# Patient Record
Sex: FEMALE | Race: WHITE | URBAN - METROPOLITAN AREA
[De-identification: names, ages, dates, MRNs, and addresses within clinical notes are randomized per-mention and may not be internally consistent; named-entity substitution may affect disease eponyms.]

---

## 2018-12-01 ENCOUNTER — EMERGENCY (EMERGENCY)
Facility: HOSPITAL | Age: 68
LOS: 0 days | Discharge: HOME | End: 2018-12-02
Attending: FAMILY MEDICINE | Admitting: FAMILY MEDICINE
Payer: MEDICARE

## 2018-12-01 VITALS
HEART RATE: 55 BPM | TEMPERATURE: 98 F | WEIGHT: 110.01 LBS | DIASTOLIC BLOOD PRESSURE: 61 MMHG | HEIGHT: 61 IN | SYSTOLIC BLOOD PRESSURE: 154 MMHG | RESPIRATION RATE: 20 BRPM

## 2018-12-01 DIAGNOSIS — N93.9 ABNORMAL UTERINE AND VAGINAL BLEEDING, UNSPECIFIED: ICD-10-CM

## 2018-12-01 DIAGNOSIS — F01.50 VASCULAR DEMENTIA WITHOUT BEHAVIORAL DISTURBANCE: ICD-10-CM

## 2018-12-01 DIAGNOSIS — Z79.899 OTHER LONG TERM (CURRENT) DRUG THERAPY: ICD-10-CM

## 2018-12-01 DIAGNOSIS — N93.8 OTHER SPECIFIED ABNORMAL UTERINE AND VAGINAL BLEEDING: ICD-10-CM

## 2018-12-01 DIAGNOSIS — N39.0 URINARY TRACT INFECTION, SITE NOT SPECIFIED: ICD-10-CM

## 2018-12-01 DIAGNOSIS — B96.1 KLEBSIELLA PNEUMONIAE [K. PNEUMONIAE] AS THE CAUSE OF DISEASES CLASSIFIED ELSEWHERE: ICD-10-CM

## 2018-12-01 DIAGNOSIS — M33.13 OTHER DERMATOMYOSITIS WITHOUT MYOPATHY: ICD-10-CM

## 2018-12-01 LAB
APPEARANCE UR: ABNORMAL
BACTERIA # UR AUTO: ABNORMAL
BASOPHILS # BLD AUTO: 0.04 K/UL — SIGNIFICANT CHANGE UP (ref 0–0.2)
BASOPHILS NFR BLD AUTO: 0.5 % — SIGNIFICANT CHANGE UP (ref 0–2)
BILIRUB UR-MCNC: NEGATIVE — SIGNIFICANT CHANGE UP
COLOR SPEC: ABNORMAL
DIFF PNL FLD: ABNORMAL
EOSINOPHIL # BLD AUTO: 0.46 K/UL — SIGNIFICANT CHANGE UP (ref 0–0.5)
EOSINOPHIL NFR BLD AUTO: 5.5 % — SIGNIFICANT CHANGE UP (ref 0–6)
EPI CELLS # UR: SIGNIFICANT CHANGE UP
GLUCOSE UR QL: NEGATIVE MG/DL — SIGNIFICANT CHANGE UP
HCT VFR BLD CALC: 41 % — SIGNIFICANT CHANGE UP (ref 34.5–45)
HGB BLD-MCNC: 13 G/DL — SIGNIFICANT CHANGE UP (ref 11.5–15.5)
IMM GRANULOCYTES NFR BLD AUTO: 0.4 % — SIGNIFICANT CHANGE UP (ref 0–1.5)
KETONES UR-MCNC: NEGATIVE — SIGNIFICANT CHANGE UP
LEUKOCYTE ESTERASE UR-ACNC: NEGATIVE — SIGNIFICANT CHANGE UP
LYMPHOCYTES # BLD AUTO: 2.37 K/UL — SIGNIFICANT CHANGE UP (ref 1–3.3)
LYMPHOCYTES # BLD AUTO: 28.6 % — SIGNIFICANT CHANGE UP (ref 13–44)
MCHC RBC-ENTMCNC: 31.3 PG — SIGNIFICANT CHANGE UP (ref 27–34)
MCHC RBC-ENTMCNC: 31.7 GM/DL — LOW (ref 32–36)
MCV RBC AUTO: 98.8 FL — SIGNIFICANT CHANGE UP (ref 80–100)
MONOCYTES # BLD AUTO: 0.62 K/UL — SIGNIFICANT CHANGE UP (ref 0–0.9)
MONOCYTES NFR BLD AUTO: 7.5 % — SIGNIFICANT CHANGE UP (ref 2–14)
NEUTROPHILS # BLD AUTO: 4.77 K/UL — SIGNIFICANT CHANGE UP (ref 1.8–7.4)
NEUTROPHILS NFR BLD AUTO: 57.5 % — SIGNIFICANT CHANGE UP (ref 43–77)
NITRITE UR-MCNC: POSITIVE
NRBC # BLD: 0 /100 WBCS — SIGNIFICANT CHANGE UP (ref 0–0)
PH UR: 5 — SIGNIFICANT CHANGE UP (ref 5–8)
PLATELET # BLD AUTO: 189 K/UL — SIGNIFICANT CHANGE UP (ref 150–400)
PROT UR-MCNC: 30 MG/DL
RBC # BLD: 4.15 M/UL — SIGNIFICANT CHANGE UP (ref 3.8–5.2)
RBC # FLD: 13.3 % — SIGNIFICANT CHANGE UP (ref 10.3–14.5)
RBC CASTS # UR COMP ASSIST: >50 /HPF (ref 0–4)
SP GR SPEC: 1.02 — SIGNIFICANT CHANGE UP (ref 1.01–1.02)
UROBILINOGEN FLD QL: NEGATIVE MG/DL — SIGNIFICANT CHANGE UP
WBC # BLD: 8.29 K/UL — SIGNIFICANT CHANGE UP (ref 3.8–10.5)
WBC # FLD AUTO: 8.29 K/UL — SIGNIFICANT CHANGE UP (ref 3.8–10.5)
WBC UR QL: SIGNIFICANT CHANGE UP

## 2018-12-01 PROCEDURE — 99285 EMERGENCY DEPT VISIT HI MDM: CPT | Mod: 25

## 2018-12-01 NOTE — ED PROVIDER NOTE - OBJECTIVE STATEMENT
69 y/o female with PMHx of HTN, dermatomyositis (gets IVIG infusions once a month), vascular dementia presents to the ED sent from [facility] [place] regarding vaginal bleeding. Per pt's son who is her healthcare proxy. Stacya called him yesterday and relayed that pt was experiencing vaginal bleeding. Denies pain anywhere, lightheadedness. NKDA. 67 y/o female with PMHx of HTN, dermatomyositis (gets IVIG infusions once a month), vascular dementia presents to the ED sent from [facility] at [place] regarding vaginal bleeding. Per pt's son who is her healthcare proxy, [facility] called him yesterday and relayed that pt was experiencing vaginal bleeding. Denies pain anywhere, lightheadedness. NKDA.

## 2018-12-01 NOTE — ED PROVIDER NOTE - PROGRESS NOTE DETAILS
Carol AMIN for ED attending, Dr. Saldivar: Stool guaiac performed by Dr. Saldivar. Lot #: [id number] ; Expiration: 3/31/19 ; Result: Positive Negative ; QC- reactive. Carol AMIN for ED attending, Dr. Saldivar: Stool guaiac performed by Dr. Saldivar. Lot #: [id number] ; Expiration: 6/30/19 ; Result: slight heme positive ; QC- reactive. Brown stool. Carol AMIN for ED attending, Dr. Saldivar: Vaginal exam performed by Dr. Saldivar. Slight discharge, no active bleeding noted.

## 2018-12-02 VITALS
HEART RATE: 57 BPM | OXYGEN SATURATION: 100 % | DIASTOLIC BLOOD PRESSURE: 74 MMHG | SYSTOLIC BLOOD PRESSURE: 153 MMHG | RESPIRATION RATE: 19 BRPM

## 2018-12-02 LAB
ABO RH CONFIRMATION: SIGNIFICANT CHANGE UP
ALBUMIN SERPL ELPH-MCNC: 3.4 G/DL — SIGNIFICANT CHANGE UP (ref 3.3–5)
ALP SERPL-CCNC: 91 U/L — SIGNIFICANT CHANGE UP (ref 40–120)
ALT FLD-CCNC: 24 U/L — SIGNIFICANT CHANGE UP (ref 12–78)
ANION GAP SERPL CALC-SCNC: 9 MMOL/L — SIGNIFICANT CHANGE UP (ref 5–17)
APTT BLD: 28 SEC — SIGNIFICANT CHANGE UP (ref 27.5–36.3)
AST SERPL-CCNC: 22 U/L — SIGNIFICANT CHANGE UP (ref 15–37)
BILIRUB SERPL-MCNC: 0.3 MG/DL — SIGNIFICANT CHANGE UP (ref 0.2–1.2)
BLD GP AB SCN SERPL QL: SIGNIFICANT CHANGE UP
BUN SERPL-MCNC: 13 MG/DL — SIGNIFICANT CHANGE UP (ref 7–23)
CALCIUM SERPL-MCNC: 8.8 MG/DL — SIGNIFICANT CHANGE UP (ref 8.5–10.1)
CHLORIDE SERPL-SCNC: 109 MMOL/L — HIGH (ref 96–108)
CO2 SERPL-SCNC: 27 MMOL/L — SIGNIFICANT CHANGE UP (ref 22–31)
CREAT SERPL-MCNC: 0.66 MG/DL — SIGNIFICANT CHANGE UP (ref 0.5–1.3)
GLUCOSE SERPL-MCNC: 81 MG/DL — SIGNIFICANT CHANGE UP (ref 70–99)
INR BLD: 1.03 RATIO — SIGNIFICANT CHANGE UP (ref 0.88–1.16)
POTASSIUM SERPL-MCNC: 3.3 MMOL/L — LOW (ref 3.5–5.3)
POTASSIUM SERPL-SCNC: 3.3 MMOL/L — LOW (ref 3.5–5.3)
PROT SERPL-MCNC: 7.9 GM/DL — SIGNIFICANT CHANGE UP (ref 6–8.3)
PROTHROM AB SERPL-ACNC: 11.5 SEC — SIGNIFICANT CHANGE UP (ref 10–12.9)
SODIUM SERPL-SCNC: 145 MMOL/L — SIGNIFICANT CHANGE UP (ref 135–145)
TYPE + AB SCN PNL BLD: SIGNIFICANT CHANGE UP

## 2018-12-02 RX ORDER — CEFTRIAXONE 500 MG/1
1000 INJECTION, POWDER, FOR SOLUTION INTRAMUSCULAR; INTRAVENOUS ONCE
Qty: 0 | Refills: 0 | Status: COMPLETED | OUTPATIENT
Start: 2018-12-02 | End: 2018-12-02

## 2018-12-02 RX ADMIN — CEFTRIAXONE 1000 MILLIGRAM(S): 500 INJECTION, POWDER, FOR SOLUTION INTRAMUSCULAR; INTRAVENOUS at 00:55

## 2018-12-02 NOTE — ED ADULT NURSE NOTE - OBJECTIVE STATEMENT
Patient presents to ED complaining of vaginal bleeding. Patient soming from [facility], A&0x1, son at bedside. Patient unable to answer questions regarding current medical status. Son states he was contacted by NH Patient presents to ED complaining of vaginal bleeding. Patient coming from [facility], A&0x1, son at bedside. Patient unable to answer questions regarding current medical status. Son states he was contacted by NH. Patient denies pain, dizziness, CP, SOB, NVD

## 2018-12-02 NOTE — ED ADULT NURSE NOTE - NSIMPLEMENTINTERV_GEN_ALL_ED
Implemented All Fall with Harm Risk Interventions:  [place] to call system. Call bell, personal items and telephone within reach. Instruct patient to call for assistance. Room bathroom lighting operational. Non-slip footwear when patient is off stretcher. Physically safe environment: no spills, clutter or unnecessary equipment. Stretcher in lowest position, wheels locked, appropriate side rails in place. Provide visual cue, wrist band, yellow gown, etc. Monitor gait and stability. Monitor for mental status changes and reorient to person, place, and time. Review medications for side effects contributing to fall risk. Reinforce activity limits and safety measures with patient and family. Provide visual clues: red socks.

## 2018-12-05 NOTE — ED POST DISCHARGE NOTE - DETAILS
left message for pt's son to call back ED  Rhoda Guillermo PA-C Spoke with nurse at [facility] regarding postive UC. Will treat with cirpo. Discussed close PMD FU, return precatutions. -Wes Oakley PA-C

## 2018-12-06 RX ORDER — MOXIFLOXACIN HYDROCHLORIDE TABLETS, 400 MG 400 MG/1
1 TABLET, FILM COATED ORAL
Qty: 14 | Refills: 0
Start: 2018-12-06 | End: 2018-12-12

## 2019-03-28 ENCOUNTER — EMERGENCY (EMERGENCY)
Facility: HOSPITAL | Age: 69
LOS: 1 days | Discharge: HOME | End: 2019-03-28
Attending: EMERGENCY MEDICINE | Admitting: STUDENT IN AN ORGANIZED HEALTH CARE EDUCATION/TRAINING PROGRAM
Payer: MEDICARE

## 2019-03-28 VITALS
RESPIRATION RATE: 16 BRPM | HEART RATE: 64 BPM | DIASTOLIC BLOOD PRESSURE: 78 MMHG | OXYGEN SATURATION: 99 % | TEMPERATURE: 98 F | SYSTOLIC BLOOD PRESSURE: 101 MMHG

## 2019-03-28 VITALS — WEIGHT: 139.99 LBS

## 2019-03-28 DIAGNOSIS — Z79.899 OTHER LONG TERM (CURRENT) DRUG THERAPY: ICD-10-CM

## 2019-03-28 DIAGNOSIS — I10 ESSENTIAL (PRIMARY) HYPERTENSION: ICD-10-CM

## 2019-03-28 DIAGNOSIS — Z00.00 ENCOUNTER FOR GENERAL ADULT MEDICAL EXAMINATION WITHOUT ABNORMAL FINDINGS: ICD-10-CM

## 2019-03-28 DIAGNOSIS — M33.10 OTHER DERMATOMYOSITIS, ORGAN INVOLVEMENT UNSPECIFIED: ICD-10-CM

## 2019-03-28 DIAGNOSIS — F01.50 VASCULAR DEMENTIA WITHOUT BEHAVIORAL DISTURBANCE: ICD-10-CM

## 2019-03-28 DIAGNOSIS — E87.6 HYPOKALEMIA: ICD-10-CM

## 2019-03-28 PROBLEM — M33.90 DERMATOPOLYMYOSITIS, UNSPECIFIED, ORGAN INVOLVEMENT UNSPECIFIED: Chronic | Status: ACTIVE | Noted: 2018-12-02

## 2019-03-28 LAB
ADD ON TEST-SPECIMEN IN LAB: SIGNIFICANT CHANGE UP
ALBUMIN SERPL ELPH-MCNC: 3.5 G/DL — SIGNIFICANT CHANGE UP (ref 3.3–5)
ALP SERPL-CCNC: 77 U/L — SIGNIFICANT CHANGE UP (ref 40–120)
ALT FLD-CCNC: 23 U/L — SIGNIFICANT CHANGE UP (ref 12–78)
ANION GAP SERPL CALC-SCNC: 8 MMOL/L — SIGNIFICANT CHANGE UP (ref 5–17)
AST SERPL-CCNC: 24 U/L — SIGNIFICANT CHANGE UP (ref 15–37)
BASOPHILS # BLD AUTO: 0.05 K/UL — SIGNIFICANT CHANGE UP (ref 0–0.2)
BASOPHILS NFR BLD AUTO: 0.7 % — SIGNIFICANT CHANGE UP (ref 0–2)
BILIRUB SERPL-MCNC: 0.2 MG/DL — SIGNIFICANT CHANGE UP (ref 0.2–1.2)
BUN SERPL-MCNC: 16 MG/DL — SIGNIFICANT CHANGE UP (ref 7–23)
CALCIUM SERPL-MCNC: 8.7 MG/DL — SIGNIFICANT CHANGE UP (ref 8.5–10.1)
CHLORIDE SERPL-SCNC: 104 MMOL/L — SIGNIFICANT CHANGE UP (ref 96–108)
CK SERPL-CCNC: 61 U/L — SIGNIFICANT CHANGE UP (ref 26–192)
CO2 SERPL-SCNC: 27 MMOL/L — SIGNIFICANT CHANGE UP (ref 22–31)
CREAT SERPL-MCNC: 0.89 MG/DL — SIGNIFICANT CHANGE UP (ref 0.5–1.3)
EOSINOPHIL # BLD AUTO: 0.24 K/UL — SIGNIFICANT CHANGE UP (ref 0–0.5)
EOSINOPHIL NFR BLD AUTO: 3.2 % — SIGNIFICANT CHANGE UP (ref 0–6)
ERYTHROCYTE [SEDIMENTATION RATE] IN BLOOD: 76 MM/HR — HIGH (ref 0–20)
GLUCOSE SERPL-MCNC: 142 MG/DL — HIGH (ref 70–99)
HCT VFR BLD CALC: 37.1 % — SIGNIFICANT CHANGE UP (ref 34.5–45)
HGB BLD-MCNC: 11.9 G/DL — SIGNIFICANT CHANGE UP (ref 11.5–15.5)
IMM GRANULOCYTES NFR BLD AUTO: 0.4 % — SIGNIFICANT CHANGE UP (ref 0–1.5)
LYMPHOCYTES # BLD AUTO: 1.67 K/UL — SIGNIFICANT CHANGE UP (ref 1–3.3)
LYMPHOCYTES # BLD AUTO: 22.1 % — SIGNIFICANT CHANGE UP (ref 13–44)
MCHC RBC-ENTMCNC: 32.1 GM/DL — SIGNIFICANT CHANGE UP (ref 32–36)
MCHC RBC-ENTMCNC: 32.2 PG — SIGNIFICANT CHANGE UP (ref 27–34)
MCV RBC AUTO: 100.5 FL — HIGH (ref 80–100)
MONOCYTES # BLD AUTO: 0.45 K/UL — SIGNIFICANT CHANGE UP (ref 0–0.9)
MONOCYTES NFR BLD AUTO: 6 % — SIGNIFICANT CHANGE UP (ref 2–14)
NEUTROPHILS # BLD AUTO: 5.12 K/UL — SIGNIFICANT CHANGE UP (ref 1.8–7.4)
NEUTROPHILS NFR BLD AUTO: 67.6 % — SIGNIFICANT CHANGE UP (ref 43–77)
NRBC # BLD: 0 /100 WBCS — SIGNIFICANT CHANGE UP (ref 0–0)
PLATELET # BLD AUTO: 218 K/UL — SIGNIFICANT CHANGE UP (ref 150–400)
POTASSIUM SERPL-MCNC: 3.3 MMOL/L — LOW (ref 3.5–5.3)
POTASSIUM SERPL-SCNC: 3.3 MMOL/L — LOW (ref 3.5–5.3)
PROT SERPL-MCNC: 8.4 GM/DL — HIGH (ref 6–8.3)
RBC # BLD: 3.69 M/UL — LOW (ref 3.8–5.2)
RBC # FLD: 14.6 % — HIGH (ref 10.3–14.5)
SODIUM SERPL-SCNC: 139 MMOL/L — SIGNIFICANT CHANGE UP (ref 135–145)
TSH SERPL-MCNC: 3.44 UU/ML — SIGNIFICANT CHANGE UP (ref 0.34–4.82)
WBC # BLD: 7.56 K/UL — SIGNIFICANT CHANGE UP (ref 3.8–10.5)
WBC # FLD AUTO: 7.56 K/UL — SIGNIFICANT CHANGE UP (ref 3.8–10.5)

## 2019-03-28 PROCEDURE — 99284 EMERGENCY DEPT VISIT MOD MDM: CPT

## 2019-03-28 RX ORDER — POTASSIUM CHLORIDE 20 MEQ
20 PACKET (EA) ORAL ONCE
Qty: 0 | Refills: 0 | Status: COMPLETED | OUTPATIENT
Start: 2019-03-28 | End: 2019-03-28

## 2019-03-28 RX ORDER — SODIUM CHLORIDE 9 MG/ML
500 INJECTION INTRAMUSCULAR; INTRAVENOUS; SUBCUTANEOUS ONCE
Qty: 0 | Refills: 0 | Status: COMPLETED | OUTPATIENT
Start: 2019-03-28 | End: 2019-03-28

## 2019-03-28 RX ADMIN — SODIUM CHLORIDE 500 MILLILITER(S): 9 INJECTION INTRAMUSCULAR; INTRAVENOUS; SUBCUTANEOUS at 16:53

## 2019-03-28 RX ADMIN — Medication 20 MILLIEQUIVALENT(S): at 18:30

## 2019-03-28 NOTE — ED PROVIDER NOTE - NSFOLLOWUPINSTRUCTIONS_ED_ALL_ED_FT
1) Follow up with your doctor and bring these results.   2) Return to the ER immediately for new or worsening symptoms including passing out, chest pain, or other issues.   3) continue all medications as prescribed.

## 2019-03-28 NOTE — ED PROVIDER NOTE - CARE PLAN
Principal Discharge DX:	Dermatomyositis  Secondary Diagnosis:	Hypokalemia  Secondary Diagnosis:	Vascular dementia

## 2019-03-28 NOTE — ED PROVIDER NOTE - CLINICAL SUMMARY MEDICAL DECISION MAKING FREE TEXT BOX
69 y/o F with hx of Dermatomyositis, Vascular Dementia, HTN presenting to ED for blood work. Will get lab, hydrate, and reassess.

## 2019-03-28 NOTE — ED ADULT TRIAGE NOTE - CHIEF COMPLAINT QUOTE
Son states that pt is to received IVIG infusionsmonthly and requries bloodwork prior to infusion. son states lab ahs been unable to obtain lab work 3 times, due to pt being difficult stick.  son advised to go to ED for pt to obtain lab work. pt feeling well at baseline, vomitted 1 time in car after eatting

## 2019-03-28 NOTE — ED PROVIDER NOTE - PROGRESS NOTE DETAILS
Warren STEWARD - patient finished fluids, and pulled own IV. will print copy of results to patient and dc home. Patient and sone bedside agree with plan.

## 2019-03-28 NOTE — ED PROVIDER NOTE - OBJECTIVE STATEMENT
69 y/o F with PMHx of Dermatomyositis, HTN, and Vascular Dementia presenting to the ED from Nursing Home for medical evaluation, blood work. Son reports that pt has been receiving monthly IVIG infusions for dermatomyositis. Pt was receiving these infusions from Rheumatologist Dr. Russo and [place] but is scheduled to receive her next infusion from Dr. Kern at [place]. Son states that pt requires blood work prior to receiving IVIG infusion at Dr. Kern's office but multiple attempts to obtain labs as outpatient have been unsuccessful due to pt being a difficult stick. Son brought pt into ED to obtain this blood work. Pt with no complaints at this time. NKDA. Unable to obtain full HPI secondary to pt's hx of Dementia.

## 2019-03-28 NOTE — ED PROVIDER NOTE - NEUROLOGICAL, MLM
Alert. No focal deficits, no motor or sensory deficits. Cranial Nerves II-XII intact. Normal speech.

## 2019-03-28 NOTE — ED PROVIDER NOTE - ATTENDING CONTRIBUTION TO CARE
Dr. Carter: I have personally performed a face to face bedside history and physical examination of this patient. I have discussed the history, examination, review of systems, assessment and plan of management with the resident. I have reviewed the electronic medical record and amended it to reflect my history, review of systems, physical exam, assessment and plan.

## 2019-03-28 NOTE — ED STATDOCS - PROGRESS NOTE DETAILS
Carol Gonzalez for attending Dr. Blackburn: Pt's son instructed specialized labs are not done in ED.

## 2019-03-28 NOTE — ED STATDOCS - OBJECTIVE STATEMENT
67 y/o female with a PMHx of dermatomyositis, HTN, vascular dementia presents to the ED for blood work. As per son, pt receives IVIG infusions monthly and requires blood work prior to infusion. Son states that they are unable to obtain labs due to pt being a difficult stick. Pt vomited once PTA. No other complaints at this time.

## 2019-03-29 LAB
CRP SERPL-MCNC: 0.45 MG/DL — HIGH (ref 0–0.4)
T3 SERPL-MCNC: 77 NG/DL — LOW (ref 80–200)
T4 AB SER-ACNC: 5.5 UG/DL — SIGNIFICANT CHANGE UP (ref 4.6–12)

## 2019-04-09 ENCOUNTER — EMERGENCY (EMERGENCY)
Facility: HOSPITAL | Age: 69
LOS: 0 days | Discharge: HOME | End: 2019-04-09
Attending: EMERGENCY MEDICINE | Admitting: EMERGENCY MEDICINE
Payer: MEDICARE

## 2019-04-09 VITALS
HEART RATE: 59 BPM | RESPIRATION RATE: 18 BRPM | TEMPERATURE: 98 F | SYSTOLIC BLOOD PRESSURE: 118 MMHG | OXYGEN SATURATION: 100 % | DIASTOLIC BLOOD PRESSURE: 62 MMHG

## 2019-04-09 VITALS — TEMPERATURE: 98 F

## 2019-04-09 DIAGNOSIS — Z87.2 PERSONAL HISTORY OF DISEASES OF THE SKIN AND SUBCUTANEOUS TISSUE: ICD-10-CM

## 2019-04-09 DIAGNOSIS — S09.90XA UNSPECIFIED INJURY OF HEAD, INITIAL ENCOUNTER: ICD-10-CM
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
## 2019-04-09 DIAGNOSIS — F32.9 MAJOR DEPRESSIVE DISORDER, SINGLE EPISODE, UNSPECIFIED: ICD-10-CM

## 2019-04-09 DIAGNOSIS — Z79.899 OTHER LONG TERM (CURRENT) DRUG THERAPY: ICD-10-CM
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
## 2019-04-09 DIAGNOSIS — F01.50 VASCULAR DEMENTIA WITHOUT BEHAVIORAL DISTURBANCE: ICD-10-CM

## 2019-04-09 LAB
APTT BLD: 28 SEC — SIGNIFICANT CHANGE UP (ref 27.5–36.3)
INR BLD: 0.98 RATIO — SIGNIFICANT CHANGE UP (ref 0.88–1.16)
PROTHROM AB SERPL-ACNC: 10.9 SEC — SIGNIFICANT CHANGE UP (ref 10–12.9)

## 2019-04-09 PROCEDURE — 73502 X-RAY EXAM HIP UNI 2-3 VIEWS: CPT | Mod: 26,LT

## 2019-04-09 PROCEDURE — 73590 X-RAY EXAM OF LOWER LEG: CPT | Mod: 26,LT

## 2019-04-09 PROCEDURE — 99284 EMERGENCY DEPT VISIT MOD MDM: CPT

## 2019-04-09 PROCEDURE — 72125 CT NECK SPINE W/O DYE: CPT | Mod: 26

## 2019-04-09 PROCEDURE — 72170 X-RAY EXAM OF PELVIS: CPT | Mod: 26,59

## 2019-04-09 PROCEDURE — 76376 3D RENDER W/INTRP POSTPROCES: CPT | Mod: 26

## 2019-04-09 PROCEDURE — 72192 CT PELVIS W/O DYE: CPT | Mod: 26

## 2019-04-09 PROCEDURE — 73552 X-RAY EXAM OF FEMUR 2/>: CPT | Mod: 26,LT

## 2019-04-09 PROCEDURE — 70450 CT HEAD/BRAIN W/O DYE: CPT | Mod: 26

## 2019-04-09 PROCEDURE — 71045 X-RAY EXAM CHEST 1 VIEW: CPT | Mod: 26

## 2019-04-09 NOTE — ED PROVIDER NOTE - PHYSICAL EXAMINATION
***GEN - NAD; well appearing; not alert and oriented   ***PULMONARY - CTA b/l, symmetric breath sounds.   ***CARDIAC -s1s2, RRR, no M,G,R  ***ABDOMEN - ND, NT, soft, no guarding, no rebound, no carina's   ***SKIN - no rash. +old bruise right forearm, old abrasion right shin.   ***NEUROLOGIC - not alert and oriented sensation nl, motor nl,   ***PSYCH - not alert and oriented  +left shin mild TTP

## 2019-04-09 NOTE — ED PROVIDER NOTE - OBJECTIVE STATEMENT
67 y/o female with a PMHx of vascular dementia, HTN, dermatomyositis presents to the ED from [facility] s/p fall this morning. Pt does not know where she is or what year it is, does not know why she came to the hospital. Denies any pain. History limited secondary to pt dementia. 67 y/o female with a PMHx of vascular dementia, HTN, dermatomyositis presents to the ED from [facility] s/p fall this morning. Pt does not know where she is or what year it is, does not know why she came to the hospital. Denies any pain. History limited secondary to pt dementia.  Per chart pt reached for walker and fell.

## 2019-04-09 NOTE — ED ADULT NURSE NOTE - OBJECTIVE STATEMENT
pt alert, disoriented, pt is 69 yo presents to er for witnessed fall, denies loc, not on blood thinners.  pt trying to stand up from sitting position, pivoted and tried to reach for the walker which was on the side of the chair, lost balance, and fell.  as per ems, confusion is her baseline mental status.  pt denies any pain, dizziness, NV, no s/sx of distress noted.  pt placed iso for contact - ESBL upon arrival.

## 2019-04-09 NOTE — ED PROVIDER NOTE - NS_ ATTENDINGSCRIBEDETAILS _ED_A_ED_FT
The scribe's documentation has been prepared under my direction and personally reviewed by me in its entirety. I confirm that the note above accurately reflects all work, treatment, procedures, and medical decision-making performed by me.  Praneeth Morfin MD

## 2019-04-09 NOTE — ED ADULT TRIAGE NOTE - CHIEF COMPLAINT QUOTE
fall with head injury. per EMS patient is on coumadin. hx from EMS patient is poor historian. trauma alert called in triage.

## 2019-04-09 NOTE — ED ADULT NURSE NOTE - NSIMPLEMENTINTERV_GEN_ALL_ED
Implemented All Fall Risk Interventions:  [place] to call system. Call bell, personal items and telephone within reach. Instruct patient to call for assistance. Room bathroom lighting operational. Non-slip footwear when patient is off stretcher. Physically safe environment: no spills, clutter or unnecessary equipment. Stretcher in lowest position, wheels locked, appropriate side rails in place. Provide visual cue, wrist band, yellow gown, etc. Monitor gait and stability. Monitor for mental status changes and reorient to person, place, and time. Review medications for side effects contributing to fall risk. Reinforce activity limits and safety measures with patient and family.

## 2019-04-09 NOTE — ED ADULT NURSE NOTE - CHPI ED NUR SYMPTOMS NEG
no abrasion/no tingling/no vomiting/no deformity/no loss of consciousness/no fever/no bleeding/no confusion/no numbness/no weakness

## 2019-04-09 NOTE — ED PROVIDER NOTE - CLINICAL SUMMARY MEDICAL DECISION MAKING FREE TEXT BOX
Pt per medical records s/p fall today after standing and tripping over object on ground. Pt with no complaints. Noted mild left shin TTP, no deformity or overlying bruising. Per initial RN note pt on coumadin, however on further discussion with EMS and nursing home notes, pt not on anticoagulation. Will evaluate for traumatic injury with CT and x-ray.

## 2019-04-09 NOTE — ED PROVIDER NOTE - PROGRESS NOTE DETAILS
No acute fx on CT, pt remains well appearing and to baseline.  stable for dc. No acute fx on CT, pt remains well appearing and to baseline.  d/w son regarding CT findings and possible NPH.  Is aware and has fu scheduled w/ neurology tomorrow.  stable for dc. No acute fx on CT, pt remains well appearing and to baseline.  d/w son regarding CT findings and possible NPH.  Is already aware and has fu scheduled w/ neurology tomorrow.  stable for dc.

## 2019-06-18 PROBLEM — F32.9 MAJOR DEPRESSIVE DISORDER, SINGLE EPISODE, UNSPECIFIED: Chronic | Status: ACTIVE | Noted: 2019-04-10

## 2019-06-24 ENCOUNTER — INPATIENT (INPATIENT)
Facility: HOSPITAL | Age: 69
LOS: 1 days | Discharge: HOME | DRG: 884 | End: 2019-06-26
Attending: HOSPITALIST | Admitting: HOSPITALIST
Payer: MEDICARE

## 2019-06-24 VITALS
SYSTOLIC BLOOD PRESSURE: 135 MMHG | TEMPERATURE: 98 F | DIASTOLIC BLOOD PRESSURE: 77 MMHG | OXYGEN SATURATION: 98 % | RESPIRATION RATE: 18 BRPM | HEART RATE: 73 BPM

## 2019-06-24 DIAGNOSIS — R56.9 UNSPECIFIED CONVULSIONS: ICD-10-CM

## 2019-06-24 LAB
ANION GAP SERPL CALC-SCNC: 13 MMOL/L — SIGNIFICANT CHANGE UP (ref 5–17)
BUN SERPL-MCNC: 19 MG/DL — SIGNIFICANT CHANGE UP (ref 8–20)
CALCIUM SERPL-MCNC: 9.3 MG/DL — SIGNIFICANT CHANGE UP (ref 8.6–10.2)
CHLORIDE SERPL-SCNC: 102 MMOL/L — SIGNIFICANT CHANGE UP (ref 98–107)
CO2 SERPL-SCNC: 26 MMOL/L — SIGNIFICANT CHANGE UP (ref 22–29)
CREAT SERPL-MCNC: 0.64 MG/DL — SIGNIFICANT CHANGE UP (ref 0.5–1.3)
GLUCOSE SERPL-MCNC: 74 MG/DL — SIGNIFICANT CHANGE UP (ref 70–115)
HBA1C BLD-MCNC: 5.1 % — SIGNIFICANT CHANGE UP (ref 4–5.6)
HCT VFR BLD CALC: 39.6 % — SIGNIFICANT CHANGE UP (ref 37–47)
HGB BLD-MCNC: 12.3 G/DL — SIGNIFICANT CHANGE UP (ref 12–16)
MCHC RBC-ENTMCNC: 29.4 PG — SIGNIFICANT CHANGE UP (ref 27–31)
MCHC RBC-ENTMCNC: 31.1 G/DL — LOW (ref 32–36)
MCV RBC AUTO: 94.5 FL — SIGNIFICANT CHANGE UP (ref 81–99)
PLATELET # BLD AUTO: 185 K/UL — SIGNIFICANT CHANGE UP (ref 150–400)
POTASSIUM SERPL-MCNC: 3.7 MMOL/L — SIGNIFICANT CHANGE UP (ref 3.5–5.3)
POTASSIUM SERPL-SCNC: 3.7 MMOL/L — SIGNIFICANT CHANGE UP (ref 3.5–5.3)
PROLACTIN SERPL-MCNC: 8.7 NG/ML — SIGNIFICANT CHANGE UP (ref 3.4–24.1)
RBC # BLD: 4.19 M/UL — LOW (ref 4.4–5.2)
RBC # FLD: 15.9 % — HIGH (ref 11–15.6)
SODIUM SERPL-SCNC: 141 MMOL/L — SIGNIFICANT CHANGE UP (ref 135–145)
WBC # BLD: 6.9 K/UL — SIGNIFICANT CHANGE UP (ref 4.8–10.8)
WBC # FLD AUTO: 6.9 K/UL — SIGNIFICANT CHANGE UP (ref 4.8–10.8)

## 2019-06-24 PROCEDURE — 99223 1ST HOSP IP/OBS HIGH 75: CPT | Mod: AI

## 2019-06-24 RX ORDER — ENOXAPARIN SODIUM 100 MG/ML
40 INJECTION SUBCUTANEOUS EVERY 24 HOURS
Refills: 0 | Status: DISCONTINUED | OUTPATIENT
Start: 2019-06-24 | End: 2019-06-26

## 2019-06-24 RX ORDER — HYDROCHLOROTHIAZIDE 25 MG
12.5 TABLET ORAL DAILY
Refills: 0 | Status: DISCONTINUED | OUTPATIENT
Start: 2019-06-24 | End: 2019-06-26

## 2019-06-24 RX ORDER — METOPROLOL TARTRATE 50 MG
25 TABLET ORAL DAILY
Refills: 0 | Status: DISCONTINUED | OUTPATIENT
Start: 2019-06-24 | End: 2019-06-26

## 2019-06-24 RX ORDER — LOSARTAN POTASSIUM 100 MG/1
50 TABLET, FILM COATED ORAL DAILY
Refills: 0 | Status: DISCONTINUED | OUTPATIENT
Start: 2019-06-24 | End: 2019-06-26

## 2019-06-24 RX ORDER — LORATADINE 10 MG/1
10 TABLET ORAL DAILY
Refills: 0 | Status: DISCONTINUED | OUTPATIENT
Start: 2019-06-24 | End: 2019-06-26

## 2019-06-24 RX ORDER — ASPIRIN/CALCIUM CARB/MAGNESIUM 324 MG
81 TABLET ORAL DAILY
Refills: 0 | Status: DISCONTINUED | OUTPATIENT
Start: 2019-06-24 | End: 2019-06-26

## 2019-06-24 RX ORDER — ESCITALOPRAM OXALATE 10 MG/1
20 TABLET, FILM COATED ORAL DAILY
Refills: 0 | Status: DISCONTINUED | OUTPATIENT
Start: 2019-06-24 | End: 2019-06-26

## 2019-06-24 RX ADMIN — Medication 81 MILLIGRAM(S): at 16:38

## 2019-06-24 RX ADMIN — LOSARTAN POTASSIUM 50 MILLIGRAM(S): 100 TABLET, FILM COATED ORAL at 12:49

## 2019-06-24 RX ADMIN — Medication 25 MILLIGRAM(S): at 16:39

## 2019-06-24 RX ADMIN — ENOXAPARIN SODIUM 40 MILLIGRAM(S): 100 INJECTION SUBCUTANEOUS at 16:39

## 2019-06-24 RX ADMIN — LORATADINE 10 MILLIGRAM(S): 10 TABLET ORAL at 12:49

## 2019-06-24 RX ADMIN — ESCITALOPRAM OXALATE 20 MILLIGRAM(S): 10 TABLET, FILM COATED ORAL at 12:49

## 2019-06-24 RX ADMIN — Medication 12.5 MILLIGRAM(S): at 12:49

## 2019-06-24 NOTE — CONSULT NOTE ADULT - PROBLEM SELECTOR RECOMMENDATION 9
Patient with abnormal EEG and progressive memory loss. She is admitted for video EEG to assess for seizure activity.  She is on no antiseizure medication. Continue with all other medications. Blood panel as per protocol. Ativan for seizure as per protocol. Call if any seizures.

## 2019-06-24 NOTE — PATIENT PROFILE ADULT - FUNCTIONAL SCREEN CURRENT LEVEL: COMMUNICATION, MLM
0 = understands/communicates without difficulty Asthma  no inhaler use for at least 2 years  Hypothyroidism  first trimester was on synthroid then d/c  Spontaneous   x2

## 2019-06-24 NOTE — H&P ADULT - HISTORY OF PRESENT ILLNESS
69yr old female with PMH of dermatomyositis, dementia, HTN, HPL with worsening confusion, forgetfulness, bladder incontinence, Gait disturbances presents for 48hr Video EEG. She was sen in office by Neurologist, had work up done which showed abnormal EEG findings. denies any complaints currently. Does not remember her PMD name, watches TV all day, is not able to carry out any ADLs on he own and son has been assisting, lives at assisted living.

## 2019-06-24 NOTE — H&P ADULT - NSHPREVIEWOFSYSTEMS_GEN_ALL_CORE
Limited ROS 2/2 memory issues    Denies any pain   Denies any urinary difficulties or burning  denies any fever, cough

## 2019-06-24 NOTE — CONSULT NOTE ADULT - REASON FOR ADMISSION
progressive memory loss, abnormal EEG, fo rvideo EEG, assess for seizure progressive memory loss, abnormal EEG, for video EEG, assess for seizure

## 2019-06-24 NOTE — H&P ADULT - ASSESSMENT
69yr old female with PMH of dermatomyositis, dementia, HTN, HPL with worsening confusion, forgetfulness, bladder incontinence, Gait disturbances presents for 48hr Video EEG. She was sen in office by Neurologist, had work up done which showed abnormal EEG findings.    # Dementia with behavioural issues   abnormal EEG findings in Office - 48hr Video EEG per Neurology  Not on Any AEDs  High risk of sundowning, discussed with RN - will evaluate and increase level of Observation as needed, currently pt calm and co-operative  On Aricept - will ct    # Dermatomyositis - follows at [facility], gets IViG   - unknown when was the last dose    # HTN - ct ANti-HTN meds   # HPL - not on statin  # dm - records from Assisted living suggest she is diabetic but not on meds - Carb controlled diet for now, monitor sugars    # DVT px - Lovenox

## 2019-06-24 NOTE — H&P ADULT - NSHPPHYSICALEXAM_GEN_ALL_CORE
PHYSICAL EXAM:    GENERAL: NAD, well-groomed, well-developed  HEAD:  Atraumatic, Normocephalic  EYES: EOMI, PERRLA, conjunctiva and sclera clear  ENMT: ; Moist mucous membranes  NECK: Supple, No JVD  NERVOUS SYSTEM:  Alert & Oriented X1-2 at best. (does not know why she is here, where she lives currently) Good concentration;   CHEST/LUNG: Clear to percussion bilaterally; No rales, rhonchi  HEART: Regular rate and rhythm;  3/6 systolic murmur+  ABDOMEN: Soft, Nontender, Nondistended; Bowel sounds present  EXTREMITIES:   No clubbing, cyanosis, or edema  SKIN: No rashes or lesions

## 2019-06-24 NOTE — CONSULT NOTE ADULT - SUBJECTIVE AND OBJECTIVE BOX
HPI: Patient is a 69 year old right handed female with progressive memory loss, and gait disturbance, and abnormal EEG  admitted for 48 hours of video EEG to assess for seizure activity.     PAST MEDICAL & SURGICAL HISTORY:  Depression  Vascular dementia  Dermatomyositis  HTN (hypertension)      MEDICATIONS  (STANDING):  as listed on last office note.  No antiseizure medications    MEDICATIONS  (PRN):      Intolerances        SOCIAL HISTORY:   , right handed, high school graduate  FAMILY HISTORY:  Non contributory        Neurological Exam:  Patient is alert and oriented x 3. There is no aphasia or dysarthria. Follows complex commands with some pause. Vision is intact to confrontation.   Pupils are equal and reactive. Extra occular  muscles are intact. There is no facial droop or asymmetry. Tongue is midline.   Sensation is intact in the face. Other CN II-XII are intact.   On motor examination upper muscles are 5/5 and there is no pronator drift. Bilateral iliopsoas are 4/5, otherwise 5/6 lower extremities.  Sensory is intact to pinprick and touch. DTR are 1/4 all 4 extremities. Babinski is negative bilateral.       LABS:                RADIOLOGY & ADDITIONAL STUDIES: HPI: Patient is a 69 year old right handed female with progressive memory loss, and gait disturbance, and abnormal EEG  admitted for 48 hours of video EEG to assess for seizure activity.     PAST MEDICAL & SURGICAL HISTORY:  Depression  Vascular dementia  Dermatomyositis  HTN (hypertension)      MEDICATIONS  (STANDING):  as listed on last office note.  No antiseizure medications    MEDICATIONS  (PRN):      Intolerances        SOCIAL HISTORY:   , right handed, high school graduate  FAMILY HISTORY:  Non contributory        Neurological Exam:  Patient is alert and oriented to place , not time.  There is no aphasia or dysarthria. Follows complex commands with some pause. Vision is intact to confrontation.   Pupils are equal and reactive. Extra occular  muscles are intact. There is no facial droop or asymmetry. Tongue is midline.   Sensation is intact in the face. Other CN II-XII are intact.   On motor examination upper muscles are 5/5 and there is no pronator drift. Bilateral iliopsoas are 4/5, otherwise 5/6 lower extremities.  Sensory is intact to pinprick and touch. DTR are 1/4 all 4 extremities. Babinski is negative bilateral.       LABS:                RADIOLOGY & ADDITIONAL STUDIES:

## 2019-06-25 LAB
HCV AB S/CO SERPL IA: 0.13 S/CO — SIGNIFICANT CHANGE UP (ref 0–0.99)
HCV AB SERPL-IMP: SIGNIFICANT CHANGE UP

## 2019-06-25 PROCEDURE — 99232 SBSQ HOSP IP/OBS MODERATE 35: CPT

## 2019-06-25 RX ADMIN — ESCITALOPRAM OXALATE 20 MILLIGRAM(S): 10 TABLET, FILM COATED ORAL at 10:58

## 2019-06-25 RX ADMIN — ENOXAPARIN SODIUM 40 MILLIGRAM(S): 100 INJECTION SUBCUTANEOUS at 15:47

## 2019-06-25 RX ADMIN — Medication 81 MILLIGRAM(S): at 10:58

## 2019-06-25 RX ADMIN — Medication 25 MILLIGRAM(S): at 05:27

## 2019-06-25 RX ADMIN — Medication 12.5 MILLIGRAM(S): at 05:27

## 2019-06-25 RX ADMIN — LOSARTAN POTASSIUM 50 MILLIGRAM(S): 100 TABLET, FILM COATED ORAL at 05:28

## 2019-06-25 RX ADMIN — LORATADINE 10 MILLIGRAM(S): 10 TABLET ORAL at 10:58

## 2019-06-25 NOTE — PROGRESS NOTE ADULT - PROBLEM SELECTOR PLAN 1
Patient with abnormal EEG and progressive memory loss, here to assess for seizure activity. No seizures are noted. Patient's EEG/Video EEG shows background slowing but no epileptiform activity. Continue one-on-one and video EEG for one more night.

## 2019-06-25 NOTE — PROGRESS NOTE ADULT - ASSESSMENT
69yr old female with PMH of dermatomyositis, dementia, HTN, HPL with worsening confusion, forgetfulness, bladder incontinence, Gait disturbances presents for 48hr Video EEG. She was seen in office by Neurologist, had work up done which showed abnormal EEG findings.    # Dementia with behavioural issues   delirium overnight 2/2 sundowning.   abnormal EEG findings in Office - 48hr Video EEG protocol per Neurology  Not on Any AEDs  High risk of sundowning again, discussed with RN - on increased level of Observation, 1:1  On Aricept - will ct    # Dermatomyositis - follows at [facility], gets IViG   - unknown when was the last dose    # HTN - ct ANti-HTN meds   # HPL - not on statin  # dm - records from Assisted living suggest she is diabetic but not on meds - Carb controlled diet - A1C - 5.1 - Will not keep on SSI.     # DVT px - Lovenox

## 2019-06-26 ENCOUNTER — TRANSCRIPTION ENCOUNTER (OUTPATIENT)
Age: 69
End: 2019-06-26

## 2019-06-26 VITALS
SYSTOLIC BLOOD PRESSURE: 103 MMHG | TEMPERATURE: 98 F | RESPIRATION RATE: 18 BRPM | OXYGEN SATURATION: 95 % | DIASTOLIC BLOOD PRESSURE: 69 MMHG | HEART RATE: 61 BPM

## 2019-06-26 PROCEDURE — 99238 HOSP IP/OBS DSCHRG MGMT 30/<: CPT

## 2019-06-26 RX ADMIN — LOSARTAN POTASSIUM 50 MILLIGRAM(S): 100 TABLET, FILM COATED ORAL at 06:36

## 2019-06-26 RX ADMIN — Medication 12.5 MILLIGRAM(S): at 06:36

## 2019-06-26 RX ADMIN — Medication 81 MILLIGRAM(S): at 11:15

## 2019-06-26 RX ADMIN — Medication 25 MILLIGRAM(S): at 06:36

## 2019-06-26 RX ADMIN — LORATADINE 10 MILLIGRAM(S): 10 TABLET ORAL at 11:15

## 2019-06-26 RX ADMIN — ESCITALOPRAM OXALATE 20 MILLIGRAM(S): 10 TABLET, FILM COATED ORAL at 11:15

## 2019-06-26 NOTE — DISCHARGE NOTE PROVIDER - HOSPITAL COURSE
69yr old female with PMH of dermatomyositis, dementia, HTN, HPL with worsening confusion, forgetfulness, bladder incontinence, Gait disturbances presents for 48hr Video EEG. She was seen in office by Neurologist, had work up done which showed abnormal EEG findings.        # Dementia with behavioural issues     delirium overnight 2/2 sundowning requiring 1:1    48hr Video EEG protocol per Neurology was done -     Not on Any AEDs    On Aricept - will ct        # Dermatomyositis - follows at [facility], gets IViG     - unknown when was the last dose        # HTN - ct ANti-HTN meds     # HPL - not on statin    # dm - records from Assisted living suggest she is diabetic but not on meds - A1C - 5.1             Vital Signs Last 24 Hrs    T(C): 36.9 (26 Jun 2019 07:39), Max: 37 (25 Jun 2019 16:06)    T(F): 98.4 (26 Jun 2019 07:39), Max: 98.6 (25 Jun 2019 16:06)    HR: 61 (26 Jun 2019 07:39) (61 - 78)    BP: 103/69 (26 Jun 2019 07:39) (103/69 - 116/67)    BP(mean): --    RR: 18 (26 Jun 2019 07:39) (18 - 18)    SpO2: 95% (26 Jun 2019 07:39) (95% - 98%)        PHYSICAL EXAM:        GENERAL: NAD, well-groomed, well-developed    HEAD:  Atraumatic, Normocephalic    EYES: EOMI, PERRLA, conjunctiva and sclera clear    NECK: Supple, No JVD    NERVOUS SYSTEM:  Alert & Oriented X1-2 at best, Good concentration     CHEST/LUNG: Clear to percussion bilaterally; No rales, rhonchi,     HEART: Regular rate and rhythm; No murmurs    ABDOMEN: Soft, Nontender, Nondistended; Bowel sounds present    EXTREMITIES:  2+ Peripheral Pulses, No clubbing, cyanosis, or edema    SKIN: No rashes or lesions 69yr old female with PMH of dermatomyositis, dementia, HTN, HPL with worsening confusion, forgetfulness, bladder incontinence, Gait disturbances presents for 48hr Video EEG. She was seen in office by Neurologist, had work up done which showed abnormal EEG findings.        # Dementia with behavioural issues     delirium overnight 2/2 sundowning requiring 1:1    48hr Video EEG protocol per Neurology was done - did not show any seizure activity. neurology cleared for discharge.     Not on Any AEDs    On Aricept - will ct        # Dermatomyositis - follows at [facility], gets IViG     - unknown when was the last dose        # HTN - ct ANti-HTN meds     # HPL - not on statin    # dm - records from Assisted living suggest she is diabetic but not on meds - A1C - 5.1             Vital Signs Last 24 Hrs    T(C): 36.9 (26 Jun 2019 07:39), Max: 37 (25 Jun 2019 16:06)    T(F): 98.4 (26 Jun 2019 07:39), Max: 98.6 (25 Jun 2019 16:06)    HR: 61 (26 Jun 2019 07:39) (61 - 78)    BP: 103/69 (26 Jun 2019 07:39) (103/69 - 116/67)    BP(mean): --    RR: 18 (26 Jun 2019 07:39) (18 - 18)    SpO2: 95% (26 Jun 2019 07:39) (95% - 98%)        PHYSICAL EXAM:        GENERAL: NAD, well-groomed, well-developed    HEAD:  Atraumatic, Normocephalic    EYES: EOMI, PERRLA, conjunctiva and sclera clear    NECK: Supple, No JVD    NERVOUS SYSTEM:  Alert & Oriented X1-2 at best, Good concentration     CHEST/LUNG: Clear to percussion bilaterally; No rales, rhonchi,     HEART: Regular rate and rhythm; No murmurs    ABDOMEN: Soft, Nontender, Nondistended; Bowel sounds present    EXTREMITIES:  2+ Peripheral Pulses, No clubbing, cyanosis, or edema    SKIN: No rashes or lesions

## 2019-06-26 NOTE — DISCHARGE NOTE PROVIDER - CARE PROVIDER_API CALL
Rodrigo Waldrop (DO)  Neurology  [address] [place] [address]  Phone: [phone]  Fax: [phone]  Follow Up Time:

## 2019-06-26 NOTE — PROGRESS NOTE ADULT - SUBJECTIVE AND OBJECTIVE BOX
CHIEF COMPLAINT:  Memory loss, abnormal EEG, assess for seizures    INTERVAL HISTORY:  Patient was monitored with Video EEG for the second night.  No events were reported or recorded. Patient denies any events.        VITAL SIGNS:  Vital Signs Last 24 Hrs  T(C): 36.9 (26 Jun 2019 07:39), Max: 37 (25 Jun 2019 16:06)  T(F): 98.4 (26 Jun 2019 07:39), Max: 98.6 (25 Jun 2019 16:06)  HR: 61 (26 Jun 2019 07:39) (61 - 78)  BP: 103/69 (26 Jun 2019 07:39) (103/69 - 116/67)  BP(mean): --  RR: 18 (26 Jun 2019 07:39) (18 - 18)  SpO2: 95% (26 Jun 2019 07:39) (95% - 98%)      Neurological Exam:  Patient is alert and not  oriented to time. There is no aphasia or dysarthria. Follows  commands with prompting. Vision is intact to confrontation.   Pupils are equal and reactive. Extra occular  muscles are intact. There is no facial droop or asymmetry. Tongue is midline.   Sensation is intact in the face. Other CN II-XII are intact.   On motor examination all muscles are 5/5 except for bilateral iliopsoas which is 4/5.  Sensory is intact to pinprick and touch. DTR are 1/4 all 4 extremities. Babinski is negative bilateral.     On video EEG both nights there was no spikes or epileptiform activity or seizures. Posterior dominant rhythm and background is 5-6 HZ and consistent with mild-moderate generalized slowing which has non-specific etiology.     MEDS:  MEDICATIONS  (STANDING):  aspirin enteric coated 81 milliGRAM(s) Oral daily  enoxaparin Injectable 40 milliGRAM(s) SubCutaneous every 24 hours  escitalopram 20 milliGRAM(s) Oral daily  hydrochlorothiazide 12.5 milliGRAM(s) Oral daily  loratadine 10 milliGRAM(s) Oral daily  losartan 50 milliGRAM(s) Oral daily  metoprolol succinate ER 25 milliGRAM(s) Oral daily    MEDICATIONS  (PRN):  LORazepam   Injectable 2 milliGRAM(s) IV Push once PRN Seizure activity      LABS:                          12.3   6.9   )-----------( 185      ( 24 Jun 2019 13:21 )             39.6     06-24    141  |  102  |  19.0  ----------------------------<  74  3.7   |  26.0  |  0.64    Ca    9.3      24 Jun 2019 13:21            RADIOLOGY & ADDITIONAL STUDIES:      IMPRESSION & PLAN:
CHIEF COMPLAINT:  memory loss, abnormal EEG, assess for seizure    INTERVAL HISTORY:    Patient was monitored overnight with sleep deprivation. Leads needed to be adjusted in the afternoon and patient pulled out the leads at approximately 9 PM. She is on one-on-one.   Patient denies any seizures or episodes. The nursing staff do not report any events.      VITAL SIGNS:  Vital Signs Last 24 Hrs  T(C): 37 (25 Jun 2019 07:41), Max: 37 (25 Jun 2019 07:41)  T(F): 98.6 (25 Jun 2019 07:41), Max: 98.6 (25 Jun 2019 07:41)  HR: 64 (25 Jun 2019 07:41) (64 - 64)  BP: 123/61 (25 Jun 2019 07:41) (102/62 - 123/61)  BP(mean): --  RR: 19 (25 Jun 2019 07:41) (18 - 19)  SpO2: 97% (25 Jun 2019 07:41) (97% - 97%)      Neurological Exam:  Patient is alert and oriented to place and self but not year or month.  Follows commands, at times with some pause. . There is no aphasia or dysarthria.  Vision is intact to confrontation.   Pupils are equal and reactive. Extra occular  muscles are intact. There is no facial droop or asymmetry. Tongue is midline.   Sensation is intact in the face. Other CN II-XII are intact.   On motor examination all muscles are 5/5 , except mild decrease in iliopsoas at 4+/5 and there is no pronator drift. Sensory is intact to pinprick and touch. DTR are 1/4 all 4 extremities. Babinski is negative bilateral.     MEDS:  MEDICATIONS  (STANDING):  aspirin enteric coated 81 milliGRAM(s) Oral daily  enoxaparin Injectable 40 milliGRAM(s) SubCutaneous every 24 hours  escitalopram 20 milliGRAM(s) Oral daily  hydrochlorothiazide 12.5 milliGRAM(s) Oral daily  loratadine 10 milliGRAM(s) Oral daily  losartan 50 milliGRAM(s) Oral daily  metoprolol succinate ER 25 milliGRAM(s) Oral daily    MEDICATIONS  (PRN):  LORazepam   Injectable 2 milliGRAM(s) IV Push once PRN Seizure activity      LABS:                          12.3   6.9   )-----------( 185      ( 24 Jun 2019 13:21 )             39.6     06-24    141  |  102  |  19.0  ----------------------------<  74  3.7   |  26.0  |  0.64    Ca    9.3      24 Jun 2019 13:21            RADIOLOGY & ADDITIONAL STUDIES:      IMPRESSION & PLAN:
RAFAEL FALL    [id number]    69y      Female    CC: Admitted for video EEG to r/o Seizures  Offers no complaints, currently in Bed    INTERVAL HPI/OVERNIGHT EVENTS: per RN, pt was very disoriented overnight, ripped off the EEG leads.       REVIEW OF SYSTEMS:  Limited ROS 2/2 mental state ?reliability   denies any pain  denies any urinary issues.       Vital Signs Last 24 Hrs  T(C): 37 (25 Jun 2019 07:41), Max: 37 (25 Jun 2019 07:41)  T(F): 98.6 (25 Jun 2019 07:41), Max: 98.6 (25 Jun 2019 07:41)  HR: 64 (25 Jun 2019 07:41) (64 - 64)  BP: 123/61 (25 Jun 2019 07:41) (102/62 - 123/61)  BP(mean): --  RR: 19 (25 Jun 2019 07:41) (18 - 19)  SpO2: 97% (25 Jun 2019 07:41) (97% - 97%)    PHYSICAL EXAM:    GENERAL: NAD, well-groomed  HEENT: PERRL, +EOMI  NECK: soft, Supple  CHEST/LUNG: Clear to percussion bilaterally; No wheezing  HEART: S1S2+, Regular rate and rhythm; No murmurs  EXTREMITIES:   No clubbing, cyanosis, or edema  SKIN: No rashes or lesions  Neuro: Calm, alert but disoriented       LABS:                        12.3   6.9   )-----------( 185      ( 24 Jun 2019 13:21 )             39.6     06-24    141  |  102  |  19.0  ----------------------------<  74  3.7   |  26.0  |  0.64    Ca    9.3      24 Jun 2019 13:21              MEDICATIONS  (STANDING):  aspirin enteric coated 81 milliGRAM(s) Oral daily  enoxaparin Injectable 40 milliGRAM(s) SubCutaneous every 24 hours  escitalopram 20 milliGRAM(s) Oral daily  hydrochlorothiazide 12.5 milliGRAM(s) Oral daily  loratadine 10 milliGRAM(s) Oral daily  losartan 50 milliGRAM(s) Oral daily  metoprolol succinate ER 25 milliGRAM(s) Oral daily    MEDICATIONS  (PRN):  LORazepam   Injectable 2 milliGRAM(s) IV Push once PRN Seizure activity      RADIOLOGY & ADDITIONAL TESTS:

## 2019-06-26 NOTE — PROGRESS NOTE ADULT - REASON FOR ADMISSION
abnormal EEG findings in office in the setting of dementia

## 2019-06-26 NOTE — DISCHARGE NOTE NURSING/CASE MANAGEMENT/SOCIAL WORK - NSDCDPATPORTLINK_GEN_ALL_CORE
You can access the [facility] Portal, offered by [facility], by registering with the following website: [email or web address]

## 2019-06-26 NOTE — PROGRESS NOTE ADULT - PROBLEM SELECTOR PLAN 1
Patient with memory loss and an abnormal EEG admitted for 48 hours of intensive video EEG to assess for seizures. No seizures were captured and there is no epileptiform activity. EEG is consistent with generalized background slowing which has non-specific etiology. patient in on no anti-seizure medications . She will be discharged on medications she was taking at home.

## 2019-06-26 NOTE — DISCHARGE NOTE PROVIDER - NSDCCPCAREPLAN_GEN_ALL_CORE_FT
PRINCIPAL DISCHARGE DIAGNOSIS  Diagnosis: Delirium with dementia  Assessment and Plan of Treatment:       SECONDARY DISCHARGE DIAGNOSES  Diagnosis: Dermatomyositis  Assessment and Plan of Treatment:     Diagnosis: HTN (hypertension), benign  Assessment and Plan of Treatment:

## 2019-06-27 PROCEDURE — 36415 COLL VENOUS BLD VENIPUNCTURE: CPT

## 2019-06-27 PROCEDURE — 85027 COMPLETE CBC AUTOMATED: CPT

## 2019-06-27 PROCEDURE — 83036 HEMOGLOBIN GLYCOSYLATED A1C: CPT

## 2019-06-27 PROCEDURE — 95951: CPT

## 2019-06-27 PROCEDURE — 80048 BASIC METABOLIC PNL TOTAL CA: CPT

## 2019-06-27 PROCEDURE — 86803 HEPATITIS C AB TEST: CPT

## 2019-06-27 PROCEDURE — 84146 ASSAY OF PROLACTIN: CPT

## 2020-04-01 NOTE — ED ADULT TRIAGE NOTE - MEANS OF ARRIVAL
stretcher Valtrex Counseling: I discussed with the patient the risks of valacyclovir including but not limited to kidney damage, nausea, vomiting and severe allergy.  The patient understands that if the infection seems to be worsening or is not improving, they are to call.

## 2021-05-04 ENCOUNTER — EMERGENCY (EMERGENCY)
Facility: HOSPITAL | Age: 71
LOS: 0 days | Discharge: HOME | End: 2021-05-04
Attending: EMERGENCY MEDICINE
Payer: MEDICARE

## 2021-05-04 VITALS
TEMPERATURE: 98 F | SYSTOLIC BLOOD PRESSURE: 125 MMHG | HEART RATE: 69 BPM | OXYGEN SATURATION: 100 % | RESPIRATION RATE: 19 BRPM | WEIGHT: 145.06 LBS | HEIGHT: 72 IN | DIASTOLIC BLOOD PRESSURE: 48 MMHG

## 2021-05-04 VITALS
RESPIRATION RATE: 19 BRPM | HEART RATE: 66 BPM | OXYGEN SATURATION: 100 % | SYSTOLIC BLOOD PRESSURE: 144 MMHG | DIASTOLIC BLOOD PRESSURE: 49 MMHG | TEMPERATURE: 98 F

## 2021-05-04 DIAGNOSIS — R93.89 ABNORMAL FINDINGS ON DIAGNOSTIC IMAGING OF OTHER SPECIFIED BODY STRUCTURES: ICD-10-CM

## 2021-05-04 DIAGNOSIS — F32.9 MAJOR DEPRESSIVE DISORDER, SINGLE EPISODE, UNSPECIFIED: ICD-10-CM

## 2021-05-04 DIAGNOSIS — I10 ESSENTIAL (PRIMARY) HYPERTENSION: ICD-10-CM

## 2021-05-04 DIAGNOSIS — M33.13 OTHER DERMATOMYOSITIS WITHOUT MYOPATHY: ICD-10-CM

## 2021-05-04 DIAGNOSIS — E78.5 HYPERLIPIDEMIA, UNSPECIFIED: ICD-10-CM

## 2021-05-04 DIAGNOSIS — G40.909 EPILEPSY, UNSPECIFIED, NOT INTRACTABLE, WITHOUT STATUS EPILEPTICUS: ICD-10-CM

## 2021-05-04 DIAGNOSIS — F01.50 VASCULAR DEMENTIA WITHOUT BEHAVIORAL DISTURBANCE: ICD-10-CM

## 2021-05-04 DIAGNOSIS — S09.90XA UNSPECIFIED INJURY OF HEAD, INITIAL ENCOUNTER: ICD-10-CM

## 2021-05-04 DIAGNOSIS — G91.9 HYDROCEPHALUS, UNSPECIFIED: ICD-10-CM

## 2021-05-04 DIAGNOSIS — Z20.822 CONTACT WITH AND (SUSPECTED) EXPOSURE TO COVID-19: ICD-10-CM
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
## 2021-05-04 DIAGNOSIS — Z79.82 LONG TERM (CURRENT) USE OF ASPIRIN: ICD-10-CM
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
## 2021-05-04 LAB — SARS-COV-2 RNA SPEC QL NAA+PROBE: SIGNIFICANT CHANGE UP

## 2021-05-04 PROCEDURE — 71045 X-RAY EXAM CHEST 1 VIEW: CPT | Mod: 26

## 2021-05-04 PROCEDURE — 72125 CT NECK SPINE W/O DYE: CPT

## 2021-05-04 PROCEDURE — 71045 X-RAY EXAM CHEST 1 VIEW: CPT

## 2021-05-04 PROCEDURE — 70450 CT HEAD/BRAIN W/O DYE: CPT | Mod: 26

## 2021-05-04 PROCEDURE — 99285 EMERGENCY DEPT VISIT HI MDM: CPT | Mod: 25

## 2021-05-04 PROCEDURE — U0003: CPT

## 2021-05-04 PROCEDURE — 72125 CT NECK SPINE W/O DYE: CPT | Mod: 26

## 2021-05-04 PROCEDURE — 72170 X-RAY EXAM OF PELVIS: CPT

## 2021-05-04 PROCEDURE — 70450 CT HEAD/BRAIN W/O DYE: CPT

## 2021-05-04 PROCEDURE — U0005: CPT

## 2021-05-04 PROCEDURE — 99285 EMERGENCY DEPT VISIT HI MDM: CPT | Mod: CS

## 2021-05-04 PROCEDURE — 72170 X-RAY EXAM OF PELVIS: CPT | Mod: 26

## 2021-05-04 NOTE — ED PROVIDER NOTE - CLINICAL SUMMARY MEDICAL DECISION MAKING FREE TEXT BOX
71 y/o female presenting s/p witnessed mechanical fall with head trauma. No LOC per aide. Pt walking at baseline with assistance. CXR shows possible COVID PNA. pt without cough or son. No fever. Spo2 99% on room air with pt maintaining upon exertion. 69 y/o female presenting s/p witnessed mechanical fall with head trauma. No LOC per aide. Pt walking at baseline with assistance. CXR shows possible COVID PNA. pt without cough nor sob. No fever. Spo2 99% on room air which pt maintaining upon exertion.

## 2021-05-04 NOTE — ED PROVIDER NOTE - PROGRESS NOTE DETAILS
Scribe IN for Dr. Grace: STEFF Hua will call [facility] to ask pt be isolated until COVID results, and arrange transport.

## 2021-05-04 NOTE — ED PROVIDER NOTE - NSFOLLOWUPINSTRUCTIONS_ED_ALL_ED_FT
Your CXR shows you may have PNA, you will need to be isolated until swab is resulted. You do not need to be admitted as you do not have cough or SOB, and spo2 normal at rest and exertion.     Fall Prevention    WHAT YOU NEED TO KNOW:    Fall prevention includes ways to make your home and other areas safer. It also includes ways you can move more carefully to prevent a fall. Health conditions that cause changes in your blood pressure, vision, or muscle strength and coordination may increase your risk for falls. Medicines may also increase your risk for falls if they make you dizzy, weak, or sleepy.     DISCHARGE INSTRUCTIONS:    Call 911 or have someone else call if:     You have fallen and are unconscious.      You have fallen and cannot move part of your body.    Contact your healthcare provider if:     You have fallen and have pain or a headache.      You have questions or concerns about your condition or care.    Fall prevention tips:     Stand or sit up slowly. This may help you keep your balance and prevent falls.      Use assistive devices as directed. Your healthcare provider may suggest that you use a cane or walker to help you keep your balance. You may need to have grab bars put in your bathroom near the toilet or in the shower.      Wear shoes that fit well and have soles that . Wear shoes both inside and outside. Use slippers with good . Do not wear shoes with high heels.      Wear a personal alarm. This is a device that allows you to call 911 if you fall and need help. Ask your healthcare provider for more information.      Stay active. Exercise can help strengthen your muscles and improve your balance. Your healthcare provider may recommend water aerobics or walking. He or she may also recommend physical therapy to improve your coordination. Never start an exercise program without talking to your healthcare provider first.       Manage your medical conditions. Keep all appointments with your healthcare providers. Visit your eye doctor as directed.           Home safety tips:     Add items to prevent falls in the bathroom. Put nonslip strips on your bath or shower floor to prevent you from slipping. Use a bath mat if you do not have carpet in the bathroom. This will prevent you from falling when you step out of the bath or shower. Use a shower seat so you do not need to stand while you shower. Sit on the toilet or a chair in your bathroom to dry yourself and put on clothing. This will prevent you from losing your balance from drying or dressing yourself while you are standing.       Keep paths clear. Remove books, shoes, and other objects from walkways and stairs. Place cords for telephones and lamps out of the way so that you do not need to walk over them. Tape them down if you cannot move them. Remove small rugs. If you cannot remove a rug, secure it with double-sided tape. This will prevent you from tripping.       Install bright lights in your home. Use night lights to help light paths to the bathroom or kitchen. Always turn on the light before you start walking.      Keep items you use often on shelves within reach. Do not use a step stool to help you reach an item.      Paint or place reflective tape on the edges of your stairs. This will help you see the stairs better.    Follow up with your healthcare provider as directed: Write down your questions so you remember to ask them during your visits. n

## 2021-05-04 NOTE — ED PROVIDER NOTE - PMH
Depression    Dermatomyositis    HLD (hyperlipidemia)    HTN (hypertension)    Seizures    Vascular dementia

## 2021-05-04 NOTE — ED ADULT TRIAGE NOTE - CHIEF COMPLAINT QUOTE
pt. brought in by EMS from [facility] s/p mechanical fall from standing height, pt. was walking with aid and tripped hitting head on carpeted floor. no visible bleeding, hx of dementia.  -LOC, -blood thinners. neuro alert called, pt. sent to CT. yellow gown applied.

## 2021-05-04 NOTE — ED PROVIDER NOTE - OBJECTIVE STATEMENT
Pertinent HPI/PMH/PSH/FHx/SHx and Review of Systems contained within  HPI:  Patient presenting BIBEMS from [facility] s/p fall today. Fall witnessed by aide, who states pt hit head on carpet. Pt with history of dementia. No blood thinner use. Pt AAOx2 at baseline to place and self secondary to dementia.  denies pain  PMH/PSH relevant for: Vascular dementia, Hydrocephalus s/p ventricular shunt, Depression, Dermatomyositis, HTN, Seizures on Keppra, on Lasix for BLE Edema.  ROS negative for: fever, Chest pain, SOB, Nausea, vomiting, diarrhea, abdominal pain, dysuria    FamilyHx and SocialHx not otherwise contributory Pertinent HPI/PMH/PSH/FHx/SHx and Review of Systems contained within  HPI:  Patient presenting BIBEMS from [facility] s/p fall today. Fall witnessed by aide, who states pt hit head on carpet. No blood thinner use. Pt AAOx2 at baseline to place and self secondary to dementia & has no complaints.  denies pain  PMH/PSH relevant for: Vascular dementia, Hydrocephalus s/p ventricular shunt, Depression, Dermatomyositis, HTN, Seizures on Keppra, on Lasix for BLE Edema.  ROS negative for: fever, Chest pain, SOB, Nausea, vomiting, diarrhea, abdominal pain, cough,   FamilyHx and SocialHx not otherwise contributory

## 2021-05-04 NOTE — ED PROVIDER NOTE - CARE PLAN
Principal Discharge DX:	Head trauma  Secondary Diagnosis:	Fall  Secondary Diagnosis:	Abnormal CXR (chest x-ray)

## 2021-05-04 NOTE — ED PROVIDER NOTE - PATIENT PORTAL LINK FT
You can access the FollowMyHealth Patient Portal offered by [facility] by registering at the following website: [email or web address]. By joining [employer]’s FollowMyHealth portal, you will also be able to view your health information using other applications (apps) compatible with our system.

## 2021-05-04 NOTE — ED PROVIDER NOTE - PHYSICAL EXAMINATION
*GEN: No acute distress, well appearing   *HEAD: + shunt intact  *EYES/NOSE: b/l Pupils symmetric & Reactive to ligth, EOMI b/l  *THROAT: airway patent, moist mucous membranes  *NECK: Neck supple  *PULMONARY: No Respiratory distress, symmetric b/l chest rise  *CARDIAC: s1s2, regular rhythm   *ABDOMEN:  Non Tender, Non Distended, soft, no guarding, no rebound, no masses   *BACK: no CVA tenderness, No midline vertebral tenderness to palpation   *EXTREMITIES: symmetric pulses, 2+ DP & radial pulses, no cyanosis, no edema   *SKIN: +chronic skin changes BLE  *NEUROLOGIC: alert,  full active & passive ROM in all 4 extremities, CN 2-12 intact, normal finger to nose & heel to shin; no dysdiadochokinesis; equal & normal strength & sensation in b/l UE/LE; full active & passive ROM in all extremities,  no pronator drift, normal patellar reflex, normal gait, romberg sign negative   *PSYCH: appropriate concern about symptoms, pleasant *GEN: No acute distress, well appearing   *HEAD: + shunt intact  *EYES/NOSE: b/l Pupils symmetric & Reactive to ligth, EOMI b/l  *THROAT: airway patent, moist mucous membranes  *NECK: Neck supple  *PULMONARY: No Respiratory distress, symmetric b/l chest rise  *CARDIAC: s1s2, regular rhythm   *ABDOMEN:  Non Tender, Non Distended, soft, no guarding, no rebound, no masses   *BACK: no CVA tenderness, No midline vertebral tenderness to palpation   *EXTREMITIES: symmetric pulses, 2+ DP & radial pulses, no cyanosis, no edema   *SKIN: +chronic skin changes BLE  *NEUROLOGIC: alert,  full active & passive ROM in all 4 extremities, CN 2-12 intact, normal finger to nose & heel to shin; no dysdiadochokinesis; equal & normal strength & sensation in b/l UE/LE; full active & passive ROM in all extremities,  no pronator drift, normal patellar reflex, normal gait, romberg sign negative   *PSYCH:  pleasantly demented

## 2021-05-04 NOTE — ED ADULT NURSE NOTE - OBJECTIVE STATEMENT
Pt brought in by EMS from [facility] s/p mechanical fall from standing height, pt. was walking with aid and tripped hitting head on carpeted floor. no visible bleeding, hx of dementia.  -LOC, -blood thinners. neuro alert called,

## 2021-05-04 NOTE — ED PROVIDER NOTE - NS ED ROS FT
Review of Systems:  	•	CONSTITUTIONAL: no fever  	•	SKIN: no rash  	•	RESPIRATORY: no shortness of breath  	•	CARDIAC: no chest pain, no palpitations  	•	GI:  no abd pain, no nausea, no vomiting, no diarrhea  	•	GENITO-URINARY:  no dysuria; no hematuria    	•	MUSCULOSKELETAL:  no back pain  	•	NEUROLOGIC: no weakness  	•	ALLERGY: no rhinitis  	•	PSYSCHIATRIC: no anxiety

## 2021-06-03 ENCOUNTER — EMERGENCY (EMERGENCY)
Facility: HOSPITAL | Age: 71
LOS: 0 days | Discharge: HOME | End: 2021-06-03
Attending: EMERGENCY MEDICINE
Payer: MEDICARE

## 2021-06-03 VITALS
SYSTOLIC BLOOD PRESSURE: 127 MMHG | HEART RATE: 62 BPM | HEIGHT: 72 IN | OXYGEN SATURATION: 100 % | DIASTOLIC BLOOD PRESSURE: 36 MMHG | WEIGHT: 139.99 LBS | RESPIRATION RATE: 20 BRPM | TEMPERATURE: 98 F

## 2021-06-03 VITALS
DIASTOLIC BLOOD PRESSURE: 55 MMHG | HEART RATE: 69 BPM | OXYGEN SATURATION: 100 % | TEMPERATURE: 98 F | RESPIRATION RATE: 18 BRPM | SYSTOLIC BLOOD PRESSURE: 136 MMHG

## 2021-06-03 DIAGNOSIS — M33.90 DERMATOPOLYMYOSITIS, UNSPECIFIED, ORGAN INVOLVEMENT UNSPECIFIED: ICD-10-CM

## 2021-06-03 DIAGNOSIS — G91.2 (IDIOPATHIC) NORMAL PRESSURE HYDROCEPHALUS: ICD-10-CM

## 2021-06-03 DIAGNOSIS — I10 ESSENTIAL (PRIMARY) HYPERTENSION: ICD-10-CM

## 2021-06-03 DIAGNOSIS — Z79.82 LONG TERM (CURRENT) USE OF ASPIRIN: ICD-10-CM

## 2021-06-03 DIAGNOSIS — Z79.899 OTHER LONG TERM (CURRENT) DRUG THERAPY: ICD-10-CM

## 2021-06-03 DIAGNOSIS — F32.9 MAJOR DEPRESSIVE DISORDER, SINGLE EPISODE, UNSPECIFIED: ICD-10-CM
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
## 2021-06-03 DIAGNOSIS — E78.5 HYPERLIPIDEMIA, UNSPECIFIED: ICD-10-CM

## 2021-06-03 DIAGNOSIS — S00.91XA ABRASION OF UNSPECIFIED PART OF HEAD, INITIAL ENCOUNTER: ICD-10-CM

## 2021-06-03 DIAGNOSIS — S06.5X0A TRAUMATIC SUBDURAL HEMORRHAGE WITHOUT LOSS OF CONSCIOUSNESS, INITIAL ENCOUNTER: ICD-10-CM

## 2021-06-03 DIAGNOSIS — Z98.2 PRESENCE OF CEREBROSPINAL FLUID DRAINAGE DEVICE: ICD-10-CM

## 2021-06-03 DIAGNOSIS — F01.50 VASCULAR DEMENTIA WITHOUT BEHAVIORAL DISTURBANCE: ICD-10-CM
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
## 2021-06-03 PROBLEM — R56.9 UNSPECIFIED CONVULSIONS: Chronic | Status: ACTIVE | Noted: 2021-05-05

## 2021-06-03 LAB
ALBUMIN SERPL ELPH-MCNC: 3.8 G/DL — SIGNIFICANT CHANGE UP (ref 3.3–5)
ALP SERPL-CCNC: 74 U/L — SIGNIFICANT CHANGE UP (ref 40–120)
ALT FLD-CCNC: 19 U/L — SIGNIFICANT CHANGE UP (ref 12–78)
ANION GAP SERPL CALC-SCNC: 3 MMOL/L — LOW (ref 5–17)
APTT BLD: 29.4 SEC — SIGNIFICANT CHANGE UP (ref 27.5–35.5)
AST SERPL-CCNC: 16 U/L — SIGNIFICANT CHANGE UP (ref 15–37)
BASOPHILS # BLD AUTO: 0.04 K/UL — SIGNIFICANT CHANGE UP (ref 0–0.2)
BASOPHILS NFR BLD AUTO: 0.6 % — SIGNIFICANT CHANGE UP (ref 0–2)
BILIRUB SERPL-MCNC: 0.3 MG/DL — SIGNIFICANT CHANGE UP (ref 0.2–1.2)
BUN SERPL-MCNC: 20 MG/DL — SIGNIFICANT CHANGE UP (ref 7–23)
CALCIUM SERPL-MCNC: 9.2 MG/DL — SIGNIFICANT CHANGE UP (ref 8.5–10.1)
CHLORIDE SERPL-SCNC: 112 MMOL/L — HIGH (ref 96–108)
CO2 SERPL-SCNC: 31 MMOL/L — SIGNIFICANT CHANGE UP (ref 22–31)
CREAT SERPL-MCNC: 0.72 MG/DL — SIGNIFICANT CHANGE UP (ref 0.5–1.3)
EOSINOPHIL # BLD AUTO: 0.19 K/UL — SIGNIFICANT CHANGE UP (ref 0–0.5)
EOSINOPHIL NFR BLD AUTO: 3 % — SIGNIFICANT CHANGE UP (ref 0–6)
GLUCOSE SERPL-MCNC: 98 MG/DL — SIGNIFICANT CHANGE UP (ref 70–99)
HCT VFR BLD CALC: 38 % — SIGNIFICANT CHANGE UP (ref 34.5–45)
HGB BLD-MCNC: 11.6 G/DL — SIGNIFICANT CHANGE UP (ref 11.5–15.5)
IMM GRANULOCYTES NFR BLD AUTO: 0.2 % — SIGNIFICANT CHANGE UP (ref 0–1.5)
INR BLD: 1.07 RATIO — SIGNIFICANT CHANGE UP (ref 0.88–1.16)
LYMPHOCYTES # BLD AUTO: 1.64 K/UL — SIGNIFICANT CHANGE UP (ref 1–3.3)
LYMPHOCYTES # BLD AUTO: 25.9 % — SIGNIFICANT CHANGE UP (ref 13–44)
MCHC RBC-ENTMCNC: 29.1 PG — SIGNIFICANT CHANGE UP (ref 27–34)
MCHC RBC-ENTMCNC: 30.5 GM/DL — LOW (ref 32–36)
MCV RBC AUTO: 95.5 FL — SIGNIFICANT CHANGE UP (ref 80–100)
MONOCYTES # BLD AUTO: 0.49 K/UL — SIGNIFICANT CHANGE UP (ref 0–0.9)
MONOCYTES NFR BLD AUTO: 7.7 % — SIGNIFICANT CHANGE UP (ref 2–14)
NEUTROPHILS # BLD AUTO: 3.96 K/UL — SIGNIFICANT CHANGE UP (ref 1.8–7.4)
NEUTROPHILS NFR BLD AUTO: 62.6 % — SIGNIFICANT CHANGE UP (ref 43–77)
PLATELET # BLD AUTO: 167 K/UL — SIGNIFICANT CHANGE UP (ref 150–400)
POTASSIUM SERPL-MCNC: 3.8 MMOL/L — SIGNIFICANT CHANGE UP (ref 3.5–5.3)
POTASSIUM SERPL-SCNC: 3.8 MMOL/L — SIGNIFICANT CHANGE UP (ref 3.5–5.3)
PROT SERPL-MCNC: 7.7 GM/DL — SIGNIFICANT CHANGE UP (ref 6–8.3)
PROTHROM AB SERPL-ACNC: 12.4 SEC — SIGNIFICANT CHANGE UP (ref 10.6–13.6)
RAPID RVP RESULT: SIGNIFICANT CHANGE UP
RBC # BLD: 3.98 M/UL — SIGNIFICANT CHANGE UP (ref 3.8–5.2)
RBC # FLD: 14.6 % — HIGH (ref 10.3–14.5)
SARS-COV-2 RNA SPEC QL NAA+PROBE: SIGNIFICANT CHANGE UP
SODIUM SERPL-SCNC: 146 MMOL/L — HIGH (ref 135–145)
WBC # BLD: 6.33 K/UL — SIGNIFICANT CHANGE UP (ref 3.8–10.5)
WBC # FLD AUTO: 6.33 K/UL — SIGNIFICANT CHANGE UP (ref 3.8–10.5)

## 2021-06-03 PROCEDURE — 99284 EMERGENCY DEPT VISIT MOD MDM: CPT

## 2021-06-03 PROCEDURE — 70450 CT HEAD/BRAIN W/O DYE: CPT | Mod: 26,MA,77

## 2021-06-03 PROCEDURE — 85610 PROTHROMBIN TIME: CPT

## 2021-06-03 PROCEDURE — 72125 CT NECK SPINE W/O DYE: CPT | Mod: 26,MH

## 2021-06-03 PROCEDURE — 99285 EMERGENCY DEPT VISIT HI MDM: CPT | Mod: 25

## 2021-06-03 PROCEDURE — 85730 THROMBOPLASTIN TIME PARTIAL: CPT

## 2021-06-03 PROCEDURE — 85025 COMPLETE CBC W/AUTO DIFF WBC: CPT

## 2021-06-03 PROCEDURE — 80053 COMPREHEN METABOLIC PANEL: CPT

## 2021-06-03 PROCEDURE — 99283 EMERGENCY DEPT VISIT LOW MDM: CPT

## 2021-06-03 PROCEDURE — 72125 CT NECK SPINE W/O DYE: CPT

## 2021-06-03 PROCEDURE — 36415 COLL VENOUS BLD VENIPUNCTURE: CPT

## 2021-06-03 PROCEDURE — 0225U NFCT DS DNA&RNA 21 SARSCOV2: CPT

## 2021-06-03 PROCEDURE — 70450 CT HEAD/BRAIN W/O DYE: CPT | Mod: 26,MH

## 2021-06-03 PROCEDURE — 70450 CT HEAD/BRAIN W/O DYE: CPT

## 2021-06-03 NOTE — ED ADULT NURSE NOTE - OBJECTIVE STATEMENT
Pt A&Ox1, BIBEMS from [facility] s/p unwitnessed fall.  Abrasion noted on left side of forehead. Pt denies any pain, LOC, numbness/tingling, and blood thinner use. Pt A&Ox1, poor historian. Pt BIBEMS from [facility] s/p unwitnessed fall. Abrasion noted to left forehead and left shoulder. Pt denies any pain, numbness/tingling, dizziness, blurred vision. Pt denies taking any blood thinners. +PROM/AROM in all four extremities.

## 2021-06-03 NOTE — ED PROVIDER NOTE - CONSTITUTIONAL, MLM
Well appearing, awake, alert, oriented to person, place, time/situation and in no apparent distress. normal... Well appearing, mildly confused and in no apparent distress.

## 2021-06-03 NOTE — PROGRESS NOTE ADULT - SUBJECTIVE AND OBJECTIVE BOX
Neurosurgery PA follow up note:    Follow up CT of the head reviewed  Stable CT of the head   Subacute SDH   Follow up with Dr. Edmonds or NP in the office in 4 weeks       < from: CT Head No Cont (06.03.21 @ 16:22) >  There is moderate diffuse parenchymal volume loss. There are areas of low attenuation in the periventricular white matter likely related to mild chronic microvascular ischemic changes.  Small left frontoparietal subdural hematoma without significant change in size. No significant midline shift  Right parietal ventricular catheter with tip near the left frontal horn unchanged. No hydrocephalus.  IMPRESSION:  Stable small left subdural hematoma.     Discussed with Dr. Edmonds

## 2021-06-03 NOTE — ED PROVIDER NOTE - CARE PROVIDER_API CALL
Brodie Edmonds; PhD)  Neurosurgery  [address], [address] [place] [address]  Phone: [phone]  Fax: [phone]  Follow Up Time:

## 2021-06-03 NOTE — ED ADULT TRIAGE NOTE - CHIEF COMPLAINT QUOTE
Pt arrives to ED s/p unwitnessed fall at [facility]. found on ground. abrasion to forehead. no other signs of trauma noted in triage. no daily blood thinners. Hx dementia. neuro alert called in triage.

## 2021-06-03 NOTE — ED PROVIDER NOTE - PATIENT PORTAL LINK FT
You can access the FollowMyHealth Patient Portal offered by [facility] by registering at the following website: [email or web address]. By joining [employer]’s FollowMyHealth portal, you will also be able to view your health information using other applications (apps) compatible with our system.

## 2021-06-03 NOTE — ED PROVIDER NOTE - OBJECTIVE STATEMENT
72 y/o F with PMHx of HLD, seizures, depression, vascular dementia, dermatomyositis and HTN presents to the ED BIBEMS for evaluation s/p unwitnessed fall at [facility]. She was found on the ground with abrasion to forehead. Pt reports to be feeling fine right now. Pt reports not taking daily blood thinners. No fever. Non smoker and social drinker. NKDA.

## 2021-06-03 NOTE — CONSULT NOTE ADULT - SUBJECTIVE AND OBJECTIVE BOX
Patient is a 71y old  Female who presents with a chief complaint of s/p fall     HPI: Pt is a 71 year old woman with a PMH of Dementia, HTN,  shunt for NPH, and seizures who presented to the ED after a fall at her assisted living facility.   A CT of the head was done and there was a left sided subacute subdural hematoma without brain compression. Neurosurgery was consulted.   On exam is awake and alert, oriented to self only, she denies headache, nausea vomiting, or pain. She is able to participate in the exam, follows commands but is confused. Pt is not on any anticoagulation or antiplatelet medication.     PAST MEDICAL & SURGICAL HISTORY:  HTN (hypertension)  Dermatomyositis  Vascular dementia  Depression  Seizures  HLD (hyperlipidemia)  No significant past surgical history    FAMILY HISTORY:  No pertinent family history in first degree relatives    Social Hx:  Nonsmoker, no drug or alcohol use    MEDICATIONS  (STANDING):  Home meds reviewed   Atorvastatin  Colace  Escitalopram  Lasix  Keppra  Loratadine  Losartan  Metoprolol  Potassium chloride  Michelle D2    Allergies  No Known Allergies    ROS: Pertinent positives in HPI, all other ROS were reviewed and are negative.      Vital Signs Last 24 Hrs  T(C): 36.5 (03 Jun 2021 12:03), Max: 36.5 (03 Jun 2021 12:03)  T(F): 97.7 (03 Jun 2021 12:03), Max: 97.7 (03 Jun 2021 12:03)  HR: 65 (03 Jun 2021 12:03) (62 - 65)  BP: 140/53 (03 Jun 2021 12:03) (127/36 - 140/53)  BP(mean): 71 (03 Jun 2021 12:03) (71 - 71)  RR: 14 (03 Jun 2021 12:03) (14 - 20)  SpO2: 100% (03 Jun 2021 09:57) (100% - 100%)    PHYSICAL EXAM:  Constitutional: awake and alert, pleasantly demented, follows most commands   HEENT: PERRLA, EOMI  Neck: Supple  Respiratory: Breath sounds are clear bilaterally  Cardiovascular: S1 and S2, regular rhythm  Gastrointestinal: soft, nontender  Extremities:  no edema  Vascular: Carotid Bruit - no  Musculoskeletal: no joint swelling/tenderness, no abnormal movements  Skin: abrasion to left forehead     Neurological exam:  HF: awake, alert, pleasant, oriented to self only, follows most commands, speech clear, no dysarthria, difficulty naming objects, repeats without difficulty  CN: ANDREE, EOMI, VFF, facial sensation normal, no NLFD, tongue midline, Palate moves equally, SCM equal bilaterally  Motor: No pronator drift, Strength 5/5 in all 4 ext, normal bulk and tone, no tremor, rigidity or bradykinesia.    Sens: Intact to light touch / PP/ VS/ JS    Reflexes: Symmetric and normal, downgoing toes b/l  Coord:  No FNFA, dysmetria, MICHEL intact   Gait/Balance: Not tested     Labs:                        11.6   6.33  )-----------( 167      ( 03 Jun 2021 11:27 )             38.0     06-03    146<H>  |  112<H>  |  20  ----------------------------<  98  3.8   |  31  |  0.72    Ca    9.2      03 Jun 2021 11:27    TPro  7.7  /  Alb  3.8  /  TBili  0.3  /  DBili  x   /  AST  16  /  ALT  19  /  AlkPhos  74  06-03    PT/INR - ( 03 Jun 2021 11:27 )   PT: 12.4 sec;   INR: 1.07 ratio      PTT - ( 03 Jun 2021 11:27 )  PTT:29.4 sec    RADIOLOGY:  < from: CT Head No Cont (06.03.21 @ 10:05) >  1)  new acute and/or recent subacute, low-density small subdural collection along the left hemisphere ranging between 8 and 9 mm in diameter..  2)  underlying involutional changes again noted with atrophy and a ventriculostomy in situ. No acute cerebral infarct or intraparenchymal bleed suggested.

## 2021-06-03 NOTE — ED PROVIDER NOTE - PROGRESS NOTE DETAILS
took signout from Dr tate pending repeat CT head and neurosx. neurosx has cleared pt for dc and outpt followup.  MD Gabe

## 2021-06-03 NOTE — ED PROVIDER NOTE - NSFOLLOWUPINSTRUCTIONS_ED_ALL_ED_FT
Follow-up with your regular physician  Return with any persistent/worsening symptoms.     Head Injury, Adult    There are many types of head injuries. They can be as minor as a bump. Some head injuries can be worse. Worse injuries include:    A strong hit to the head that hurts the brain (concussion).  A bruise of the brain (contusion). This means there is bleeding in the brain that can cause swelling.  A cracked skull (skull fracture).  Bleeding in the brain that gathers, gets thick (makes a clot), and forms a bump (hematoma).    Most problems from a head injury come in the first 24 hours. However, you may still have side effects up to 7–10 days after your injury. It is important to watch your condition for any changes.     Follow these instructions at home:  Activity    Rest as much as possible.  Avoid activities that are hard or tiring.  Make sure you get enough sleep.  Limit activities that need a lot of thought or attention, such as:  Watching TV.  Playing memory games and puzzles.  Job-related work or homework.  Working on the computer, social media, and texting.  Avoid activities that could cause another head injury until your doctor says it is okay. This includes playing sports.  Ask your doctor when it is safe for you to go back to your normal activities, such as work or school. Ask your doctor for a step-by-step plan for slowly going back to your normal activities.  Ask your doctor when you can drive, ride a bicycle, or use heavy machinery. Never do these activities if you are dizzy.    Lifestyle    Do not drink alcohol until your doctor says it is okay.  Avoid drug use.  If it is harder than usual to remember things, write them down.  If you are easily distracted, try to do one thing at a time.  Talk with family members or close friends when making important decisions.  Tell your friends, family, a trusted coworker, and  about your injury, symptoms, and limits (restrictions). Have them watch for any problems that are new or getting worse.    General instructions    Take over-the-counter and prescription medicines only as told by your doctor.  Have someone stay with you for 24 hours after your head injury. This person should watch you for any changes in your symptoms and be ready to get help.  Keep all follow-up visits as told by your doctor. This is important.    How is this prevented?  Work on your balance and strength. This can help you avoid falls.  Wear a seatbelt when you are in a moving vehicle.  Wear a helmet when:  Riding a bicycle.  Skiing.  Doing any other sport or activity that has a risk of injury.  Drink alcohol only in moderation.  Make your home safer by:  Getting rid of clutter from the floors and stairs, like things that can make you trip.  Using grab bars in bathrooms and handrails by stairs.  Placing non-slip mats on floors and in bathtubs.  Putting more light in dim areas.    Get help right away if:  You have:  A very bad (severe) headache that is not helped by medicine.  Trouble walking or weakness in your arms and legs.  Clear or bloody fluid coming from your nose or ears.  Changes in your seeing (vision).  Jerky movements that you cannot control (seizure).  You throw up (vomit).  Your symptoms get worse.  You lose balance.  Your speech is slurred.  You pass out.  You are sleepier and have trouble staying awake.  The black centers of your eyes (pupils) change in size.    These symptoms may be an emergency. Do not wait to see if the symptoms will go away. Get medical help right away. Call your local emergency services (911 in the [place] Do not drive yourself to the hospital.    ADDITIONAL NOTES AND INSTRUCTIONS    Please follow up with your Primary MD in 24-48 hr.  Seek immediate medical care for any new/worsening signs or symptoms. Follow-up with your regular physician within one week  Follow up with neurosurgery, Dr Edmonds in 4 weeks.  Return with any persistent/worsening symptoms.     Head Injury, Adult    There are many types of head injuries. They can be as minor as a bump. Some head injuries can be worse. Worse injuries include:    A strong hit to the head that hurts the brain (concussion).  A bruise of the brain (contusion). This means there is bleeding in the brain that can cause swelling.  A cracked skull (skull fracture).  Bleeding in the brain that gathers, gets thick (makes a clot), and forms a bump (hematoma).    Most problems from a head injury come in the first 24 hours. However, you may still have side effects up to 7–10 days after your injury. It is important to watch your condition for any changes.     Follow these instructions at home:  Activity    Rest as much as possible.  Avoid activities that are hard or tiring.  Make sure you get enough sleep.  Limit activities that need a lot of thought or attention, such as:  Watching TV.  Playing memory games and puzzles.  Job-related work or homework.  Working on the computer, social media, and texting.  Avoid activities that could cause another head injury until your doctor says it is okay. This includes playing sports.  Ask your doctor when it is safe for you to go back to your normal activities, such as work or school. Ask your doctor for a step-by-step plan for slowly going back to your normal activities.  Ask your doctor when you can drive, ride a bicycle, or use heavy machinery. Never do these activities if you are dizzy.    Lifestyle    Do not drink alcohol until your doctor says it is okay.  Avoid drug use.  If it is harder than usual to remember things, write them down.  If you are easily distracted, try to do one thing at a time.  Talk with family members or close friends when making important decisions.  Tell your friends, family, a trusted coworker, and  about your injury, symptoms, and limits (restrictions). Have them watch for any problems that are new or getting worse.    General instructions    Take over-the-counter and prescription medicines only as told by your doctor.  Have someone stay with you for 24 hours after your head injury. This person should watch you for any changes in your symptoms and be ready to get help.  Keep all follow-up visits as told by your doctor. This is important.    How is this prevented?  Work on your balance and strength. This can help you avoid falls.  Wear a seatbelt when you are in a moving vehicle.  Wear a helmet when:  Riding a bicycle.  Skiing.  Doing any other sport or activity that has a risk of injury.  Drink alcohol only in moderation.  Make your home safer by:  Getting rid of clutter from the floors and stairs, like things that can make you trip.  Using grab bars in bathrooms and handrails by stairs.  Placing non-slip mats on floors and in bathtubs.  Putting more light in dim areas.    Get help right away if:  You have:  A very bad (severe) headache that is not helped by medicine.  Trouble walking or weakness in your arms and legs.  Clear or bloody fluid coming from your nose or ears.  Changes in your seeing (vision).  Jerky movements that you cannot control (seizure).  You throw up (vomit).  Your symptoms get worse.  You lose balance.  Your speech is slurred.  You pass out.  You are sleepier and have trouble staying awake.  The black centers of your eyes (pupils) change in size.    These symptoms may be an emergency. Do not wait to see if the symptoms will go away. Get medical help right away. Call your local emergency services (911 in the [place] Do not drive yourself to the hospital.    ADDITIONAL NOTES AND INSTRUCTIONS    Please follow up with your Primary MD in 24-48 hr.  Seek immediate medical care for any new/worsening signs or symptoms.

## 2021-06-03 NOTE — ED PROVIDER NOTE - NEUROLOGICAL, MLM
Mildly confused, no focal deficits, no motor or sensory deficits. no focal deficits, no motor or sensory deficits.

## 2021-06-03 NOTE — CONSULT NOTE ADULT - ASSESSMENT
Pt is a 71 year old woman with a PMH of Dementia, HTN,  shunt for NPH, and seizures who presented to the ED after a fall at her assisted living facility.   A CT of the head was done and there was a left sided subacute subdural hematoma without brain compression.    #Subacute Subdural hematoma without brain compression   no acute neurosurgical intervention   Repeat CT head in 6 hours pt can be discharged back to her assisted living facility   maintain SBP < 140   Pt can follow up with María Elena the NP in Dr. Edmonds's office in 2-4 weeks     Discussed with Dr. Edmonds and Dr. Borrego from the ED

## 2021-06-05 ENCOUNTER — INPATIENT (INPATIENT)
Facility: HOSPITAL | Age: 71
LOS: 3 days | Discharge: NURSING, REHAB OR LONG-TERM CARE | DRG: 86 | End: 2021-06-09
Attending: SURGERY | Admitting: SURGERY
Payer: MEDICARE

## 2021-06-05 ENCOUNTER — EMERGENCY (EMERGENCY)
Facility: HOSPITAL | Age: 71
LOS: 0 days | Discharge: ANOTHER HOSPITAL | End: 2021-06-05
Attending: EMERGENCY MEDICINE
Payer: MEDICARE

## 2021-06-05 VITALS
WEIGHT: 100.09 LBS | HEART RATE: 58 BPM | HEIGHT: 72 IN | RESPIRATION RATE: 18 BRPM | TEMPERATURE: 98 F | SYSTOLIC BLOOD PRESSURE: 153 MMHG | DIASTOLIC BLOOD PRESSURE: 50 MMHG | OXYGEN SATURATION: 100 %

## 2021-06-05 VITALS
DIASTOLIC BLOOD PRESSURE: 53 MMHG | HEART RATE: 53 BPM | SYSTOLIC BLOOD PRESSURE: 139 MMHG | OXYGEN SATURATION: 100 % | RESPIRATION RATE: 18 BRPM | TEMPERATURE: 98 F

## 2021-06-05 VITALS
WEIGHT: 100.09 LBS | SYSTOLIC BLOOD PRESSURE: 148 MMHG | OXYGEN SATURATION: 97 % | DIASTOLIC BLOOD PRESSURE: 75 MMHG | HEIGHT: 62 IN | TEMPERATURE: 98 F | HEART RATE: 58 BPM | RESPIRATION RATE: 18 BRPM

## 2021-06-05 DIAGNOSIS — S01.111A LACERATION WITHOUT FOREIGN BODY OF RIGHT EYELID AND PERIOCULAR AREA, INITIAL ENCOUNTER: ICD-10-CM

## 2021-06-05 DIAGNOSIS — Z98.2 PRESENCE OF CEREBROSPINAL FLUID DRAINAGE DEVICE: ICD-10-CM

## 2021-06-05 DIAGNOSIS — S02.40CA MAXILLARY FRACTURE, RIGHT SIDE, INITIAL ENCOUNTER FOR CLOSED FRACTURE: ICD-10-CM

## 2021-06-05 DIAGNOSIS — S06.5X9A TRAUMATIC SUBDURAL HEMORRHAGE WITH LOSS OF CONSCIOUSNESS OF UNSPECIFIED DURATION, INITIAL ENCOUNTER: ICD-10-CM

## 2021-06-05 DIAGNOSIS — S02.641A FRACTURE OF RAMUS OF RIGHT MANDIBLE, INITIAL ENCOUNTER FOR CLOSED FRACTURE: ICD-10-CM

## 2021-06-05 DIAGNOSIS — Z79.899 OTHER LONG TERM (CURRENT) DRUG THERAPY: ICD-10-CM

## 2021-06-05 DIAGNOSIS — Z79.02 LONG TERM (CURRENT) USE OF ANTITHROMBOTICS/ANTIPLATELETS: ICD-10-CM

## 2021-06-05 DIAGNOSIS — E78.5 HYPERLIPIDEMIA, UNSPECIFIED: ICD-10-CM

## 2021-06-05 DIAGNOSIS — S02.92XA UNSPECIFIED FRACTURE OF FACIAL BONES, INITIAL ENCOUNTER FOR CLOSED FRACTURE: ICD-10-CM

## 2021-06-05 DIAGNOSIS — I10 ESSENTIAL (PRIMARY) HYPERTENSION: ICD-10-CM
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
## 2021-06-05 DIAGNOSIS — Z79.82 LONG TERM (CURRENT) USE OF ASPIRIN: ICD-10-CM
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
## 2021-06-05 DIAGNOSIS — Z87.2 PERSONAL HISTORY OF DISEASES OF THE SKIN AND SUBCUTANEOUS TISSUE: ICD-10-CM

## 2021-06-05 DIAGNOSIS — F03.90 UNSPECIFIED DEMENTIA WITHOUT BEHAVIORAL DISTURBANCE: ICD-10-CM

## 2021-06-05 DIAGNOSIS — S02.31XA FRACTURE OF ORBITAL FLOOR, RIGHT SIDE, INITIAL ENCOUNTER FOR CLOSED FRACTURE: ICD-10-CM

## 2021-06-05 DIAGNOSIS — F32.9 MAJOR DEPRESSIVE DISORDER, SINGLE EPISODE, UNSPECIFIED: ICD-10-CM

## 2021-06-05 DIAGNOSIS — G40.909 EPILEPSY, UNSPECIFIED, NOT INTRACTABLE, WITHOUT STATUS EPILEPTICUS: ICD-10-CM

## 2021-06-05 LAB
ALBUMIN SERPL ELPH-MCNC: 3.9 G/DL — SIGNIFICANT CHANGE UP (ref 3.3–5)
ALBUMIN SERPL ELPH-MCNC: 4 G/DL — SIGNIFICANT CHANGE UP (ref 3.3–5)
ALP SERPL-CCNC: 74 U/L — SIGNIFICANT CHANGE UP (ref 40–120)
ALP SERPL-CCNC: 77 U/L — SIGNIFICANT CHANGE UP (ref 40–120)
ALT FLD-CCNC: 11 U/L — SIGNIFICANT CHANGE UP (ref 10–45)
ALT FLD-CCNC: 17 U/L — SIGNIFICANT CHANGE UP (ref 12–78)
ANION GAP SERPL CALC-SCNC: 13 MMOL/L — SIGNIFICANT CHANGE UP (ref 5–17)
ANION GAP SERPL CALC-SCNC: 7 MMOL/L — SIGNIFICANT CHANGE UP (ref 5–17)
APPEARANCE UR: CLEAR — SIGNIFICANT CHANGE UP
APTT BLD: 29 SEC — SIGNIFICANT CHANGE UP (ref 27.5–35.5)
APTT BLD: 30.4 SEC — SIGNIFICANT CHANGE UP (ref 27.5–35.5)
AST SERPL-CCNC: 14 U/L — LOW (ref 15–37)
AST SERPL-CCNC: 16 U/L — SIGNIFICANT CHANGE UP (ref 10–40)
BASOPHILS # BLD AUTO: 0.02 K/UL — SIGNIFICANT CHANGE UP (ref 0–0.2)
BASOPHILS # BLD AUTO: 0.04 K/UL — SIGNIFICANT CHANGE UP (ref 0–0.2)
BASOPHILS NFR BLD AUTO: 0.3 % — SIGNIFICANT CHANGE UP (ref 0–2)
BASOPHILS NFR BLD AUTO: 0.6 % — SIGNIFICANT CHANGE UP (ref 0–2)
BILIRUB SERPL-MCNC: 0.3 MG/DL — SIGNIFICANT CHANGE UP (ref 0.2–1.2)
BILIRUB SERPL-MCNC: 0.4 MG/DL — SIGNIFICANT CHANGE UP (ref 0.2–1.2)
BILIRUB UR-MCNC: NEGATIVE — SIGNIFICANT CHANGE UP
BUN SERPL-MCNC: 13 MG/DL — SIGNIFICANT CHANGE UP (ref 7–23)
BUN SERPL-MCNC: 17 MG/DL — SIGNIFICANT CHANGE UP (ref 7–23)
CALCIUM SERPL-MCNC: 9.1 MG/DL — SIGNIFICANT CHANGE UP (ref 8.4–10.5)
CALCIUM SERPL-MCNC: 9.6 MG/DL — SIGNIFICANT CHANGE UP (ref 8.5–10.1)
CHLORIDE SERPL-SCNC: 109 MMOL/L — HIGH (ref 96–108)
CHLORIDE SERPL-SCNC: 110 MMOL/L — HIGH (ref 96–108)
CO2 SERPL-SCNC: 23 MMOL/L — SIGNIFICANT CHANGE UP (ref 22–31)
CO2 SERPL-SCNC: 27 MMOL/L — SIGNIFICANT CHANGE UP (ref 22–31)
COLOR SPEC: YELLOW — SIGNIFICANT CHANGE UP
CREAT SERPL-MCNC: 0.55 MG/DL — SIGNIFICANT CHANGE UP (ref 0.5–1.3)
CREAT SERPL-MCNC: 0.56 MG/DL — SIGNIFICANT CHANGE UP (ref 0.5–1.3)
DIFF PNL FLD: NEGATIVE — SIGNIFICANT CHANGE UP
EOSINOPHIL # BLD AUTO: 0.15 K/UL — SIGNIFICANT CHANGE UP (ref 0–0.5)
EOSINOPHIL # BLD AUTO: 0.16 K/UL — SIGNIFICANT CHANGE UP (ref 0–0.5)
EOSINOPHIL NFR BLD AUTO: 2 % — SIGNIFICANT CHANGE UP (ref 0–6)
EOSINOPHIL NFR BLD AUTO: 2.2 % — SIGNIFICANT CHANGE UP (ref 0–6)
ETHANOL SERPL-MCNC: SIGNIFICANT CHANGE UP MG/DL (ref 0–10)
GLUCOSE SERPL-MCNC: 84 MG/DL — SIGNIFICANT CHANGE UP (ref 70–99)
GLUCOSE SERPL-MCNC: 91 MG/DL — SIGNIFICANT CHANGE UP (ref 70–99)
GLUCOSE UR QL: NEGATIVE MG/DL — SIGNIFICANT CHANGE UP
HCT VFR BLD CALC: 38.2 % — SIGNIFICANT CHANGE UP (ref 34.5–45)
HCT VFR BLD CALC: 39.9 % — SIGNIFICANT CHANGE UP (ref 34.5–45)
HGB BLD-MCNC: 11.6 G/DL — SIGNIFICANT CHANGE UP (ref 11.5–15.5)
HGB BLD-MCNC: 12.2 G/DL — SIGNIFICANT CHANGE UP (ref 11.5–15.5)
IMM GRANULOCYTES NFR BLD AUTO: 0.3 % — SIGNIFICANT CHANGE UP (ref 0–1.5)
IMM GRANULOCYTES NFR BLD AUTO: 0.3 % — SIGNIFICANT CHANGE UP (ref 0–1.5)
INR BLD: 1.04 RATIO — SIGNIFICANT CHANGE UP (ref 0.88–1.16)
INR BLD: 1.09 RATIO — SIGNIFICANT CHANGE UP (ref 0.88–1.16)
KETONES UR-MCNC: NEGATIVE — SIGNIFICANT CHANGE UP
LEUKOCYTE ESTERASE UR-ACNC: ABNORMAL
LIDOCAIN IGE QN: 29 U/L — SIGNIFICANT CHANGE UP (ref 7–60)
LYMPHOCYTES # BLD AUTO: 1.44 K/UL — SIGNIFICANT CHANGE UP (ref 1–3.3)
LYMPHOCYTES # BLD AUTO: 1.52 K/UL — SIGNIFICANT CHANGE UP (ref 1–3.3)
LYMPHOCYTES # BLD AUTO: 19.9 % — SIGNIFICANT CHANGE UP (ref 13–44)
LYMPHOCYTES # BLD AUTO: 20.3 % — SIGNIFICANT CHANGE UP (ref 13–44)
MCHC RBC-ENTMCNC: 28.5 PG — SIGNIFICANT CHANGE UP (ref 27–34)
MCHC RBC-ENTMCNC: 28.9 PG — SIGNIFICANT CHANGE UP (ref 27–34)
MCHC RBC-ENTMCNC: 30.4 GM/DL — LOW (ref 32–36)
MCHC RBC-ENTMCNC: 30.6 GM/DL — LOW (ref 32–36)
MCV RBC AUTO: 93.9 FL — SIGNIFICANT CHANGE UP (ref 80–100)
MCV RBC AUTO: 94.5 FL — SIGNIFICANT CHANGE UP (ref 80–100)
MONOCYTES # BLD AUTO: 0.47 K/UL — SIGNIFICANT CHANGE UP (ref 0–0.9)
MONOCYTES # BLD AUTO: 0.51 K/UL — SIGNIFICANT CHANGE UP (ref 0–0.9)
MONOCYTES NFR BLD AUTO: 6.5 % — SIGNIFICANT CHANGE UP (ref 2–14)
MONOCYTES NFR BLD AUTO: 6.8 % — SIGNIFICANT CHANGE UP (ref 2–14)
NEUTROPHILS # BLD AUTO: 5.1 K/UL — SIGNIFICANT CHANGE UP (ref 1.8–7.4)
NEUTROPHILS # BLD AUTO: 5.28 K/UL — SIGNIFICANT CHANGE UP (ref 1.8–7.4)
NEUTROPHILS NFR BLD AUTO: 70.3 % — SIGNIFICANT CHANGE UP (ref 43–77)
NEUTROPHILS NFR BLD AUTO: 70.5 % — SIGNIFICANT CHANGE UP (ref 43–77)
NITRITE UR-MCNC: POSITIVE
NRBC # BLD: 0 /100 WBCS — SIGNIFICANT CHANGE UP (ref 0–0)
PH UR: 7 — SIGNIFICANT CHANGE UP (ref 5–8)
PLATELET # BLD AUTO: 162 K/UL — SIGNIFICANT CHANGE UP (ref 150–400)
PLATELET # BLD AUTO: 163 K/UL — SIGNIFICANT CHANGE UP (ref 150–400)
PLATELET RESPONSE ASPIRIN RESULT: 632 ARU — SIGNIFICANT CHANGE UP (ref 350–700)
POTASSIUM SERPL-MCNC: 3.7 MMOL/L — SIGNIFICANT CHANGE UP (ref 3.5–5.3)
POTASSIUM SERPL-MCNC: 3.9 MMOL/L — SIGNIFICANT CHANGE UP (ref 3.5–5.3)
POTASSIUM SERPL-SCNC: 3.7 MMOL/L — SIGNIFICANT CHANGE UP (ref 3.5–5.3)
POTASSIUM SERPL-SCNC: 3.9 MMOL/L — SIGNIFICANT CHANGE UP (ref 3.5–5.3)
PROT SERPL-MCNC: 7.3 G/DL — SIGNIFICANT CHANGE UP (ref 6–8.3)
PROT SERPL-MCNC: 8 GM/DL — SIGNIFICANT CHANGE UP (ref 6–8.3)
PROT UR-MCNC: NEGATIVE MG/DL — SIGNIFICANT CHANGE UP
PROTHROM AB SERPL-ACNC: 12.4 SEC — SIGNIFICANT CHANGE UP (ref 10.6–13.6)
PROTHROM AB SERPL-ACNC: 12.6 SEC — SIGNIFICANT CHANGE UP (ref 10.6–13.6)
RBC # BLD: 4.07 M/UL — SIGNIFICANT CHANGE UP (ref 3.8–5.2)
RBC # BLD: 4.22 M/UL — SIGNIFICANT CHANGE UP (ref 3.8–5.2)
RBC # FLD: 14.5 % — SIGNIFICANT CHANGE UP (ref 10.3–14.5)
RBC # FLD: 14.7 % — HIGH (ref 10.3–14.5)
SARS-COV-2 RNA SPEC QL NAA+PROBE: SIGNIFICANT CHANGE UP
SODIUM SERPL-SCNC: 144 MMOL/L — SIGNIFICANT CHANGE UP (ref 135–145)
SODIUM SERPL-SCNC: 145 MMOL/L — SIGNIFICANT CHANGE UP (ref 135–145)
SP GR SPEC: 1.01 — SIGNIFICANT CHANGE UP (ref 1.01–1.02)
TROPONIN I SERPL-MCNC: <0.015 NG/ML — SIGNIFICANT CHANGE UP (ref 0.01–0.04)
UROBILINOGEN FLD QL: NEGATIVE MG/DL — SIGNIFICANT CHANGE UP
WBC # BLD: 7.23 K/UL — SIGNIFICANT CHANGE UP (ref 3.8–10.5)
WBC # BLD: 7.5 K/UL — SIGNIFICANT CHANGE UP (ref 3.8–10.5)
WBC # FLD AUTO: 7.23 K/UL — SIGNIFICANT CHANGE UP (ref 3.8–10.5)
WBC # FLD AUTO: 7.5 K/UL — SIGNIFICANT CHANGE UP (ref 3.8–10.5)

## 2021-06-05 PROCEDURE — 85610 PROTHROMBIN TIME: CPT

## 2021-06-05 PROCEDURE — 70486 CT MAXILLOFACIAL W/O DYE: CPT

## 2021-06-05 PROCEDURE — U0003: CPT

## 2021-06-05 PROCEDURE — 71045 X-RAY EXAM CHEST 1 VIEW: CPT | Mod: 26

## 2021-06-05 PROCEDURE — 72170 X-RAY EXAM OF PELVIS: CPT

## 2021-06-05 PROCEDURE — 99291 CRITICAL CARE FIRST HOUR: CPT | Mod: 25

## 2021-06-05 PROCEDURE — 81001 URINALYSIS AUTO W/SCOPE: CPT

## 2021-06-05 PROCEDURE — 85025 COMPLETE CBC W/AUTO DIFF WBC: CPT

## 2021-06-05 PROCEDURE — 70486 CT MAXILLOFACIAL W/O DYE: CPT | Mod: 26,MA

## 2021-06-05 PROCEDURE — 70450 CT HEAD/BRAIN W/O DYE: CPT | Mod: 26

## 2021-06-05 PROCEDURE — 70450 CT HEAD/BRAIN W/O DYE: CPT | Mod: 26,MH

## 2021-06-05 PROCEDURE — 93010 ELECTROCARDIOGRAM REPORT: CPT

## 2021-06-05 PROCEDURE — 87086 URINE CULTURE/COLONY COUNT: CPT

## 2021-06-05 PROCEDURE — 84484 ASSAY OF TROPONIN QUANT: CPT

## 2021-06-05 PROCEDURE — 99283 EMERGENCY DEPT VISIT LOW MDM: CPT

## 2021-06-05 PROCEDURE — 12011 RPR F/E/E/N/L/M 2.5 CM/<: CPT

## 2021-06-05 PROCEDURE — 82962 GLUCOSE BLOOD TEST: CPT

## 2021-06-05 PROCEDURE — 72125 CT NECK SPINE W/O DYE: CPT

## 2021-06-05 PROCEDURE — 87186 SC STD MICRODIL/AGAR DIL: CPT

## 2021-06-05 PROCEDURE — 76376 3D RENDER W/INTRP POSTPROCES: CPT | Mod: 26

## 2021-06-05 PROCEDURE — 85730 THROMBOPLASTIN TIME PARTIAL: CPT

## 2021-06-05 PROCEDURE — 71045 X-RAY EXAM CHEST 1 VIEW: CPT

## 2021-06-05 PROCEDURE — 86900 BLOOD TYPING SEROLOGIC ABO: CPT

## 2021-06-05 PROCEDURE — U0005: CPT

## 2021-06-05 PROCEDURE — 72125 CT NECK SPINE W/O DYE: CPT | Mod: 26,MH

## 2021-06-05 PROCEDURE — 76376 3D RENDER W/INTRP POSTPROCES: CPT

## 2021-06-05 PROCEDURE — 36415 COLL VENOUS BLD VENIPUNCTURE: CPT

## 2021-06-05 PROCEDURE — 80053 COMPREHEN METABOLIC PANEL: CPT

## 2021-06-05 PROCEDURE — 86901 BLOOD TYPING SEROLOGIC RH(D): CPT

## 2021-06-05 PROCEDURE — 70450 CT HEAD/BRAIN W/O DYE: CPT

## 2021-06-05 PROCEDURE — 93005 ELECTROCARDIOGRAM TRACING: CPT | Mod: XU

## 2021-06-05 PROCEDURE — 99285 EMERGENCY DEPT VISIT HI MDM: CPT | Mod: GC

## 2021-06-05 PROCEDURE — 96374 THER/PROPH/DIAG INJ IV PUSH: CPT | Mod: XU

## 2021-06-05 PROCEDURE — 86850 RBC ANTIBODY SCREEN: CPT

## 2021-06-05 PROCEDURE — 72170 X-RAY EXAM OF PELVIS: CPT | Mod: 26

## 2021-06-05 RX ORDER — LOSARTAN POTASSIUM 100 MG/1
50 TABLET, FILM COATED ORAL DAILY
Refills: 0 | Status: DISCONTINUED | OUTPATIENT
Start: 2021-06-05 | End: 2021-06-09

## 2021-06-05 RX ORDER — CEFTRIAXONE 500 MG/1
1000 INJECTION, POWDER, FOR SOLUTION INTRAMUSCULAR; INTRAVENOUS ONCE
Refills: 0 | Status: COMPLETED | OUTPATIENT
Start: 2021-06-05 | End: 2021-06-05

## 2021-06-05 RX ORDER — POTASSIUM CHLORIDE 20 MEQ
20 PACKET (EA) ORAL DAILY
Refills: 0 | Status: DISCONTINUED | OUTPATIENT
Start: 2021-06-05 | End: 2021-06-09

## 2021-06-05 RX ORDER — METOPROLOL TARTRATE 50 MG
25 TABLET ORAL DAILY
Refills: 0 | Status: DISCONTINUED | OUTPATIENT
Start: 2021-06-05 | End: 2021-06-09

## 2021-06-05 RX ORDER — LEVETIRACETAM 250 MG/1
500 TABLET, FILM COATED ORAL EVERY 12 HOURS
Refills: 0 | Status: DISCONTINUED | OUTPATIENT
Start: 2021-06-06 | End: 2021-06-09

## 2021-06-05 RX ORDER — LEVETIRACETAM 250 MG/1
500 TABLET, FILM COATED ORAL
Refills: 0 | Status: DISCONTINUED | OUTPATIENT
Start: 2021-06-05 | End: 2021-06-05

## 2021-06-05 RX ORDER — ATORVASTATIN CALCIUM 80 MG/1
10 TABLET, FILM COATED ORAL AT BEDTIME
Refills: 0 | Status: DISCONTINUED | OUTPATIENT
Start: 2021-06-05 | End: 2021-06-09

## 2021-06-05 RX ORDER — CEFTRIAXONE 500 MG/1
1000 INJECTION, POWDER, FOR SOLUTION INTRAMUSCULAR; INTRAVENOUS ONCE
Refills: 0 | Status: DISCONTINUED | OUTPATIENT
Start: 2021-06-05 | End: 2021-06-05

## 2021-06-05 RX ORDER — FUROSEMIDE 40 MG
20 TABLET ORAL DAILY
Refills: 0 | Status: DISCONTINUED | OUTPATIENT
Start: 2021-06-05 | End: 2021-06-09

## 2021-06-05 RX ORDER — ESCITALOPRAM OXALATE 10 MG/1
20 TABLET, FILM COATED ORAL DAILY
Refills: 0 | Status: DISCONTINUED | OUTPATIENT
Start: 2021-06-05 | End: 2021-06-09

## 2021-06-05 RX ORDER — LEVETIRACETAM 250 MG/1
500 TABLET, FILM COATED ORAL EVERY 12 HOURS
Refills: 0 | Status: DISCONTINUED | OUTPATIENT
Start: 2021-06-05 | End: 2021-06-05

## 2021-06-05 RX ADMIN — Medication 20 MILLIEQUIVALENT(S): at 23:02

## 2021-06-05 RX ADMIN — LEVETIRACETAM 400 MILLIGRAM(S): 250 TABLET, FILM COATED ORAL at 15:23

## 2021-06-05 RX ADMIN — CEFTRIAXONE 1000 MILLIGRAM(S): 500 INJECTION, POWDER, FOR SOLUTION INTRAMUSCULAR; INTRAVENOUS at 09:40

## 2021-06-05 RX ADMIN — ESCITALOPRAM OXALATE 20 MILLIGRAM(S): 10 TABLET, FILM COATED ORAL at 23:02

## 2021-06-05 RX ADMIN — ATORVASTATIN CALCIUM 10 MILLIGRAM(S): 80 TABLET, FILM COATED ORAL at 23:02

## 2021-06-05 NOTE — ED PROVIDER NOTE - PROGRESS NOTE DETAILS
called by radiology - pt with new subdural - spoke with neurosurgery will see patient shortly. Manjit Lowery M.D., Attending Physician ct max face appreciated. trauma consulted. Manjit Lowery M.D., Attending Physician spoke with patients son and updated on patients issues. Manjit Lowery M.D., Attending Physician spoke with Dr. Jimenez on call for trauma - states trauma will see the patient but pt will need transfer. transfer center called. Manjit Lowery M.D., Attending Physician called transfer center again as its been 1 hour and ENT/OMFS hasn't called them back. they are trying to call the 2nd on call. Manjit Lowery M.D., Attending Physician ct max face appreciated. trauma consulted full rom of eye no entrapment no proptosis able to open and close eye without issue. Manjit Lowery M.D., Attending Physician 3rd call to transfer center. they are trying to get in touch with omfs/ent/optho. Manjit Lowery M.D., Attending Physician pt to go to [facility] to trauma service . accepted under Dr. Fritz. Manjit Lowery M.D., Attending Physician

## 2021-06-05 NOTE — ED PROVIDER NOTE - CLINICAL SUMMARY MEDICAL DECISION MAKING FREE TEXT BOX
Attending MD Nolan: 71F with dementia,  shunt presenting from OSH for traumatic SDH, mandibular and maxillary sinus fx. Pt wide awake here, pleasant, oriented x 1 (presumably baseline). C spine was cleared radiographically at OSH. No other trauma externally on exam. Plan for serial head CT now, nsg, plastics and trauma consultations.        *The above represents an initial assessment/impression. Please refer to progress notes for potential changes in patient clinical course*

## 2021-06-05 NOTE — ED ADULT NURSE NOTE - OBJECTIVE STATEMENT
71 year old transfer from [place] from [facility], Pt had mechanical fall this morning with facial injuries as per EMS and was found to have a new acute bleeding in the subdural space as well as a stable subdural. Pt is A&O x 1 at baseline. repaired laceration noted to right forehead. Pt is A&O x 1, VSS, afebrile. Pt denies fever, chills, NVD, SOB, or chest pain. IV placed, labs drawn, bed in low position, safety measures in place.

## 2021-06-05 NOTE — CONSULT NOTE ADULT - ATTENDING COMMENTS
I was present in ER with PA.  I examined patient. I have reviewed all imaging and lab results     Patient had  shunt placed for treatment of presumed NPH at an outside hospital. Patient and her son unclear as to which hospital or the treating surgeon. It was placed in spring of 2019 and apparently no followup. shunt placed from and occipital trajectory on right. shunt valve/reservoir is in the occipital region - the patient's head in the neutral resting position is compressing the reservoir. It is not clear if mechanical repeated compression is causing overshunting of the ventricles or if it is because of the valve setting is too low. However, clearly because of the collapsed ventricles the patient is vulnerable to subdural hygroma and hematoma.     Given the patient's overall condition and minimal mass effect I recommend conservative approach to the left hemisphere SDH collection. The shunt valve has been set at a higher resistance - 7 - Certas programmable valve.     After discussion with ER and trauma team it has been determined that the patient has significant right mid face fractures and retro orbital hematoma (without evidence of elevated pressure on exam). As a result the patient will be transferred to [facility] for specialist ENT and opthalmology support.     repeat CT of head stable - patient is safe for transport

## 2021-06-05 NOTE — ED PROVIDER NOTE - CLINICAL SUMMARY MEDICAL DECISION MAKING FREE TEXT BOX
71F presents to the ED s/p unwitnessed fall. on arrival pt with lac to right eyebrow - trauma alert called. will obtain ct head workup for unwitnessed fall likely admit. Manjit Lowery M.D., Attending Physician

## 2021-06-05 NOTE — H&P ADULT - HISTORY OF PRESENT ILLNESS
TRAUMA SERVICE (Acute Care Surgery / [facility] - #[id number]) - CONSULT NOTE  --------------------------------------------------------------------------------------------    TRAUMA ACTIVATION LEVEL: 3    MECHANISM OF INJURY:      [] Blunt  	[] MVC	[X] Fall	[] Pedestrian Struck	[] Motorcycle accident      [] Penetrating  	[] Gun Shot Wound 		[] Stab Wound    GCS: 	E: 4	V: 4	M: 6    Patient is a 71y old  Female who presents with a chief complaint of fall    HPI:   71F with history of dementia (AAOx1 to self at baseline, lives at [facility]), HTN,  shunt for NPH, seizures (worked up in April 2019, currently not on AEDs), who initially arrived to [facility] ED s/p mechanical fall. After initial workup including neurosurgery and trauma evaluation, she was transferred to [facility] for further trauma evaluation  Pt has been having recurrent falls and was recently seen in [facility] ED 6/3/2021 for left subacute SDH s/p fall, repeat imaging stable and discharged same day    Primary Survey:    A - airway intact  B - bilateral breath sounds and good chest rise  C - initial BP  BP: 148/75 (06-05-21 @ 13:49), HR HR: 58 (06-05-21 @ 13:49), palpable pulses in all extremities  D - GCS 14 on arrival  Exposure obtained      Secondary Survey:   General: Elderly female, NAD  HEENT: R periorbital ecchymosis, small amount of dry blood at periorbital area and on upper lip, PEERL. R   Neck: Soft, midline trachea.  Chest: No chest wall tenderness or deformities.   Cardiac: S1, S2, RRR  Respiratory: Bilateral breath sounds, clear and equal bilaterally  Abdomen: Soft, non-distended, non-tender, no rebound, no guarding, no masses palpated  Groin: Normal appearing  Ext: palp radial b/l UE, b/l DP palp in Lower Extrem, motor and sensory grossly intact in all 4 extremities  Back: no TTP, no palpable stepoff/deformity        PAST MEDICAL & SURGICAL HISTORY:  Dementia      SOCIAL HISTORY:  Unable to obtain    ALLERGIES: No Known Allergies      --------------------------------------------------------------------------------------------    Vitals:   T(C): 36.8 (06-05-21 @ 13:49), Max: 36.8 (06-05-21 @ 13:49)  HR: 58 (06-05-21 @ 13:49) (58 - 58)  BP: 148/75 (06-05-21 @ 13:49) (148/75 - 148/75)  RR: 18 (06-05-21 @ 13:49) (18 - 18)  SpO2: 97% (06-05-21 @ 13:49) (97% - 97%)      --------------------------------------------------------------------------------------------

## 2021-06-05 NOTE — ED PROVIDER NOTE - OBJECTIVE STATEMENT
71F with PMHx of dementia (AAO*1 at baseline) who initially arrived to [facility] from the [facility] from a mechanical fall. Pt has been having recurrent falls and was recently seen on the 3rd. She had a stable SDH on the 3rd and after this more recent fall she developed a small amount of acute bleeding in the subdural space. She was also found to have a right inferior orbital wall, madibular ramus and maxillary sinus fractures. Pt denies any pain currently or any symptoms right now. Is pleasantly demented.

## 2021-06-05 NOTE — H&P ADULT - NSICDXPASTMEDICALHX_GEN_ALL_CORE_FT
PAST MEDICAL HISTORY:  Benign essential HTN     Dementia     Dermatomyositis     Hyperlipidemia     Normal pressure hydrocephalus s/p VPS

## 2021-06-05 NOTE — CONSULT NOTE ADULT - ASSESSMENT
70 y/o F PMH of Dementia, HTN, NPH s/p VPS, seizures? on ASA 81 presents with LT acute on chronic SDH s/p fall this morning at nursing home with right facial fractures and laceration s/p repair    Patient seen and evaluated by Neurosurgery  F/U repeat Head CT   Patient plan for transfer to [facility] for tertiary evaluation of right orbital floor fractures, displaced fracture of the right anterior mandibular ramus  No acute trauma intervention at this time   Please reconsult as needed    Plan discussed with Dr. Jimenez, trauma surgery attending

## 2021-06-05 NOTE — ED PROVIDER NOTE - OBJECTIVE STATEMENT
71F hx htn hld dementia seizure disorder presents to the ED from nursing facility for unwitnessed fall. sent in for laceration to right eyebrow. Pt was seen in the Ed on June 3rd for subdural hematoma and was d/c home. history limited 2/2 dementia.

## 2021-06-05 NOTE — ED ADULT TRIAGE NOTE - CHIEF COMPLAINT QUOTE
Unwitnessed fall at NH. Hx Dementia, patient cannot recall falling, denies any complaints. No anticoagulants. Hematoma to back of head. Small lac to right eyebrow, bleeding controlled. MD Lowery made aware. VS stable, no distress noted. Recently seen in ED s/p fall with bleed. Trauma alert called.          Trauma Alert Called.

## 2021-06-05 NOTE — ED PROVIDER NOTE - PHYSICAL EXAMINATION
Constitutional: NAD   Eyes: PERRLA EOMI  Head: see below  ENT: No tom sign, no raccoon eyes no csf rhinorrhea, no nasal septal hematoma  Mouth: MMM  Cardiac: regular rate   Resp: Lungs CTAB  GI: Abd s/nt/nd  Neuro: CN2-12 intact GCS 15  Skin: no bruising to chest, back, abdomen or extremities abrasion to left shoulder, (no ttp) 1 cm laceration to right eyebrow  Msk: No midline spinal ttp, mild ttp right eyebrow, no ttp to chest wall, pelvis stable, full ROM of all extremities without any ttp of extremities

## 2021-06-05 NOTE — CONSULT NOTE ADULT - SUBJECTIVE AND OBJECTIVE BOX
Trauma Surgery   Trauma Consultation    Patient Name: RAFAEL FALL  Identification: [id number] , 71y (1950) , Female  Location: [facility] ([facility])    Mechanism/CC:  71 year old woman with a PMH of Dementia, HTN, NPH s/p VPS, seizures? on ASA 81 presents with LT acute on chronic SDH s/p fall this morning at nursing home .  Patient has fall 2 days ago and was evaluated for LT chronic SDH at  ED and discharged after stable scan. Patient denies LOC.  Patient had history of VPS in 2019.   Location of Pain: Right face , Intensity: *** /10    Physical Examination:  GCS: E: 4 V:5 M:6 = 15 /15  Vital Signs: T(C): 36.8 (21 @ 07:01), Max: 36.8 (21 @ 07:01)  HR: 58 (21 @ 07:01)  BP: 153/50 (21 @ 07:01)  RR: 18 (21 @ 07:01)  SpO2: 100% (21 @ 07:01)  Height (cm): 315 (21 @ 07:01)  Weight (kg): 45.4 (21 @ 07:01)  BMI (kg/m2): 4.6 (21 @ :01)  BSA (m2): 2.35 (21 @ 07:01)  General Appearance: A&O x2, no acute distress confused   Head:  - Midface: stable, Eyes: right orbital contusion , Nose: dried blood in nares , Mouth: atraumatic , Pharynx: atraumatic  Neck:  - Tracheal Deviation:  no , Mid-line tenderness C-spine: no , JVD:  no   Chest:  - External evidence of trauma: no; BL chest rise , Flail segment: no , Able to take deep inspiation without pain:  no KENDRICK , Breathe sounds: BL CTA  , Heart Sounds: normal RRR , Chest tenderness: none KENDRICK , Subcutaneous Emphysema: no  Abdomen:   - Soft Firm non-distended , Tender:  no   Back:  - Tender: no,  Deformity:  no, Hematoma: no   Pelvis:   - AP compression of Iliac Crest: stable, non-tender KENDRICK , AP compression of Symphysis Pubis: stable , Lacerations:  no  Genitals:  - Normal Female   Rectum:  - Not assessed   Extremities:  - Open Joint:no , Deformities:no , Lacerations: no , Knee deformities: no , Hip dislocation: no , Open fractures: no , Neurological Impairment: no , Bruits: no , KENDRICK  Neuro:  - Pupils: R eye:, Reactivity: Brisk , L eye:Reactivity: Brisk , Motor Function: (Grade 1-5), KENDRICK RUE *** /5 RLE *** /5 LUE *** /5 LLE *** /5    HPI:  This is a 71y Female who presents to ED s/p fall at nursing home. Patient has PMH of Dementia, HTN, NPH s/p VPS, seizures? on ASA 81 presents with LT acute on chronic SDH s/p fall this morning at nursing home .  Patient has fall 2 days ago and was evaluated for LT chronic SDH at  ED and discharged after stable scan. Patient denies LOC.  Patient had history of VPS in 2019.   (+): HT , LOC , BT/AC , Seatbelt , Airbags , EtOH , Substance use, Obvious signs of trauma   (-): HT , LOC , BT/AC , Seatbelt , Airbags , EtOH , Substance use , Obvious signs of trauma     PMH:  HTN (hypertension)    Dermatomyositis    Vascular dementia    Depression    Seizures    HLD (hyperlipidemia)      PSH:  No significant past surgical history        Medications:  Aspirin Enteric Coated 81 mg oral delayed release tablet: 1 tab(s) orally once a day  escitalopram 20 mg oral tablet: 1 tab(s) orally once a day  hydroCHLOROthiazide 12.5 mg oral capsule: 1 tab(s) orally once a day  loratadine 10 mg oral tablet: 1 tab(s) orally once a day  losartan 50 mg oral tablet: 1 tab(s) orally once a day  Metoprolol Succinate ER 25 mg oral tablet, extended release: 1 tab(s) orally once a day  triamcinolone 0.1% topical ointment: Apply topically to affected area once a day  Vitamin D3 50,000 intl units oral capsule: 1 cap(s) orally every 2 weeks    Allergies:  No Known Allergies      Social Hx:  Denies all  EtOH: ***drinks/day  Tobacco: ***pack years  Substance Use:  no   Tetanus: UNK  Last Meal: This morning    Radiology/Imaging:  CXR , Xray Pelvis , Head CT , CT C-sine , CT Chest , CT Abdomen/Pelvis Results:   CT Maxillofacial No Cont:   EXAM:  CT MAXILLOFACIAL                          EXAM:  CT 3D RECONSTRUCT LORNA SAGE                            PROCEDURE DATE:  2021          INTERPRETATION:  Study: Maxillofacial CT without contrast  Indication:  trauma alert  Comparison:None available.    Technique: Multidetector axial imaging of the facial bones was obtained without contrast. 3-D reformats were obtained. Sagittal and coronal reformatted images were produced.    Findings:  There is an acute fracture of the right inferior orbital wall with minimally depressed bony fragments. There are displaced comminuted fractures of the lateral wall of the right maxillary sinus. There is extensive blood in the right maxillary sinus.    There is a displaced fracture of the rightanterior mandibular ramus. The right TMJ remains well located.    There is a small right periorbital hematoma and hematoma of the right anterior cheek.    Rest of the osseous structures are remain intact.    Please see report of CT head for intracranial findings including new acute rebleeding of the left subdural hematoma.        IMPRESSION:    Acute fracture of the right inferior orbital wall with minimally depressed bony fragments. There are displaced comminuted fractures of the lateral wall ofthe right maxillary sinus. There is extensive blood in the right maxillary sinus.    Displaced fracture of the right anterior mandibular ramus. The right TMJ remains well located.    Small right periorbital hematoma and hematoma of the right anteriorcheek.      Please see report of CT head for intracranial findings including new acute rebleeding of the left subdural hematoma.                  ALEXANDER BEAL MD; Attending Radiologist  This document has been electronically signed. 2021  7:50AM (21 @ 07:21)  CT Cervical Spine No Cont:   EXAM:  CT CERVICAL SPINE                            PROCEDURE DATE:  2021          INTERPRETATION:  CT cervical spine without IV contrast    CLINICAL INFORMATION: trauma alert    TECHNIQUE:  Contiguous axial sections were obtained through thecervical spine using a single helical acquisition.  Additional sagittal and coronal reconstructions of the spine were obtained.  These additional reformatted images were used to evaluate the spine for alignment, vertebral fractures and the integrity of the the posterior elements.   This scan was performed using automatic exposure control (radiation dose reduction software) to obtain a diagnostic image quality scan with patient dose as low as reasonably achievable.    FINDINGS:   No prior similar studies are available for review    Cervical vertebral body heights are maintained. No vertebral fracture is seen. No destructive bone lesion is found.  Alignment is preserved.  Facet joints appear intact and aligned.    Cervical intervertebral disc spaces show multilevel degenerative disease and spondylosis with loss of intervertebral disc height and associated degenerative endplate changes.  There is multilevel narrowing of the neural foramina on a degenerative basis.  There is no evidence of high-grade central canal stenosis.   MR would be required to evaluate the intervertebral discs at higher sensitivity for disc pathology.    The skull base appears intact.  No neck mass is recognized.  Paraspinal soft tissues appear intact. Visualized lymph nodes appear to be within physiologic size limits.      IMPRESSION:    No acute fracture.  Multilevel degenerative changes of the cervical spine as above.            ALEXANDER BEAL MD; Attending Radiologist  This document has been electronically signed. 2021  7:44AM (21 @ 07:21)  CT Head No Cont:   EXAM:  CT BRAIN                            PROCEDURE DATE:  2021          INTERPRETATION:  EXAM:  CT BRAIN      PROCEDURE DATE: 2021    INTERPRETATION:  CLINICAL STATEMENT: Follow-up subdural hematoma    TECHNIQUE: CT of the head was performed without IV contrast.  RAPID artificial intelligence was utilized for the preliminary evaluation of intracranial hemorrhage.    COMPARISON: CT head 6/3/2021    FINDINGS:    There is a small amount of new acute rebleeding into the left subdural hematoma. Overall size of the left holohemispheric subdural hematoma is increased measuring up to 1.4 cm. No significant mass effect on the underlying parenchyma. No midline shift.    Right parietal ventricular catheter with tip near the left frontal horn unchanged. Ventricles unchanged in size.    There is moderate diffuse parenchymal volume loss. There are areas of low attenuation in the periventricular white matter likely related to mild chronic microvascular ischemic changes.        IMPRESSION:  Small amount of new acute rebleeding into the left subdural hematoma. Overall size of the left holohemispheric subdural hematoma is increased measuring up to 1.4 cm. No significant mass effect on the underlying parenchyma. No midline shift.      Critical value:  I discussed the finding of this report with Dr. Lowery at 7:43 AM on 2021.  Critical value policy of the hospital was followed.  Read back and confirmation of receipt of this communication was performed.  This verbal communication supplements the text report of this document.                    ALEXANDER BEAL MD; Attending Radiologist  This document has been electronically signed. 2021  7:43AM (21 @ 07:21)      Labs:  CBC: 21 @ 08:02  WBC:7.23 N-- M-- L-- E--  HEMO:12.2 RBC:4.22  HCT:39.9  PLT:162    BMP: 21 @ 08:02   <H> BUN 17 GFR 94  K 3.7 CO2 27 CR 0.55 GLC 84    CA: 9.6  MG: --  PO4: --    LFT: 21 @ 08:02  TPROT 8.0 TBILI 0.4 AST 14<L> ALP 77  ALB 3.9 DBILI -- ALT 17    COA21 @ 08:02  PT: 12.6  PTT: 30.4  INR: 1.09    ETOH:   PREG STATUS:   CE: Troponin I, Serum: <0.015 ng/mL (21 @ 08:02)    OTHER:     Injury Diagnosis/Problem List:  Right eyebrow laceration s/p primary repair       Chief/Senior Surgical Resident Note:   ***

## 2021-06-05 NOTE — CHART NOTE - NSCHARTNOTEFT_GEN_A_CORE
Patient with pmhx NPH s/p VPS placement with decreased ventricle size and left thin SDH on CT scan.  CURRENT HealariumMAN CERTAS VPS @ 5, confirmed via adjustment device and  series on CT.  Adjusted to 7 at 11am.   D/w Dr. Williamson

## 2021-06-05 NOTE — ED PROVIDER NOTE - ATTENDING CONTRIBUTION TO CARE
Attending MD Nolan:  I personally have seen and examined this patient.  Resident note reviewed and agree on plan of care and except where noted.  See HPI, PE, and MDM for details.

## 2021-06-05 NOTE — CONSULT NOTE ADULT - ASSESSMENT
71 year old woman with a PMH of Dementia, HTN, NPH s/p VPS, seizures? on ASA 81 presents with LT acute on chronic SDH s/p fall this morning.  Patient has fall 2 days ago and was evaluated for LT chronic SDH and discharged after stable scan.    - Repeat CT head in 6 hours (2PM), please obtain earlier if any acute neuro changes occur.   - Patient records indicate patient possibly on ASA 81mg, no need to reverse, discussed with Dr. Williamson.  HOLD ASPIRIN / AC  - Maintain SBP < 150, continue home antihypertensives, losartan and HCTZ.  Cont Lopressor.  Frequent falls:  - Will need to obtain lateral skull X-ray to evaluate VPS valve if we can't find information on VPS, decreased ventricle size can be contributing to SDH and falls. May need to adjust valve setting.   - consider EEG to evaluate for seizures causing falls, patient with HX of EEG evaluation in 2019 for seizure like activity.  No need for seizure prophylaxis at this time.  +AED if EEG positive or witnessed seizure.   - Appreciate Hospitalist reccs/ follow if admitted.     Attempts to contact [facility] unsuccessful, will attempt again to obtain more medical history.      D/W Dr. Williamson.  71 year old woman with a PMH of Dementia, HTN, NPH s/p VPS, seizures? on ASA 81 presents with LT acute on chronic SDH s/p fall this morning.  Patient has fall 2 days ago and was evaluated for LT chronic SDH and discharged after stable scan.    - Repeat CT head in 6 hours (2PM), please obtain earlier if any acute neuro changes occur.   - Patient records indicate patient possibly on ASA 81mg, no need to reverse, discussed with Dr. Williamson.  HOLD ASPIRIN / AC  - Maintain SBP < 150, continue home antihypertensives, losartan and HCTZ.  Cont Lopressor.  Frequent falls:  - Decreased ventricle size can be contributing to SDH and falls.  Via  series on CT, VPS valve looks to be a CODMAN CERTAS @5, confirmed with adjustment device.  Will ADJUST TO 7   - consider EEG to evaluate for seizures causing falls, patient with HX of EEG evaluation in 2019 for seizure like activity.  No need for seizure prophylaxis at this time.  +AED if EEG positive or witnessed seizure.   - Appreciate Hospitalist reccs/ follow if admitted.     Attempts to contact [facility] unsuccessful, will attempt again to obtain more medical history.    Spoke with Son Josh at phone number listed.  Updated on plans.     D/W Dr. Williamson.

## 2021-06-05 NOTE — ED PROCEDURE NOTE - CPROC ED WOUND CLOSURE1
Sulma Rand   8/11/2017   Anticoagulation Therapy Visit    Description:  69 year old female   Provider:  Kirby Boucher, RN   Department:  [facility]           INR as of 8/11/2017     Today's INR 2.12      Anticoagulation Summary as of 8/11/2017     INR goal 2.0-3.0   Today's INR 2.12   Full instructions 7.5 mg on Tue, Thu, Sat; 10 mg all other days   Next INR check 8/15/2017    Indications   Personal history of DVT (deep vein thrombosis) [Z86.718]  Long term (current) use of anticoagulants [Z79.01]         August 2017 Details    Sun Mon Tue Wed Thu Fri Sat       1               2               3               4               5                 6               7               8               9               10               11      10 mg   See details      12      7.5 mg           13      10 mg         14      10 mg         15            16               17               18               19                 20               21               22               23               24               25               26                 27               28               29               30               31                  Date Details   08/11 This INR check       Date of next INR:  8/15/2017         How to take your warfarin dose     To take:  7.5 mg Take 1.5 of the 5 mg tablets.    To take:  10 mg Take 2 of the 5 mg tablets.           
skin suture

## 2021-06-05 NOTE — ED ADULT NURSE NOTE - SUICIDE SCREENING QUESTION 1
----- Message from Natalie Kay sent at 10/23/2018  8:43 AM CDT -----  Contact: Dr.Wojcik Heard calling asking to speak with  regarding pt          call back number [phone]   Patient unable to complete

## 2021-06-05 NOTE — ED PROVIDER NOTE - CARE PLAN
Principal Discharge DX:	Facial fracture due to fall   Principal Discharge DX:	Facial fracture due to fall  Secondary Diagnosis:	Subdural hematoma  Secondary Diagnosis:	Facial laceration, initial encounter  Secondary Diagnosis:	Closed head injury, initial encounter

## 2021-06-05 NOTE — ED PROVIDER NOTE - PHYSICAL EXAMINATION
Head: No steps offs, bruising or hematoma noted throughout the skull. No otorrhea, rhinorrhea. Bruising noted over right eye and maxillary areas. Repaired laceration over right eyebrow.  Neck: Pt able to rotate neck 45 degrees to the left and right w/o difficulty or pain. No pain on palpation of the mid cervical spine. No step offs present.  Chest: No pain on palpation of the ribs. No pain on deep inspiration. No obvious deformities or bruising  Extremities: No obvious fractures or deformities. Sensation intact throughout. Full RoM.

## 2021-06-05 NOTE — CONSULT NOTE ADULT - SUBJECTIVE AND OBJECTIVE BOX
71 year old woman with a PMH of Dementia (Lives at Assisted Facility [facility] in [facility]), HTN,  shunt for NPH (unknown type, placed between 4/2019 and 5/2021), and seizures (worked up in April 2019, currently not on AEDs), recently seen in ED 6/3/2021 for LT subacute SDH s/p fall.  Repeat imaging stable and discharged same day with follow-up instructions.  Patient now in ED s/p fall this morning, head CT shows prior LT SDH with new acute components, no MLS/compression but decreased ventricle size. Neurosurgery was consulted. Patient is awake and alert, oriented to self only, she denies headache, nausea vomiting, or pain. She is able to participate in the exam, follows commands but is confused. Pt is not on any anticoagulation or antiplatelet medication.       PAST MEDICAL & SURGICAL HISTORY:  HTN (hypertension)  Dermatomyositis  Vascular dementia  Depression  Seizures  HLD (hyperlipidemia)  No significant past surgical history        FAMILY HISTORY:  No pertinent family history in first degree relatives       Noncontributory     Social Hx:  Nonsmoker, no drug or alcohol use    Allergies    No Known Allergies    Intolerances        MEDICATIONS  (STANDING):  cefTRIAXone   IVPB 1000 milliGRAM(s) IV Intermittent once       ROS: Pertinent positives in HPI, all other ROS were reviewed and are negative.      Vital Signs Last 24 Hrs  T(C): 36.8 (05 Jun 2021 07:01), Max: 36.8 (05 Jun 2021 07:01)  T(F): 98.2 (05 Jun 2021 07:01), Max: 98.2 (05 Jun 2021 07:01)  HR: 58 (05 Jun 2021 07:01) (58 - 58)  BP: 153/50 (05 Jun 2021 07:01) (153/50 - 153/50)  BP(mean): --  RR: 18 (05 Jun 2021 07:01) (18 - 18)  SpO2: 100% (05 Jun 2021 07:01) (100% - 100%)    Labs:                        12.2   7.23  )-----------( 162      ( 05 Jun 2021 08:02 )             39.9     06-05    144  |  110<H>  |  17  ----------------------------<  84  3.7   |  27  |  0.55    Ca    9.6      05 Jun 2021 08:02    TPro  8.0  /  Alb  3.9  /  TBili  0.4  /  DBili  x   /  AST  14<L>  /  ALT  17  /  AlkPhos  77  06-05        PT/INR - ( 05 Jun 2021 08:02 )   PT: 12.6 sec;   INR: 1.09 ratio         PTT - ( 05 Jun 2021 08:02 )  PTT:30.4 sec    Radiology report:    CT Head No Cont (06.05.21 @ 07:21) >  IMPRESSION:  Small amount of new acute rebleeding into the left subdural hematoma. Overall size of the left holohemispheric subdural hematoma is increased measuring up to 1.4 cm. No significant mass effect on the underlying parenchyma. No midline shift.    CT Cervical Spine No Cont (06.05.21 @ 07:21) >  IMPRESSION:    No acute fracture.  Multilevel degenerative changes of the cervical spine as above.    CT Maxillofacial No Cont (06.05.21 @ 07:21) >  IMPRESSION:  Acute fracture of the right inferior orbital wall with minimally depressed bony fragments. There are displaced comminuted fractures of the lateral wall ofthe right maxillary sinus. There is extensive blood in the right maxillary sinus.  Displaced fracture of the right anterior mandibular ramus. The right TMJ remains well located.  Small right periorbital hematoma and hematoma of the right anteriorcheek.    < end of copied text >      Physical Exam:  Constitutional: Awake / alert oriented to self only  HEENT: PERRLA, EOMI, +laceration/ecchymosis above RT orbit. Sutured in ED.  Neck: Supple  Respiratory: Breath sounds are clear bilaterally  Cardiovascular: S1 and S2, regular rhythm  Gastrointestinal: Soft, NT/ND  Extremities:  no edema  Vascular: No carotid Bruit  Musculoskeletal: no joint swelling/tenderness, no abnormal movements  Skin: No rashes    Neurological Exam:  Oriented to self only, pleasantly interactive, normal affect, speech fluent, face symmetric. PERRL EOM-I.  Moves all extremities with good strength at least 4/5 (difficult to keep focus). No drifts appreciated.   Gait/Balance: Cannot test     71 year old woman with a PMH of Dementia (Lives at Assisted [facility] in [facility]), HTN,  shunt for NPH (unknown type, placed between 4/2019 and 5/2021), and seizures (worked up in April 2019, currently not on AEDs), recently seen in ED 6/3/2021 for LT subacute SDH s/p fall.  Repeat imaging stable and discharged same day with follow-up instructions.  Patient now in ED s/p fall this morning, head CT shows prior LT chronic SDH with new acute components, no MLS/compression but decreased ventricle size. Neurosurgery was consulted. Patient is awake and alert, oriented to self only, she denies headache, nausea vomiting, or pain. She is able to participate in the exam, follows commands but is confused. Pt is not on any anticoagulation or antiplatelet medication.       PAST MEDICAL & SURGICAL HISTORY:  HTN (hypertension)  Dermatomyositis  Vascular dementia  Depression  Seizures  HLD (hyperlipidemia)  No significant past surgical history        FAMILY HISTORY:  No pertinent family history in first degree relatives       Noncontributory     Social Hx:  Nonsmoker, no drug or alcohol use    Allergies    No Known Allergies    Intolerances        MEDICATIONS  (STANDING):  cefTRIAXone   IVPB 1000 milliGRAM(s) IV Intermittent once       ROS: Pertinent positives in HPI, all other ROS were reviewed and are negative.      Vital Signs Last 24 Hrs  T(C): 36.8 (05 Jun 2021 07:01), Max: 36.8 (05 Jun 2021 07:01)  T(F): 98.2 (05 Jun 2021 07:01), Max: 98.2 (05 Jun 2021 07:01)  HR: 58 (05 Jun 2021 07:01) (58 - 58)  BP: 153/50 (05 Jun 2021 07:01) (153/50 - 153/50)  BP(mean): --  RR: 18 (05 Jun 2021 07:01) (18 - 18)  SpO2: 100% (05 Jun 2021 07:01) (100% - 100%)    Labs:                        12.2   7.23  )-----------( 162      ( 05 Jun 2021 08:02 )             39.9     06-05    144  |  110<H>  |  17  ----------------------------<  84  3.7   |  27  |  0.55    Ca    9.6      05 Jun 2021 08:02    TPro  8.0  /  Alb  3.9  /  TBili  0.4  /  DBili  x   /  AST  14<L>  /  ALT  17  /  AlkPhos  77  06-05        PT/INR - ( 05 Jun 2021 08:02 )   PT: 12.6 sec;   INR: 1.09 ratio         PTT - ( 05 Jun 2021 08:02 )  PTT:30.4 sec    Radiology report:    CT Head No Cont (06.05.21 @ 07:21) >  IMPRESSION:  Small amount of new acute rebleeding into the left subdural hematoma. Overall size of the left holohemispheric subdural hematoma is increased measuring up to 1.4 cm. No significant mass effect on the underlying parenchyma. No midline shift.    CT Cervical Spine No Cont (06.05.21 @ 07:21) >  IMPRESSION:    No acute fracture.  Multilevel degenerative changes of the cervical spine as above.    CT Maxillofacial No Cont (06.05.21 @ 07:21) >  IMPRESSION:  Acute fracture of the right inferior orbital wall with minimally depressed bony fragments. There are displaced comminuted fractures of the lateral wall ofthe right maxillary sinus. There is extensive blood in the right maxillary sinus.  Displaced fracture of the right anterior mandibular ramus. The right TMJ remains well located.  Small right periorbital hematoma and hematoma of the right anteriorcheek.    < end of copied text >      Physical Exam:  Constitutional: Awake / alert oriented to self only  HEENT: PERRLA, EOMI, +laceration/ecchymosis above RT orbit. Sutured in ED.  Neck: Supple  Respiratory: Breath sounds are clear bilaterally  Cardiovascular: S1 and S2, regular rhythm  Gastrointestinal: Soft, NT/ND  Extremities:  no edema  Vascular: No carotid Bruit  Musculoskeletal: no joint swelling/tenderness, no abnormal movements  Skin: No rashes    Neurological Exam:  Oriented to self only, pleasantly interactive, normal affect, speech fluent, face symmetric. PERRL EOM-I.  Moves all extremities with good strength at least 4/5 (difficult to keep focus). No drifts appreciated.   Gait/Balance: Cannot test

## 2021-06-05 NOTE — H&P ADULT - NSHPLABSRESULTS_GEN_ALL_CORE
EXAM:  CT BRAIN                            PROCEDURE DATE:  06/05/2021          INTERPRETATION:  EXAM:  CT BRAIN      PROCEDURE DATE: 6/5/2021    INTERPRETATION:  CLINICAL STATEMENT: Follow-up subdural hematoma    TECHNIQUE: CT of the head was performed without IV contrast.  RAPID artificial intelligence was utilized for the preliminary evaluation of intracranial hemorrhage.    COMPARISON: CT head 6/3/2021    FINDINGS:    There is a small amount of new acute rebleeding into the left subdural hematoma. Overall size of the left holohemispheric subdural hematoma is increased measuring up to 1.4 cm. No significant mass effect on the underlying parenchyma. No midline shift.    Right parietal ventricular catheter with tip near the left frontal horn unchanged. Ventricles unchanged in size.    There is moderate diffuse parenchymal volume loss. There are areas of low attenuation in the periventricular white matter likely related to mild chronic microvascular ischemic changes.        IMPRESSION:  Small amount of new acute rebleeding into the left subdural hematoma. Overall size of the left holohemispheric subdural hematoma is increased measuring up to 1.4 cm. No significant mass effect on the underlying parenchyma. No midline shift.      Critical value:  I discussed the finding of this report with Dr. Lowery at 7:43 AM on 6/5/2021.  Critical value policy of the hospital was followed.  Read back and confirmation of receipt of this communication was performed.  This verbal communication supplements the text report of this document.                    ALEXANDER BEAL MD; Attending Radiologist  This document has been electronically signed. Jun 5 2021  7:43AM      EXAM:  CT CERVICAL SPINE                            PROCEDURE DATE:  06/05/2021          INTERPRETATION:  CT cervical spine without IV contrast    CLINICAL INFORMATION: trauma alert    TECHNIQUE:  Contiguous axial sections were obtained through the cervical spine using a single helical acquisition.  Additional sagittal and coronal reconstructions of the spine were obtained.  These additional reformatted images were used to evaluate the spine for alignment, vertebral fractures and the integrity of the the posterior elements.   This scan was performed using automatic exposure control (radiation dose reduction software) to obtain a diagnostic image quality scan with patient dose as low as reasonably achievable.    FINDINGS:   No prior similar studies are available for review    Cervical vertebral body heights are maintained. No vertebral fracture is seen. No destructive bone lesion is found.  Alignment is preserved.  Facet joints appear intact and aligned.    Cervical intervertebral disc spaces show multilevel degenerative disease and spondylosis with loss of intervertebral disc height and associated degenerative endplate changes.  There is multilevel narrowing of the neural foramina on a degenerative basis.  There is no evidence of high-grade central canal stenosis.   MR would be required to evaluate the intervertebral discs at higher sensitivity for disc pathology.    The skull base appears intact.  No neck mass is recognized.  Paraspinal soft tissues appear intact. Visualized lymph nodes appear to be within physiologic size limits.      IMPRESSION:    No acute fracture.  Multilevel degenerative changes of the cervical spine as above.            ALEXANDER BEAL MD; Attending Radiologist  This document has been electronically signed. Jun 5 2021  7:44AM        EXAM:  CT MAXILLOFACIAL          IMPRESSION:  Acute fracture of the right inferior orbital wall with minimally depressed bony fragments. There are displaced comminuted fractures of the lateral wall of the right maxillary sinus. There is extensive blood in the right maxillary sinus.    Displaced fracture of the right anterior mandibular ramus. The right TMJ remains well located.    Small right periorbital hematoma and hematoma of the right anterior cheek.

## 2021-06-06 DIAGNOSIS — Z98.2 PRESENCE OF CEREBROSPINAL FLUID DRAINAGE DEVICE: Chronic | ICD-10-CM

## 2021-06-06 LAB
ANION GAP SERPL CALC-SCNC: 13 MMOL/L — SIGNIFICANT CHANGE UP (ref 5–17)
BUN SERPL-MCNC: 10 MG/DL — SIGNIFICANT CHANGE UP (ref 7–23)
CALCIUM SERPL-MCNC: 9.5 MG/DL — SIGNIFICANT CHANGE UP (ref 8.4–10.5)
CHLORIDE SERPL-SCNC: 106 MMOL/L — SIGNIFICANT CHANGE UP (ref 96–108)
CO2 SERPL-SCNC: 25 MMOL/L — SIGNIFICANT CHANGE UP (ref 22–31)
COVID-19 SPIKE DOMAIN AB INTERP: POSITIVE
COVID-19 SPIKE DOMAIN ANTIBODY RESULT: >250 U/ML — HIGH
CREAT SERPL-MCNC: 0.63 MG/DL — SIGNIFICANT CHANGE UP (ref 0.5–1.3)
GLUCOSE SERPL-MCNC: 86 MG/DL — SIGNIFICANT CHANGE UP (ref 70–99)
HCT VFR BLD CALC: 40.1 % — SIGNIFICANT CHANGE UP (ref 34.5–45)
HCV AB S/CO SERPL IA: 0.15 S/CO — SIGNIFICANT CHANGE UP (ref 0–0.99)
HCV AB SERPL-IMP: SIGNIFICANT CHANGE UP
HGB BLD-MCNC: 12.1 G/DL — SIGNIFICANT CHANGE UP (ref 11.5–15.5)
MAGNESIUM SERPL-MCNC: 2 MG/DL — SIGNIFICANT CHANGE UP (ref 1.6–2.6)
MCHC RBC-ENTMCNC: 28.3 PG — SIGNIFICANT CHANGE UP (ref 27–34)
MCHC RBC-ENTMCNC: 30.2 GM/DL — LOW (ref 32–36)
MCV RBC AUTO: 93.9 FL — SIGNIFICANT CHANGE UP (ref 80–100)
NRBC # BLD: 0 /100 WBCS — SIGNIFICANT CHANGE UP (ref 0–0)
PHOSPHATE SERPL-MCNC: 3.1 MG/DL — SIGNIFICANT CHANGE UP (ref 2.5–4.5)
PLATELET # BLD AUTO: 158 K/UL — SIGNIFICANT CHANGE UP (ref 150–400)
POTASSIUM SERPL-MCNC: 3.9 MMOL/L — SIGNIFICANT CHANGE UP (ref 3.5–5.3)
POTASSIUM SERPL-SCNC: 3.9 MMOL/L — SIGNIFICANT CHANGE UP (ref 3.5–5.3)
RBC # BLD: 4.27 M/UL — SIGNIFICANT CHANGE UP (ref 3.8–5.2)
RBC # FLD: 14.6 % — HIGH (ref 10.3–14.5)
SARS-COV-2 IGG+IGM SERPL QL IA: >250 U/ML — HIGH
SARS-COV-2 IGG+IGM SERPL QL IA: POSITIVE
SODIUM SERPL-SCNC: 144 MMOL/L — SIGNIFICANT CHANGE UP (ref 135–145)
WBC # BLD: 6.02 K/UL — SIGNIFICANT CHANGE UP (ref 3.8–10.5)
WBC # FLD AUTO: 6.02 K/UL — SIGNIFICANT CHANGE UP (ref 3.8–10.5)

## 2021-06-06 PROCEDURE — 99233 SBSQ HOSP IP/OBS HIGH 50: CPT

## 2021-06-06 PROCEDURE — 70450 CT HEAD/BRAIN W/O DYE: CPT | Mod: 26

## 2021-06-06 PROCEDURE — 70250 X-RAY EXAM OF SKULL: CPT | Mod: 26

## 2021-06-06 RX ADMIN — Medication 20 MILLIGRAM(S): at 06:07

## 2021-06-06 RX ADMIN — Medication 25 MILLIGRAM(S): at 06:07

## 2021-06-06 RX ADMIN — ATORVASTATIN CALCIUM 10 MILLIGRAM(S): 80 TABLET, FILM COATED ORAL at 21:48

## 2021-06-06 RX ADMIN — Medication 20 MILLIEQUIVALENT(S): at 11:35

## 2021-06-06 RX ADMIN — ESCITALOPRAM OXALATE 20 MILLIGRAM(S): 10 TABLET, FILM COATED ORAL at 11:36

## 2021-06-06 RX ADMIN — LOSARTAN POTASSIUM 50 MILLIGRAM(S): 100 TABLET, FILM COATED ORAL at 06:07

## 2021-06-06 RX ADMIN — LEVETIRACETAM 500 MILLIGRAM(S): 250 TABLET, FILM COATED ORAL at 03:09

## 2021-06-06 RX ADMIN — LEVETIRACETAM 500 MILLIGRAM(S): 250 TABLET, FILM COATED ORAL at 14:11

## 2021-06-06 NOTE — PROGRESS NOTE ADULT - ASSESSMENT
71F with history of dementia HTN,  shunt for NPH, seizures, recurrent falls and was recently seen in [place] ED 6/3/2021 for left subacute SDH s/p fall, now transferred from [place] ED s/p fall with worsened L SDH without midline shift and facial fractures  VPS adjusted at [place] from 5 to 7    PLAN:  - F/u neurosurgery recommendations  - F/u Plastics for facial fracture management    - Q4h neurological checks  - Hold AC/Antiplatelet agents  - PT eval  - SW/    ACS [id number] 71F with history of dementia HTN,  shunt for NPH, seizures, recurrent falls and was recently seen in [place] ED 6/3/2021 for left subacute SDH s/p fall, now transferred from [place] ED s/p fall with worsened L SDH without midline shift and facial fractures. VPS adjusted at [place] from 5 to 7    PLAN:  - rpt CTH today. If stable, can resume DVT ppx tmrw.   - F/u neurosurgery recommendations  - F/u Plastics for facial fracture management    - Hold AC/Antiplatelet agents  - PT eval  - c/w keppra  - c/w home meds  - SW/CM    ACS [id number]

## 2021-06-06 NOTE — PHYSICAL THERAPY INITIAL EVALUATION ADULT - PRECAUTIONS/LIMITATIONS, REHAB EVAL
Pt has been having recurrent falls and was recently seen in [place] ED 6/3/2021 for left subacute SDH s/p fall, repeat imaging stable and discharged same day. PT found to have increase in size of SDH and R orbital floor fracture, R coronoid process fracture and R anterior maxillary wall fracture. As per NSx, no surgical intervention at this time./fall precautions

## 2021-06-06 NOTE — CHART NOTE - NSCHARTNOTEFT_GEN_A_CORE
Repeat CTH slight increase in hyperdense component of SDH. Rec repeat in the AM for stability. No DVT ppx at this time
shunt changed to 8, certas. Rec XR to confirm
Repeat 6h CTH stable. No further imaging indicated except for change in exam. No neurosurgical intervention.    If needs to start DVT ppx, must repeat CTH in AM for 24h stability imaging. Cannot start dvt ppx until 24h AFTER a final read 24h stable CTH.       [id number] with any changes. Otherwise, plan as previously documented.

## 2021-06-06 NOTE — PHYSICAL THERAPY INITIAL EVALUATION ADULT - LEVEL OF INDEPENDENCE, REHAB EVAL
PRINCIPAL DISCHARGE DIAGNOSIS  Diagnosis: Perforated viscus  Assessment and Plan of Treatment:       SECONDARY DISCHARGE DIAGNOSES  Diagnosis: Delirium due to known physiological condition  Assessment and Plan of Treatment: contact guard

## 2021-06-06 NOTE — PROGRESS NOTE ADULT - SUBJECTIVE AND OBJECTIVE BOX
SUBJECTIVE:   Pt seen and examined at bedside. No acute events overnight. Pt laying in bed        Vital Signs Last 24 Hrs  T(C): 36.7 (05 Jun 2021 23:48), Max: 36.8 (05 Jun 2021 13:49)  T(F): 98.1 (05 Jun 2021 23:48), Max: 98.2 (05 Jun 2021 13:49)  HR: 74 (05 Jun 2021 23:48) (58 - 74)  BP: 135/78 (05 Jun 2021 23:48) (135/78 - 148/75)  BP(mean): --  RR: 18 (05 Jun 2021 23:48) (17 - 18)  SpO2: 96% (05 Jun 2021 23:48) (95% - 99%)      PHYSICAL EXAM:  Constitutional: NAD, laying in bed  Respiratory: breathing comfortably  Gastrointestinal: Abdomen soft, non distended, nontender      I&O's Summary    05 Jun 2021 07:01  -  06 Jun 2021 03:29  --------------------------------------------------------  IN: 100 mL / OUT: 0 mL / NET: 100 mL      I&O's Detail    05 Jun 2021 07:01  -  06 Jun 2021 03:29  --------------------------------------------------------  IN:    Oral Fluid: 100 mL  Total IN: 100 mL    OUT:  Total OUT: 0 mL    Total NET: 100 mL          MEDICATIONS  (STANDING):  atorvastatin 10 milliGRAM(s) Oral at bedtime  escitalopram 20 milliGRAM(s) Oral daily  furosemide    Tablet 20 milliGRAM(s) Oral daily  levETIRAcetam 500 milliGRAM(s) Oral every 12 hours  losartan 50 milliGRAM(s) Oral daily  metoprolol succinate ER 25 milliGRAM(s) Oral daily  potassium chloride    Tablet ER 20 milliEquivalent(s) Oral daily    MEDICATIONS  (PRN):      LABS:                        11.6   7.50  )-----------( 163      ( 05 Jun 2021 14:12 )             38.2     06-05    145  |  109<H>  |  13  ----------------------------<  91  3.9   |  23  |  0.56    Ca    9.1      05 Jun 2021 14:12    TPro  7.3  /  Alb  4.0  /  TBili  0.3  /  DBili  x   /  AST  16  /  ALT  11  /  AlkPhos  74  06-05    PT/INR - ( 05 Jun 2021 14:12 )   PT: 12.4 sec;   INR: 1.04 ratio         PTT - ( 05 Jun 2021 14:12 )  PTT:29.0 sec      RADIOLOGY & ADDITIONAL STUDIES:

## 2021-06-06 NOTE — ED POST DISCHARGE NOTE - OTHER COMMUNICATION
Pt was transferred to [facility]. Accepting attending/team to follow. Pt was already given rocephin while in ER. -Minor Singh PA-C

## 2021-06-06 NOTE — PHYSICAL THERAPY INITIAL EVALUATION ADULT - PERTINENT HX OF CURRENT PROBLEM, REHAB EVAL
71F with history of dementia (AAOx1 to self at baseline, lives at [facility]), HTN,  shunt for NPH, seizures (worked up in April 2019, currently not on AEDs), who initially arrived to [place] ED s/p mechanical fall. After initial workup including neurosurgery and trauma evaluation, she was transferred to [facility] for further trauma evaluation.

## 2021-06-07 DIAGNOSIS — F03.90 UNSPECIFIED DEMENTIA WITHOUT BEHAVIORAL DISTURBANCE: ICD-10-CM

## 2021-06-07 DIAGNOSIS — I10 ESSENTIAL (PRIMARY) HYPERTENSION: ICD-10-CM

## 2021-06-07 DIAGNOSIS — S02.92XA UNSPECIFIED FRACTURE OF FACIAL BONES, INITIAL ENCOUNTER FOR CLOSED FRACTURE: ICD-10-CM

## 2021-06-07 DIAGNOSIS — G91.2 (IDIOPATHIC) NORMAL PRESSURE HYDROCEPHALUS: ICD-10-CM

## 2021-06-07 DIAGNOSIS — S06.5X9A TRAUMATIC SUBDURAL HEMORRHAGE WITH LOSS OF CONSCIOUSNESS OF UNSPECIFIED DURATION, INITIAL ENCOUNTER: ICD-10-CM

## 2021-06-07 DIAGNOSIS — Z29.9 ENCOUNTER FOR PROPHYLACTIC MEASURES, UNSPECIFIED: ICD-10-CM

## 2021-06-07 LAB
ANION GAP SERPL CALC-SCNC: 11 MMOL/L — SIGNIFICANT CHANGE UP (ref 5–17)
BUN SERPL-MCNC: 18 MG/DL — SIGNIFICANT CHANGE UP (ref 7–23)
CALCIUM SERPL-MCNC: 9.7 MG/DL — SIGNIFICANT CHANGE UP (ref 8.4–10.5)
CHLORIDE SERPL-SCNC: 106 MMOL/L — SIGNIFICANT CHANGE UP (ref 96–108)
CO2 SERPL-SCNC: 25 MMOL/L — SIGNIFICANT CHANGE UP (ref 22–31)
CREAT SERPL-MCNC: 0.88 MG/DL — SIGNIFICANT CHANGE UP (ref 0.5–1.3)
GLUCOSE SERPL-MCNC: 87 MG/DL — SIGNIFICANT CHANGE UP (ref 70–99)
HCT VFR BLD CALC: 41.2 % — SIGNIFICANT CHANGE UP (ref 34.5–45)
HGB BLD-MCNC: 12.7 G/DL — SIGNIFICANT CHANGE UP (ref 11.5–15.5)
MAGNESIUM SERPL-MCNC: 2.2 MG/DL — SIGNIFICANT CHANGE UP (ref 1.6–2.6)
MCHC RBC-ENTMCNC: 28.7 PG — SIGNIFICANT CHANGE UP (ref 27–34)
MCHC RBC-ENTMCNC: 30.8 GM/DL — LOW (ref 32–36)
MCV RBC AUTO: 93.2 FL — SIGNIFICANT CHANGE UP (ref 80–100)
NRBC # BLD: 0 /100 WBCS — SIGNIFICANT CHANGE UP (ref 0–0)
PHOSPHATE SERPL-MCNC: 4.2 MG/DL — SIGNIFICANT CHANGE UP (ref 2.5–4.5)
PLATELET # BLD AUTO: 170 K/UL — SIGNIFICANT CHANGE UP (ref 150–400)
POTASSIUM SERPL-MCNC: 4.4 MMOL/L — SIGNIFICANT CHANGE UP (ref 3.5–5.3)
POTASSIUM SERPL-SCNC: 4.4 MMOL/L — SIGNIFICANT CHANGE UP (ref 3.5–5.3)
RBC # BLD: 4.42 M/UL — SIGNIFICANT CHANGE UP (ref 3.8–5.2)
RBC # FLD: 14.8 % — HIGH (ref 10.3–14.5)
SODIUM SERPL-SCNC: 142 MMOL/L — SIGNIFICANT CHANGE UP (ref 135–145)
WBC # BLD: 7.12 K/UL — SIGNIFICANT CHANGE UP (ref 3.8–10.5)
WBC # FLD AUTO: 7.12 K/UL — SIGNIFICANT CHANGE UP (ref 3.8–10.5)

## 2021-06-07 PROCEDURE — 70450 CT HEAD/BRAIN W/O DYE: CPT | Mod: 26

## 2021-06-07 PROCEDURE — 99223 1ST HOSP IP/OBS HIGH 75: CPT

## 2021-06-07 RX ORDER — CHOLECALCIFEROL (VITAMIN D3) 125 MCG
2000 CAPSULE ORAL DAILY
Refills: 0 | Status: DISCONTINUED | OUTPATIENT
Start: 2021-06-07 | End: 2021-06-09

## 2021-06-07 RX ORDER — ERGOCALCIFEROL 1.25 MG/1
50000 CAPSULE ORAL
Refills: 0 | Status: DISCONTINUED | OUTPATIENT
Start: 2021-06-07 | End: 2021-06-07

## 2021-06-07 RX ADMIN — Medication 20 MILLIEQUIVALENT(S): at 11:34

## 2021-06-07 RX ADMIN — ESCITALOPRAM OXALATE 20 MILLIGRAM(S): 10 TABLET, FILM COATED ORAL at 11:34

## 2021-06-07 RX ADMIN — Medication 2000 UNIT(S): at 11:34

## 2021-06-07 RX ADMIN — Medication 20 MILLIGRAM(S): at 05:53

## 2021-06-07 RX ADMIN — ATORVASTATIN CALCIUM 10 MILLIGRAM(S): 80 TABLET, FILM COATED ORAL at 21:01

## 2021-06-07 RX ADMIN — LEVETIRACETAM 500 MILLIGRAM(S): 250 TABLET, FILM COATED ORAL at 14:57

## 2021-06-07 RX ADMIN — LOSARTAN POTASSIUM 50 MILLIGRAM(S): 100 TABLET, FILM COATED ORAL at 05:53

## 2021-06-07 RX ADMIN — Medication 25 MILLIGRAM(S): at 05:52

## 2021-06-07 RX ADMIN — LEVETIRACETAM 500 MILLIGRAM(S): 250 TABLET, FILM COATED ORAL at 05:52

## 2021-06-07 NOTE — CONSULT NOTE ADULT - SUBJECTIVE AND OBJECTIVE BOX
p (1480)     HPI:  71F with PMHx of dementia (AAO*1 at baseline) who initially arrived to [facility] from the [facility] from a mechanical fall. Pt has been having recurrent falls and was recently seen on the 3rd. She had a stable SDH on the 3rd and after this more recent fall she developed a small amount of acute bleeding in the subdural space. She was also found to have a right inferior orbital wall, madibular ramus and maxillary sinus fractures. Pt denies any pain currently or any symptoms right now. Is pleasantly demented.    Imaging:  Small amount of new acute rebleeding into the left subdural hematoma. Overall size of the left holohemispheric subdural hematoma is increased measuring up to 1.4 cm. No significant mass effect on the underlying parenchyma. No midline shift.    Acute fracture of the right inferior orbital wall with minimally depressed bony fragments. There are displaced comminuted fractures of the lateral wall of the right maxillary sinus. There is extensive blood in the right maxillary sinus.    Displaced fracture of the right anterior mandibular ramus. The right TMJ remains well located.    Exam: AOx1, otherwise non-sensical, intermittently fc x4, CALLAHAN w/ LOL strength, pupils 3R, +facial bruising R>L    --Anticoagulation:    =====================  PAST MEDICAL HISTORY   Dementia      PAST SURGICAL HISTORY         MEDICATIONS:  Antibiotics:    Neuro:    Other:      SOCIAL HISTORY:   Occupation:   Marital Status:     FAMILY HISTORY:      ROS: Negative except per HPI    LABS:  PT/INR - ( 05 Jun 2021 14:12 )   PT: 12.4 sec;   INR: 1.04 ratio         PTT - ( 05 Jun 2021 14:12 )  PTT:29.0 sec                        11.6   7.50  )-----------( 163      ( 05 Jun 2021 14:12 )             38.2     06-05    145  |  109<H>  |  13  ----------------------------<  91  3.9   |  23  |  0.56    Ca    9.1      05 Jun 2021 14:12    TPro  7.3  /  Alb  4.0  /  TBili  0.3  /  DBili  x   /  AST  16  /  ALT  11  /  AlkPhos  74  06-05      
TRAUMA/ACUTE CARE SURGERY - HOSPITAL MEDICINE CO-MANAGEMENT INITIAL VISIT NOTE    CHIEF COMPLAINT: Patient is a 71y old  Female who presents with a chief complaint of         HPI:   71F with history of dementia (AAOx1 to self at baseline, lives at [facility]), HTN,  shunt for NPH, seizures (worked up in April 2019, currently not on AEDs), who initially arrived to [place] ED s/p mechanical fall. After initial workup including neurosurgery and trauma evaluation, she was transferred to [facility] for further trauma evaluation  Pt has been having recurrent falls and was recently seen in [place] ED 6/3/2021 for left subacute SDH s/p fall, repeat imaging stable and discharged same day      PAST MEDICAL & SURGICAL HISTORY:  Dementia      SOCIAL HISTORY:  Unable to obtain    ALLERGIES: No Known Allergies      --------------------------------------------------------------------------------------------    Vitals:   T(C): 36.8 (06-05-21 @ 13:49), Max: 36.8 (06-05-21 @ 13:49)  HR: 58 (06-05-21 @ 13:49) (58 - 58)  BP: 148/75 (06-05-21 @ 13:49) (148/75 - 148/75)  RR: 18 (06-05-21 @ 13:49) (18 - 18)  SpO2: 97% (06-05-21 @ 13:49) (97% - 97%)      --------------------------------------------------------------------------------------------     (05 Jun 2021 15:22)      History limited due to: [x ] Dementia  [ ] Delirium  [ x] Condition    Pain Symptoms if applicable:             	                         none	   mild         moderate         severe  	                            0	    1-3	     4-6	         7-10  Pain:  Location:	  Modifying factors:	  Associated symptoms:	    Allergies    No Known Allergies    Intolerances        HOME MEDICATIONS: [ ] Reviewed    MEDICATIONS  (STANDING):  atorvastatin 10 milliGRAM(s) Oral at bedtime  cholecalciferol 2000 Unit(s) Oral daily  escitalopram 20 milliGRAM(s) Oral daily  furosemide    Tablet 20 milliGRAM(s) Oral daily  levETIRAcetam 500 milliGRAM(s) Oral every 12 hours  losartan 50 milliGRAM(s) Oral daily  metoprolol succinate ER 25 milliGRAM(s) Oral daily  potassium chloride    Tablet ER 20 milliEquivalent(s) Oral daily    MEDICATIONS  (PRN):      PAST MEDICAL & SURGICAL HISTORY:  Dementia    Normal pressure hydrocephalus  s/p VPS    Benign essential HTN    Hyperlipidemia    Dermatomyositis    S/P ventriculoperitoneal shunt    [ ] Reviewed     SOCIAL HISTORY:  Residence: [ ] [facility]  [ ] [facility]  [ ] Community  [ ] Substance abuse:   [ ] Tobacco:   [ ] Alcohol use:     FAMILY HISTORY:  [ ] No pertinent family history in first degree relatives     REVIEW OF SYSTEMS:    [  ] All other ROS negative  [ x ] Unable to obtain due to poor mental status    PHYSICAL EXAM:    Vital Signs Last 24 Hrs  T(C): 37.7 (07 Jun 2021 13:42), Max: 37.7 (07 Jun 2021 13:42)  T(F): 99.8 (07 Jun 2021 13:42), Max: 99.8 (07 Jun 2021 13:42)  HR: 63 (07 Jun 2021 13:42) (62 - 78)  BP: 117/73 (07 Jun 2021 13:42) (105/57 - 143/63)  BP(mean): --  RR: 18 (07 Jun 2021 13:42) (18 - 18)  SpO2: 98% (07 Jun 2021 13:42) (97% - 99%)    GENERAL: NAD, thin  HEAD:  Atraumatic, Normocephalic  EYES: conjunctiva and sclera clear  NECK:  No JVD  RESPIRATORY: Clear to auscultation bilaterally; No rales, rhonchi, wheezing, or rubs  CARDIOVASCULAR: Regular rate and rhythm; S1S2  GASTROINTESTINAL: Soft, Nontender, Nondistended; Bowel sounds present  EXTREMITIES:  2+ Peripheral Pulses,  NERVOUS SYSTEM:  Alert & Oriented X1                        12.7   7.12  )-----------( 170      ( 07 Jun 2021 05:58 )             41.2     Hemoglobin: 12.7 g/dL (06-07 @ 05:58)  Hemoglobin: 12.1 g/dL (06-06 @ 06:18)  Hemoglobin: 11.6 g/dL (06-05 @ 14:12)    06-07    142  |  106  |  18  ----------------------------<  87  4.4   |  25  |  0.88    Ca    9.7      07 Jun 2021 05:58  Phos  4.2     06-07  Mg     2.2     06-07          CAPILLARY BLOOD GLUCOSE        Microbiology     RADIOLOGY & ADDITIONAL STUDIES:    EKG:   Personally Reviewed:  [ ] YES     Imaging:   Personally Reviewed:  [ ] YES               [ ] Consultant(s) Notes Reviewed  [x] Care Discussed with Consultants/Other Providers: Trauma Team    Functional Assessment: [ ] Independent  [ ] Assistance  [ ] Total care  [ ] Non-ambulatory    [ ] Fall risks identified:    [ ] High risk medications identified:    [ ] Probable osteoporosis    [ ] Possible osteomalacia    [ ] Increased delirium risk    [x ] Delirium and other risks can be reduced by:          -early ambulation          -minimizing "tethers" - IV, oxygen, catheters, etc          -avoiding hypnotics and sedatives          -maintaining hydration/nutrition          -avoid anticholinergics - diphenhydramine, etc          -pain control          -supportive environment    Advanced Directives: [ ] DNR  [ ] No feeding tube  [ ] MOLST in chart  [ ] MOLST completed today  [ ] Unknown    
Plastic surgery consult note     HPI:   71F with history of dementia (AAOx1 to self at baseline, lives at [facility]), HTN,  shunt for NPH, seizures (worked up in April 2019, currently not on AEDs), who initially arrived to [place] ED s/p mechanical fall. After initial workup including neurosurgery and trauma evaluation, she was transferred to [facility] for further trauma evaluation  Pt has been having recurrent falls and was recently seen in [place] ED 6/3/2021 for left subacute SDH s/p fall, repeat imaging stable and discharged same day    Plastic surgery consulted in ED for evaluation of multiple facial fractures noted on CT max/face.       PAST MEDICAL & SURGICAL HISTORY:  Dementia      SOCIAL HISTORY:  Unable to obtain    ALLERGIES: No Known Allergies      --------------------------------------------------------------------------------------------    Vitals:   T(C): 36.8 (06-05-21 @ 13:49), Max: 36.8 (06-05-21 @ 13:49)  HR: 58 (06-05-21 @ 13:49) (58 - 58)  BP: 148/75 (06-05-21 @ 13:49) (148/75 - 148/75)  RR: 18 (06-05-21 @ 13:49) (18 - 18)  SpO2: 97% (06-05-21 @ 13:49) (97% - 97%)      --------------------------------------------------------------------------------------------     (05 Jun 2021 15:22)    PAST MEDICAL & SURGICAL HISTORY:  Dementia      Allergies    No Known Allergies    Intolerances      Home Medications:    MEDICATIONS  (STANDING):  levETIRAcetam  IVPB 500 milliGRAM(s) IV Intermittent every 12 hours      SOCIAL HISTORY:  FAMILY HISTORY:      ___________________________________________  OBJECTIVE:  Vital Signs Last 24 Hrs  T(C): 36.8 (05 Jun 2021 13:49), Max: 36.8 (05 Jun 2021 13:49)  T(F): 98.2 (05 Jun 2021 13:49), Max: 98.2 (05 Jun 2021 13:49)  HR: 58 (05 Jun 2021 13:49) (58 - 58)  BP: 148/75 (05 Jun 2021 13:49) (148/75 - 148/75)  BP(mean): --  RR: 18 (05 Jun 2021 13:49) (18 - 18)  SpO2: 97% (05 Jun 2021 13:49) (97% - 97%)CAPILLARY BLOOD GLUCOSE        I&O's Detail    General: NAD  Neuro: A/O X 1, moves all extremities spontaneously  HEENT: R eye with periorbital swelling and ecchymosis, laceration repair to R eyebrow. Small palpable stepoff to R inferior orbital rim. EOMI, PERRL. No tenderness overlying R TMJ, normal appearing occlusion, no dental injuries or intraoral lacerations. Does not appear to have difficulties with opening/closing mouth. Exam limited secondary to mental status.   Respiratory: Airway patent, respirations unlabored  CVS: Regular rate and rhythm  Abdomen: Soft, nontender, nondistended  Extremities: No edema, sensation and movement grossly intact  Skin: Warm, dry, appropriate color  ____________________________________________  LABS:  CBC Full  -  ( 05 Jun 2021 14:12 )  WBC Count : 7.50 K/uL  RBC Count : 4.07 M/uL  Hemoglobin : 11.6 g/dL  Hematocrit : 38.2 %  Platelet Count - Automated : 163 K/uL  Mean Cell Volume : 93.9 fl  Mean Cell Hemoglobin : 28.5 pg  Mean Cell Hemoglobin Concentration : 30.4 gm/dL  Auto Neutrophil # : 5.28 K/uL  Auto Lymphocyte # : 1.52 K/uL  Auto Monocyte # : 0.51 K/uL  Auto Eosinophil # : 0.15 K/uL  Auto Basophil # : 0.02 K/uL  Auto Neutrophil % : 70.3 %  Auto Lymphocyte % : 20.3 %  Auto Monocyte % : 6.8 %  Auto Eosinophil % : 2.0 %  Auto Basophil % : 0.3 %    06-05    145  |  109<H>  |  13  ----------------------------<  91  3.9   |  23  |  0.56    Ca    9.1      05 Jun 2021 14:12    TPro  7.3  /  Alb  4.0  /  TBili  0.3  /  DBili  x   /  AST  16  /  ALT  11  /  AlkPhos  74  06-05    LIVER FUNCTIONS - ( 05 Jun 2021 14:12 )  Alb: 4.0 g/dL / Pro: 7.3 g/dL / ALK PHOS: 74 U/L / ALT: 11 U/L / AST: 16 U/L / GGT: x           PT/INR - ( 05 Jun 2021 14:12 )   PT: 12.4 sec;   INR: 1.04 ratio         PTT - ( 05 Jun 2021 14:12 )  PTT:29.0 sec          ____________________________________________  MICRO:  RECENT CULTURES:    ____________________________________________  RADIOLOGY:

## 2021-06-07 NOTE — CONSULT NOTE ADULT - PROBLEM SELECTOR RECOMMENDATION 9
Repeat CT head: The holohemispheric left subdural hematoma remains stable in thickness without midline shift or significant mass effect. The ventricular caliber has slightly increased  keppra for total 7 days  management per sx

## 2021-06-07 NOTE — CONSULT NOTE ADULT - ASSESSMENT
71F with history of dementia HTN,  shunt for NPH, seizures, recurrent falls and was recently seen in [place] ED 6/3/2021 for left subacute SDH s/p fall, now transferred from [place] ED s/p fall with worsened L SDH without midline shift and facial fractures. VPS adjusted at [place] from 5 to 7 and to 8 by neurosurgery (6/7).Medicine consulted for further manaement   
RAFAEL FALL    72YO F w/ severe dementia (AOx1), hx VPS for NPH unknown neurosurgeon, on ASA, xfer from [facility] after fall at nursing home for OMFS w/u of mandibular fx, Dr. Williamson adjusted Certas from 5-->7 this AM c/f overdrainage given smaller vents and new small acute component to otherwise known L chronic SDH. No HA/N/V, coags/plts wnl. Exam: Wide awake, Ox1, otherwise nonsensical, intermittently fc x4, CALLAHAN w/ LOL strength, pupils 3R, +facial bruising R>L.   - No acute nsgy intervention, shunt confirmed to be at 7  - CTH now for 6-8 stability scan, if stable cont q4h neuro checks  - Hold all systemic AC/AP at this time  - Fu outpatient with primary neurosurgeon and/or Dr. Williamson in 1-2 weeks for stability imaging   - Keppra 500 BID x7d  - fu ARU    - Will discuss with neurosurgery attending  - Plan discussed with ER  - GCS 15  
A/P:     Patient is a 72 y/o F with Hx of dementia, recent falls, NPH with  shunt, chronic SDH transferred from [facility] for mechanical fall with increase in size of SDH and R orbital floor fracture, R coronoid process fracture and R anterior maxillary wall fracture:       - No surgical intervention at this time.   - Patient may consume soft diet given mandible fractures   - Keep head elevated to help reduce swelling   - Sinus precautions   - Patient may follow up as outpatient with Dr. Ryan when discharged from the hospital. You may call [phone] to schedule an appointment.   - Please contact plastic surgery for any questions or concerns.       Plastic Surgery   [id number]

## 2021-06-07 NOTE — PROGRESS NOTE ADULT - ASSESSMENT
71F with history of dementia HTN,  shunt for NPH, seizures, recurrent falls and was recently seen in [place] ED 6/3/2021 for left subacute SDH s/p fall, now transferred from [place] ED s/p fall with worsened L SDH without midline shift and facial fractures. VPS adjusted at [place] from 5 to 7 and to 8 by neurosurgery (6/7)    PLAN:  - Diet: Soft  - CT Head in AM  - Follow up head xray s/p shunt repositioned   - Appreciate Neurosurgery following; shunt adjusted to 8  - R orbital, coronoid process, and anterior maxillary sinus fractures; Appreciate plastic surgery recommendations; no acute sgx, HOB elevation  - Hold AC/Antiplatelet agents until 24 hours with stable head CT  - Sceening LE venous duplex on HD3 per trauma clinical management guideline  - PT eval  - c/w Keppra (7 day course post traumatic seizure ppx)   - c/w home meds  - SW/CM  - Will clarify with patient this AM hx of Loratadine and dermatomyositis treatement (hx of IVIG)  - Hospitalist co-management consult  - Follow up with Dr. Ryan when discharged ([phone])    ACS [id number]

## 2021-06-07 NOTE — PROGRESS NOTE ADULT - ASSESSMENT
RAFAEL FALL    72YO F w/ severe dementia (AOx1), hx VPS for NPH unknown neurosurgeon, on ASA, xfer from [facility] after fall at nursing home for OMFS w/u of mandibular fx, Dr. Williamson adjusted Certas from 5-->7 this AM c/f overdrainage given smaller vents and new small acute component to otherwise known L chronic SDH. No HA/N/V, coags/plts wnl. Exam: Wide awake, Ox1, otherwise nonsensical, intermittently fc x4, CALLAHAN w/ LOL strength, pupils 3R, +facial bruising R>L.   - No acute nsgy intervention  -Xray to assess shunt setting shows now at 8  - Repeat CTH AM for slight inc hyperdense component  - Hold all systemic AC/AP   - Fu outpt w/ primary neurosurgeon and/or Dr. Williamson in 1-2 weeks for stability imaging and shunt management  - Keppra 500 BID x7d total

## 2021-06-07 NOTE — PROGRESS NOTE ADULT - SUBJECTIVE AND OBJECTIVE BOX
TRAUMA SURGERY PROGRESS NOTE   ___________________________________________________________________    RAFAEL FALL | [id number] | 71y Female | [facility] 7TOW 706 D1 | LOS 2d    Attending: Raulito Fritz    ___________________________________________________________________    CC: Patient is a 71y old  Female who presents with a chief complaint of     SUBJECTIVE:   Patient seen today during morning rounds at bedside and found to be without acute distress.     Overnight: Unremarkable    Allergies:  NKDA    OBJECTIVE:  Vitals:    T(C): 36.7 (21 @ 20:06), Max: 36.8 (21 @ 13:33)  HR: 78 (21 @ 20:06) (57 - 87)  BP: 105/57 (21 @ 20:06) (105/57 - 158/73)  RR: 18 (21 @ 20:06) (18 - 18)  SpO2: 99% (21 @ 20:06) (99% - 100%)      OUT:    Voided (mL): 500 mL  Total OUT: 500 mL      OUT:    Voided (mL): 900 mL  Total OUT: 900 mL        Physical Exam:   Constitutional: NAD, laying in bed  Respiratory: breathing comfortably  Gastrointestinal: Abdomen soft, non distended, nontender      Medications:    escitalopram 20 milliGRAM(s) Oral daily  furosemide    Tablet 20 milliGRAM(s) Oral daily  levETIRAcetam 500 milliGRAM(s) Oral every 12 hours  losartan 50 milliGRAM(s) Oral daily  metoprolol succinate ER 25 milliGRAM(s) Oral daily        Laboratory:  WBC: 6.02 H&H: 12.1/40.1 Plt: 158  WBC: 7.50 H&H: 11.6/38.2 Plt: 163    Chemistry:    144  |  106  |  10  ----------------------------<  86  3.9   |  25  |  0.63    Phos: 3.1  M.0         TPro 7.3 / Alb 4.0 / TBili 0.3 / DBili x  / AST/AST 16/11 / AlkPhos 74  PTT 29.0 PT/INR 12.4/1.04          Reviewed laboratory and imaging

## 2021-06-08 ENCOUNTER — TRANSCRIPTION ENCOUNTER (OUTPATIENT)
Age: 71
End: 2021-06-08

## 2021-06-08 LAB
-  AMIKACIN: SIGNIFICANT CHANGE UP
-  AMOXICILLIN/CLAVULANIC ACID: SIGNIFICANT CHANGE UP
-  AMPICILLIN/SULBACTAM: SIGNIFICANT CHANGE UP
-  AMPICILLIN: SIGNIFICANT CHANGE UP
-  AZTREONAM: SIGNIFICANT CHANGE UP
-  CEFAZOLIN: SIGNIFICANT CHANGE UP
-  CEFEPIME: SIGNIFICANT CHANGE UP
-  CEFOXITIN: SIGNIFICANT CHANGE UP
-  CEFTRIAXONE: SIGNIFICANT CHANGE UP
-  CIPROFLOXACIN: SIGNIFICANT CHANGE UP
-  ERTAPENEM: SIGNIFICANT CHANGE UP
-  GENTAMICIN: SIGNIFICANT CHANGE UP
-  IMIPENEM: SIGNIFICANT CHANGE UP
-  LEVOFLOXACIN: SIGNIFICANT CHANGE UP
-  MEROPENEM: SIGNIFICANT CHANGE UP
-  NITROFURANTOIN: SIGNIFICANT CHANGE UP
-  PIPERACILLIN/TAZOBACTAM: SIGNIFICANT CHANGE UP
-  TIGECYCLINE: SIGNIFICANT CHANGE UP
-  TOBRAMYCIN: SIGNIFICANT CHANGE UP
-  TRIMETHOPRIM/SULFAMETHOXAZOLE: SIGNIFICANT CHANGE UP
CULTURE RESULTS: SIGNIFICANT CHANGE UP
METHOD TYPE: SIGNIFICANT CHANGE UP
ORGANISM # SPEC MICROSCOPIC CNT: SIGNIFICANT CHANGE UP
ORGANISM # SPEC MICROSCOPIC CNT: SIGNIFICANT CHANGE UP
SARS-COV-2 RNA SPEC QL NAA+PROBE: SIGNIFICANT CHANGE UP
SPECIMEN SOURCE: SIGNIFICANT CHANGE UP

## 2021-06-08 PROCEDURE — 99231 SBSQ HOSP IP/OBS SF/LOW 25: CPT

## 2021-06-08 PROCEDURE — 70450 CT HEAD/BRAIN W/O DYE: CPT | Mod: 26

## 2021-06-08 PROCEDURE — 99233 SBSQ HOSP IP/OBS HIGH 50: CPT

## 2021-06-08 PROCEDURE — 93970 EXTREMITY STUDY: CPT | Mod: 26

## 2021-06-08 RX ORDER — ACETAMINOPHEN 500 MG
1 TABLET ORAL
Qty: 0 | Refills: 0 | DISCHARGE
Start: 2021-06-08

## 2021-06-08 RX ORDER — LEVETIRACETAM 250 MG/1
1 TABLET, FILM COATED ORAL
Qty: 0 | Refills: 0 | DISCHARGE
Start: 2021-06-08 | End: 2021-06-11

## 2021-06-08 RX ORDER — ENOXAPARIN SODIUM 100 MG/ML
40 INJECTION SUBCUTANEOUS EVERY 24 HOURS
Refills: 0 | Status: DISCONTINUED | OUTPATIENT
Start: 2021-06-08 | End: 2021-06-08

## 2021-06-08 RX ORDER — CHOLECALCIFEROL (VITAMIN D3) 125 MCG
2000 CAPSULE ORAL
Qty: 0 | Refills: 0 | DISCHARGE
Start: 2021-06-08

## 2021-06-08 RX ORDER — ACETAMINOPHEN 500 MG
500 TABLET ORAL EVERY 8 HOURS
Refills: 0 | Status: DISCONTINUED | OUTPATIENT
Start: 2021-06-08 | End: 2021-06-09

## 2021-06-08 RX ADMIN — ATORVASTATIN CALCIUM 10 MILLIGRAM(S): 80 TABLET, FILM COATED ORAL at 22:57

## 2021-06-08 RX ADMIN — Medication 20 MILLIGRAM(S): at 06:24

## 2021-06-08 RX ADMIN — Medication 500 MILLIGRAM(S): at 23:27

## 2021-06-08 RX ADMIN — LEVETIRACETAM 500 MILLIGRAM(S): 250 TABLET, FILM COATED ORAL at 06:24

## 2021-06-08 RX ADMIN — Medication 500 MILLIGRAM(S): at 22:57

## 2021-06-08 RX ADMIN — Medication 2000 UNIT(S): at 12:26

## 2021-06-08 RX ADMIN — LEVETIRACETAM 500 MILLIGRAM(S): 250 TABLET, FILM COATED ORAL at 17:39

## 2021-06-08 RX ADMIN — Medication 20 MILLIEQUIVALENT(S): at 12:25

## 2021-06-08 RX ADMIN — ESCITALOPRAM OXALATE 20 MILLIGRAM(S): 10 TABLET, FILM COATED ORAL at 12:25

## 2021-06-08 RX ADMIN — LOSARTAN POTASSIUM 50 MILLIGRAM(S): 100 TABLET, FILM COATED ORAL at 06:24

## 2021-06-08 NOTE — DIETITIAN INITIAL EVALUATION ADULT. - ORAL INTAKE PTA/DIET HISTORY
Pt with Dementia, A&Ox1 per chart, unable to participate in RD interview.   Per H&P note, Pt taking potassium chloride PTA. NKFA per chart.

## 2021-06-08 NOTE — PROGRESS NOTE ADULT - SUBJECTIVE AND OBJECTIVE BOX
ACS SURGERY DAILY PROGRESS NOTE:       SUBJECTIVE/ROS: Patient feels well.  No acute overnight events.  Repeat head CT yesterday stable, will require another head CT this AM.         MEDICATIONS  (STANDING):  atorvastatin 10 milliGRAM(s) Oral at bedtime  cholecalciferol 2000 Unit(s) Oral daily  escitalopram 20 milliGRAM(s) Oral daily  furosemide    Tablet 20 milliGRAM(s) Oral daily  levETIRAcetam 500 milliGRAM(s) Oral every 12 hours  losartan 50 milliGRAM(s) Oral daily  metoprolol succinate ER 25 milliGRAM(s) Oral daily  potassium chloride    Tablet ER 20 milliEquivalent(s) Oral daily    MEDICATIONS  (PRN):      OBJECTIVE:    Vital Signs Last 24 Hrs  T(C): 36.6 (08 Jun 2021 04:27), Max: 37.7 (07 Jun 2021 13:42)  T(F): 97.8 (08 Jun 2021 04:27), Max: 99.8 (07 Jun 2021 13:42)  HR: 61 (08 Jun 2021 04:27) (61 - 69)  BP: 115/54 (08 Jun 2021 04:27) (105/53 - 144/78)  BP(mean): --  RR: 18 (08 Jun 2021 04:27) (18 - 18)  SpO2: 99% (08 Jun 2021 04:27) (95% - 99%)        I&O's Detail    07 Jun 2021 07:01  -  08 Jun 2021 07:00  --------------------------------------------------------  IN:    Oral Fluid: 480 mL  Total IN: 480 mL    OUT:    Voided (mL): 1350 mL  Total OUT: 1350 mL    Total NET: -870 mL          Daily     Daily     LABS:                        12.7   7.12  )-----------( 170      ( 07 Jun 2021 05:58 )             41.2     06-07    142  |  106  |  18  ----------------------------<  87  4.4   |  25  |  0.88    Ca    9.7      07 Jun 2021 05:58  Phos  4.2     06-07  Mg     2.2     06-07                    PHYSICAL EXAM:  Constitutional: NAD, laying in bed  Neuro: CN II-XII grossly intact  Respiratory: breathing comfortably  Gastrointestinal: Abdomen soft, non distended, nontender

## 2021-06-08 NOTE — DISCHARGE NOTE PROVIDER - NSDCCPCAREPLAN_GEN_ALL_CORE_FT
PRINCIPAL DISCHARGE DIAGNOSIS  Diagnosis: Facial fracture due to fall  Assessment and Plan of Treatment: Please follow up as outpatient with Dr. Ryan when discharged from the hospital. You may call [phone] to schedule an appointment.   - Keep head of bed elevated to help reduce swelling  - Sinus precautions      SECONDARY DISCHARGE DIAGNOSES  Diagnosis: Subdural hematoma  Assessment and Plan of Treatment: Please continue Keppra through 6/11.  Please follow up with Neurosurgery as an outpatient within 1-2 weeks.

## 2021-06-08 NOTE — DISCHARGE NOTE PROVIDER - NSDCFUADDINST_GEN_ALL_CORE_FT
NOTIFY YOUR SURGEON IF: You have any fever (over 100.4 F) or chills, persistent nausea/vomiting, severe headache, blurry vision, persistent diarrhea, or if your pain is not controlled on your discharge pain medications.

## 2021-06-08 NOTE — DISCHARGE NOTE PROVIDER - DETAILS OF MALNUTRITION DIAGNOSIS/DIAGNOSES
This patient has been assessed with a concern for Malnutrition and was treated during this hospitalization for the following Nutrition diagnosis/diagnoses:     -  06/08/2021: Underweight (BMI < 19)

## 2021-06-08 NOTE — PROGRESS NOTE ADULT - ASSESSMENT
71F with history of dementia HTN,  shunt for NPH, seizures, recurrent falls and was recently seen in [facility] 6/3/2021 for left subacute SDH s/p fall, now transferred from [facility] s/p fall with worsened L SDH without midline shift and facial fractures. VPS adjusted at [facility] from 5 to 7 and to 8 by neurosurgery (6/7).Medicine consulted for further manaement

## 2021-06-08 NOTE — DIETITIAN INITIAL EVALUATION ADULT. - REASON INDICATOR FOR ASSESSMENT
Nutrition Assessment warranted for BMI <19 Kg/m2  Source: electronic medical record, Pt's RN  -- Pt with dementia, A&Ox1 per chart

## 2021-06-08 NOTE — DISCHARGE NOTE PROVIDER - CARE PROVIDER_API CALL
Lakia Ryan)  Plastic Surgery  [address] [place] [address]  Phone: [phone]  Fax: [phone]  Follow Up Time: 2 weeks    Jamel Williamson; PhD)  Neurosurgery  [address] [facility], [address] [place] [address]  Phone: [phone]  Fax: [phone]  Follow Up Time: 2 weeks    Armani Rucker)  Surgery; Surgical Critical Care  [address] [place] [address]  Phone: [phone]  Fax: [phone]  Follow Up Time: Routine

## 2021-06-08 NOTE — DIETITIAN INITIAL EVALUATION ADULT. - ADD RECOMMEND
1. Continue Soft Diet as ordered 2. Continue micronutrient supplementation as ordered if no contraindications (potassium chloride, cholecalciferol) 3. Obtain subjective information/weight history as able 4. Monitor weight trends, PO intake, GI function, electrolytes, and skin integrity 1. Continue Soft Diet as ordered 2. Continue micronutrient supplementation as ordered if no contraindications (potassium chloride, cholecalciferol) 3. Obtain subjective information/weight history as able 4. Continue to provide assistance with meals 5. RD to provide Mighty Shake X2 (provides 200 Kcal, 6 g protein per serving) 6. Monitor weight trends, PO intake, GI function, electrolytes, and skin integrity

## 2021-06-08 NOTE — DIETITIAN INITIAL EVALUATION ADULT. - CHIEF COMPLAINT
Per chart, "71F with history of dementia HTN,  shunt for NPH, seizures, recurrent falls and was recently seen in [place] ED 6/3/2021 for left subacute SDH s/p fall, now transferred from [facility] ED s/p fall with worsened L SDH without midline shift and facial fractures. VPS adjusted at [facility] from 5 to 7."

## 2021-06-08 NOTE — DISCHARGE NOTE PROVIDER - HOSPITAL COURSE
71F with history of dementia (AAOx1 to self at baseline, lives at [facility]), HTN,  shunt for NPH, seizures (worked up in April 2019, currently not on AEDs), who initially arrived to [facility] ED s/p mechanical fall. After initial workup including neurosurgery and trauma evaluation, she was transferred to [facility] for further trauma evaluation  Pt has been having recurrent falls and was recently seen in [facility] ED 6/3/2021 for left subacute SDH s/p fall, repeat imaging stable and discharged same day.    IMAGING/INJURIES  HEAD CT: Small amount of new acute rebleeding into the left subdural hematoma. Overall size of the left holohemispheric subdural hematoma is increased measuring up to 1.4 cm. No significant mass effect on the underlying parenchyma. No midline shift.  CT MAX/FACE: Acute fracture of the right inferior orbital wall with minimally depressed bony fragments. There are displaced comminuted fractures of the lateral wall of the right maxillary sinus. There is extensive blood in the right maxillary sinus. Displaced fracture of the right anterior mandibular ramus. The right TMJ remains well located.  Small right periorbital hematoma and hematoma of the right anterior cheek.    The patient was admitted to [facility] and neurosurgery was consulted, recc repeat head CT in 6 hrs, Fu outpatient with primary neurosurgeon and/or Dr. Williamson in 1-2 weeks for stability imaging, Keppra 500 BID x7d.  Repeat head CT x2 were stable.  LE duplex done to r/o DVT as patient has been off DVT ppx >48 hrs, negative.    PRS consulted for facial fractures, recc soft diet given mandible fractures, Keep head elevated to help reduce swelling, Sinus precautions.  Patient may follow up as outpatient with Dr. Ryan when discharged from the hospital. You may call [phone] to schedule an appointment.     Patient was evaluated by physical therapy and recommended return to SNF when patient medically cleared for discharge.  On day of discharge, the patient was tolerating diet, ambulating well and pain controlled. The patient will be discharged to SNF with outpatient follow up with NSx and PRS.             71F with history of dementia (AAOx1 to self at baseline, lives at [facility]), HTN,  shunt for NPH, seizures (worked up in April 2019, currently not on AEDs), who initially arrived to [place] ED s/p mechanical fall. After initial workup including neurosurgery and trauma evaluation, she was transferred to [facility] for further trauma evaluation  Pt has been having recurrent falls and was recently seen in [place] ED 6/3/2021 for left subacute SDH s/p fall, repeat imaging stable and discharged same day.    IMAGING/INJURIES  HEAD CT: Small amount of new acute rebleeding into the left subdural hematoma. Overall size of the left holohemispheric subdural hematoma is increased measuring up to 1.4 cm. No significant mass effect on the underlying parenchyma. No midline shift.  CT MAX/FACE: Acute fracture of the right inferior orbital wall with minimally depressed bony fragments. There are displaced comminuted fractures of the lateral wall of the right maxillary sinus. There is extensive blood in the right maxillary sinus. Displaced fracture of the right anterior mandibular ramus. The right TMJ remains well located.  Small right periorbital hematoma and hematoma of the right anterior cheek.    The patient was admitted to [facility] and neurosurgery was consulted, recc repeat head CT in 6 hrs, Fu outpatient with primary neurosurgeon and/or Dr. Williamson in 1-2 weeks for stability imaging, Keppra 500 BID x7d.  Repeat head CT x2 were stable.  LE duplex done to r/o DVT as patient has been off DVT ppx >48 hrs, negative.  Shunt changed to 8 as per NSx.    PRS consulted for facial fractures, recc soft diet given mandible fractures, Keep head elevated to help reduce swelling, Sinus precautions.  Patient may follow up as outpatient with Dr. Ryan when discharged from the hospital. You may call [phone] to schedule an appointment.     Patient was evaluated by physical therapy and recommended return to SNF when patient medically cleared for discharge.  On day of discharge, the patient was tolerating diet, ambulating well and pain controlled. The patient will be discharged to SNF with outpatient follow up with NSx and PRS.             71F with history of dementia (AAOx1 to self at baseline, lives at [facility]), HTN,  shunt for NPH, seizures (worked up in April 2019, currently not on AEDs), who initially arrived to [facility] ED s/p mechanical fall. After initial workup including neurosurgery and trauma evaluation, she was transferred to [facility] for further trauma evaluation  Pt has been having recurrent falls and was recently seen in [place] ED 6/3/2021 for left subacute SDH s/p fall, repeat imaging stable and discharged same day.    IMAGING/INJURIES  HEAD CT: Small amount of new acute rebleeding into the left subdural hematoma. Overall size of the left holohemispheric subdural hematoma is increased measuring up to 1.4 cm. No significant mass effect on the underlying parenchyma. No midline shift.  CT MAX/FACE: Acute fracture of the right inferior orbital wall with minimally depressed bony fragments. There are displaced comminuted fractures of the lateral wall of the right maxillary sinus. There is extensive blood in the right maxillary sinus. Displaced fracture of the right anterior mandibular ramus. The right TMJ remains well located.  Small right periorbital hematoma and hematoma of the right anterior cheek.    The patient was admitted to [facility] and neurosurgery was consulted, recc repeat head CT in 6 hrs, Fu outpatient with primary neurosurgeon and/or Dr. Williamson in 1-2 weeks for stability imaging, Keppra 500 BID x7d.  Repeat head CT x2 were stable.  LE duplex done to r/o DVT as patient has been off DVT ppx >48 hrs, negative.  Shunt changed to 8 as per NSx.    PRS consulted for facial fractures, recc soft diet given mandible fractures, Keep head elevated to help reduce swelling, Sinus precautions.  Patient may follow up as outpatient with Dr. Ryan when discharged from the hospital. You may call [phone] to schedule an appointment.     Patient was evaluated by physical therapy and recommended return to SNF when patient medically cleared for discharge.  On day of discharge, the patient was tolerating diet, ambulating well and pain controlled. The patient will be discharged to SNF with outpatient follow up with NSx and PRS.

## 2021-06-08 NOTE — DISCHARGE NOTE PROVIDER - PROVIDER TOKENS
PROVIDER:[TOKEN:[[id number]],FOLLOWUP:[2 weeks]],PROVIDER:[TOKEN:[[id number]],FOLLOWUP:[2 weeks]],PROVIDER:[TOKEN:[[id number]],FOLLOWUP:[Routine]]

## 2021-06-08 NOTE — DIETITIAN INITIAL EVALUATION ADULT. - OTHER CALCULATIONS
estimated nutrient needs based on admission/dosing weight of 45.4 Kg with considerations for Pt with chronic SDH per chart

## 2021-06-08 NOTE — PROGRESS NOTE ADULT - SUBJECTIVE AND OBJECTIVE BOX
Patient is a 71y old  Female who presents with a chief complaint of     SUBJECTIVE / OVERNIGHT EVENTS: no acute events overnight     MEDICATIONS  (STANDING):  atorvastatin 10 milliGRAM(s) Oral at bedtime  cholecalciferol 2000 Unit(s) Oral daily  escitalopram 20 milliGRAM(s) Oral daily  furosemide    Tablet 20 milliGRAM(s) Oral daily  levETIRAcetam 500 milliGRAM(s) Oral every 12 hours  losartan 50 milliGRAM(s) Oral daily  metoprolol succinate ER 25 milliGRAM(s) Oral daily  potassium chloride    Tablet ER 20 milliEquivalent(s) Oral daily    MEDICATIONS  (PRN):        CAPILLARY BLOOD GLUCOSE        I&O's Summary    07 Jun 2021 07:01  -  08 Jun 2021 07:00  --------------------------------------------------------  IN: 480 mL / OUT: 1350 mL / NET: -870 mL        PHYSICAL EXAM:  HEAD:  Atraumatic, Normocephalic  EYES: conjunctiva and sclera clear  NECK:  No JVD  RESPIRATORY: Clear to auscultation bilaterally; No rales, rhonchi, wheezing, or rubs  CARDIOVASCULAR: Regular rate and rhythm; S1S2  GASTROINTESTINAL: Soft, Nontender, Nondistended; Bowel sounds present  EXTREMITIES:  2+ Peripheral Pulses,  NERVOUS SYSTEM:  Alert & Oriented X1    LABS:                        12.7   7.12  )-----------( 170      ( 07 Jun 2021 05:58 )             41.2     06-07    142  |  106  |  18  ----------------------------<  87  4.4   |  25  |  0.88    Ca    9.7      07 Jun 2021 05:58  Phos  4.2     06-07  Mg     2.2     06-07                RADIOLOGY & ADDITIONAL TESTS:    Imaging Personally Reviewed:    Consultant(s) Notes Reviewed:      Care Discussed with Consultants/Other Providers:

## 2021-06-08 NOTE — DIETITIAN INITIAL EVALUATION ADULT. - OTHER INFO
Per discussion with Pt's RN, Pt with good appetite/PO intake, consuming >/=100% of meals. RN states Pt tolerating current diet well without s/s of difficulty chewing or swallowing. Per Pt's RN, Pt without any current GI distress (nausea/vomiting/diarrhea/constipation); last BM x1 today per nursing flowsheets.    Current dosing weight: 45.4 Kg  Pt unable to report UBW. No weight Hx per chart.  Will continue to monitor weight trends in-house.

## 2021-06-08 NOTE — DISCHARGE NOTE PROVIDER - NSDCMRMEDTOKEN_GEN_ALL_CORE_FT
acetaminophen 500 mg oral tablet: 1 tab(s) orally every 8 hours, As needed, Mild Pain (1 - 3)  atorvastatin 10 mg oral tablet: 1 tab(s) orally once a day  cholecalciferol oral tablet: 2000 unit(s) orally once a day  Colace 100 mg oral capsule: 1 cap(s) orally 2 times a day  escitalopram 20 mg oral tablet: 1 tab(s) orally once a day  Lasix 20 mg oral tablet: 1 tab(s) orally once a day  levETIRAcetam 500 mg oral tablet: 1 tab(s) orally every 12 hours  loratadine 10 mg oral tablet: 1 tab(s) orally once a day, As Needed  losartan 50 mg oral tablet: 1 tab(s) orally once a day  metoprolol succinate 25 mg oral tablet, extended release: 1 tab(s) orally once a day  potassium chloride 20 mEq oral tablet, extended release: 20 milligram(s) orally once a day   acetaminophen 500 mg oral tablet: 1 tab(s) orally every 8 hours, As needed, Mild Pain (1 - 3)  atorvastatin 10 mg oral tablet: 1 tab(s) orally once a day  cholecalciferol oral tablet: 2000 unit(s) orally once a day  Colace 100 mg oral capsule: 1 cap(s) orally 2 times a day  escitalopram 20 mg oral tablet: 1 tab(s) orally once a day  Lasix 20 mg oral tablet: 1 tab(s) orally once a day  levETIRAcetam 500 mg oral tablet: 1 tab(s) orally every 12 hours  loratadine 10 mg oral tablet: 1 tab(s) orally once a day, As Needed  losartan 25 mg oral tablet: 1 tab(s) orally once a day  metoprolol succinate 25 mg oral tablet, extended release: 1 tab(s) orally once a day  potassium chloride 20 mEq oral tablet, extended release: 20 milligram(s) orally once a day

## 2021-06-08 NOTE — DIETITIAN INITIAL EVALUATION ADULT. - PERTINENT MEDS FT
MEDICATIONS  (STANDING):  atorvastatin 10 milliGRAM(s) Oral at bedtime  cholecalciferol 2000 Unit(s) Oral daily  escitalopram 20 milliGRAM(s) Oral daily  furosemide    Tablet 20 milliGRAM(s) Oral daily  levETIRAcetam 500 milliGRAM(s) Oral every 12 hours  losartan 50 milliGRAM(s) Oral daily  metoprolol succinate ER 25 milliGRAM(s) Oral daily  potassium chloride    Tablet ER 20 milliEquivalent(s) Oral daily    MEDICATIONS  (PRN):  acetaminophen   Tablet .. 500 milliGRAM(s) Oral every 8 hours PRN Mild Pain (1 - 3)

## 2021-06-09 ENCOUNTER — TRANSCRIPTION ENCOUNTER (OUTPATIENT)
Age: 71
End: 2021-06-09

## 2021-06-09 VITALS — SYSTOLIC BLOOD PRESSURE: 114 MMHG | DIASTOLIC BLOOD PRESSURE: 58 MMHG

## 2021-06-09 PROCEDURE — 80053 COMPREHEN METABOLIC PANEL: CPT

## 2021-06-09 PROCEDURE — 70250 X-RAY EXAM OF SKULL: CPT

## 2021-06-09 PROCEDURE — 99231 SBSQ HOSP IP/OBS SF/LOW 25: CPT

## 2021-06-09 PROCEDURE — 86769 SARS-COV-2 COVID-19 ANTIBODY: CPT

## 2021-06-09 PROCEDURE — 86803 HEPATITIS C AB TEST: CPT

## 2021-06-09 PROCEDURE — 85027 COMPLETE CBC AUTOMATED: CPT

## 2021-06-09 PROCEDURE — 70450 CT HEAD/BRAIN W/O DYE: CPT

## 2021-06-09 PROCEDURE — 83690 ASSAY OF LIPASE: CPT

## 2021-06-09 PROCEDURE — 99285 EMERGENCY DEPT VISIT HI MDM: CPT | Mod: 25

## 2021-06-09 PROCEDURE — 80307 DRUG TEST PRSMV CHEM ANLYZR: CPT

## 2021-06-09 PROCEDURE — U0005: CPT

## 2021-06-09 PROCEDURE — 93970 EXTREMITY STUDY: CPT

## 2021-06-09 PROCEDURE — 85730 THROMBOPLASTIN TIME PARTIAL: CPT

## 2021-06-09 PROCEDURE — 99233 SBSQ HOSP IP/OBS HIGH 50: CPT

## 2021-06-09 PROCEDURE — 84100 ASSAY OF PHOSPHORUS: CPT

## 2021-06-09 PROCEDURE — 97161 PT EVAL LOW COMPLEX 20 MIN: CPT

## 2021-06-09 PROCEDURE — 80048 BASIC METABOLIC PNL TOTAL CA: CPT

## 2021-06-09 PROCEDURE — 85610 PROTHROMBIN TIME: CPT

## 2021-06-09 PROCEDURE — 85025 COMPLETE CBC W/AUTO DIFF WBC: CPT

## 2021-06-09 PROCEDURE — 85576 BLOOD PLATELET AGGREGATION: CPT

## 2021-06-09 PROCEDURE — 83735 ASSAY OF MAGNESIUM: CPT

## 2021-06-09 PROCEDURE — U0003: CPT

## 2021-06-09 RX ORDER — LOSARTAN POTASSIUM 100 MG/1
25 TABLET, FILM COATED ORAL DAILY
Refills: 0 | Status: DISCONTINUED | OUTPATIENT
Start: 2021-06-09 | End: 2021-06-09

## 2021-06-09 RX ORDER — ENOXAPARIN SODIUM 100 MG/ML
40 INJECTION SUBCUTANEOUS EVERY 24 HOURS
Refills: 0 | Status: DISCONTINUED | OUTPATIENT
Start: 2021-06-09 | End: 2021-06-09

## 2021-06-09 RX ORDER — LOSARTAN POTASSIUM 100 MG/1
1 TABLET, FILM COATED ORAL
Qty: 0 | Refills: 0 | DISCHARGE
Start: 2021-06-09

## 2021-06-09 RX ADMIN — Medication 20 MILLIEQUIVALENT(S): at 11:08

## 2021-06-09 RX ADMIN — Medication 2000 UNIT(S): at 11:07

## 2021-06-09 RX ADMIN — Medication 500 MILLIGRAM(S): at 12:46

## 2021-06-09 RX ADMIN — ESCITALOPRAM OXALATE 20 MILLIGRAM(S): 10 TABLET, FILM COATED ORAL at 11:08

## 2021-06-09 RX ADMIN — Medication 20 MILLIGRAM(S): at 06:02

## 2021-06-09 RX ADMIN — LEVETIRACETAM 500 MILLIGRAM(S): 250 TABLET, FILM COATED ORAL at 06:03

## 2021-06-09 RX ADMIN — Medication 500 MILLIGRAM(S): at 11:08

## 2021-06-09 RX ADMIN — ENOXAPARIN SODIUM 40 MILLIGRAM(S): 100 INJECTION SUBCUTANEOUS at 12:51

## 2021-06-09 NOTE — PROGRESS NOTE ADULT - ATTENDING COMMENTS
Pt seen and examined with ACS team, agree with above.    1. S/p fall with acute on chronic SDH:  - Today's CT shows increased density of R SDH, appreciate NSG follow up - will need repeat head CT tomorrow, no chemical VTE px for now (will start 24 hours after stable head CT)  - Since will likely not start until 6/8 at the earliest, will need to check screening LE venous duplex on HD3 per trauma clinical management guideline  - Continue Keppra for post-traumatic seizure prophylaxis (7 days)    2. R orbital, coronoid process, and anterior maxillary sinus fractures:   - Appreciate PRS consultation  - No surgical intervention  - Sinus precautions, keep HOB elevated to reduce swelling  - Soft diet  - Follow up with Dr. Ryan when discharged ([phone])    3. Multiple recent falls:  - Will obtain Hospitalist co-management consult tomorrow  - Start vitamin D    4. VPS for NPH:  - Shunt adjusted to 8 tonight by NSG, will follow up skull x-ray    5. Essential HTN:  - Home meds: lasix 20mg daily (with KCL 20mEq daily), losartan 50mg daily, metoprolol XL 25mg daily  - Continue home meds    6. HLD:  - Home meds: atorvastatin 10mg daily  - Continue    7. Dementia/ depression?  - Home meds: lexapro 20mg daily    8. On loratadine:  - Unclear if pt takes this every day or only as needed for allergies, will need to clarify    9. Dermatomyositis:  - Per records from 2 years ago on [facility], pt was on IVIG at one time, unclear whether this remains an actively treated issue, will clarify
Acute issues addressed, she will follow with plastics for facial fractures. Continue with Keppra. Disposition d/c to assisted living.

## 2021-06-09 NOTE — PROGRESS NOTE ADULT - PROBLEM SELECTOR PLAN 4
Frequent orientation   Needs assistance with adls   PT - assisted living   High risk for delirium avoid opiates.
Frequent orientation   Needs assistance with adls   PT - assisted living   High risk for delirium avoid opiates.

## 2021-06-09 NOTE — PROGRESS NOTE ADULT - ASSESSMENT
71F with history of dementia HTN,  shunt for NPH, seizures, recurrent falls and was recently seen in [facility] 6/3/2021 for left subacute SDH s/p fall, now transferred from [facility] s/p fall with worsened L SDH without midline shift and facial fractures. VPS adjusted at [facility] from 5 to 7    PLAN:  - Regular diet  - c/w Keppra as per NSX  - F/u Plastics for facial fracture management --> soft, diet, outpatient f/u   - Hold AC/Antiplatelet agents  - PT eval--> recc return to assisted living facility  - c/w home meds  - [facility] 6/8 stable. will consider start DVT ppx      ACS  [id number]   71F with history of dementia HTN,  shunt for NPH, seizures, recurrent falls and was recently seen in [facility] ED 6/3/2021 for left subacute SDH s/p fall, now transferred from [facility] s/p fall with worsened L SDH without midline shift and facial fractures. VPS adjusted at [facility] from 5 to 7    PLAN:  - Soft diet  - c/w Keppra as per NSX  - F/u Plastics for facial fracture management --> soft, diet, outpatient f/u   - Restart dvt ppx at 11 am   - PT eval--> recc return to assisted living facility  - c/w home meds  - [facility] 6/8 stable. will consider start DVT ppx  - Dispo: DC to assisted living facility today       ACS  [id number]

## 2021-06-09 NOTE — PROGRESS NOTE ADULT - SUBJECTIVE AND OBJECTIVE BOX
ACS DAILY PROGRESS NOTE:       SUBJECTIVE/ROS: No acute events.  Pain is controlled with oral pain meds. Denies nausea, vomiting, chest pain, shortness of breath         MEDICATIONS  (STANDING):  atorvastatin 10 milliGRAM(s) Oral at bedtime  cholecalciferol 2000 Unit(s) Oral daily  escitalopram 20 milliGRAM(s) Oral daily  furosemide    Tablet 20 milliGRAM(s) Oral daily  levETIRAcetam 500 milliGRAM(s) Oral every 12 hours  losartan 50 milliGRAM(s) Oral daily  metoprolol succinate ER 25 milliGRAM(s) Oral daily  potassium chloride    Tablet ER 20 milliEquivalent(s) Oral daily    MEDICATIONS  (PRN):  acetaminophen   Tablet .. 500 milliGRAM(s) Oral every 8 hours PRN Mild Pain (1 - 3)      OBJECTIVE:    Vital Signs Last 24 Hrs  T(C): 36.5 (09 Jun 2021 01:08), Max: 36.8 (08 Jun 2021 20:50)  T(F): 97.7 (09 Jun 2021 01:08), Max: 98.2 (08 Jun 2021 20:50)  HR: 74 (09 Jun 2021 01:08) (61 - 74)  BP: 102/50 (09 Jun 2021 01:08) (95/41 - 115/54)  BP(mean): --  RR: 16 (09 Jun 2021 01:08) (16 - 18)  SpO2: 96% (09 Jun 2021 01:08) (96% - 99%)    PHYSICAL EXAM:  Constitutional: NAD, laying in bed  Neuro: CN II-XII grossly intact  Respiratory: breathing comfortably  Heart: RRR  Gastrointestinal: Abdomen soft, non distended, nontender  Ext: well perfused x 4        I&O's Detail    07 Jun 2021 07:01  -  08 Jun 2021 07:00  --------------------------------------------------------  IN:    Oral Fluid: 480 mL  Total IN: 480 mL    OUT:    Voided (mL): 1350 mL  Total OUT: 1350 mL    Total NET: -870 mL      08 Jun 2021 07:01  -  09 Jun 2021 01:53  --------------------------------------------------------  IN:    Oral Fluid: 720 mL  Total IN: 720 mL    OUT:    Voided (mL): 650 mL  Total OUT: 650 mL    Total NET: 70 mL          Daily     Daily     LABS:                        12.7   7.12  )-----------( 170      ( 07 Jun 2021 05:58 )             41.2     06-07    142  |  106  |  18  ----------------------------<  87  4.4   |  25  |  0.88    Ca    9.7      07 Jun 2021 05:58  Phos  4.2     06-07  Mg     2.2     06-07                     ACS DAILY PROGRESS NOTE:     SUBJECTIVE/ROS: No acute events.  Pain is controlled with oral pain meds. Denies nausea, vomiting, chest pain, shortness of breath    MEDICATIONS  (STANDING):  atorvastatin 10 milliGRAM(s) Oral at bedtime  cholecalciferol 2000 Unit(s) Oral daily  escitalopram 20 milliGRAM(s) Oral daily  furosemide    Tablet 20 milliGRAM(s) Oral daily  levETIRAcetam 500 milliGRAM(s) Oral every 12 hours  losartan 50 milliGRAM(s) Oral daily  metoprolol succinate ER 25 milliGRAM(s) Oral daily  potassium chloride    Tablet ER 20 milliEquivalent(s) Oral daily    MEDICATIONS  (PRN):  acetaminophen   Tablet .. 500 milliGRAM(s) Oral every 8 hours PRN Mild Pain (1 - 3)      OBJECTIVE:    Vital Signs Last 24 Hrs  T(C): 36.5 (09 Jun 2021 01:08), Max: 36.8 (08 Jun 2021 20:50)  T(F): 97.7 (09 Jun 2021 01:08), Max: 98.2 (08 Jun 2021 20:50)  HR: 74 (09 Jun 2021 01:08) (61 - 74)  BP: 102/50 (09 Jun 2021 01:08) (95/41 - 115/54)  BP(mean): --  RR: 16 (09 Jun 2021 01:08) (16 - 18)  SpO2: 96% (09 Jun 2021 01:08) (96% - 99%)      PHYSICAL EXAM:  Constitutional: NAD, laying in bed  Respiratory: breathing comfortably  Heart: RRR  Gastrointestinal: Abdomen soft, non distended, nontender  Ext: well perfused x 4        I&O's Detail    07 Jun 2021 07:01  -  08 Jun 2021 07:00  --------------------------------------------------------  IN:    Oral Fluid: 480 mL  Total IN: 480 mL    OUT:    Voided (mL): 1350 mL  Total OUT: 1350 mL    Total NET: -870 mL      08 Jun 2021 07:01  -  09 Jun 2021 01:53  --------------------------------------------------------  IN:    Oral Fluid: 720 mL  Total IN: 720 mL    OUT:    Voided (mL): 650 mL  Total OUT: 650 mL    Total NET: 70 mL          Daily     Daily     LABS:                        12.7   7.12  )-----------( 170      ( 07 Jun 2021 05:58 )             41.2     06-07    142  |  106  |  18  ----------------------------<  87  4.4   |  25  |  0.88    Ca    9.7      07 Jun 2021 05:58  Phos  4.2     06-07  Mg     2.2     06-07

## 2021-06-09 NOTE — DISCHARGE NOTE NURSING/CASE MANAGEMENT/SOCIAL WORK - PATIENT PORTAL LINK FT
You can access the FollowMyHealth Patient Portal offered by [facility] by registering at the following website: [email or web address]. By joining [facility]’s FollowMyHealth portal, you will also be able to view your health information using other applications (apps) compatible with our system.

## 2021-06-09 NOTE — PROGRESS NOTE ADULT - NUTRITIONAL ASSESSMENT
This patient has been assessed with a concern for Malnutrition and has been determined to have a diagnosis/diagnoses of Underweight (BMI < 19).    This patient is being managed with:   Diet Soft-  Entered: Jun 8 2021  2:31PM

## 2021-06-09 NOTE — PROVIDER CONTACT NOTE (OTHER) - REASON
Pt. f/u PT order and CT scan of the head
abnormal neuro check
Clarification for stable CT scan prior to lovenox admin.

## 2021-06-09 NOTE — PROVIDER CONTACT NOTE (OTHER) - ASSESSMENT
Pt resting in bed comfortably, no s/s of pain present. AOx0-1, L pupil sluggish compared to R pupil, reactive to light. MD Cardenas made aware.
Pt resting in bed, no changes in neuro checks q4h as per order. Need clarification for 2 stable CT scans prior to lovenox administration. MD Ronald made aware.
Patient resting in bed comfortably. A&OX1. Neuro checks Q 4hrs. WDL. Left Pupil slugish noted. Rt. periorbital sutures intact. Ecchymosis on right side of the face intact. Safety maintained, call bell within reach, bed alarm active. MD Ronald made aware.

## 2021-06-09 NOTE — PROGRESS NOTE ADULT - PROBLEM SELECTOR PLAN 1
CT head 6/8: Stable left holohemispheric acute on chronic subdural hematoma.  keppra for total 7 days  management per sx.
CT head 6/8: Stable left holohemispheric acute on chronic subdural hematoma.  keppra for total 7 days  management per sx.

## 2021-06-09 NOTE — PROVIDER CONTACT NOTE (OTHER) - ACTION/TREATMENT ORDERED:
As per MD Ronald CT scan of head pending, No PT needed.
As per MD Cardenas, Ok to admin lovenox, 2 stable CT scans present. Last stable CT scan done within last 24 hrs, LENNY Gustafson aware.
MD Ronald made aware, as per MD, record pupil reaction as per assessment findings, cont to monitor. Neuro checks done q4h.

## 2021-06-09 NOTE — PROGRESS NOTE ADULT - SUBJECTIVE AND OBJECTIVE BOX
Patient is a 71y old  Female who presents with a chief complaint of Fall (09 Jun 2021 01:52)      SUBJECTIVE / OVERNIGHT EVENTS: no acute events overnight     MEDICATIONS  (STANDING):  atorvastatin 10 milliGRAM(s) Oral at bedtime  cholecalciferol 2000 Unit(s) Oral daily  enoxaparin Injectable 40 milliGRAM(s) SubCutaneous every 24 hours  escitalopram 20 milliGRAM(s) Oral daily  furosemide    Tablet 20 milliGRAM(s) Oral daily  levETIRAcetam 500 milliGRAM(s) Oral every 12 hours  losartan 50 milliGRAM(s) Oral daily  metoprolol succinate ER 25 milliGRAM(s) Oral daily  potassium chloride    Tablet ER 20 milliEquivalent(s) Oral daily    MEDICATIONS  (PRN):  acetaminophen   Tablet .. 500 milliGRAM(s) Oral every 8 hours PRN Mild Pain (1 - 3)        CAPILLARY BLOOD GLUCOSE        I&O's Summary    08 Jun 2021 07:01  -  09 Jun 2021 07:00  --------------------------------------------------------  IN: 720 mL / OUT: 1300 mL / NET: -580 mL    09 Jun 2021 07:01  -  09 Jun 2021 12:35  --------------------------------------------------------  IN: 360 mL / OUT: 0 mL / NET: 360 mL        PHYSICAL EXAM:  HEAD:  Atraumatic, Normocephalic  EYES: conjunctiva and sclera clear  NECK:  No JVD  RESPIRATORY: Clear to auscultation bilaterally; No rales, rhonchi, wheezing, or rubs  CARDIOVASCULAR: Regular rate and rhythm; S1S2  GASTROINTESTINAL: Soft, Nontender, Nondistended; Bowel sounds present  EXTREMITIES:  2+ Peripheral Pulses,  NERVOUS SYSTEM:  Alert & Oriented X1    LABS:                    RADIOLOGY & ADDITIONAL TESTS:    Imaging Personally Reviewed:    Consultant(s) Notes Reviewed:      Care Discussed with Consultants/Other Providers:

## 2021-06-09 NOTE — PROGRESS NOTE ADULT - ASSESSMENT
71F with history of dementia HTN,  shunt for NPH, seizures, recurrent falls and was recently seen in [place] ED 6/3/2021 for left subacute SDH s/p fall, now transferred from [place] ED s/p fall with worsened L SDH without midline shift and facial fractures. VPS adjusted at [place] from 5 to 7 and to 8 by neurosurgery (6/7).Medicine consulted for further manaement

## 2021-06-09 NOTE — PROGRESS NOTE ADULT - PROBLEM SELECTOR PLAN 3
losartan 50mg   toprol xl 25mg.   continue to monitor BP, recent readings somewhat soft, if continues to downtrend can decrease losartan to 25mg
BP somewhat soft  decrease losartan 25mg   toprol xl 25mg.

## 2021-06-09 NOTE — PROGRESS NOTE ADULT - PROBLEM SELECTOR PLAN 2
tylenol for pain control   management per plastics.
tylenol for pain control   management per plastics.

## 2021-06-09 NOTE — PROGRESS NOTE ADULT - PROBLEM SELECTOR PLAN 6
scds, pharmacologic ppx when ok with sx  Dispo- assisted living facility
scds, pharmacologic ppx when ok with sx   left message for PMD pending call back to discuss care.

## 2021-06-09 NOTE — PROVIDER CONTACT NOTE (OTHER) - SITUATION
Clarification for stable CT scan prior to lovenox admin.
abnormal neuro check
Pt. f/u PT order and CT scan of the head.

## 2021-06-15 RX ORDER — LEVETIRACETAM 250 MG/1
1 TABLET, FILM COATED ORAL
Qty: 0 | Refills: 0 | DISCHARGE

## 2021-06-15 RX ORDER — LOSARTAN POTASSIUM 100 MG/1
1 TABLET, FILM COATED ORAL
Qty: 0 | Refills: 0 | DISCHARGE

## 2021-06-15 RX ORDER — LORATADINE 10 MG/1
1 TABLET ORAL
Qty: 0 | Refills: 0 | DISCHARGE

## 2021-07-07 PROBLEM — E78.5 HYPERLIPIDEMIA, UNSPECIFIED: Chronic | Status: ACTIVE | Noted: 2021-06-06

## 2021-07-07 PROBLEM — F03.90 UNSPECIFIED DEMENTIA WITHOUT BEHAVIORAL DISTURBANCE: Chronic | Status: ACTIVE | Noted: 2021-06-05

## 2021-07-07 PROBLEM — G91.2 (IDIOPATHIC) NORMAL PRESSURE HYDROCEPHALUS: Chronic | Status: ACTIVE | Noted: 2021-06-06

## 2021-07-07 PROBLEM — M33.90 DERMATOPOLYMYOSITIS, UNSPECIFIED, ORGAN INVOLVEMENT UNSPECIFIED: Chronic | Status: ACTIVE | Noted: 2021-06-06

## 2021-07-07 PROBLEM — F03.90 UNSPECIFIED DEMENTIA, UNSPECIFIED SEVERITY, WITHOUT BEHAVIORAL DISTURBANCE, PSYCHOTIC DISTURBANCE, MOOD DISTURBANCE, AND ANXIETY: Chronic | Status: ACTIVE | Noted: 2021-06-05

## 2021-07-07 PROBLEM — I10 ESSENTIAL (PRIMARY) HYPERTENSION: Chronic | Status: ACTIVE | Noted: 2021-06-06

## 2021-07-22 ENCOUNTER — OUTPATIENT (OUTPATIENT)
Dept: OUTPATIENT SERVICES | Facility: HOSPITAL | Age: 71
LOS: 1 days | End: 2021-07-22
Payer: MEDICARE

## 2021-07-22 ENCOUNTER — APPOINTMENT (OUTPATIENT)
Dept: NEUROSURGERY | Facility: CLINIC | Age: 71
End: 2021-07-22
Payer: MEDICARE

## 2021-07-22 ENCOUNTER — APPOINTMENT (OUTPATIENT)
Dept: CT IMAGING | Facility: CLINIC | Age: 71
End: 2021-07-22
Payer: MEDICARE

## 2021-07-22 VITALS
SYSTOLIC BLOOD PRESSURE: 129 MMHG | HEIGHT: 64 IN | DIASTOLIC BLOOD PRESSURE: 71 MMHG | OXYGEN SATURATION: 96 % | TEMPERATURE: 96.7 F | WEIGHT: 123 LBS | HEART RATE: 62 BPM | BODY MASS INDEX: 21 KG/M2

## 2021-07-22 DIAGNOSIS — Z87.2 PERSONAL HISTORY OF DISEASES OF THE SKIN AND SUBCUTANEOUS TISSUE: ICD-10-CM

## 2021-07-22 DIAGNOSIS — S06.5X9A TRAUMATIC SUBDURAL HEMORRHAGE WITH LOSS OF CONSCIOUSNESS OF UNSPECIFIED DURATION, INITIAL ENCOUNTER: ICD-10-CM

## 2021-07-22 DIAGNOSIS — Z98.2 PRESENCE OF CEREBROSPINAL FLUID DRAINAGE DEVICE: Chronic | ICD-10-CM

## 2021-07-22 PROCEDURE — 99215 OFFICE O/P EST HI 40 MIN: CPT

## 2021-07-22 PROCEDURE — G1004: CPT

## 2021-07-22 PROCEDURE — 70450 CT HEAD/BRAIN W/O DYE: CPT

## 2021-07-22 PROCEDURE — 70450 CT HEAD/BRAIN W/O DYE: CPT | Mod: 26,ME

## 2021-07-22 NOTE — CONSULT LETTER
[Dear  ___] : Dear  [unfilled], [Courtesy Letter:] : I had the pleasure of seeing your patient, [unfilled], in my office today. [Sincerely,] : Sincerely, [FreeTextEntry2] : Nasrin Ferrell MD\par [address]\[place] [address] [FreeTextEntry1] : Shira Braxton is a 71-year-old female who presents today for follow-up to her left chronic subdural hematoma.  Patient is accompanied by son.  Patient currently resides at [facility] assisted living in [place].  Patient has a significant history of dementia and seizures.  Patient was on aspirin.  Patient had also underwent [employer] certa  shunt placement for NPH.  Family indicates it was placed approximately 2 years ago.  Unsure of surgeon.  \par \par On 6/3/2021 patient had presented to [facility] status post mechanical fall.  CT was performed which indicated a left subacute subdural hematoma without brain compression.  Patient was discharged same day.  Repeat CT was stable no neurosurgery was required.  Patient then presented to [facility] ED again 2 days later after another mechanical fall.  At that time, she underwent CT which indicated "left chronic subdural hematoma with new acute component.  Decreased ventricular size was also noted."  Patient CT of the neck which was negative.  She had underwent a CT maxillofacial which indicated a "right inferior orbital wall, mandibular ramus and maxillary  sinus fracture."  Aspirin was held.  No reversal was given.  Ventricles were noted to be decreased which could have possibly contributed to subdural hematoma and fall.  Therefore patient's  shunt was adjusted to from 5 to 7.  She was then transferred to [facility] for evaluation of her orbital, mandibular, maxillary fractures.  No surgery was performed. While there  shunt was again adjusted from 7 to 8. Patient was placed on Keppra 500 mg twice a day x7 days.\par \par Patient is a poor historian as she has dementia.  Family indicates they have not noted any new concerning symptoms.  Family indicates [facility] has not expressed any concerns as far symptoms.  Patient has not complained of any headaches, nausea or vomiting, dizziness, vision changes, weakness, difficulties with balance, seizure-like activity, or speech difficulties.\par \par Patient underwent CT of the head performed on 7/22/2021.  Report indicates "mixed attenuation subdural hematoma is again seen and slightly increased in size."\par \par Patient is alert to name.  Unable to recall date.  Unable to perform Mini-Mental exam on patient.  Tinetti balance assessment tool was performed. Patient scored 14/16 on balance and 10/12 on gait right total score 24/28.  This places the patient in low risk for falls.  Patient is able to move all extremities without difficulties.  Pupils equal and reactive to light.  Able to move all extremities.  No gross weakness noted on right.  However patient is having difficulty with completing tasks.  Patient walking without noted difficulties.  The shunt valve was checked.  The valve was  noted to be set at 1.  Patient's valve was reset to 8 which is essentially off.\par \par Clinically the patient appears stable.  She has not displayed any concerning symptoms.  Therefore son is in agreement to continue monitoring subdural hematoma.  We will monitor closely with an updated CT of the head in 2 weeks.  Should patient experience new or worsening  symptoms  or should she display any difficulties with  speech or weakness, son has been advised to bring patient to the emergency room and phone office. it was discussed with son that if the subdural hematoma worsens or does not decrease in size within 2 weeks patient may have to undergo a surgical procedure for evacuation of the subdural hematoma.  Risks and benefits of the procedure were discussed with patient.  We will continue to monitor closely. Son has been advised to call with any further questions or concerns. Dr. Edmonds was present for the consult.  [FreeTextEntry3] : Jessica Avelar, MSN, FNP-BC\par Nurse Practitioner\par Neurosurgery\par [address] [place] [address], 2nd floor \[place] [address] \par Office: [phone] \par Fax: [phone]\par \par

## 2021-08-04 ENCOUNTER — APPOINTMENT (OUTPATIENT)
Dept: CT IMAGING | Facility: CLINIC | Age: 71
End: 2021-08-04
Payer: MEDICARE

## 2021-08-04 ENCOUNTER — APPOINTMENT (OUTPATIENT)
Dept: NEUROSURGERY | Facility: CLINIC | Age: 71
End: 2021-08-04
Payer: MEDICARE

## 2021-08-04 ENCOUNTER — OUTPATIENT (OUTPATIENT)
Dept: OUTPATIENT SERVICES | Facility: HOSPITAL | Age: 71
LOS: 1 days | End: 2021-08-04
Payer: MEDICARE

## 2021-08-04 VITALS
HEART RATE: 70 BPM | BODY MASS INDEX: 22.08 KG/M2 | HEIGHT: 62 IN | SYSTOLIC BLOOD PRESSURE: 113 MMHG | DIASTOLIC BLOOD PRESSURE: 70 MMHG | TEMPERATURE: 97.1 F | OXYGEN SATURATION: 98 % | WEIGHT: 120 LBS

## 2021-08-04 DIAGNOSIS — Z98.2 PRESENCE OF CEREBROSPINAL FLUID DRAINAGE DEVICE: Chronic | ICD-10-CM

## 2021-08-04 DIAGNOSIS — S06.5X9A TRAUMATIC SUBDURAL HEMORRHAGE WITH LOSS OF CONSCIOUSNESS OF UNSPECIFIED DURATION, INITIAL ENCOUNTER: ICD-10-CM

## 2021-08-04 PROCEDURE — 70450 CT HEAD/BRAIN W/O DYE: CPT

## 2021-08-04 PROCEDURE — 70450 CT HEAD/BRAIN W/O DYE: CPT | Mod: 26,MH

## 2021-08-04 PROCEDURE — 99214 OFFICE O/P EST MOD 30 MIN: CPT

## 2021-08-05 PROBLEM — S06.5X9A SUBDURAL HEMATOMA: Status: ACTIVE | Noted: 2021-07-01

## 2021-08-05 NOTE — CONSULT LETTER
[Dear  ___] : Dear  [unfilled], [Courtesy Letter:] : I had the pleasure of seeing your patient, [unfilled], in my office today. [Sincerely,] : Sincerely, [FreeTextEntry2] : Nasrin Ferrell MD\par [address]\par [place] [address] \par  [FreeTextEntry1] : Shira Braxton is a 71-year-old female who presents today for follow-up to her left chronic subdural hematoma.  Patient is accompanied by her niece.Patient has a significant history of dementia and seizures. Patient was on aspirin. Patient had also underwent Codman certa  shunt placement for NPH. Family indicates it was placed approximately 2 years ago. Unsure of surgeon.   On 6/3/2021 patient had presented to [facility] status post mechanical fall. CT was performed which indicated a left subacute subdural hematoma without brain compression. Patient was discharged same day. Repeat CT was stable no neurosurgery was required. Patient then presented to [facility] ED again 2 days later after another mechanical fall. At that time, she underwent CT which indicated "left chronic subdural hematoma with new acute component. Decreased ventricular size was also noted." Patient CT of the neck which was negative. She had underwent a CT maxillofacial which indicated a "right inferior orbital wall, mandibular ramus and maxillary sinus fracture." Aspirin was held. No reversal was given. Ventricles were noted to be decreased which could have possibly contributed to subdural hematoma and fall. Therefore patient's  shunt was adjusted to from 5 to 7. She was then transferred to [facility] for evaluation of her orbital, mandibular, maxillary fractures. No surgery was performed. While there  shunt was again adjusted from 7 to 8. Patient was placed on Keppra 500 mg twice a day x7 days.  Patient is a poor historian as she has severe dementia.  Niece indicates that she has noted improvement in her ambulation.  Patient has not complained of any headaches, nausea or vomiting, dizziness, or visual changes.  Family has not noted any difficulties with balance, seizure-like activity, or speech difficulties.  Patient underwent CT of the head performed on 8/04/2021. Report indicates "small left subdural hematoma.  Markedly decreased near resolution of midline shift"  Patient is alert to name. Unable to recall date. Unable to perform Mini-Mental exam on patient.  Patient is walking well with heels almost touching.  Patient is walking with consecutive steps.  No foot drop noted.  Patient is stable on feet and able to make a complete turn without any discontinuous steps. Patient is able to move all extremities without difficulties. Pupils equal and reactive to light. No gross upper or lower extremity weakness noted.  Patient is having difficulties completing tasks.  The shunt valve was checked and confirmed at 8.  Clinically the patient appears stable.  CT of the head performed today does indicate improvement.  She does not display any concerning symptoms nor has she displayed any concerning symptoms to her family.  We will continue to closely monitor hemorrhage with an updated CT of the head in 4 weeks.  Advised family to call office should patient experience new or worsening symptoms or should she display any difficulties with speech or weakness, at which time she should go to ER.  Discussed case with Dr. Edmonds. [FreeTextEntry3] : Jessica Avelar, MSN, FNP-BC\par Nurse Practitioner \par Neurosurgery \par [address] [place] [address], [address]nd floor \[place] [address]\par Office: [phone]\par Fax: [phone]\par

## 2021-09-02 ENCOUNTER — APPOINTMENT (OUTPATIENT)
Dept: NEUROSURGERY | Facility: CLINIC | Age: 71
End: 2021-09-02

## 2021-09-02 ENCOUNTER — EMERGENCY (EMERGENCY)
Facility: HOSPITAL | Age: 71
LOS: 0 days | Discharge: HOME | End: 2021-09-02
Attending: STUDENT IN AN ORGANIZED HEALTH CARE EDUCATION/TRAINING PROGRAM
Payer: MEDICARE

## 2021-09-02 ENCOUNTER — APPOINTMENT (OUTPATIENT)
Dept: CT IMAGING | Facility: CLINIC | Age: 71
End: 2021-09-02

## 2021-09-02 VITALS
DIASTOLIC BLOOD PRESSURE: 67 MMHG | SYSTOLIC BLOOD PRESSURE: 134 MMHG | HEIGHT: 62 IN | RESPIRATION RATE: 18 BRPM | HEART RATE: 72 BPM | WEIGHT: 149.91 LBS | TEMPERATURE: 97 F | OXYGEN SATURATION: 97 %

## 2021-09-02 VITALS
OXYGEN SATURATION: 100 % | HEART RATE: 68 BPM | SYSTOLIC BLOOD PRESSURE: 135 MMHG | DIASTOLIC BLOOD PRESSURE: 61 MMHG | RESPIRATION RATE: 19 BRPM

## 2021-09-02 DIAGNOSIS — S00.91XA ABRASION OF UNSPECIFIED PART OF HEAD, INITIAL ENCOUNTER: ICD-10-CM
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
## 2021-09-02 DIAGNOSIS — Z79.899 OTHER LONG TERM (CURRENT) DRUG THERAPY: ICD-10-CM

## 2021-09-02 DIAGNOSIS — S06.5X0A TRAUMATIC SUBDURAL HEMORRHAGE WITHOUT LOSS OF CONSCIOUSNESS, INITIAL ENCOUNTER: ICD-10-CM

## 2021-09-02 DIAGNOSIS — Z79.82 LONG TERM (CURRENT) USE OF ASPIRIN: ICD-10-CM

## 2021-09-02 DIAGNOSIS — Z98.2 PRESENCE OF CEREBROSPINAL FLUID DRAINAGE DEVICE: Chronic | ICD-10-CM

## 2021-09-02 DIAGNOSIS — F03.90 UNSPECIFIED DEMENTIA WITHOUT BEHAVIORAL DISTURBANCE: ICD-10-CM

## 2021-09-02 DIAGNOSIS — E78.5 HYPERLIPIDEMIA, UNSPECIFIED: ICD-10-CM

## 2021-09-02 DIAGNOSIS — Z98.2 PRESENCE OF CEREBROSPINAL FLUID DRAINAGE DEVICE: ICD-10-CM

## 2021-09-02 DIAGNOSIS — Z87.39 PERSONAL HISTORY OF OTHER DISEASES OF THE MUSCULOSKELETAL SYSTEM AND CONNECTIVE TISSUE: ICD-10-CM
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
## 2021-09-02 DIAGNOSIS — N39.0 URINARY TRACT INFECTION, SITE NOT SPECIFIED: ICD-10-CM

## 2021-09-02 DIAGNOSIS — F32.9 MAJOR DEPRESSIVE DISORDER, SINGLE EPISODE, UNSPECIFIED: ICD-10-CM

## 2021-09-02 DIAGNOSIS — I10 ESSENTIAL (PRIMARY) HYPERTENSION: ICD-10-CM

## 2021-09-02 LAB
ANION GAP SERPL CALC-SCNC: 0 MMOL/L — LOW (ref 5–17)
APPEARANCE UR: ABNORMAL
BASOPHILS # BLD AUTO: 0.03 K/UL — SIGNIFICANT CHANGE UP (ref 0–0.2)
BASOPHILS NFR BLD AUTO: 0.4 % — SIGNIFICANT CHANGE UP (ref 0–2)
BILIRUB UR-MCNC: NEGATIVE — SIGNIFICANT CHANGE UP
BUN SERPL-MCNC: 17 MG/DL — SIGNIFICANT CHANGE UP (ref 7–23)
CALCIUM SERPL-MCNC: 9.1 MG/DL — SIGNIFICANT CHANGE UP (ref 8.5–10.1)
CHLORIDE SERPL-SCNC: 111 MMOL/L — HIGH (ref 96–108)
CK SERPL-CCNC: 160 U/L — SIGNIFICANT CHANGE UP (ref 26–192)
CO2 SERPL-SCNC: 30 MMOL/L — SIGNIFICANT CHANGE UP (ref 22–31)
COLOR SPEC: YELLOW — SIGNIFICANT CHANGE UP
CREAT SERPL-MCNC: 0.62 MG/DL — SIGNIFICANT CHANGE UP (ref 0.5–1.3)
DIFF PNL FLD: ABNORMAL
EOSINOPHIL # BLD AUTO: 0.31 K/UL — SIGNIFICANT CHANGE UP (ref 0–0.5)
EOSINOPHIL NFR BLD AUTO: 3.9 % — SIGNIFICANT CHANGE UP (ref 0–6)
GLUCOSE SERPL-MCNC: 81 MG/DL — SIGNIFICANT CHANGE UP (ref 70–99)
GLUCOSE UR QL: NEGATIVE MG/DL — SIGNIFICANT CHANGE UP
HCT VFR BLD CALC: 42.7 % — SIGNIFICANT CHANGE UP (ref 34.5–45)
HGB BLD-MCNC: 13.6 G/DL — SIGNIFICANT CHANGE UP (ref 11.5–15.5)
IMM GRANULOCYTES NFR BLD AUTO: 0.1 % — SIGNIFICANT CHANGE UP (ref 0–1.5)
KETONES UR-MCNC: NEGATIVE — SIGNIFICANT CHANGE UP
LEUKOCYTE ESTERASE UR-ACNC: ABNORMAL
LYMPHOCYTES # BLD AUTO: 1.23 K/UL — SIGNIFICANT CHANGE UP (ref 1–3.3)
LYMPHOCYTES # BLD AUTO: 15.5 % — SIGNIFICANT CHANGE UP (ref 13–44)
MCHC RBC-ENTMCNC: 30.2 PG — SIGNIFICANT CHANGE UP (ref 27–34)
MCHC RBC-ENTMCNC: 31.9 GM/DL — LOW (ref 32–36)
MCV RBC AUTO: 94.9 FL — SIGNIFICANT CHANGE UP (ref 80–100)
MONOCYTES # BLD AUTO: 0.57 K/UL — SIGNIFICANT CHANGE UP (ref 0–0.9)
MONOCYTES NFR BLD AUTO: 7.2 % — SIGNIFICANT CHANGE UP (ref 2–14)
NEUTROPHILS # BLD AUTO: 5.77 K/UL — SIGNIFICANT CHANGE UP (ref 1.8–7.4)
NEUTROPHILS NFR BLD AUTO: 72.9 % — SIGNIFICANT CHANGE UP (ref 43–77)
NITRITE UR-MCNC: POSITIVE
PH UR: 7 — SIGNIFICANT CHANGE UP (ref 5–8)
PLATELET # BLD AUTO: 155 K/UL — SIGNIFICANT CHANGE UP (ref 150–400)
POTASSIUM SERPL-MCNC: 4.3 MMOL/L — SIGNIFICANT CHANGE UP (ref 3.5–5.3)
POTASSIUM SERPL-SCNC: 4.3 MMOL/L — SIGNIFICANT CHANGE UP (ref 3.5–5.3)
PROT UR-MCNC: NEGATIVE MG/DL — SIGNIFICANT CHANGE UP
RBC # BLD: 4.5 M/UL — SIGNIFICANT CHANGE UP (ref 3.8–5.2)
RBC # FLD: 13.6 % — SIGNIFICANT CHANGE UP (ref 10.3–14.5)
SODIUM SERPL-SCNC: 141 MMOL/L — SIGNIFICANT CHANGE UP (ref 135–145)
SP GR SPEC: 1.01 — SIGNIFICANT CHANGE UP (ref 1.01–1.02)
UROBILINOGEN FLD QL: NEGATIVE MG/DL — SIGNIFICANT CHANGE UP
WBC # BLD: 7.92 K/UL — SIGNIFICANT CHANGE UP (ref 3.8–10.5)
WBC # FLD AUTO: 7.92 K/UL — SIGNIFICANT CHANGE UP (ref 3.8–10.5)

## 2021-09-02 PROCEDURE — 80048 BASIC METABOLIC PNL TOTAL CA: CPT

## 2021-09-02 PROCEDURE — 72125 CT NECK SPINE W/O DYE: CPT | Mod: 26,MG

## 2021-09-02 PROCEDURE — 76376 3D RENDER W/INTRP POSTPROCES: CPT

## 2021-09-02 PROCEDURE — 70486 CT MAXILLOFACIAL W/O DYE: CPT

## 2021-09-02 PROCEDURE — 70450 CT HEAD/BRAIN W/O DYE: CPT | Mod: 26,MG

## 2021-09-02 PROCEDURE — 99285 EMERGENCY DEPT VISIT HI MDM: CPT

## 2021-09-02 PROCEDURE — 71045 X-RAY EXAM CHEST 1 VIEW: CPT | Mod: 26

## 2021-09-02 PROCEDURE — 87186 SC STD MICRODIL/AGAR DIL: CPT

## 2021-09-02 PROCEDURE — 87086 URINE CULTURE/COLONY COUNT: CPT

## 2021-09-02 PROCEDURE — 85025 COMPLETE CBC W/AUTO DIFF WBC: CPT

## 2021-09-02 PROCEDURE — 99282 EMERGENCY DEPT VISIT SF MDM: CPT

## 2021-09-02 PROCEDURE — 73120 X-RAY EXAM OF HAND: CPT | Mod: RT

## 2021-09-02 PROCEDURE — 72125 CT NECK SPINE W/O DYE: CPT

## 2021-09-02 PROCEDURE — 76376 3D RENDER W/INTRP POSTPROCES: CPT | Mod: 26

## 2021-09-02 PROCEDURE — 71045 X-RAY EXAM CHEST 1 VIEW: CPT

## 2021-09-02 PROCEDURE — 82550 ASSAY OF CK (CPK): CPT

## 2021-09-02 PROCEDURE — 73120 X-RAY EXAM OF HAND: CPT | Mod: 26,RT

## 2021-09-02 PROCEDURE — 81001 URINALYSIS AUTO W/SCOPE: CPT

## 2021-09-02 PROCEDURE — 99285 EMERGENCY DEPT VISIT HI MDM: CPT | Mod: 25

## 2021-09-02 PROCEDURE — 70486 CT MAXILLOFACIAL W/O DYE: CPT | Mod: 26,MG

## 2021-09-02 PROCEDURE — G1004: CPT

## 2021-09-02 PROCEDURE — 36415 COLL VENOUS BLD VENIPUNCTURE: CPT

## 2021-09-02 PROCEDURE — 70450 CT HEAD/BRAIN W/O DYE: CPT

## 2021-09-02 PROCEDURE — 93010 ELECTROCARDIOGRAM REPORT: CPT

## 2021-09-02 PROCEDURE — 93005 ELECTROCARDIOGRAM TRACING: CPT

## 2021-09-02 RX ORDER — CEPHALEXIN 500 MG
1 CAPSULE ORAL
Qty: 14 | Refills: 0
Start: 2021-09-02 | End: 2021-09-08

## 2021-09-02 RX ORDER — CEPHALEXIN 500 MG
500 CAPSULE ORAL ONCE
Refills: 0 | Status: COMPLETED | OUTPATIENT
Start: 2021-09-02 | End: 2021-09-02

## 2021-09-02 RX ADMIN — Medication 500 MILLIGRAM(S): at 11:05

## 2021-09-02 NOTE — ED PROVIDER NOTE - NSICDXPASTSURGICALHX_GEN_ALL_CORE_FT
PAST SURGICAL HISTORY:  No significant past surgical history     S/P ventriculoperitoneal shunt

## 2021-09-02 NOTE — CONSULT NOTE ADULT - SUBJECTIVE AND OBJECTIVE BOX
Patient is a 71y old  Female who presents with a chief complaint of s/p fall    HPI: Patient is a 71 year old woman with advanced dementia who presented to the ED after an unwitnessed fall at the [facility]. Patient was schedule to see Dr. Edmonds today as an outpatient for followup for an acute SDH she suffered in June of 2021. Pt seen and examined in the ED. Pt is awake and alert, pleasantly confused in the ED, resting comfortably in the ED.   She has an abrasion to her nose and forehead, she does not appear to in any pain, she is moving all extremities antigravity. A CT of the head was done and showed resolving subacute right sided SDH.     PAST MEDICAL & SURGICAL HISTORY:  HTN (hypertension)  Dermatomyositis  Vascular dementia  Depression  Seizures  HLD (hyperlipidemia)  Dementia  Normal pressure hydrocephalus, s/p VPS  Hyperlipidemia  S/P ventriculoperitoneal shunt    FAMILY HISTORY:  unable to obtain due to dementia     Social Hx: unable to obtain due to dementia     MEDICATIONS  (STANDING):  Home Meds from [facility] reviewed   cephalexin 500 milliGRAM(s) Oral once    Allergies:  No Known Allergies as per pervious documentation     ROS: unable to obtain due to dementia, pt denies pain and appears comfortable     Vital Signs Last 24 Hrs  T(C): 36.3 (02 Sep 2021 06:36), Max: 36.3 (02 Sep 2021 06:36)  T(F): 97.3 (02 Sep 2021 06:36), Max: 97.3 (02 Sep 2021 06:36)  HR: 72 (02 Sep 2021 06:36) (72 - 72)  BP: 134/67 (02 Sep 2021 06:36) (134/67 - 134/67)  RR: 18 (02 Sep 2021 06:36) (18 - 18)  SpO2: 97% (02 Sep 2021 06:36) (97% - 97%)    PHYSICAL EXAM:   Constitutional: awake and alert, pleasantly confused   HEENT: PERRLA, EOMI  Neck: Supple  Respiratory: Breath sounds are clear bilaterally  Cardiovascular: S1 and S2, regular  rhythm  Gastrointestinal: soft, nontender  Extremities:  no edema  Vascular: Carotid Bruit - no  Musculoskeletal: no joint swelling/tenderness, no abnormal movements  Skin: abrasion to forehead and nose     Neurological exam:  HF: A x O x 1 Appropriately interactive, dementia, able to name objects, unaware of circumstances or surroundings, speech clear, no dysarthria   CN: PEARRL, EOMI, VFF, facial sensation normal, no NLFD, tongue midline, Palate moves equally, SCM equal bilaterally  Motor: No pronator drift, Strength 5/5 in all 4 ext, normal bulk and tone, no tremor, rigidity or bradykinesia.    Sens: Intact to light touch    Reflexes: Symmetric and normal, downgoing toes b/l  Coord: unable to assess   Gait/Balance: Not tested     Labs:                        13.6   7.92  )-----------( 155      ( 02 Sep 2021 07:00 )             42.7     09-02    141  |  111<H>  |  17  ----------------------------<  81  4.3   |       30      |  0.62    Ca    9.1      02 Sep 2021 07:00    RADIOLOGY:  < from: CT Head No Cont (09.02.21 @ 07:09) >  CT HEAD:  Right parietal approach ventriculostomy catheter with tip at the level of the septum pellucidum. Ventricular size is unchanged.  Mixed density left convexity subdural hematoma measures 8 mm at greatest depth, decreased in size. No significant midline shift. There are areas of hypodensity in the bilateral hemispheric white matter suggesting chronic white matter microvascular ischemic change. There is cerebral volume loss.  Frontal extracalvarial hematoma. There is no displaced calvarial fracture. Right parietal venita hole.  The mastoid air cells are well aerated.    CT MAXILLOFACIAL:  No acute facial bone fractures are visualized. Chronic fractures of the anterior and lateral walls of the right maxillary sinus, right lateral orbital wall and right orbital floor. Chronic diastasis of the right zygomaticofrontal suture. Chronic fracture of the right mandibular coronoid process.  The visualized portions of the paranasal sinuses are well aerated.  The visualized orbits are within normal limits.    CT CERVICAL SPINE:  Straightening of the cervical lordosis. Anterolisthesis at C4-C5, C7-T1, T1-T2 and T2-T3. Vertebral body heights are within normal limits. Multilevel intervertebral disc height loss. Disc osteophyte complex at C5-C6 contributes to spinal canal narrowing. Multilevel bilateral neuroforaminal narrowing due to facet and uncovertebral arthropathy.  Calcified plaque in bilateral carotid bifurcations.    IMPRESSION:  CT head: Mixed density left convexity subdural hematoma measures 8 mm at greatest depth, decreased in size.  CT maxillofacial: No acute facial bone fracture.  CT cervical spine: No evidence for acute displaced fracture or malalignment.

## 2021-09-02 NOTE — ED PROVIDER NOTE - PSYCHIATRIC, MLM
Alert and oriented to person, not place or time; normal mood and affect. no apparent risk to self or others.

## 2021-09-02 NOTE — ED ADULT TRIAGE NOTE - CHIEF COMPLAINT QUOTE
Patient presents with unwitnessed fall from bed, patient from Dementia unit at [facility]. Patient has facial abrasions. Baseline mental status. Not on anticoagulants. Dr Blackburn saw patient at triage patient called as neuro alert

## 2021-09-02 NOTE — CONSULT NOTE ADULT - ASSESSMENT
Patient is a 71 year old woman with advanced dementia who presented to the ED after an unwitnessed fall at the [facility]. Patient was schedule to see Dr. Edmonds today as an outpatient for followup for an acute SDH she suffered in June of 2021. Pt seen and examined in the ED. Pt is awake and alert, pleasantly confused in the ED, resting comfortably in the ED.   She has an abrasion to her nose and forehead, she does not appear to in any pain, she is moving all extremities antigravity. A CT of the head was done and showed resolving subacute right sided SDH.     Chronic resolving SDH  No acute neurosurgical intervention  Dr. Edmonds has reviewed films   pt to follow up in the office in6 weeks with a new CT of the head

## 2021-09-02 NOTE — ED PROVIDER NOTE - OBJECTIVE STATEMENT
Patient is a 70 yo female s/p unwitnessed fall at [facility]; patient with hx of dementia; found by staff with abrasions to forehead; hx limited 2/2 to patient's hx of dementia; per sunrise- patient with hx of frequent falls; hx of subdural hemorrhage in past; patient offers no complaints at this time; immunizations utd.

## 2021-09-02 NOTE — ED ADULT NURSE NOTE - CHIEF COMPLAINT QUOTE
Patient presents with unwitnessed fall from bed, patient from Dementia unit at [facility]. Patient has facial abrasions. Baseline mental status. Not on anticoagulants. Dr Blackburn saw patient at triage patient called as neuro alert

## 2021-09-02 NOTE — ED PROVIDER NOTE - PROGRESS NOTE DETAILS
Bere DO: son requests NSG- Dr. Edmonds be contacted; cleared by NSG team- to f/u in 6 weeks; will treat urine; patient ambulatory in ED with steady gait; strict return precautions given. Bere DO: patient unable to provide urine culture prior to d/c home; urine to be treated.

## 2021-09-02 NOTE — ED PROVIDER NOTE - CARE PROVIDER_API CALL
Brodie Edmonds; PhD)  Neurosurgery  [address] [facility] [address] [facility], 2nd [place] [address]  Phone: [phone]  Fax: [phone]  Follow Up Time:

## 2021-09-02 NOTE — ED PROVIDER NOTE - NSFOLLOWUPINSTRUCTIONS_ED_ALL_ED_FT
Fall Prevention    WHAT YOU NEED TO KNOW:    Fall prevention includes ways to make your home and other areas safer. It also includes ways you can move more carefully to prevent a fall. Health conditions that cause changes in your blood pressure, vision, or muscle strength and coordination may increase your risk for falls. Medicines may also increase your risk for falls if they make you dizzy, weak, or sleepy.     DISCHARGE INSTRUCTIONS:    Call 911 or have someone else call if:     You have fallen and are unconscious.      You have fallen and cannot move part of your body.    Contact your healthcare provider if:     You have fallen and have pain or a headache.      You have questions or concerns about your condition or care.    Fall prevention tips:     Stand or sit up slowly. This may help you keep your balance and prevent falls.      Use assistive devices as directed. Your healthcare provider may suggest that you use a cane or walker to help you keep your balance. You may need to have grab bars put in your bathroom near the toilet or in the shower.      Wear shoes that fit well and have soles that . Wear shoes both inside and outside. Use slippers with good . Do not wear shoes with high heels.      Wear a personal alarm. This is a device that allows you to call 911 if you fall and need help. Ask your healthcare provider for more information.      Stay active. Exercise can help strengthen your muscles and improve your balance. Your healthcare provider may recommend water aerobics or walking. He or she may also recommend physical therapy to improve your coordination. Never start an exercise program without talking to your healthcare provider first.       Manage your medical conditions. Keep all appointments with your healthcare providers. Visit your eye doctor as directed.           Home safety tips:     Add items to prevent falls in the bathroom. Put nonslip strips on your bath or shower floor to prevent you from slipping. Use a bath mat if you do not have carpet in the bathroom. This will prevent you from falling when you step out of the bath or shower. Use a shower seat so you do not need to stand while you shower. Sit on the toilet or a chair in your bathroom to dry yourself and put on clothing. This will prevent you from losing your balance from drying or dressing yourself while you are standing.       Keep paths clear. Remove books, shoes, and other objects from walkways and stairs. Place cords for telephones and lamps out of the way so that you do not need to walk over them. Tape them down if you cannot move them. Remove small rugs. If you cannot remove a rug, secure it with double-sided tape. This will prevent you from tripping.       Install bright lights in your home. Use night lights to help light paths to the bathroom or kitchen. Always turn on the light before you start walking.      Keep items you use often on shelves within reach. Do not use a step stool to help you reach an item.      Paint or place reflective tape on the edges of your stairs. This will help you see the stairs better.    Follow up with your healthcare provider as directed: Write down your questions so you remember to ask them during your visits.    Urinary Tract Infection, Adult  ImageA urinary tract infection (UTI) is an infection of any part of the urinary tract, which includes the kidneys, ureters, bladder, and urethra. These organs make, store, and get rid of urine in the body. UTI can be a bladder infection (cystitis) or kidney infection (pyelonephritis).    What are the causes?  This infection may be caused by fungi, viruses, or bacteria. Bacteria are the most common cause of UTIs. This condition can also be caused by repeated incomplete emptying of the bladder during urination.    What increases the risk?  This condition is more likely to develop if:    You ignore your need to urinate or hold urine for long periods of time.  You do not empty your bladder completely during urination.  You wipe back to front after urinating or having a bowel movement, if you are female.  You are uncircumcised, if you are male.  You are constipated.  You have a urinary catheter that stays in place (indwelling).  You have a weak defense (immune) system.  You have a medical condition that affects your bowels, kidneys, or bladder.  You have diabetes.  You take antibiotic medicines frequently or for long periods of time, and the antibiotics no longer work well against certain types of infections (antibiotic resistance).  You take medicines that irritate your urinary tract.  You are exposed to chemicals that irritate your urinary tract.  You are female.    What are the signs or symptoms?  Symptoms of this condition include:    Fever.  Frequent urination or passing small amounts of urine frequently.  Needing to urinate urgently.  Pain or burning with urination.  Urine that smells bad or unusual.  Cloudy urine.  Pain in the lower abdomen or back.  Trouble urinating.  Blood in the urine.  Vomiting or being less hungry than normal.  Diarrhea or abdominal pain.  Vaginal discharge, if you are female.    How is this diagnosed?  This condition is diagnosed with a medical history and physical exam. You will also need to provide a urine sample to test your urine. Other tests may be done, including:    Blood tests.  Sexually transmitted disease (STD) testing.    If you have had more than one UTI, a cystoscopy or imaging studies may be done to determine the cause of the infections.    How is this treated?  Treatment for this condition often includes a combination of two or more of the following:    Antibiotic medicine.  Other medicines to treat less common causes of UTI.  Over-the-counter medicines to treat pain.  Drinking enough water to stay hydrated.    Follow these instructions at home:  Take over-the-counter and prescription medicines only as told by your health care provider.  If you were prescribed an antibiotic, take it as told by your health care provider. Do not stop taking the antibiotic even if you start to feel better.  Avoid alcohol, caffeine, tea, and carbonated beverages. They can irritate your bladder.  Drink enough fluid to keep your urine clear or pale yellow.  Keep all follow-up visits as told by your health care provider. This is important.  ImageMake sure to:    Empty your bladder often and completely. Do not hold urine for long periods of time.  Empty your bladder before and after sex.  Wipe from front to back after a bowel movement if you are female. Use each tissue one time when you wipe.    Contact a health care provider if:  You have back pain.  You have a fever.  You feel nauseous or vomit.  Your symptoms do not get better after 3 days.  Your symptoms go away and then return.  Get help right away if:  You have severe back pain or lower abdominal pain.  You are vomiting and cannot keep down any medicines or water.  This information is not intended to replace advice given to you by your health care provider. Make sure you discuss any questions you have with your health care provider.    PLEASE FOLLOW UP WITH DR. LEY in 6 WEEKS- call for appt

## 2021-09-02 NOTE — ED PROVIDER NOTE - PATIENT PORTAL LINK FT
You can access the FollowMyHealth Patient Portal offered by [facility] by registering at the following website: [email or web address]. By joining [employer]’s FollowMyHealth portal, you will also be able to view your health information using other applications (apps) compatible with our system.

## 2021-09-02 NOTE — ED PROVIDER NOTE - NSICDXPASTMEDICALHX_GEN_ALL_CORE_FT
PAST MEDICAL HISTORY:  Benign essential HTN     Dementia     Depression     Dermatomyositis     Dermatomyositis     HLD (hyperlipidemia)     HTN (hypertension)     Hyperlipidemia     Normal pressure hydrocephalus s/p VPS    Seizures     Vascular dementia

## 2021-09-02 NOTE — ED ADULT NURSE REASSESSMENT NOTE - NS ED NURSE REASSESS COMMENT FT1
received report from rn sarah, pt resting comfortably, ambulated to bathroom with walker with nursing assistant, urine obtained, vitals stable, awaiting ct orders, pt near nursing station with yellow gown

## 2021-09-02 NOTE — ED PROVIDER NOTE - CONSTITUTIONAL, MLM
Well appearing, awake, alert, oriented to person, not place or time; and in no apparent distress. normal...

## 2021-09-04 NOTE — ED POST DISCHARGE NOTE - DETAILS
Final report shows E. coli is sensitive to Cefazolin / Keflex.  No furthe rintervention needed.  GOPAL bartlett PA-C

## 2021-09-04 NOTE — ED POST DISCHARGE NOTE - RESULT SUMMARY
PRELIM urine culture: +E. coli. Patient dc'd on Keflex. Sensitivities pending. ~Frankie Crawford PA-C

## 2021-09-11 ENCOUNTER — EMERGENCY (EMERGENCY)
Facility: HOSPITAL | Age: 71
LOS: 0 days | Discharge: HOME | End: 2021-09-11
Attending: STUDENT IN AN ORGANIZED HEALTH CARE EDUCATION/TRAINING PROGRAM
Payer: MEDICARE

## 2021-09-11 VITALS
HEART RATE: 48 BPM | HEIGHT: 62 IN | DIASTOLIC BLOOD PRESSURE: 55 MMHG | RESPIRATION RATE: 16 BRPM | SYSTOLIC BLOOD PRESSURE: 134 MMHG | WEIGHT: 110.01 LBS | OXYGEN SATURATION: 99 %

## 2021-09-11 VITALS
TEMPERATURE: 98 F | RESPIRATION RATE: 16 BRPM | SYSTOLIC BLOOD PRESSURE: 124 MMHG | DIASTOLIC BLOOD PRESSURE: 53 MMHG | OXYGEN SATURATION: 99 % | HEART RATE: 66 BPM

## 2021-09-11 DIAGNOSIS — S00.33XA CONTUSION OF NOSE, INITIAL ENCOUNTER: ICD-10-CM

## 2021-09-11 DIAGNOSIS — Z79.899 OTHER LONG TERM (CURRENT) DRUG THERAPY: ICD-10-CM
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
## 2021-09-11 DIAGNOSIS — Z98.2 PRESENCE OF CEREBROSPINAL FLUID DRAINAGE DEVICE: Chronic | ICD-10-CM
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
## 2021-09-11 DIAGNOSIS — S09.90XA UNSPECIFIED INJURY OF HEAD, INITIAL ENCOUNTER: ICD-10-CM

## 2021-09-11 DIAGNOSIS — I10 ESSENTIAL (PRIMARY) HYPERTENSION: ICD-10-CM

## 2021-09-11 DIAGNOSIS — S00.81XA ABRASION OF OTHER PART OF HEAD, INITIAL ENCOUNTER: ICD-10-CM

## 2021-09-11 DIAGNOSIS — R29.6 REPEATED FALLS: ICD-10-CM

## 2021-09-11 DIAGNOSIS — N39.0 URINARY TRACT INFECTION, SITE NOT SPECIFIED: ICD-10-CM

## 2021-09-11 DIAGNOSIS — E78.5 HYPERLIPIDEMIA, UNSPECIFIED: ICD-10-CM

## 2021-09-11 DIAGNOSIS — F32.9 MAJOR DEPRESSIVE DISORDER, SINGLE EPISODE, UNSPECIFIED: ICD-10-CM

## 2021-09-11 DIAGNOSIS — F03.90 UNSPECIFIED DEMENTIA WITHOUT BEHAVIORAL DISTURBANCE: ICD-10-CM

## 2021-09-11 DIAGNOSIS — M33.90 DERMATOPOLYMYOSITIS, UNSPECIFIED, ORGAN INVOLVEMENT UNSPECIFIED: ICD-10-CM

## 2021-09-11 DIAGNOSIS — F01.50 VASCULAR DEMENTIA WITHOUT BEHAVIORAL DISTURBANCE: ICD-10-CM

## 2021-09-11 DIAGNOSIS — R94.31 ABNORMAL ELECTROCARDIOGRAM [ECG] [EKG]: ICD-10-CM

## 2021-09-11 DIAGNOSIS — Z20.822 CONTACT WITH AND (SUSPECTED) EXPOSURE TO COVID-19: ICD-10-CM

## 2021-09-11 DIAGNOSIS — Z98.2 PRESENCE OF CEREBROSPINAL FLUID DRAINAGE DEVICE: ICD-10-CM

## 2021-09-11 DIAGNOSIS — R00.1 BRADYCARDIA, UNSPECIFIED: ICD-10-CM

## 2021-09-11 LAB
ALBUMIN SERPL ELPH-MCNC: 3.5 G/DL — SIGNIFICANT CHANGE UP (ref 3.3–5)
ALP SERPL-CCNC: 69 U/L — SIGNIFICANT CHANGE UP (ref 40–120)
ALT FLD-CCNC: 22 U/L — SIGNIFICANT CHANGE UP (ref 12–78)
ANION GAP SERPL CALC-SCNC: 6 MMOL/L — SIGNIFICANT CHANGE UP (ref 5–17)
APPEARANCE UR: CLEAR — SIGNIFICANT CHANGE UP
AST SERPL-CCNC: 24 U/L — SIGNIFICANT CHANGE UP (ref 15–37)
BACTERIA # UR AUTO: ABNORMAL
BILIRUB SERPL-MCNC: 0.3 MG/DL — SIGNIFICANT CHANGE UP (ref 0.2–1.2)
BILIRUB UR-MCNC: NEGATIVE — SIGNIFICANT CHANGE UP
BUN SERPL-MCNC: 24 MG/DL — HIGH (ref 7–23)
CALCIUM SERPL-MCNC: 9 MG/DL — SIGNIFICANT CHANGE UP (ref 8.5–10.1)
CHLORIDE SERPL-SCNC: 111 MMOL/L — HIGH (ref 96–108)
CO2 SERPL-SCNC: 24 MMOL/L — SIGNIFICANT CHANGE UP (ref 22–31)
COLOR SPEC: YELLOW — SIGNIFICANT CHANGE UP
CREAT SERPL-MCNC: 0.63 MG/DL — SIGNIFICANT CHANGE UP (ref 0.5–1.3)
DIFF PNL FLD: ABNORMAL
EPI CELLS # UR: SIGNIFICANT CHANGE UP
GLUCOSE SERPL-MCNC: 72 MG/DL — SIGNIFICANT CHANGE UP (ref 70–99)
GLUCOSE UR QL: NEGATIVE MG/DL — SIGNIFICANT CHANGE UP
HCT VFR BLD CALC: 40.2 % — SIGNIFICANT CHANGE UP (ref 34.5–45)
HGB BLD-MCNC: 12.6 G/DL — SIGNIFICANT CHANGE UP (ref 11.5–15.5)
KETONES UR-MCNC: NEGATIVE — SIGNIFICANT CHANGE UP
LEUKOCYTE ESTERASE UR-ACNC: ABNORMAL
MCHC RBC-ENTMCNC: 30.3 PG — SIGNIFICANT CHANGE UP (ref 27–34)
MCHC RBC-ENTMCNC: 31.3 GM/DL — LOW (ref 32–36)
MCV RBC AUTO: 96.6 FL — SIGNIFICANT CHANGE UP (ref 80–100)
NITRITE UR-MCNC: POSITIVE
PH UR: 6 — SIGNIFICANT CHANGE UP (ref 5–8)
PLATELET # BLD AUTO: 141 K/UL — LOW (ref 150–400)
POTASSIUM SERPL-MCNC: 4.1 MMOL/L — SIGNIFICANT CHANGE UP (ref 3.5–5.3)
POTASSIUM SERPL-SCNC: 4.1 MMOL/L — SIGNIFICANT CHANGE UP (ref 3.5–5.3)
PROT SERPL-MCNC: 7.7 GM/DL — SIGNIFICANT CHANGE UP (ref 6–8.3)
PROT UR-MCNC: 15 MG/DL
RBC # BLD: 4.16 M/UL — SIGNIFICANT CHANGE UP (ref 3.8–5.2)
RBC # FLD: 13.7 % — SIGNIFICANT CHANGE UP (ref 10.3–14.5)
RBC CASTS # UR COMP ASSIST: ABNORMAL /HPF (ref 0–4)
SARS-COV-2 RNA SPEC QL NAA+PROBE: SIGNIFICANT CHANGE UP
SODIUM SERPL-SCNC: 141 MMOL/L — SIGNIFICANT CHANGE UP (ref 135–145)
SP GR SPEC: 1.02 — SIGNIFICANT CHANGE UP (ref 1.01–1.02)
UROBILINOGEN FLD QL: NEGATIVE MG/DL — SIGNIFICANT CHANGE UP
WBC # BLD: 5.4 K/UL — SIGNIFICANT CHANGE UP (ref 3.8–10.5)
WBC # FLD AUTO: 5.4 K/UL — SIGNIFICANT CHANGE UP (ref 3.8–10.5)
WBC UR QL: SIGNIFICANT CHANGE UP

## 2021-09-11 PROCEDURE — 97530 THERAPEUTIC ACTIVITIES: CPT | Mod: GP

## 2021-09-11 PROCEDURE — U0003: CPT

## 2021-09-11 PROCEDURE — 70450 CT HEAD/BRAIN W/O DYE: CPT | Mod: 26,MA

## 2021-09-11 PROCEDURE — 81001 URINALYSIS AUTO W/SCOPE: CPT

## 2021-09-11 PROCEDURE — 76376 3D RENDER W/INTRP POSTPROCES: CPT | Mod: 26

## 2021-09-11 PROCEDURE — 72125 CT NECK SPINE W/O DYE: CPT

## 2021-09-11 PROCEDURE — 70486 CT MAXILLOFACIAL W/O DYE: CPT

## 2021-09-11 PROCEDURE — 36415 COLL VENOUS BLD VENIPUNCTURE: CPT

## 2021-09-11 PROCEDURE — 99284 EMERGENCY DEPT VISIT MOD MDM: CPT | Mod: 25

## 2021-09-11 PROCEDURE — 97116 GAIT TRAINING THERAPY: CPT | Mod: GP

## 2021-09-11 PROCEDURE — 71045 X-RAY EXAM CHEST 1 VIEW: CPT

## 2021-09-11 PROCEDURE — U0005: CPT

## 2021-09-11 PROCEDURE — 76376 3D RENDER W/INTRP POSTPROCES: CPT

## 2021-09-11 PROCEDURE — 93010 ELECTROCARDIOGRAM REPORT: CPT

## 2021-09-11 PROCEDURE — 72125 CT NECK SPINE W/O DYE: CPT | Mod: 26,MA

## 2021-09-11 PROCEDURE — 87186 SC STD MICRODIL/AGAR DIL: CPT

## 2021-09-11 PROCEDURE — 93005 ELECTROCARDIOGRAM TRACING: CPT

## 2021-09-11 PROCEDURE — 80053 COMPREHEN METABOLIC PANEL: CPT

## 2021-09-11 PROCEDURE — 71045 X-RAY EXAM CHEST 1 VIEW: CPT | Mod: 26

## 2021-09-11 PROCEDURE — 70486 CT MAXILLOFACIAL W/O DYE: CPT | Mod: 26,MA

## 2021-09-11 PROCEDURE — 87086 URINE CULTURE/COLONY COUNT: CPT

## 2021-09-11 PROCEDURE — 99284 EMERGENCY DEPT VISIT MOD MDM: CPT

## 2021-09-11 PROCEDURE — 97162 PT EVAL MOD COMPLEX 30 MIN: CPT | Mod: GP

## 2021-09-11 PROCEDURE — 85027 COMPLETE CBC AUTOMATED: CPT

## 2021-09-11 PROCEDURE — 70450 CT HEAD/BRAIN W/O DYE: CPT

## 2021-09-11 RX ORDER — LORATADINE 10 MG/1
1 TABLET ORAL
Qty: 0 | Refills: 0 | DISCHARGE

## 2021-09-11 RX ORDER — CHOLECALCIFEROL (VITAMIN D3) 125 MCG
1 CAPSULE ORAL
Qty: 0 | Refills: 0 | DISCHARGE

## 2021-09-11 RX ORDER — NITROFURANTOIN MACROCRYSTAL 50 MG
100 CAPSULE ORAL
Refills: 0 | Status: DISCONTINUED | OUTPATIENT
Start: 2021-09-11 | End: 2021-09-11

## 2021-09-11 RX ORDER — ESCITALOPRAM OXALATE 10 MG/1
1 TABLET, FILM COATED ORAL
Qty: 0 | Refills: 0 | DISCHARGE

## 2021-09-11 RX ORDER — NITROFURANTOIN MACROCRYSTAL 50 MG
1 CAPSULE ORAL
Qty: 14 | Refills: 0
Start: 2021-09-11 | End: 2021-09-17

## 2021-09-11 RX ORDER — LOSARTAN POTASSIUM 100 MG/1
1 TABLET, FILM COATED ORAL
Qty: 0 | Refills: 0 | DISCHARGE

## 2021-09-11 RX ORDER — CEFTRIAXONE 500 MG/1
1000 INJECTION, POWDER, FOR SOLUTION INTRAMUSCULAR; INTRAVENOUS ONCE
Refills: 0 | Status: DISCONTINUED | OUTPATIENT
Start: 2021-09-11 | End: 2021-09-11

## 2021-09-11 RX ORDER — ASPIRIN/CALCIUM CARB/MAGNESIUM 324 MG
1 TABLET ORAL
Qty: 0 | Refills: 0 | DISCHARGE

## 2021-09-11 RX ORDER — METOPROLOL TARTRATE 50 MG
1 TABLET ORAL
Qty: 0 | Refills: 0 | DISCHARGE

## 2021-09-11 RX ORDER — DOCUSATE SODIUM 100 MG
1 CAPSULE ORAL
Qty: 0 | Refills: 0 | DISCHARGE

## 2021-09-11 RX ADMIN — Medication 100 MILLIGRAM(S): at 11:14

## 2021-09-11 NOTE — PHYSICAL THERAPY INITIAL EVALUATION ADULT - POSTURAL RE-EDUCATION, PT EVAL
2 weeks: pt able to stand erectly with verbal/tactile cues and maintains erect posture more consistently during upright activities including ambulation

## 2021-09-11 NOTE — PHYSICAL THERAPY INITIAL EVALUATION ADULT - GAIT TRAINING, PT EVAL
2 weeks :ambulate with RW >400ft with SBG/supervision ,consistent use of assistive device displaying safe technique

## 2021-09-11 NOTE — PHYSICAL THERAPY INITIAL EVALUATION ADULT - LIVES WITH, PROFILE
resident [facility]

## 2021-09-11 NOTE — PHYSICAL THERAPY INITIAL EVALUATION ADULT - RANGE OF MOTION, PT EVAL
1 week: Performs AROM ex's BUEs/BLEs in bed,chair with improved consistency/follow thru ,decreased assistance

## 2021-09-11 NOTE — PHYSICAL THERAPY INITIAL EVALUATION ADULT - IMPAIRMENTS FOUND, PT EVAL
arousal, attention, and cognition/cognitive impairment/gait, locomotion, and balance/muscle strength/poor safety awareness/posture

## 2021-09-11 NOTE — PHYSICAL THERAPY INITIAL EVALUATION ADULT - PRECAUTIONS/LIMITATIONS, REHAB EVAL
advanced dementia/fall precautions advanced dementia ,h/o seizures/fall precautions/seizure precautions

## 2021-09-11 NOTE — ED PROVIDER NOTE - NSFOLLOWUPINSTRUCTIONS_ED_ALL_ED_FT
Patient placed in rehab due to frequent falls.  patient also found to have UTI. Please give Macrobid 100mg BID for 7 days. patient given one dose in the hospital.    Urinary Tract Infection, Adult  ImageA urinary tract infection (UTI) is an infection of any part of the urinary tract, which includes the kidneys, ureters, bladder, and urethra. These organs make, store, and get rid of urine in the body. UTI can be a bladder infection (cystitis) or kidney infection (pyelonephritis).    What are the causes?  This infection may be caused by fungi, viruses, or bacteria. Bacteria are the most common cause of UTIs. This condition can also be caused by repeated incomplete emptying of the bladder during urination.    What increases the risk?  This condition is more likely to develop if:    You ignore your need to urinate or hold urine for long periods of time.  You do not empty your bladder completely during urination.  You wipe back to front after urinating or having a bowel movement, if you are female.  You are uncircumcised, if you are male.  You are constipated.  You have a urinary catheter that stays in place (indwelling).  You have a weak defense (immune) system.  You have a medical condition that affects your bowels, kidneys, or bladder.  You have diabetes.  You take antibiotic medicines frequently or for long periods of time, and the antibiotics no longer work well against certain types of infections (antibiotic resistance).  You take medicines that irritate your urinary tract.  You are exposed to chemicals that irritate your urinary tract.  You are female.    What are the signs or symptoms?  Symptoms of this condition include:    Fever.  Frequent urination or passing small amounts of urine frequently.  Needing to urinate urgently.  Pain or burning with urination.  Urine that smells bad or unusual.  Cloudy urine.  Pain in the lower abdomen or back.  Trouble urinating.  Blood in the urine.  Vomiting or being less hungry than normal.  Diarrhea or abdominal pain.  Vaginal discharge, if you are female.    How is this diagnosed?  This condition is diagnosed with a medical history and physical exam. You will also need to provide a urine sample to test your urine. Other tests may be done, including:    Blood tests.  Sexually transmitted disease (STD) testing.    If you have had more than one UTI, a cystoscopy or imaging studies may be done to determine the cause of the infections.    How is this treated?  Treatment for this condition often includes a combination of two or more of the following:    Antibiotic medicine.  Other medicines to treat less common causes of UTI.  Over-the-counter medicines to treat pain.  Drinking enough water to stay hydrated.    Follow these instructions at home:  Take over-the-counter and prescription medicines only as told by your health care provider.  If you were prescribed an antibiotic, take it as told by your health care provider. Do not stop taking the antibiotic even if you start to feel better.  Avoid alcohol, caffeine, tea, and carbonated beverages. They can irritate your bladder.  Drink enough fluid to keep your urine clear or pale yellow.  Keep all follow-up visits as told by your health care provider. This is important.  ImageMake sure to:    Empty your bladder often and completely. Do not hold urine for long periods of time.  Empty your bladder before and after sex.  Wipe from front to back after a bowel movement if you are female. Use each tissue one time when you wipe.    Contact a health care provider if:  You have back pain.  You have a fever.  You feel nauseous or vomit.  Your symptoms do not get better after 3 days.  Your symptoms go away and then return.  Get help right away if:  You have severe back pain or lower abdominal pain.  You are vomiting and cannot keep down any medicines or water.  This information is not intended to replace advice given to you by your health care provider. Make sure you discuss any questions you have with your health care provider.

## 2021-09-11 NOTE — PHYSICAL THERAPY INITIAL EVALUATION ADULT - ACTIVE RANGE OF MOTION EXAMINATION, REHAB EVAL
AAROM/bilateral upper extremity Active ROM was WFL (within functional limits)/bilateral  lower extremity Active ROM was WFL (within functional limits)

## 2021-09-11 NOTE — PHYSICAL THERAPY INITIAL EVALUATION ADULT - RANGE OF MOTION EXAMINATION, REHAB EVAL
pt has difficulty following thru during AROM testing,needs PT to initiate/guide movement pattern ,initiate ,Atul; often pulls UEs away and folds hands at abdomen ,head down,fiddling with gown/sheet etc ,decreased attentionnal distraction evident

## 2021-09-11 NOTE — ED ADULT NURSE NOTE - CHIEF COMPLAINT QUOTE
Patient presents in ED from [facility] with an unwitnessed fall. Abrasion and bruising noted to face. Neuro alert called in triage. Patient confused at baseline.

## 2021-09-11 NOTE — PHYSICAL THERAPY INITIAL EVALUATION ADULT - PATIENT PROFILE REVIEW, REHAB EVAL
resident of dementia/memory care unit at [facility]; pt was hospitalized at [facility] in June 2021 with falls ; she had trauma workup revealing enlarging R SDH ,R orbital floor fx,coronoid process fx ,R anterior maxillary wall sinus/yes resident of dementia/memory care unit at [facility]; pt was hospitalized at [facility] in June 5-9th, 2021 with falls ; she had trauma workup revealing new acute component to subacute small  L frontoparietal SDH & facial fxs including R orbital floor fx,coronoid process fx ,R anterior maxillary sinus wall fx/yes

## 2021-09-11 NOTE — PHYSICAL THERAPY INITIAL EVALUATION ADULT - DISCHARGE DISPOSITION, PT EVAL
consider private HHA on return to [facility] or possible transition to LTC/extended care facility/rehabilitation facility

## 2021-09-11 NOTE — PHYSICAL THERAPY INITIAL EVALUATION ADULT - DIAGNOSIS, PT EVAL
impaired mobility ,deconditioning ,advanced dementia ,h/o NPH s/p  shunt ,gait instability & frequent falls ,h/o R SDH diagnosed here in ED 6/3/21 and re-evaluated @ [facility] with +enlargement

## 2021-09-11 NOTE — ED PROVIDER NOTE - PATIENT PORTAL LINK FT
You can access the FollowMyHealth Patient Portal offered by [facility] by registering at the following website: [email or web address]. By joining [employer]’s FollowMyHealth portal, you will also be able to view your health information using other applications (apps) compatible with our system.

## 2021-09-11 NOTE — ED PROVIDER NOTE - OBJECTIVE STATEMENT
here after fall at assisted living. patient with multiple recent falls. family does not feels she is safe at AL. patient without complaint. 71 F history of subdural, dementia here after fall at assisted living. patient with multiple recent falls. family does not feels she is safe at AL. patient without complaint.

## 2021-09-11 NOTE — ED PROVIDER NOTE - CLINICAL SUMMARY MEDICAL DECISION MAKING FREE TEXT BOX
here s/p fall with reported head trauma. history of SDH and UTI. family with safety concerns. check CT brain, c-spine, maxillofacial, UA, labs, PT/SW for alternate placement.

## 2021-09-11 NOTE — PHYSICAL THERAPY INITIAL EVALUATION ADULT - GAIT DEVIATIONS NOTED, PT EVAL
crouched stooped posture ,looking at floor ,cues to  head ,scan ahead ,also to maintain handgrip consistently @ RW for safety ,distractible

## 2021-09-11 NOTE — PHYSICAL THERAPY INITIAL EVALUATION ADULT - GENERAL OBSERVATIONS, REHAB EVAL
supine recumbent on stretcher in ED hallway ,hunched posture, head down c/o feeling cold ,pt with poor eye contact ,mumbling when engaged but speech is clear ; difficulty following instructions

## 2021-09-11 NOTE — PHYSICAL THERAPY INITIAL EVALUATION ADULT - FOLLOWS COMMANDS/ANSWERS QUESTIONS, REHAB EVAL
requires demonstration, repetition of command often/25% of the time/able to follow single-step instructions

## 2021-09-11 NOTE — ED PROVIDER NOTE - PROGRESS NOTE DETAILS
spoke with family, they are concerned about her safety at assisted living given how often she falls. KV: social work able to provide placement. Will discharge.

## 2021-09-11 NOTE — ED ADULT NURSE REASSESSMENT NOTE - NS ED NURSE REASSESS COMMENT FT1
Unable to get IV on patient. RN tried numerous times and another RN tried as well. Called and left voicemail with iv team. Dr. Juárez aware. Lab to [facility] for blood

## 2021-09-11 NOTE — ED ADULT NURSE NOTE - OBJECTIVE STATEMENT
Pt comes from [facility] for unwitnessed fall. Pt has old bruising and redness to nose/forehead, and new redness/abrasion to right shoulder. aox1, dementia. unable to receive any other information from patient. placed in yellow gown with bed alarm in front of nurses station.

## 2021-09-11 NOTE — PHYSICAL THERAPY INITIAL EVALUATION ADULT - PATIENT/FAMILY/SIGNIFICANT OTHER GOALS STATEMENT, PT EVAL
imaging including CT Head,Facial Bones ,Cervical Spine revealed interval decrease in size of small  L frontoparietal SDH ,no  facial fxs and DDD/spondylosis of C/S only

## 2021-09-11 NOTE — PHYSICAL THERAPY INITIAL EVALUATION ADULT - MANUAL MUSCLE TESTING RESULTS, REHAB EVAL
confusion ; grossly >/= 3+ to 4-/5 thruout BUE/BLEs ,again decreased ability to attend/comprehend testing procedures/grossly assessed due to

## 2021-09-11 NOTE — ED PROVIDER NOTE - PHYSICAL EXAMINATION
Vital signs as available reviewed.  General:  Comfortable, no acute distress.  Head: old appearing abrasions to forehead / nose with yellowing ecchymosis.  Eyes:  Conjunctiva pink, no icterus.  Cardiovascular:  Regular rate, no obvious murmur.  Respiratory:  Clear to auscultation, good air entry bilaterally.  Abdomen:  Soft, non-tender.  Musculoskeletal:  No deformity or calf tenderness.  Neurologic: Alert, confused. moving all extremities.  Skin:  Warm and dry.

## 2021-09-11 NOTE — PHYSICAL THERAPY INITIAL EVALUATION ADULT - STRENGTHENING, PT EVAL
2 weeks: Increase strength BUE/BLEs 1/2 to 1 full grade &  Able to complete sit to stand 10x In/OOC without assistance at RW

## 2021-09-11 NOTE — ED ADULT TRIAGE NOTE - CHIEF COMPLAINT QUOTE
Patient presents in ED from [facility] with an unwitnessed fall. Abrasion and bruising noted to face. Neuro alert called in triage. Patient confused at baseline.

## 2021-10-26 RX ORDER — ATORVASTATIN CALCIUM 80 MG/1
1 TABLET, FILM COATED ORAL
Qty: 14 | Refills: 0
Start: 2021-10-26 | End: 2021-11-08

## 2021-10-26 RX ORDER — LORATADINE 10 MG/1
1 TABLET ORAL
Qty: 14 | Refills: 0
Start: 2021-10-26 | End: 2021-11-08

## 2021-10-26 RX ORDER — POTASSIUM CHLORIDE 20 MEQ
20 PACKET (EA) ORAL
Qty: 0 | Refills: 0 | DISCHARGE

## 2021-10-26 RX ORDER — METOPROLOL TARTRATE 50 MG
1 TABLET ORAL
Qty: 14 | Refills: 0
Start: 2021-10-26 | End: 2021-11-08

## 2021-10-26 RX ORDER — ATORVASTATIN CALCIUM 80 MG/1
1 TABLET, FILM COATED ORAL
Qty: 0 | Refills: 0 | DISCHARGE

## 2021-10-26 RX ORDER — FUROSEMIDE 40 MG
1 TABLET ORAL
Qty: 0 | Refills: 0 | DISCHARGE

## 2021-10-26 RX ORDER — POTASSIUM CHLORIDE 20 MEQ
20 PACKET (EA) ORAL
Qty: 14 | Refills: 0
Start: 2021-10-26 | End: 2021-11-08

## 2021-10-26 RX ORDER — LORATADINE 10 MG/1
1 TABLET ORAL
Qty: 0 | Refills: 0 | DISCHARGE

## 2021-10-26 RX ORDER — ESCITALOPRAM OXALATE 10 MG/1
1 TABLET, FILM COATED ORAL
Qty: 0 | Refills: 0 | DISCHARGE

## 2021-10-26 RX ORDER — DOCUSATE SODIUM 100 MG
2 CAPSULE ORAL
Qty: 28 | Refills: 0
Start: 2021-10-26 | End: 2021-11-08

## 2021-10-26 RX ORDER — METOPROLOL TARTRATE 50 MG
1 TABLET ORAL
Qty: 0 | Refills: 0 | DISCHARGE

## 2021-10-26 RX ORDER — LOSARTAN POTASSIUM 100 MG/1
1 TABLET, FILM COATED ORAL
Qty: 14 | Refills: 0
Start: 2021-10-26 | End: 2021-11-08

## 2021-10-26 RX ORDER — ESCITALOPRAM OXALATE 10 MG/1
1 TABLET, FILM COATED ORAL
Qty: 14 | Refills: 0
Start: 2021-10-26 | End: 2021-11-08

## 2021-10-26 RX ORDER — LEVETIRACETAM 250 MG/1
1 TABLET, FILM COATED ORAL
Qty: 28 | Refills: 0
Start: 2021-10-26 | End: 2021-11-08

## 2021-10-26 RX ORDER — FUROSEMIDE 40 MG
1 TABLET ORAL
Qty: 14 | Refills: 0
Start: 2021-10-26 | End: 2021-11-08

## 2021-10-26 RX ORDER — DOCUSATE SODIUM 100 MG
2 CAPSULE ORAL
Qty: 0 | Refills: 0 | DISCHARGE

## 2021-10-28 ENCOUNTER — EMERGENCY (EMERGENCY)
Facility: HOSPITAL | Age: 71
LOS: 0 days | Discharge: HOME | End: 2021-10-28
Attending: EMERGENCY MEDICINE
Payer: MEDICARE

## 2021-10-28 VITALS
TEMPERATURE: 98 F | OXYGEN SATURATION: 96 % | DIASTOLIC BLOOD PRESSURE: 88 MMHG | RESPIRATION RATE: 18 BRPM | HEART RATE: 61 BPM | SYSTOLIC BLOOD PRESSURE: 113 MMHG

## 2021-10-28 VITALS
OXYGEN SATURATION: 97 % | HEART RATE: 68 BPM | SYSTOLIC BLOOD PRESSURE: 143 MMHG | HEIGHT: 62 IN | WEIGHT: 119.93 LBS | RESPIRATION RATE: 18 BRPM | DIASTOLIC BLOOD PRESSURE: 64 MMHG | TEMPERATURE: 97 F

## 2021-10-28 DIAGNOSIS — F32.9 MAJOR DEPRESSIVE DISORDER, SINGLE EPISODE, UNSPECIFIED: ICD-10-CM

## 2021-10-28 DIAGNOSIS — Z86.69 PERSONAL HISTORY OF OTHER DISEASES OF THE NERVOUS SYSTEM AND SENSE ORGANS: ICD-10-CM

## 2021-10-28 DIAGNOSIS — F03.90 UNSPECIFIED DEMENTIA WITHOUT BEHAVIORAL DISTURBANCE: ICD-10-CM
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
## 2021-10-28 DIAGNOSIS — Z20.822 CONTACT WITH AND (SUSPECTED) EXPOSURE TO COVID-19: ICD-10-CM

## 2021-10-28 DIAGNOSIS — S09.90XA UNSPECIFIED INJURY OF HEAD, INITIAL ENCOUNTER: ICD-10-CM

## 2021-10-28 DIAGNOSIS — R26.2 DIFFICULTY IN WALKING, NOT ELSEWHERE CLASSIFIED: ICD-10-CM

## 2021-10-28 DIAGNOSIS — I10 ESSENTIAL (PRIMARY) HYPERTENSION: ICD-10-CM

## 2021-10-28 DIAGNOSIS — E78.5 HYPERLIPIDEMIA, UNSPECIFIED: ICD-10-CM

## 2021-10-28 DIAGNOSIS — G91.2 (IDIOPATHIC) NORMAL PRESSURE HYDROCEPHALUS: ICD-10-CM

## 2021-10-28 DIAGNOSIS — Z98.2 PRESENCE OF CEREBROSPINAL FLUID DRAINAGE DEVICE: Chronic | ICD-10-CM

## 2021-10-28 DIAGNOSIS — Z98.2 PRESENCE OF CEREBROSPINAL FLUID DRAINAGE DEVICE: ICD-10-CM
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
## 2021-10-28 DIAGNOSIS — S00.511A ABRASION OF LIP, INITIAL ENCOUNTER: ICD-10-CM

## 2021-10-28 LAB
ALBUMIN SERPL ELPH-MCNC: 3.9 G/DL — SIGNIFICANT CHANGE UP (ref 3.3–5)
ALP SERPL-CCNC: 82 U/L — SIGNIFICANT CHANGE UP (ref 40–120)
ALT FLD-CCNC: 25 U/L — SIGNIFICANT CHANGE UP (ref 12–78)
ANION GAP SERPL CALC-SCNC: 7 MMOL/L — SIGNIFICANT CHANGE UP (ref 5–17)
APTT BLD: 29.7 SEC — SIGNIFICANT CHANGE UP (ref 27.5–35.5)
AST SERPL-CCNC: 40 U/L — HIGH (ref 15–37)
BASOPHILS # BLD AUTO: 0.03 K/UL — SIGNIFICANT CHANGE UP (ref 0–0.2)
BASOPHILS NFR BLD AUTO: 0.3 % — SIGNIFICANT CHANGE UP (ref 0–2)
BILIRUB SERPL-MCNC: 0.4 MG/DL — SIGNIFICANT CHANGE UP (ref 0.2–1.2)
BUN SERPL-MCNC: 25 MG/DL — HIGH (ref 7–23)
CALCIUM SERPL-MCNC: 9.3 MG/DL — SIGNIFICANT CHANGE UP (ref 8.5–10.1)
CHLORIDE SERPL-SCNC: 113 MMOL/L — HIGH (ref 96–108)
CO2 SERPL-SCNC: 23 MMOL/L — SIGNIFICANT CHANGE UP (ref 22–31)
CREAT SERPL-MCNC: 0.69 MG/DL — SIGNIFICANT CHANGE UP (ref 0.5–1.3)
EOSINOPHIL # BLD AUTO: 0.03 K/UL — SIGNIFICANT CHANGE UP (ref 0–0.5)
EOSINOPHIL NFR BLD AUTO: 0.3 % — SIGNIFICANT CHANGE UP (ref 0–6)
GLUCOSE SERPL-MCNC: 93 MG/DL — SIGNIFICANT CHANGE UP (ref 70–99)
HCT VFR BLD CALC: 43.9 % — SIGNIFICANT CHANGE UP (ref 34.5–45)
HGB BLD-MCNC: 13.9 G/DL — SIGNIFICANT CHANGE UP (ref 11.5–15.5)
IMM GRANULOCYTES NFR BLD AUTO: 0.4 % — SIGNIFICANT CHANGE UP (ref 0–1.5)
INR BLD: 1.04 RATIO — SIGNIFICANT CHANGE UP (ref 0.88–1.16)
LYMPHOCYTES # BLD AUTO: 1.06 K/UL — SIGNIFICANT CHANGE UP (ref 1–3.3)
LYMPHOCYTES # BLD AUTO: 10.4 % — LOW (ref 13–44)
MAGNESIUM SERPL-MCNC: 2.4 MG/DL — SIGNIFICANT CHANGE UP (ref 1.6–2.6)
MCHC RBC-ENTMCNC: 30.5 PG — SIGNIFICANT CHANGE UP (ref 27–34)
MCHC RBC-ENTMCNC: 31.7 GM/DL — LOW (ref 32–36)
MCV RBC AUTO: 96.5 FL — SIGNIFICANT CHANGE UP (ref 80–100)
MONOCYTES # BLD AUTO: 0.53 K/UL — SIGNIFICANT CHANGE UP (ref 0–0.9)
MONOCYTES NFR BLD AUTO: 5.2 % — SIGNIFICANT CHANGE UP (ref 2–14)
NEUTROPHILS # BLD AUTO: 8.5 K/UL — HIGH (ref 1.8–7.4)
NEUTROPHILS NFR BLD AUTO: 83.4 % — HIGH (ref 43–77)
PLATELET # BLD AUTO: 159 K/UL — SIGNIFICANT CHANGE UP (ref 150–400)
POTASSIUM SERPL-MCNC: 4.5 MMOL/L — SIGNIFICANT CHANGE UP (ref 3.5–5.3)
POTASSIUM SERPL-SCNC: 4.5 MMOL/L — SIGNIFICANT CHANGE UP (ref 3.5–5.3)
PROT SERPL-MCNC: 8.5 GM/DL — HIGH (ref 6–8.3)
PROTHROM AB SERPL-ACNC: 12 SEC — SIGNIFICANT CHANGE UP (ref 10.6–13.6)
RBC # BLD: 4.55 M/UL — SIGNIFICANT CHANGE UP (ref 3.8–5.2)
RBC # FLD: 14.2 % — SIGNIFICANT CHANGE UP (ref 10.3–14.5)
SODIUM SERPL-SCNC: 143 MMOL/L — SIGNIFICANT CHANGE UP (ref 135–145)
TROPONIN I, HIGH SENSITIVITY RESULT: 7.54 NG/L — SIGNIFICANT CHANGE UP
WBC # BLD: 10.19 K/UL — SIGNIFICANT CHANGE UP (ref 3.8–10.5)
WBC # FLD AUTO: 10.19 K/UL — SIGNIFICANT CHANGE UP (ref 3.8–10.5)

## 2021-10-28 PROCEDURE — 80053 COMPREHEN METABOLIC PANEL: CPT

## 2021-10-28 PROCEDURE — 36415 COLL VENOUS BLD VENIPUNCTURE: CPT

## 2021-10-28 PROCEDURE — 72125 CT NECK SPINE W/O DYE: CPT | Mod: 26,MA

## 2021-10-28 PROCEDURE — 86901 BLOOD TYPING SEROLOGIC RH(D): CPT

## 2021-10-28 PROCEDURE — 86850 RBC ANTIBODY SCREEN: CPT

## 2021-10-28 PROCEDURE — U0005: CPT

## 2021-10-28 PROCEDURE — 72170 X-RAY EXAM OF PELVIS: CPT

## 2021-10-28 PROCEDURE — 72125 CT NECK SPINE W/O DYE: CPT | Mod: MA

## 2021-10-28 PROCEDURE — 85610 PROTHROMBIN TIME: CPT

## 2021-10-28 PROCEDURE — 76376 3D RENDER W/INTRP POSTPROCES: CPT | Mod: 26

## 2021-10-28 PROCEDURE — 85730 THROMBOPLASTIN TIME PARTIAL: CPT

## 2021-10-28 PROCEDURE — 70486 CT MAXILLOFACIAL W/O DYE: CPT | Mod: MA

## 2021-10-28 PROCEDURE — 71045 X-RAY EXAM CHEST 1 VIEW: CPT

## 2021-10-28 PROCEDURE — 82962 GLUCOSE BLOOD TEST: CPT

## 2021-10-28 PROCEDURE — 85025 COMPLETE CBC W/AUTO DIFF WBC: CPT

## 2021-10-28 PROCEDURE — 99284 EMERGENCY DEPT VISIT MOD MDM: CPT

## 2021-10-28 PROCEDURE — U0003: CPT

## 2021-10-28 PROCEDURE — 99283 EMERGENCY DEPT VISIT LOW MDM: CPT

## 2021-10-28 PROCEDURE — 83735 ASSAY OF MAGNESIUM: CPT

## 2021-10-28 PROCEDURE — 84484 ASSAY OF TROPONIN QUANT: CPT

## 2021-10-28 PROCEDURE — 97116 GAIT TRAINING THERAPY: CPT | Mod: GP

## 2021-10-28 PROCEDURE — 72170 X-RAY EXAM OF PELVIS: CPT | Mod: 26

## 2021-10-28 PROCEDURE — 76376 3D RENDER W/INTRP POSTPROCES: CPT

## 2021-10-28 PROCEDURE — 86900 BLOOD TYPING SEROLOGIC ABO: CPT

## 2021-10-28 PROCEDURE — 70450 CT HEAD/BRAIN W/O DYE: CPT | Mod: MA

## 2021-10-28 PROCEDURE — 97162 PT EVAL MOD COMPLEX 30 MIN: CPT | Mod: GP

## 2021-10-28 PROCEDURE — 70450 CT HEAD/BRAIN W/O DYE: CPT | Mod: 26,MA

## 2021-10-28 PROCEDURE — 71045 X-RAY EXAM CHEST 1 VIEW: CPT | Mod: 26

## 2021-10-28 PROCEDURE — 99284 EMERGENCY DEPT VISIT MOD MDM: CPT | Mod: 25

## 2021-10-28 PROCEDURE — 70486 CT MAXILLOFACIAL W/O DYE: CPT | Mod: 26,MA

## 2021-10-28 RX ORDER — ACETAMINOPHEN 500 MG
650 TABLET ORAL ONCE
Refills: 0 | Status: COMPLETED | OUTPATIENT
Start: 2021-10-28 | End: 2021-10-28

## 2021-10-28 RX ADMIN — Medication 650 MILLIGRAM(S): at 06:45

## 2021-10-28 NOTE — PHYSICAL THERAPY INITIAL EVALUATION ADULT - PERTINENT HX OF CURRENT PROBLEM, REHAB EVAL
unwitnessed fall at [facility] with head/facial injuries, +abrasion to upper lip no

## 2021-10-28 NOTE — ED PROVIDER NOTE - PROGRESS NOTE DETAILS
pt signed out me pending reassess after imagining. ct negative. labs unremarkable. pt having difficulty ambulating. sw involved speaking with son requesting PT consult. Manjit Lowery M.D., Attending Physician spoke with son who states  shunt needs settings adjusted prior to going to [facility]. spoke with neurosurgery will see patient shortly. pt signed out to Dr. Juárez. Manjit Lowery M.D., Attending Physician KV: signed out pending nsx evaluation for possible  shunt adjustment and discharge to [facility]. KV:  shunt adjusted by Neurosurgery, will discharge to [facility] as planned.

## 2021-10-28 NOTE — CHART NOTE - NSCHARTNOTEFT_GEN_A_CORE
Chart reviewed. Met with patient at bedside. Advanced dementia. Unable to have a conversation. Called son Josh Braxton. He stated that he wants his mother admitted to the hospital if she is not at her baseline. She lives at [facility] in [place]. She just returned there 2 days ago after being at [facility] since mid September. Bella De Leon spoke to wellness dept. at [facility]. They will take her back, but family will have to hire an aide. I called [facility]. They would be willing to take her back if she qualifies. I called PT dept and left a message asking for a stat PT consult. I prepared a KEVIN and attached available documents. Waiting for PT consult in order to proceed.

## 2021-10-28 NOTE — PHYSICAL THERAPY INITIAL EVALUATION ADULT - MODALITIES TREATMENT COMMENTS
Pt out of bed and ambulated in hallway with 1PA/RW to hallway bathroom,toileted in BR with min/modAx1 on/off toilet and for gabriel-care,soiled diaper removed, new diaper applied; soiled clothing bagged ; sister arrived to visit, and reports she will be going to [facility] for winter in ~ 2 weeks ,sat up out of bed in chair 10 mins while bed refreshed ,then returned to bed with modAx1 ,rails up ,CBIR

## 2021-10-28 NOTE — PHYSICAL THERAPY INITIAL EVALUATION ADULT - IMPAIRMENTS FOUND, PT EVAL
arousal, attention, and cognition/cognitive impairment/gait, locomotion, and balance/muscle strength/poor safety awareness/posture/tone

## 2021-10-28 NOTE — PHYSICAL THERAPY INITIAL EVALUATION ADULT - PLANNED THERAPY INTERVENTIONS, PT EVAL
home safety/fall prevention/balance training/bed mobility training/gait training/postural re-education/ROM/strengthening/stretching/transfer training

## 2021-10-28 NOTE — PHYSICAL THERAPY INITIAL EVALUATION ADULT - RANGE OF MOTION EXAMINATION, REHAB EVAL
AROM B shoulders  mod limited overhead reach ,/= 90 degrees ,upper back posture mildly hunched  which may functionally reduce overhead reach/deficits as listed below

## 2021-10-28 NOTE — ED ADULT TRIAGE NOTE - CHIEF COMPLAINT QUOTE
Pt BIBEMS s/p unwitnessed fall from [facility].  As per EMS pt was found on ground.  H/O of alzheimer's.  Pt with bloody swollen lip.   Dried blood noted on mouth and face.  Pt denies complaints or pain.  Trauma alert called.  MD Horowitz aware.  No known blood thinners noted in chart.

## 2021-10-28 NOTE — ED ADULT NURSE NOTE - OBJECTIVE STATEMENT
Pt. is a 71YOF BIBEMS s/p unwitnessed fall from [facility].  As per EMS pt was found on ground.  H/O of alzheimer's.  Pt with bloody swollen lip.   Dried blood noted on mouth and face.  Pt denies complaints or pain.  Trauma alert called.  MD Horowitz aware.  No known blood thinners noted in chart.

## 2021-10-28 NOTE — CONSULT NOTE ADULT - SUBJECTIVE AND OBJECTIVE BOX
Pt is a 71 year old demented woman known to our service for prior SDH following a fall.  She presented to the ED today after a fall at her assisted living facility  A CT of the head was done which showed resolution of previous SDH.   Neurosurgery was consulted b/c the patient's son wanted her  Shunt re-set to its previous setting.   As per the son, Josh ([phone]) the pt saw Dr. Marcano as an outpatient and her shunt was turned "off"  and he wanted it re-set prior to her being discharged from the ED.     Codman Certas was interrogated it was set at 8 and was then re-set to 7.   Pt denies headache, she was resting comfortably in bed.     Plan is for d/c to [facility] and followup with Dr. Edmonds in the office.     Vital Signs Last 24 Hrs  T(C): 36.5 (28 Oct 2021 10:43), Max: 36.6 (28 Oct 2021 07:27)  T(F): 97.7 (28 Oct 2021 10:43), Max: 97.9 (28 Oct 2021 07:27)  HR: 61 (28 Oct 2021 10:43) (61 - 68)  BP: 113/88 (28 Oct 2021 10:43) (113/88 - 143/64)  RR: 18 (28 Oct 2021 10:43) (18 - 18)  SpO2: 96% (28 Oct 2021 10:43) (96% - 97%)    < from: CT Head No Cont (10.28.21 @ 07:18) >  IMPRESSION:    No evidence of skull fracture, intracranial hemorrhage or mass effect.    Previously seen left frontoparietal mixed density subdural hematoma has resolved.    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    There is no acute facial bone fracture.    Chronic facial fractures involving the right antrum, floor of right orbit, zygomatic processes and right mandible as described.    There is no cervical spine fracture.

## 2021-10-28 NOTE — PHYSICAL THERAPY INITIAL EVALUATION ADULT - LEVEL OF INDEPENDENCE: STAND/SIT, REHAB EVAL
slow to assume sitting ,needed heavy cueing to lower to chair,toilet etc,/minimum assist (75% patients effort)/moderate assist (50% patients effort)

## 2021-10-28 NOTE — CHART NOTE - NSCHARTNOTEFT_GEN_A_CORE
Son would like patient to go to [facility] rehab. I arranged for transport- it's delayed until 9 pm. In the meantime, he spoke to Dr Lowery who ordered neurosurgery consult. Son does not want patient to be DC until cleared by dr Williamson.

## 2021-10-28 NOTE — PHYSICAL THERAPY INITIAL EVALUATION ADULT - FOLLOWS COMMANDS/ANSWERS QUESTIONS, REHAB EVAL
often needs demonstration/initiation and repetition to clarify single step commands/50% of the time/able to follow single-step instructions

## 2021-10-28 NOTE — ED PROVIDER NOTE - ENMT, MLM
Airway patent, Nasal mucosa clear. Large abrasion at midline of upper lip .Mouth with normal mucosa. Throat has no vesicles, no oropharyngeal exudates and uvula is midline.

## 2021-10-28 NOTE — PHYSICAL THERAPY INITIAL EVALUATION ADULT - ADDITIONAL COMMENTS
pt familiar to me from prior episode of ED care ,previously evaluated in ED 9/11/21 and discharged to [facility] ,completed course of rehab and just returned to [facility] 2 days ago (Please note that at the time of my prior evaluation given patients sever cognitive impairment I had recommended that pt have and aide on return to [facility] for safety or long term SNF placement be considered)

## 2021-10-28 NOTE — PHYSICAL THERAPY INITIAL EVALUATION ADULT - DISCHARGE DISPOSITION, PT EVAL
recommend [facility] vs return to [facility] with 24 hour HHA/home w/ assist/home w/ home PT/rehabilitation facility

## 2021-10-28 NOTE — PHYSICAL THERAPY INITIAL EVALUATION ADULT - DIAGNOSIS, PT EVAL
fall with head/facial injuries ; advanced dementia /gait instability/imbalance ,postural dysfxn ,gait impairment of dementia

## 2021-10-28 NOTE — PHYSICAL THERAPY INITIAL EVALUATION ADULT - PATIENT PROFILE REVIEW, REHAB EVAL
yes sister reports that prior to last fall pt ambulated quickly around the assisted living without device and was in a habit of constantly removing socks /shoes/yes

## 2021-10-28 NOTE — ED ADULT NURSE NOTE - NSIMPLEMENTINTERV_GEN_ALL_ED
Implemented All Fall Risk Interventions:  [place] to call system. Call bell, personal items and telephone within reach. Instruct patient to call for assistance. Room bathroom lighting operational. Non-slip footwear when patient is off stretcher. Physically safe environment: no spills, clutter or unnecessary equipment. Stretcher in lowest position, wheels locked, appropriate side rails in place. Provide visual cue, wrist band, yellow gown, etc. Monitor gait and stability. Monitor for mental status changes and reorient to person, place, and time. Review medications for side effects contributing to fall risk. Reinforce activity limits and safety measures with patient and family.

## 2021-10-28 NOTE — PHYSICAL THERAPY INITIAL EVALUATION ADULT - GENERAL OBSERVATIONS, REHAB EVAL
resting supine  on stretcher awake confused, cooperative ,very forgetful ,fidgeting at abdomen both hands with gown material,wires to HM ,+ dried blood caked to lower/underside of upper lip ,small horizontal defect oozing blood at central upper lip ,wearing diaper but saturated with  urine soiling clothing Ox1,responds to name

## 2021-10-28 NOTE — ED PROVIDER NOTE - PATIENT PORTAL LINK FT
You can access the FollowMyHealth Patient Portal offered by [facility] by registering at the following website: [email or web address]. By joining [employer]’s FollowMyHealth portal, you will also be able to view your health information using other applications (apps) compatible with our system.

## 2021-10-28 NOTE — ED PROVIDER NOTE - OBJECTIVE STATEMENT
Pt is a 70 yo F with a hx of dementia, HTN, depression, dermatomyositis, NPH with  shunt, seizures presents BIB ambulance s/p unwitnessed fall at [facility] assisted living.  Patient had head and facial injury.  Patient with abrasion on upper lip.  No other complaints or injuries.  Patient is a poor historian.  Patient does not take anticoagulation.

## 2021-11-04 ENCOUNTER — APPOINTMENT (OUTPATIENT)
Dept: NEUROSURGERY | Facility: CLINIC | Age: 71
End: 2021-11-04

## 2021-11-24 NOTE — ED PROVIDER NOTE - IV ALTEPLASE ADMIN OUTSIDE HIDDEN
I have reviewed the chart of English Manju Lara and I have interviewed and examined the patient. I agree with the PA's note, assessment and treatment plan.    Brief Physical: Reproducible chest wall tenderness. No abdominal tenderness to palpation.     Vitals  Vitals:    11/23/21 2342 11/24/21 0430   BP: (!) 139/95 (!) 133/96   BP Location: LUE - Left upper extremity    Patient Position: Sitting    Pulse: (!) 101    Resp: 16    Temp: 98.4 °F (36.9 °C)    TempSrc: Oral    SpO2: 100% 99%   Weight: 73.9 kg (163 lb)        Results for orders placed or performed during the hospital encounter of 11/24/21   ECG   Result Value Ref Range    Ventricular Rate EKG/Min (BPM) 96     Atrial Rate (BPM) 96     NM-Interval (MSEC) 148     QRS-Interval (MSEC) 70     QT-Interval (MSEC) 342     QTc 432     P Axis (Degrees) 66     R Axis (Degrees) 63     T Axis (Degrees) 8     REPORT TEXT       Normal sinus rhythm  Normal ECG  No previous ECGs available  Confirmed by BARB RICHARDS MD ([id number]),  Jt Tang ([id number]) on 11/24/2021 5:24:50 AM         ED Course  5:10 AM I introduced myself to the patient and updated her on there results of her lab work up and her X-ray chest. We discussed the plan for symptomatic management and follow up with her PCP. The patient understands and agrees with the plan. Any questions were answered.    [facility]  Critical Care time spent on this patient outside of billable procedures:  None    Clinical Impression  ED Diagnosis        Final diagnosis    Chest wall pain                Follow Up:  Call your primary care provider to be seen for ER follow up          [facility]  [address]  [facility] [id number]    Call [phone] to establish care with primary care provider if needed          Summary of your Discharge Medications      You have not been prescribed any medications.         Pt is discharged to home/self care in stable condition.        I have reviewed the information recorded by the scribe for accuracy and agree with its contents.    ____________________________________________________________________    Jt Tang acting as a scribe for Dr. Samy Amador  Dictation # [id number]  Scribe: Jt Amador MD  11/24/21 0709     show

## 2021-12-02 RX ORDER — LORATADINE 10 MG/1
1 TABLET ORAL
Qty: 30 | Refills: 0
Start: 2021-12-02 | End: 2021-12-31

## 2021-12-02 RX ORDER — LEVETIRACETAM 250 MG/1
1 TABLET, FILM COATED ORAL
Qty: 60 | Refills: 0
Start: 2021-12-02 | End: 2021-12-31

## 2021-12-02 RX ORDER — LOSARTAN POTASSIUM 100 MG/1
1 TABLET, FILM COATED ORAL
Qty: 30 | Refills: 0
Start: 2021-12-02 | End: 2021-12-31

## 2021-12-02 RX ORDER — POTASSIUM CHLORIDE 20 MEQ
20 PACKET (EA) ORAL
Qty: 30 | Refills: 0
Start: 2021-12-02 | End: 2021-12-31

## 2021-12-02 RX ORDER — ATORVASTATIN CALCIUM 80 MG/1
1 TABLET, FILM COATED ORAL
Qty: 30 | Refills: 0
Start: 2021-12-02 | End: 2021-12-31

## 2021-12-02 RX ORDER — ESCITALOPRAM OXALATE 10 MG/1
1 TABLET, FILM COATED ORAL
Qty: 30 | Refills: 0
Start: 2021-12-02 | End: 2021-12-31

## 2021-12-02 RX ORDER — ERGOCALCIFEROL 1.25 MG/1
1 CAPSULE ORAL
Qty: 0 | Refills: 0 | DISCHARGE

## 2021-12-02 RX ORDER — FUROSEMIDE 40 MG
1 TABLET ORAL
Qty: 30 | Refills: 0
Start: 2021-12-02 | End: 2021-12-31

## 2021-12-02 RX ORDER — ERGOCALCIFEROL 1.25 MG/1
1 CAPSULE ORAL
Qty: 4 | Refills: 0
Start: 2021-12-02 | End: 2021-12-31

## 2021-12-02 RX ORDER — SENNA PLUS 8.6 MG/1
2 TABLET ORAL
Qty: 20 | Refills: 0
Start: 2021-12-02 | End: 2021-12-11

## 2021-12-02 RX ORDER — POLYETHYLENE GLYCOL 3350 17 G/17G
17 POWDER, FOR SOLUTION ORAL
Qty: 119 | Refills: 0
Start: 2021-12-02 | End: 2021-12-08

## 2021-12-02 RX ORDER — METOPROLOL TARTRATE 50 MG
1 TABLET ORAL
Qty: 30 | Refills: 0
Start: 2021-12-02 | End: 2021-12-31

## 2021-12-02 RX ORDER — POTASSIUM CHLORIDE 20 MEQ
1 PACKET (EA) ORAL
Qty: 0 | Refills: 0 | DISCHARGE
Start: 2021-12-02 | End: 2021-12-31

## 2022-03-01 ENCOUNTER — EMERGENCY (EMERGENCY)
Facility: HOSPITAL | Age: 72
LOS: 0 days | Discharge: HOME | End: 2022-03-01
Attending: EMERGENCY MEDICINE
Payer: MEDICARE

## 2022-03-01 VITALS
RESPIRATION RATE: 17 BRPM | HEART RATE: 65 BPM | SYSTOLIC BLOOD PRESSURE: 115 MMHG | TEMPERATURE: 98 F | OXYGEN SATURATION: 98 % | DIASTOLIC BLOOD PRESSURE: 60 MMHG

## 2022-03-01 VITALS
RESPIRATION RATE: 18 BRPM | TEMPERATURE: 98 F | WEIGHT: 149.91 LBS | SYSTOLIC BLOOD PRESSURE: 110 MMHG | OXYGEN SATURATION: 98 % | HEART RATE: 62 BPM | HEIGHT: 62 IN | DIASTOLIC BLOOD PRESSURE: 60 MMHG

## 2022-03-01 DIAGNOSIS — Z20.822 CONTACT WITH AND (SUSPECTED) EXPOSURE TO COVID-19: ICD-10-CM

## 2022-03-01 DIAGNOSIS — N39.0 URINARY TRACT INFECTION, SITE NOT SPECIFIED: ICD-10-CM

## 2022-03-01 DIAGNOSIS — R56.9 UNSPECIFIED CONVULSIONS: ICD-10-CM

## 2022-03-01 DIAGNOSIS — Z98.2 PRESENCE OF CEREBROSPINAL FLUID DRAINAGE DEVICE: Chronic | ICD-10-CM

## 2022-03-01 DIAGNOSIS — F01.50 VASCULAR DEMENTIA WITHOUT BEHAVIORAL DISTURBANCE: ICD-10-CM
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
## 2022-03-01 DIAGNOSIS — S00.93XA CONTUSION OF UNSPECIFIED PART OF HEAD, INITIAL ENCOUNTER: ICD-10-CM

## 2022-03-01 DIAGNOSIS — F32.A DEPRESSION, UNSPECIFIED: ICD-10-CM
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
## 2022-03-01 DIAGNOSIS — I10 ESSENTIAL (PRIMARY) HYPERTENSION: ICD-10-CM

## 2022-03-01 DIAGNOSIS — E78.5 HYPERLIPIDEMIA, UNSPECIFIED: ICD-10-CM

## 2022-03-01 LAB
ALBUMIN SERPL ELPH-MCNC: 3.6 G/DL — SIGNIFICANT CHANGE UP (ref 3.3–5)
ALP SERPL-CCNC: 75 U/L — SIGNIFICANT CHANGE UP (ref 40–120)
ALT FLD-CCNC: 21 U/L — SIGNIFICANT CHANGE UP (ref 12–78)
ANION GAP SERPL CALC-SCNC: 3 MMOL/L — LOW (ref 5–17)
APPEARANCE UR: ABNORMAL
AST SERPL-CCNC: 18 U/L — SIGNIFICANT CHANGE UP (ref 15–37)
BASOPHILS # BLD AUTO: 0.04 K/UL — SIGNIFICANT CHANGE UP (ref 0–0.2)
BASOPHILS NFR BLD AUTO: 0.6 % — SIGNIFICANT CHANGE UP (ref 0–2)
BILIRUB SERPL-MCNC: 0.4 MG/DL — SIGNIFICANT CHANGE UP (ref 0.2–1.2)
BILIRUB UR-MCNC: NEGATIVE — SIGNIFICANT CHANGE UP
BUN SERPL-MCNC: 24 MG/DL — HIGH (ref 7–23)
CALCIUM SERPL-MCNC: 9.3 MG/DL — SIGNIFICANT CHANGE UP (ref 8.5–10.1)
CHLORIDE SERPL-SCNC: 114 MMOL/L — HIGH (ref 96–108)
CK SERPL-CCNC: 162 U/L — SIGNIFICANT CHANGE UP (ref 26–192)
CO2 SERPL-SCNC: 29 MMOL/L — SIGNIFICANT CHANGE UP (ref 22–31)
COLOR SPEC: ABNORMAL
CREAT SERPL-MCNC: 0.68 MG/DL — SIGNIFICANT CHANGE UP (ref 0.5–1.3)
DIFF PNL FLD: NEGATIVE — SIGNIFICANT CHANGE UP
EGFR: 92 ML/MIN/1.73M2 — SIGNIFICANT CHANGE UP
EOSINOPHIL # BLD AUTO: 0.11 K/UL — SIGNIFICANT CHANGE UP (ref 0–0.5)
EOSINOPHIL NFR BLD AUTO: 1.6 % — SIGNIFICANT CHANGE UP (ref 0–6)
GLUCOSE SERPL-MCNC: 87 MG/DL — SIGNIFICANT CHANGE UP (ref 70–99)
GLUCOSE UR QL: NEGATIVE — SIGNIFICANT CHANGE UP
HCT VFR BLD CALC: 38.6 % — SIGNIFICANT CHANGE UP (ref 34.5–45)
HGB BLD-MCNC: 12.6 G/DL — SIGNIFICANT CHANGE UP (ref 11.5–15.5)
IMM GRANULOCYTES NFR BLD AUTO: 0.1 % — SIGNIFICANT CHANGE UP (ref 0–1.5)
KETONES UR-MCNC: ABNORMAL
LEUKOCYTE ESTERASE UR-ACNC: ABNORMAL
LYMPHOCYTES # BLD AUTO: 1.8 K/UL — SIGNIFICANT CHANGE UP (ref 1–3.3)
LYMPHOCYTES # BLD AUTO: 25.8 % — SIGNIFICANT CHANGE UP (ref 13–44)
MAGNESIUM SERPL-MCNC: 2.4 MG/DL — SIGNIFICANT CHANGE UP (ref 1.6–2.6)
MCHC RBC-ENTMCNC: 32.6 GM/DL — SIGNIFICANT CHANGE UP (ref 32–36)
MCHC RBC-ENTMCNC: 32.6 PG — SIGNIFICANT CHANGE UP (ref 27–34)
MCV RBC AUTO: 99.7 FL — SIGNIFICANT CHANGE UP (ref 80–100)
MONOCYTES # BLD AUTO: 0.41 K/UL — SIGNIFICANT CHANGE UP (ref 0–0.9)
MONOCYTES NFR BLD AUTO: 5.9 % — SIGNIFICANT CHANGE UP (ref 2–14)
NEUTROPHILS # BLD AUTO: 4.62 K/UL — SIGNIFICANT CHANGE UP (ref 1.8–7.4)
NEUTROPHILS NFR BLD AUTO: 66 % — SIGNIFICANT CHANGE UP (ref 43–77)
NITRITE UR-MCNC: POSITIVE
PH UR: 5 — SIGNIFICANT CHANGE UP (ref 5–8)
PLATELET # BLD AUTO: 163 K/UL — SIGNIFICANT CHANGE UP (ref 150–400)
POTASSIUM SERPL-MCNC: 4 MMOL/L — SIGNIFICANT CHANGE UP (ref 3.5–5.3)
POTASSIUM SERPL-SCNC: 4 MMOL/L — SIGNIFICANT CHANGE UP (ref 3.5–5.3)
PROT SERPL-MCNC: 7.7 GM/DL — SIGNIFICANT CHANGE UP (ref 6–8.3)
PROT UR-MCNC: 30 MG/DL
RBC # BLD: 3.87 M/UL — SIGNIFICANT CHANGE UP (ref 3.8–5.2)
RBC # FLD: 13.4 % — SIGNIFICANT CHANGE UP (ref 10.3–14.5)
SODIUM SERPL-SCNC: 146 MMOL/L — HIGH (ref 135–145)
SP GR SPEC: 1.02 — SIGNIFICANT CHANGE UP (ref 1.01–1.02)
TROPONIN I, HIGH SENSITIVITY RESULT: 5.07 NG/L — SIGNIFICANT CHANGE UP
UROBILINOGEN FLD QL: NEGATIVE — SIGNIFICANT CHANGE UP
WBC # BLD: 6.99 K/UL — SIGNIFICANT CHANGE UP (ref 3.8–10.5)
WBC # FLD AUTO: 6.99 K/UL — SIGNIFICANT CHANGE UP (ref 3.8–10.5)

## 2022-03-01 PROCEDURE — 99285 EMERGENCY DEPT VISIT HI MDM: CPT | Mod: 25

## 2022-03-01 PROCEDURE — 36415 COLL VENOUS BLD VENIPUNCTURE: CPT

## 2022-03-01 PROCEDURE — 93010 ELECTROCARDIOGRAM REPORT: CPT

## 2022-03-01 PROCEDURE — 85025 COMPLETE CBC W/AUTO DIFF WBC: CPT

## 2022-03-01 PROCEDURE — 70450 CT HEAD/BRAIN W/O DYE: CPT | Mod: 26,MA

## 2022-03-01 PROCEDURE — 0225U NFCT DS DNA&RNA 21 SARSCOV2: CPT

## 2022-03-01 PROCEDURE — 87086 URINE CULTURE/COLONY COUNT: CPT

## 2022-03-01 PROCEDURE — 84484 ASSAY OF TROPONIN QUANT: CPT

## 2022-03-01 PROCEDURE — 72125 CT NECK SPINE W/O DYE: CPT | Mod: 26,MA

## 2022-03-01 PROCEDURE — 93005 ELECTROCARDIOGRAM TRACING: CPT

## 2022-03-01 PROCEDURE — 70450 CT HEAD/BRAIN W/O DYE: CPT | Mod: MA

## 2022-03-01 PROCEDURE — 72125 CT NECK SPINE W/O DYE: CPT | Mod: MA

## 2022-03-01 PROCEDURE — 81001 URINALYSIS AUTO W/SCOPE: CPT

## 2022-03-01 PROCEDURE — 82550 ASSAY OF CK (CPK): CPT

## 2022-03-01 PROCEDURE — 83735 ASSAY OF MAGNESIUM: CPT

## 2022-03-01 PROCEDURE — 80053 COMPREHEN METABOLIC PANEL: CPT

## 2022-03-01 PROCEDURE — 71045 X-RAY EXAM CHEST 1 VIEW: CPT | Mod: 26

## 2022-03-01 PROCEDURE — 71045 X-RAY EXAM CHEST 1 VIEW: CPT

## 2022-03-01 PROCEDURE — 99285 EMERGENCY DEPT VISIT HI MDM: CPT

## 2022-03-01 RX ORDER — CEPHALEXIN 500 MG
500 CAPSULE ORAL ONCE
Refills: 0 | Status: COMPLETED | OUTPATIENT
Start: 2022-03-01 | End: 2022-03-01

## 2022-03-01 RX ORDER — CEPHALEXIN 500 MG
1 CAPSULE ORAL
Qty: 20 | Refills: 0
Start: 2022-03-01 | End: 2022-03-10

## 2022-03-01 RX ADMIN — Medication 500 MILLIGRAM(S): at 10:45

## 2022-03-01 NOTE — ED PROVIDER NOTE - PATIENT PORTAL LINK FT
You can access the FollowMyHealth Patient Portal offered by [facility] by registering at the following website: [email or web address]. By joining [employer]’s FollowMyHealth portal, you will also be able to view your health information using other applications (apps) compatible with our system.

## 2022-03-01 NOTE — ED ADULT NURSE NOTE - OBJECTIVE STATEMENT
pt arrives to ED from nursing home s/p unwitnessed fall. pt found on floor next to bed face down. unknown LOC. no daily blood thinners. pt at baseline mental status upon arrival to ED. no signs of trauma.

## 2022-03-01 NOTE — ED ADULT NURSE NOTE - NSIMPLEMENTINTERV_GEN_ALL_ED
Implemented All Fall with Harm Risk Interventions:  [place] to call system. Call bell, personal items and telephone within reach. Instruct patient to call for assistance. Room bathroom lighting operational. Non-slip footwear when patient is off stretcher. Physically safe environment: no spills, clutter or unnecessary equipment. Stretcher in lowest position, wheels locked, appropriate side rails in place. Provide visual cue, wrist band, yellow gown, etc. Monitor gait and stability. Monitor for mental status changes and reorient to person, place, and time. Review medications for side effects contributing to fall risk. Reinforce activity limits and safety measures with patient and family. Provide visual clues: red socks.

## 2022-03-01 NOTE — ED PROVIDER NOTE - OBJECTIVE STATEMENT
70 yo F with a hx of dementia, HTN, depression, dermatomyositis, NPH with  shunt, seizures presents BIB ambulance s/p unwitnessed fall at assisted living found on her face.  Pt is AAO x 1 at baseline and unable to contribute to the HPI or ROS

## 2022-03-01 NOTE — ED ADULT TRIAGE NOTE - CHIEF COMPLAINT QUOTE
pt presents to ED brought in by ambulance from [facility] due to unwitnessed fall pt with hx of dementia poor historian no complaints no blood thinners pt found face down by staff

## 2022-03-01 NOTE — ED PROVIDER NOTE - ENMT, MLM
Airway patent, Nasal mucosa clear. Mouth with dry mucosa. Throat has no vesicles, no oropharyngeal exudates and uvula is midline.  ecchymosis and abrasion right frontal scalp

## 2022-03-03 LAB
CULTURE RESULTS: SIGNIFICANT CHANGE UP
SPECIMEN SOURCE: SIGNIFICANT CHANGE UP

## 2022-03-04 NOTE — ED POST DISCHARGE NOTE - DETAILS
LMTCB on [facility] Voicemail, called pt number no answer, pt has to repeat U Culture initial shows contamination. GOPAL SIMON son (Josh) aware pt urine culture contaminated he will have the urine culture repeated he states. GOPAL SIMON

## 2022-03-20 ENCOUNTER — EMERGENCY (EMERGENCY)
Facility: HOSPITAL | Age: 72
LOS: 0 days | Discharge: HOME | End: 2022-03-21
Attending: EMERGENCY MEDICINE
Payer: MEDICARE

## 2022-03-20 VITALS
HEART RATE: 63 BPM | RESPIRATION RATE: 16 BRPM | DIASTOLIC BLOOD PRESSURE: 53 MMHG | OXYGEN SATURATION: 100 % | WEIGHT: 110.01 LBS | SYSTOLIC BLOOD PRESSURE: 121 MMHG | HEIGHT: 62 IN | TEMPERATURE: 98 F
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
## 2022-03-20 DIAGNOSIS — E78.5 HYPERLIPIDEMIA, UNSPECIFIED: ICD-10-CM

## 2022-03-20 DIAGNOSIS — Z20.822 CONTACT WITH AND (SUSPECTED) EXPOSURE TO COVID-19: ICD-10-CM

## 2022-03-20 DIAGNOSIS — F03.90 UNSPECIFIED DEMENTIA WITHOUT BEHAVIORAL DISTURBANCE: ICD-10-CM

## 2022-03-20 DIAGNOSIS — S00.83XA CONTUSION OF OTHER PART OF HEAD, INITIAL ENCOUNTER: ICD-10-CM

## 2022-03-20 DIAGNOSIS — Z98.2 PRESENCE OF CEREBROSPINAL FLUID DRAINAGE DEVICE: Chronic | ICD-10-CM
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
## 2022-03-20 DIAGNOSIS — I10 ESSENTIAL (PRIMARY) HYPERTENSION: ICD-10-CM

## 2022-03-20 DIAGNOSIS — R56.9 UNSPECIFIED CONVULSIONS: ICD-10-CM

## 2022-03-20 PROCEDURE — 70450 CT HEAD/BRAIN W/O DYE: CPT | Mod: MA

## 2022-03-20 PROCEDURE — U0003: CPT

## 2022-03-20 PROCEDURE — 80053 COMPREHEN METABOLIC PANEL: CPT

## 2022-03-20 PROCEDURE — 85025 COMPLETE CBC W/AUTO DIFF WBC: CPT

## 2022-03-20 PROCEDURE — 72125 CT NECK SPINE W/O DYE: CPT | Mod: MA

## 2022-03-20 PROCEDURE — 93005 ELECTROCARDIOGRAM TRACING: CPT

## 2022-03-20 PROCEDURE — 99285 EMERGENCY DEPT VISIT HI MDM: CPT | Mod: 25

## 2022-03-20 PROCEDURE — 84484 ASSAY OF TROPONIN QUANT: CPT

## 2022-03-20 PROCEDURE — 99284 EMERGENCY DEPT VISIT MOD MDM: CPT

## 2022-03-20 PROCEDURE — 76376 3D RENDER W/INTRP POSTPROCES: CPT

## 2022-03-20 PROCEDURE — U0005: CPT

## 2022-03-20 PROCEDURE — 70486 CT MAXILLOFACIAL W/O DYE: CPT | Mod: MA

## 2022-03-20 PROCEDURE — 36415 COLL VENOUS BLD VENIPUNCTURE: CPT

## 2022-03-20 NOTE — ED ADULT TRIAGE NOTE - NSWEIGHTCALCTOOLDRUG_GEN_A_CORE
Pt unable to put legs straight for CT so CT brought pt back to room w/out completing scan. Pt family member upset, states that she is willing to go to CT and wear lead apron and talk him through it. MD resident Buzz made aware; will speak w/ MD Peabody for any additional recommendations prior to attempting CT again.    used

## 2022-03-20 NOTE — ED ADULT TRIAGE NOTE - CHIEF COMPLAINT QUOTE
pt BIBA s/p fall at NH. pt with hx of dementia and acting at baseline as per EMS. pt denies complaints at this time. bruise noted to left face. Dr. MCLAUGHLIN evaluated pt at triage, neuro alert called

## 2022-03-21 VITALS
TEMPERATURE: 98 F | SYSTOLIC BLOOD PRESSURE: 125 MMHG | RESPIRATION RATE: 17 BRPM | DIASTOLIC BLOOD PRESSURE: 62 MMHG | OXYGEN SATURATION: 100 % | HEART RATE: 59 BPM

## 2022-03-21 LAB
ALBUMIN SERPL ELPH-MCNC: 3.4 G/DL — SIGNIFICANT CHANGE UP (ref 3.3–5)
ALP SERPL-CCNC: 74 U/L — SIGNIFICANT CHANGE UP (ref 40–120)
ALT FLD-CCNC: 27 U/L — SIGNIFICANT CHANGE UP (ref 12–78)
ANION GAP SERPL CALC-SCNC: 5 MMOL/L — SIGNIFICANT CHANGE UP (ref 5–17)
AST SERPL-CCNC: 27 U/L — SIGNIFICANT CHANGE UP (ref 15–37)
BASOPHILS # BLD AUTO: 0.04 K/UL — SIGNIFICANT CHANGE UP (ref 0–0.2)
BASOPHILS NFR BLD AUTO: 0.6 % — SIGNIFICANT CHANGE UP (ref 0–2)
BILIRUB SERPL-MCNC: 0.3 MG/DL — SIGNIFICANT CHANGE UP (ref 0.2–1.2)
BUN SERPL-MCNC: 17 MG/DL — SIGNIFICANT CHANGE UP (ref 7–23)
CALCIUM SERPL-MCNC: 8.8 MG/DL — SIGNIFICANT CHANGE UP (ref 8.5–10.1)
CHLORIDE SERPL-SCNC: 114 MMOL/L — HIGH (ref 96–108)
CO2 SERPL-SCNC: 28 MMOL/L — SIGNIFICANT CHANGE UP (ref 22–31)
CREAT SERPL-MCNC: 0.87 MG/DL — SIGNIFICANT CHANGE UP (ref 0.5–1.3)
EGFR: 71 ML/MIN/1.73M2 — SIGNIFICANT CHANGE UP
EOSINOPHIL # BLD AUTO: 0.19 K/UL — SIGNIFICANT CHANGE UP (ref 0–0.5)
EOSINOPHIL NFR BLD AUTO: 2.7 % — SIGNIFICANT CHANGE UP (ref 0–6)
GLUCOSE SERPL-MCNC: 104 MG/DL — HIGH (ref 70–99)
HCT VFR BLD CALC: 36.2 % — SIGNIFICANT CHANGE UP (ref 34.5–45)
HGB BLD-MCNC: 11.9 G/DL — SIGNIFICANT CHANGE UP (ref 11.5–15.5)
IMM GRANULOCYTES NFR BLD AUTO: 0.3 % — SIGNIFICANT CHANGE UP (ref 0–1.5)
LYMPHOCYTES # BLD AUTO: 2.16 K/UL — SIGNIFICANT CHANGE UP (ref 1–3.3)
LYMPHOCYTES # BLD AUTO: 30.3 % — SIGNIFICANT CHANGE UP (ref 13–44)
MCHC RBC-ENTMCNC: 32.3 PG — SIGNIFICANT CHANGE UP (ref 27–34)
MCHC RBC-ENTMCNC: 32.9 GM/DL — SIGNIFICANT CHANGE UP (ref 32–36)
MCV RBC AUTO: 98.4 FL — SIGNIFICANT CHANGE UP (ref 80–100)
MONOCYTES # BLD AUTO: 0.59 K/UL — SIGNIFICANT CHANGE UP (ref 0–0.9)
MONOCYTES NFR BLD AUTO: 8.3 % — SIGNIFICANT CHANGE UP (ref 2–14)
NEUTROPHILS # BLD AUTO: 4.13 K/UL — SIGNIFICANT CHANGE UP (ref 1.8–7.4)
NEUTROPHILS NFR BLD AUTO: 57.8 % — SIGNIFICANT CHANGE UP (ref 43–77)
PLATELET # BLD AUTO: 182 K/UL — SIGNIFICANT CHANGE UP (ref 150–400)
POTASSIUM SERPL-MCNC: 4.3 MMOL/L — SIGNIFICANT CHANGE UP (ref 3.5–5.3)
POTASSIUM SERPL-SCNC: 4.3 MMOL/L — SIGNIFICANT CHANGE UP (ref 3.5–5.3)
PROT SERPL-MCNC: 7.1 GM/DL — SIGNIFICANT CHANGE UP (ref 6–8.3)
RBC # BLD: 3.68 M/UL — LOW (ref 3.8–5.2)
RBC # FLD: 13 % — SIGNIFICANT CHANGE UP (ref 10.3–14.5)
SODIUM SERPL-SCNC: 147 MMOL/L — HIGH (ref 135–145)
TROPONIN I, HIGH SENSITIVITY RESULT: 5.82 NG/L — SIGNIFICANT CHANGE UP
WBC # BLD: 7.13 K/UL — SIGNIFICANT CHANGE UP (ref 3.8–10.5)
WBC # FLD AUTO: 7.13 K/UL — SIGNIFICANT CHANGE UP (ref 3.8–10.5)

## 2022-03-21 PROCEDURE — 72125 CT NECK SPINE W/O DYE: CPT | Mod: 26,MA

## 2022-03-21 PROCEDURE — 70486 CT MAXILLOFACIAL W/O DYE: CPT | Mod: 26,MA

## 2022-03-21 PROCEDURE — 70450 CT HEAD/BRAIN W/O DYE: CPT | Mod: 26,MA

## 2022-03-21 PROCEDURE — 93010 ELECTROCARDIOGRAM REPORT: CPT

## 2022-03-21 PROCEDURE — 76376 3D RENDER W/INTRP POSTPROCES: CPT | Mod: 26

## 2022-03-21 RX ORDER — ACETAMINOPHEN 500 MG
650 TABLET ORAL ONCE
Refills: 0 | Status: DISCONTINUED | OUTPATIENT
Start: 2022-03-21 | End: 2022-03-21

## 2022-03-21 NOTE — ED PROVIDER NOTE - PHYSICAL EXAMINATION
Gen:  Well appearing in NAD  Head:  NCleft periorbital redness , swelling, superficial abrasion over cheek bone, bibiana ctive bleeding  HEENT: pupils perrl,no pharyngeal erythema, uvula midline  Cardiac: S1S2, RRR  Abd: Soft, non tender  Resp: No distress, CTA   musculoskeletal:: no deformities, no swelling, strength +5/+5  Skin: warm and dry as visualized, no rashes  Neuro: CALLAHAN, aao x 0  Psych:alert, cooperative,

## 2022-03-21 NOTE — ED PROVIDER NOTE - OBJECTIVE STATEMENT
70 yo female with ho dementia from [facility] found on floor with ecchymosis and swelling to left side of face. Pt is not oriented at baseline

## 2022-03-21 NOTE — ED ADULT NURSE NOTE - CAS TRG GEN SKIN COLOR
History of depression   - follow up with psychiatry regarding home meds  Ativan 1mg PRN q6h for anxiety History of depression  - Psychiatry recommends continuing home MAO-I but at 60mg instead of 90mg  - Outpatient psychiatrist contacted and agrees with above plan  - Ativan 1mg PRN Q6H for anxiety  - Melatonin at night as sleep aid Fluids: None  Electrolytes: Replete as needed  Nutrition: Lactose free diet  DVT: SubQ Heparin  Dispo: [facility]  Code: Full Normal for race

## 2022-03-21 NOTE — ED PROVIDER NOTE - NSFOLLOWUPINSTRUCTIONS_ED_ALL_ED_FT
Head Injury, Adult       There are many types of head injuries. They can be as minor as a small bump. Some head injuries can be worse. Worse injuries include:  •A strong hit to the head that shakes the brain back and forth, causing damage (concussion).      •A bruise (contusion) of the brain. This means there is bleeding in the brain that can cause swelling.      •A cracked skull (skull fracture).      •Bleeding in the brain that gathers, gets thick (makes a clot), and forms a bump (hematoma).      Most problems from a head injury come in the first 24 hours. However, you may still have side effects up to 7–10 days after your injury. It is important to watch your condition for any changes. You may need to be watched in the emergency department or urgent care, or you may need to stay in the hospital.      What are the causes?    There are many possible causes of a head injury. A serious head injury may be caused by:  •A car accident.      •Bicycle or motorcycle accidents.      •Sports injuries.      •Falls.      •Being hit by an object.        What are the signs or symptoms?    Symptoms of a head injury include a bruise, bump, or bleeding where the injury happened. Other physical symptoms may include:  •Headache.      •Feeling like you may vomit (nauseous) or vomiting.      •Dizziness.      •Blurred or double vision.      •Being uncomfortable around bright lights or loud noises.      •Shaking movements that you cannot control (seizures).      •Feeling tired.      •Trouble being woken up.      •Fainting or loss of consciousness.      Mental or emotional symptoms may include:  •Feeling grumpy or cranky.      •Confusion and memory problems.      •Having trouble paying attention or concentrating.      •Changes in eating or sleeping habits.      •Feeling worried or nervous (anxious).      •Feeling sad (depressed).        How is this treated?    Treatment for this condition depends on how severe the injury is and the type of injury you have. The main goal is to prevent problems and to allow the brain time to heal.    Mild head injury     If you have a mild head injury, you may be sent home, and treatment may include:  •Being watched. A responsible adult should stay with you for 24 hours after your injury and check on you often.      •Physical rest.      •Brain rest.      •Pain medicines.      Severe head injury    If you have a severe head injury, treatment may include:  •Being watched closely. This includes staying in the hospital.    •Medicines to:  •Help with pain.      •Prevent seizures.      •Help with brain swelling.        •Protecting your airway and using a machine that helps you breathe (ventilator).      •Treatments to watch for and manage swelling inside the brain.    •Brain surgery. This may be needed to:  •Remove a collection of blood or blood clots.      •Stop the bleeding.      •Remove a part of the skull. This allows room for the brain to swell.          Follow these instructions at home:    Activity     •Rest.      •Avoid activities that are hard or tiring.      •Make sure you get enough sleep.    •Let your brain rest. Do this by limiting activities that need a lot of thought or attention, such as:  •Watching TV.      •Playing memory games and puzzles.      •Job-related work or homework.      •Working on the computer, social media, and texting.        •Avoid activities that could cause another head injury until your doctor says it is okay. This includes playing sports. Having another head injury, especially before the first one has healed, can be dangerous.      •Ask your doctor when it is safe for you to go back to your normal activities, such as work or school. Ask your doctor for a step-by-step plan for slowly going back to your normal activities.      •Ask your doctor when you can drive, ride a bicycle, or use heavy machinery. Do not do these activities if you are dizzy.        Lifestyle      • Do not drink alcohol until your doctor says it is okay.      • Do not use drugs.      •If it is harder than usual to remember things, write them down.      •If you are easily distracted, try to do one thing at a time.      •Talk with family members or close friends when making important decisions.      •Tell your friends, family, a trusted co-worker, and  about your injury, symptoms, and limits (restrictions). Have them watch for any problems that are new or getting worse.      General instructions     •Take over-the-counter and prescription medicines only as told by your doctor.      •Have someone stay with you for 24 hours after your head injury. This person should watch you for any changes in your symptoms and be ready to get help.      •Keep all follow-up visits as told by your doctor. This is important.      How is this prevented?     •Work on your balance and strength. This can help you avoid falls.      •Wear a seat belt when you are in a moving vehicle.    •Wear a helmet when you:  •Ride a bicycle.      •Ski.      •Do any other sport or activity that has a risk of injury.      •If you drink alcohol:•Limit how much you use to:  •0–1 drink a day for nonpregnant women.      •0–2 drinks a day for men.        •Be aware of how much alcohol is in your drink. In the [place] one drink equals one 12 oz bottle of beer (355 mL), one 5 oz glass of wine (148 mL), or one 1½ oz glass of hard liquor (44 mL).      •Make your home safer by:  •Getting rid of clutter from the floors and stairs. This includes things that can make you trip.      •Using grab bars in bathrooms and handrails by stairs.      •Placing non-slip mats on floors and in bathtubs.      •Putting more light in dim areas.          Where to find more information    •Centers for Disease Control and Prevention: www.cdc.gov        Get help right away if:  •You have:  •A very bad headache that is not helped by medicine.      •Trouble walking or weakness in your arms and legs.      •Clear or bloody fluid coming from your nose or ears.      •Changes in how you see (vision).      •A seizure.      •More confusion or more grumpy moods.        •Your symptoms get worse.      •You are sleepier than normal and have trouble staying awake.      •You lose your balance.      •The black centers of your eyes (pupils) change in size.      •Your speech is slurred.      •Your dizziness gets worse.      •You vomit.      These symptoms may be an emergency. Do not wait to see if the symptoms will go away. Get medical help right away. Call your local emergency services (911 in the [place] Do not drive yourself to the hospital.       Summary    •Head injuries can be as minor as a small bump. Some head injuries can be worse.      •Treatment for this condition depends on how severe the injury is and the type of injury you have.      •Have someone stay with you for 24 hours after your head injury.      •Ask your doctor when it is safe for you to go back to your normal activities, such as work or school.      •To prevent a head injury, wear a seat belt in a car, wear a helmet when you use a bicycle, limit your alcohol use, and make your home safer.      This information is not intended to replace advice given to you by your health care provider. Make sure you discuss any questions you have with your health care provider.      Document Revised: 10/30/2020 Document Reviewed: 10/30/2020    Elsevier Patient Education © 2022 Elsevier Inc.

## 2022-03-21 NOTE — ED ADULT NURSE NOTE - OBJECTIVE STATEMENT
72 y/o female pt. BIBEMS from [facility] s/p fall. As per facility, pt. fell, hit head with no LOC. bruise noted to left cheek. no other signs of injury, distress at this time. pt. is a&ox1, poor historian. as per facility paperwork, no blood thinners. pt. brought to CT scan from triage for neuro alert, labs drawn and sent.

## 2022-03-21 NOTE — ED ADULT NURSE NOTE - NSIMPLEMENTINTERV_GEN_ALL_ED
Implemented All Fall Risk Interventions:  [place] to call system. Call bell, personal items and telephone within reach. Instruct patient to call for assistance. Room bathroom lighting operational. Non-slip footwear when patient is off stretcher. Physically safe environment: no spills, clutter or unnecessary equipment. Stretcher in lowest position, wheels locked, appropriate side rails in place. Provide visual cue, wrist band, yellow gown, etc. Monitor gait and stability. Monitor for mental status changes and reorient to person, place, and time. Review medications for side effects contributing to fall risk. Reinforce activity limits and safety measures with patient and family.

## 2022-03-28 ENCOUNTER — EMERGENCY (EMERGENCY)
Facility: HOSPITAL | Age: 72
LOS: 0 days | Discharge: HOME | End: 2022-03-28
Attending: EMERGENCY MEDICINE
Payer: MEDICARE

## 2022-03-28 VITALS
TEMPERATURE: 97 F | HEART RATE: 97 BPM | DIASTOLIC BLOOD PRESSURE: 77 MMHG | HEIGHT: 62 IN | SYSTOLIC BLOOD PRESSURE: 152 MMHG | OXYGEN SATURATION: 100 % | RESPIRATION RATE: 18 BRPM

## 2022-03-28 VITALS
SYSTOLIC BLOOD PRESSURE: 114 MMHG | OXYGEN SATURATION: 98 % | HEART RATE: 53 BPM | TEMPERATURE: 97 F | DIASTOLIC BLOOD PRESSURE: 90 MMHG | RESPIRATION RATE: 16 BRPM

## 2022-03-28 DIAGNOSIS — S00.81XA ABRASION OF OTHER PART OF HEAD, INITIAL ENCOUNTER: ICD-10-CM

## 2022-03-28 DIAGNOSIS — F03.90 UNSPECIFIED DEMENTIA, UNSPECIFIED SEVERITY, WITHOUT BEHAVIORAL DISTURBANCE, PSYCHOTIC DISTURBANCE, MOOD DISTURBANCE, AND ANXIETY: ICD-10-CM

## 2022-03-28 DIAGNOSIS — S09.90XA UNSPECIFIED INJURY OF HEAD, INITIAL ENCOUNTER: ICD-10-CM

## 2022-03-28 DIAGNOSIS — Z20.822 CONTACT WITH AND (SUSPECTED) EXPOSURE TO COVID-19: ICD-10-CM
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
## 2022-03-28 DIAGNOSIS — I10 ESSENTIAL (PRIMARY) HYPERTENSION: ICD-10-CM

## 2022-03-28 DIAGNOSIS — Z98.2 PRESENCE OF CEREBROSPINAL FLUID DRAINAGE DEVICE: Chronic | ICD-10-CM

## 2022-03-28 DIAGNOSIS — E78.5 HYPERLIPIDEMIA, UNSPECIFIED: ICD-10-CM

## 2022-03-28 LAB
ALBUMIN SERPL ELPH-MCNC: 3.5 G/DL — SIGNIFICANT CHANGE UP (ref 3.3–5)
ALP SERPL-CCNC: 63 U/L — SIGNIFICANT CHANGE UP (ref 40–120)
ALT FLD-CCNC: 28 U/L — SIGNIFICANT CHANGE UP (ref 12–78)
ANION GAP SERPL CALC-SCNC: 2 MMOL/L — LOW (ref 5–17)
APPEARANCE UR: CLEAR — SIGNIFICANT CHANGE UP
AST SERPL-CCNC: 20 U/L — SIGNIFICANT CHANGE UP (ref 15–37)
BASOPHILS # BLD AUTO: 0.03 K/UL — SIGNIFICANT CHANGE UP (ref 0–0.2)
BASOPHILS NFR BLD AUTO: 0.4 % — SIGNIFICANT CHANGE UP (ref 0–2)
BILIRUB SERPL-MCNC: 0.4 MG/DL — SIGNIFICANT CHANGE UP (ref 0.2–1.2)
BILIRUB UR-MCNC: NEGATIVE — SIGNIFICANT CHANGE UP
BUN SERPL-MCNC: 15 MG/DL — SIGNIFICANT CHANGE UP (ref 7–23)
CALCIUM SERPL-MCNC: 9.1 MG/DL — SIGNIFICANT CHANGE UP (ref 8.5–10.1)
CHLORIDE SERPL-SCNC: 112 MMOL/L — HIGH (ref 96–108)
CO2 SERPL-SCNC: 29 MMOL/L — SIGNIFICANT CHANGE UP (ref 22–31)
COLOR SPEC: YELLOW — SIGNIFICANT CHANGE UP
CREAT SERPL-MCNC: 0.72 MG/DL — SIGNIFICANT CHANGE UP (ref 0.5–1.3)
DIFF PNL FLD: NEGATIVE — SIGNIFICANT CHANGE UP
EGFR: 89 ML/MIN/1.73M2 — SIGNIFICANT CHANGE UP
EOSINOPHIL # BLD AUTO: 0.19 K/UL — SIGNIFICANT CHANGE UP (ref 0–0.5)
EOSINOPHIL NFR BLD AUTO: 2.6 % — SIGNIFICANT CHANGE UP (ref 0–6)
GLUCOSE SERPL-MCNC: 83 MG/DL — SIGNIFICANT CHANGE UP (ref 70–99)
GLUCOSE UR QL: NEGATIVE — SIGNIFICANT CHANGE UP
HCT VFR BLD CALC: 38.4 % — SIGNIFICANT CHANGE UP (ref 34.5–45)
HGB BLD-MCNC: 12.1 G/DL — SIGNIFICANT CHANGE UP (ref 11.5–15.5)
IMM GRANULOCYTES NFR BLD AUTO: 0.3 % — SIGNIFICANT CHANGE UP (ref 0–1.5)
KETONES UR-MCNC: NEGATIVE — SIGNIFICANT CHANGE UP
LEUKOCYTE ESTERASE UR-ACNC: NEGATIVE — SIGNIFICANT CHANGE UP
LYMPHOCYTES # BLD AUTO: 1.85 K/UL — SIGNIFICANT CHANGE UP (ref 1–3.3)
LYMPHOCYTES # BLD AUTO: 25.8 % — SIGNIFICANT CHANGE UP (ref 13–44)
MCHC RBC-ENTMCNC: 31.3 PG — SIGNIFICANT CHANGE UP (ref 27–34)
MCHC RBC-ENTMCNC: 31.5 GM/DL — LOW (ref 32–36)
MCV RBC AUTO: 99.2 FL — SIGNIFICANT CHANGE UP (ref 80–100)
MONOCYTES # BLD AUTO: 0.5 K/UL — SIGNIFICANT CHANGE UP (ref 0–0.9)
MONOCYTES NFR BLD AUTO: 7 % — SIGNIFICANT CHANGE UP (ref 2–14)
NEUTROPHILS # BLD AUTO: 4.59 K/UL — SIGNIFICANT CHANGE UP (ref 1.8–7.4)
NEUTROPHILS NFR BLD AUTO: 63.9 % — SIGNIFICANT CHANGE UP (ref 43–77)
NITRITE UR-MCNC: POSITIVE
PH UR: 6.5 — SIGNIFICANT CHANGE UP (ref 5–8)
PLATELET # BLD AUTO: 155 K/UL — SIGNIFICANT CHANGE UP (ref 150–400)
POTASSIUM SERPL-MCNC: 4 MMOL/L — SIGNIFICANT CHANGE UP (ref 3.5–5.3)
POTASSIUM SERPL-SCNC: 4 MMOL/L — SIGNIFICANT CHANGE UP (ref 3.5–5.3)
PROT SERPL-MCNC: 7.3 GM/DL — SIGNIFICANT CHANGE UP (ref 6–8.3)
PROT UR-MCNC: NEGATIVE — SIGNIFICANT CHANGE UP
RBC # BLD: 3.87 M/UL — SIGNIFICANT CHANGE UP (ref 3.8–5.2)
RBC # FLD: 12.9 % — SIGNIFICANT CHANGE UP (ref 10.3–14.5)
SARS-COV-2 RNA SPEC QL NAA+PROBE: SIGNIFICANT CHANGE UP
SODIUM SERPL-SCNC: 143 MMOL/L — SIGNIFICANT CHANGE UP (ref 135–145)
SP GR SPEC: 1.01 — SIGNIFICANT CHANGE UP (ref 1.01–1.02)
UROBILINOGEN FLD QL: NEGATIVE — SIGNIFICANT CHANGE UP
WBC # BLD: 7.18 K/UL — SIGNIFICANT CHANGE UP (ref 3.8–10.5)
WBC # FLD AUTO: 7.18 K/UL — SIGNIFICANT CHANGE UP (ref 3.8–10.5)

## 2022-03-28 PROCEDURE — 80053 COMPREHEN METABOLIC PANEL: CPT

## 2022-03-28 PROCEDURE — 97116 GAIT TRAINING THERAPY: CPT | Mod: GP

## 2022-03-28 PROCEDURE — 99285 EMERGENCY DEPT VISIT HI MDM: CPT | Mod: 25

## 2022-03-28 PROCEDURE — 85025 COMPLETE CBC W/AUTO DIFF WBC: CPT

## 2022-03-28 PROCEDURE — 71045 X-RAY EXAM CHEST 1 VIEW: CPT

## 2022-03-28 PROCEDURE — 72125 CT NECK SPINE W/O DYE: CPT | Mod: MA

## 2022-03-28 PROCEDURE — 71045 X-RAY EXAM CHEST 1 VIEW: CPT | Mod: 26

## 2022-03-28 PROCEDURE — 87635 SARS-COV-2 COVID-19 AMP PRB: CPT

## 2022-03-28 PROCEDURE — 99284 EMERGENCY DEPT VISIT MOD MDM: CPT

## 2022-03-28 PROCEDURE — 93010 ELECTROCARDIOGRAM REPORT: CPT

## 2022-03-28 PROCEDURE — 72170 X-RAY EXAM OF PELVIS: CPT | Mod: 26

## 2022-03-28 PROCEDURE — 70450 CT HEAD/BRAIN W/O DYE: CPT | Mod: MA

## 2022-03-28 PROCEDURE — 93005 ELECTROCARDIOGRAM TRACING: CPT

## 2022-03-28 PROCEDURE — 87186 SC STD MICRODIL/AGAR DIL: CPT

## 2022-03-28 PROCEDURE — 70486 CT MAXILLOFACIAL W/O DYE: CPT | Mod: MA

## 2022-03-28 PROCEDURE — 97162 PT EVAL MOD COMPLEX 30 MIN: CPT | Mod: GP

## 2022-03-28 PROCEDURE — 36415 COLL VENOUS BLD VENIPUNCTURE: CPT

## 2022-03-28 PROCEDURE — 81001 URINALYSIS AUTO W/SCOPE: CPT

## 2022-03-28 PROCEDURE — 87086 URINE CULTURE/COLONY COUNT: CPT

## 2022-03-28 PROCEDURE — 70486 CT MAXILLOFACIAL W/O DYE: CPT | Mod: 26,MA

## 2022-03-28 PROCEDURE — 70450 CT HEAD/BRAIN W/O DYE: CPT | Mod: 26,MA

## 2022-03-28 PROCEDURE — 72125 CT NECK SPINE W/O DYE: CPT | Mod: 26,MA

## 2022-03-28 PROCEDURE — 76376 3D RENDER W/INTRP POSTPROCES: CPT

## 2022-03-28 PROCEDURE — 76376 3D RENDER W/INTRP POSTPROCES: CPT | Mod: 26

## 2022-03-28 PROCEDURE — 72170 X-RAY EXAM OF PELVIS: CPT

## 2022-03-28 RX ORDER — ACETAMINOPHEN 500 MG
650 TABLET ORAL ONCE
Refills: 0 | Status: COMPLETED | OUTPATIENT
Start: 2022-03-28 | End: 2022-03-28

## 2022-03-28 RX ORDER — SODIUM CHLORIDE 9 MG/ML
500 INJECTION INTRAMUSCULAR; INTRAVENOUS; SUBCUTANEOUS ONCE
Refills: 0 | Status: COMPLETED | OUTPATIENT
Start: 2022-03-28 | End: 2022-03-28

## 2022-03-28 RX ADMIN — Medication 650 MILLIGRAM(S): at 06:02

## 2022-03-28 NOTE — ED ADULT TRIAGE NOTE - CHIEF COMPLAINT QUOTE
Pt BIBEMS from [facility] s/p unwitnessed fall. Pt hit face on ground. laceration on left cheek. pt not on any blood thinners. pt not complaining of pain. PMH dementia,HTN,HLD. confused at baseline. MD rAredondo to triage no neuro/trauma alert called. Pt BIBEMS from [facility] s/p unwitnessed fall. Pt hit face on ground. laceration on left cheek. pt not on any blood thinners. pt not complaining of pain. PMH dementia,HTN,HLD. confused at baseline. MD Horowitz to triage no neuro/trauma alert called.

## 2022-03-28 NOTE — ED PROVIDER NOTE - PATIENT PORTAL LINK FT
You can access the FollowMyHealth Patient Portal offered by [facility] by registering at the following website: [email or web address]. By joining [employer]’s FollowMyHealth portal, you will also be able to view your health information using other applications (apps) compatible with our system.

## 2022-03-28 NOTE — ED PROVIDER NOTE - CLINICAL SUMMARY MEDICAL DECISION MAKING FREE TEXT BOX
Dementia with unwitnessed fall.  Repeat episode as patient was in the ED for same a week ago.  New abrasion to left side of face.  Will get CTs of head, c spine, facial bones, and XRs of chest and pelvis.  Labs and urine and EKG also ordered.      If no acute injury or change in labs from a week ago, may send back to [facility] for PMD eval and wound care in 1-2 days.

## 2022-03-28 NOTE — ED PROVIDER NOTE - EYES, MLM
Clear bilaterally, pupils equal, round and reactive to light. EOMI; +1cm superficial abrasion inferior to left eye on left upper cheek without active bleeding. +healing ecchymosis of left orbit and left side of face.

## 2022-03-28 NOTE — ED ADULT NURSE NOTE - OBJECTIVE STATEMENT
Pt brought in by ambulance status post fall at SNF. Pt presents resting in stretcher, guarding abdomen, and with hand placed over left eye. Laceration noted orbital area just under left eye. Pt has aged yellow bruising to left orbital area. Pt denies head and chest pain. Pt is alert and disoriented, Pt does not respond to questions, makes mumbling noises. Airway is patent, breathing is even and unlabored. Dysphagia screening performed, negative finding. Skin is warm and dry. Butterfly to left AC, blood obtained and sent to lab for analysis. Pending urine. EKG completed. Patient safety maintained. Will continue to monitor.

## 2022-03-28 NOTE — PHYSICAL THERAPY INITIAL EVALUATION ADULT - DISCHARGE DISPOSITION, PT EVAL
Pt amb about 60' rw min assx1, Pt is confused but able to participate in PT, needs vc's and manual cues for rw negotiation and during turns, needs assistance for safety reasons, Pt was left lying in bed at end of PT rx, side rails up./rehabilitation facility Pt amb about 60' rw min assx1, small shuffling gait, slow and unsteady on feet, Pt is confused but able to participate in PT, needs vc's and manual cues for rw negotiation and during turns, needs assistance for safety reasons, Pt was left lying in bed at end of PT rx, side rails up./rehabilitation facility

## 2022-03-28 NOTE — ED ADULT NURSE NOTE - CHIEF COMPLAINT QUOTE
Pt BIBEMS from [facility] s/p unwitnessed fall. Pt hit face on ground. laceration on left cheek. pt not on any blood thinners. pt not complaining of pain. PMH dementia,HTN,HLD. confused at baseline. MD Horowitz to triage no neuro/trauma alert called.

## 2022-03-28 NOTE — ED PROVIDER NOTE - NSFOLLOWUPINSTRUCTIONS_ED_ALL_ED_FT
FALL PREVENTION - [facility]Care(R) Instructions(ER/ED)           Fall Prevention    WHAT YOU NEED TO KNOW:    Fall prevention includes ways to make your home and other areas safer. It also includes ways you can move more carefully to prevent a fall. Health conditions that cause changes in your blood pressure, vision, or muscle strength and coordination may increase your risk for falls. Medicines may also increase your risk for falls if they make you dizzy, weak, or sleepy.    DISCHARGE INSTRUCTIONS:    Call 911 or have someone else call if:   •You have fallen and are unconscious.      •You have fallen and cannot move part of your body.      Contact your healthcare provider if:   •You have fallen and have pain or a headache.      •You have questions or concerns about your condition or care.      Fall prevention tips:   •Stand or sit up slowly. This may help you keep your balance and prevent falls.      •Use assistive devices as directed. Your healthcare provider may suggest that you use a cane or walker to help you keep your balance. You may need to have grab bars put in your bathroom near the toilet or in the shower.      •Wear shoes that fit well and have soles that . Wear shoes both inside and outside. Use slippers with good . Do not wear shoes with high heels.      •Wear a personal alarm. This is a device that allows you to call 911 if you fall and need help. Ask your healthcare provider for more information.      •Stay active. Exercise can help strengthen your muscles and improve your balance. Your healthcare provider may recommend water aerobics or walking. He or she may also recommend physical therapy to improve your coordination. Never start an exercise program without talking to your healthcare provider first.  Walking for Exercise           •Manage your medical conditions.  Keep all appointments with your healthcare providers. Visit your eye doctor as directed.      Home safety tips:     Fall Prevention for Adults     •Add items to prevent falls in the bathroom. Put nonslip strips on your bath or shower floor to prevent you from slipping. Use a bath mat if you do not have carpet in the bathroom. This will prevent you from falling when you step out of the bath or shower. Use a shower seat so you do not need to stand while you shower. Sit on the toilet or a chair in your bathroom to dry yourself and put on clothing. This will prevent you from losing your balance from drying or dressing yourself while you are standing.      •Keep paths clear. Remove books, shoes, and other objects from walkways and stairs. Place cords for telephones and lamps out of the way so that you do not need to walk over them. Tape them down if you cannot move them. Remove small rugs. If you cannot remove a rug, secure it with double-sided tape. This will prevent you from tripping.      •Install bright lights in your home. Use night lights to help light paths to the bathroom or kitchen. Always turn on the light before you start walking.      •Keep items you use often on shelves within reach. Do not use a step stool to help you reach an item.      •Paint or place reflective tape on the edges of your stairs. This will help you see the stairs better.      Follow up with your doctor as directed: Write down your questions so you remember to ask them during your visits.        © Copyright [employer] 2022           back to top                          © Copyright [employer] 2022

## 2022-03-28 NOTE — ED ADULT NURSE NOTE - NSIMPLEMENTINTERV_GEN_ALL_ED
Implemented All Fall Risk Interventions:  [place] to call system. Call bell, personal items and telephone within reach. Instruct patient to call for assistance. Room bathroom lighting operational. Non-slip footwear when patient is off stretcher. Physically safe environment: no spills, clutter or unnecessary equipment. Stretcher in lowest position, wheels locked, appropriate side rails in place. Provide visual cue, wrist band, yellow gown, etc. Monitor gait and stability. Monitor for mental status changes and reorient to person, place, and time. Review medications for side effects contributing to fall risk. Reinforce activity limits and safety measures with patient and family.

## 2022-03-28 NOTE — ED PROVIDER NOTE - NS ED MD DISPO DISCHARGE CCDA
Subjective


HPI/CC On Admission


Date Seen by Provider:  Nov 22, 2019


Time Seen by Provider:  08:30


CC: Hip Fracture





HPI: Patient fell twice Monday receiving a hip fracture and had surgery.  

Patient was transferred to in-patient rehab yesterday. Patient does have pain 

when standing and rates it as a 9/10 otherwise, his pain is a 3/10. Pain does 

travel down his right leg. Rehab has been going well he states, and is sore from

rehab.


Subjective/Events-last exam


Bed alarm placed last night


Fever of 102 prompted septic workup, lactic acid normal, White count 11, CMP 

normal, Dr. Patino will see him, Chest x ray pending


IS will be ordered


NebulizertreatmentsQID duo Neb will be ordered


Pt will be watched closely


Conferred with RN


Reviewed therapy notes


Checked meds and labs





Review of Systems


General:  Fatigue, Malaise


Pulmonary:  Cough


Musculoskeletal:  leg pain





Focused Exam


Lactate Level


11/22/19 05:50: Lactic Acid Level 0.70








Objective


Exam


Vital Signs





Vital Signs








  Date Time  Temp Pulse Resp B/P (MAP) Pulse Ox O2 Delivery O2 Flow Rate FiO2


 


11/22/19 16:29 37.3 85 14 94/60 (71) 93 Room Air  


 


11/21/19 21:29       2.00 





Capillary Refill : Less Than 3 Seconds


General Appearance:  No Apparent Distress, WD/WN


HEENT:  PERRL/EOMI, Normal ENT Inspection, Pharynx Normal


Neck:  Full Range of Motion, Normal Inspection, Non Tender


Respiratory:  Chest Non Tender, No Accessory Muscle Use, No Respiratory 

Distress, Crackles, Decreased Breath Sounds, Wheezing


Cardiovascular:  Regular Rate, Rhythm, No Edema, No Gallop, No JVD, No Murmur, 

Normal Peripheral Pulses (Radial 2/4 bilaterally)


Gastrointestinal:  Normal Bowel Sounds, No Organomegaly, No Pulsatile Mass, Non 

Tender, Soft


Extremity:  Normal Capillary Refill, Normal Inspection, Normal Range of Motion 

(except right leg), Non Tender, No Calf Tenderness, No Pedal Edema


Neurologic/Psychiatric:  Alert, Oriented x3, No Motor/Sensory Deficits, Normal 

Mood/Affect, CNs II-XII Norm as Tested


Skin:  Normal Color, Warm/Dry





Results/Procedures


Lab


Laboratory Tests


11/22/19 05:30








Patient resulted labs reviewed.





FIM


Transfers


Therapy Code Descriptions/Definitions 





Functional [place] Measure:


0=Not Assessed/NA        4=Minimal Assistance


1=Total Assistance        5=Supervision or Setup


2=Maximal Assistance  6=Modified [place]


3=Moderate Assistance 7=Complete IndependenceSCALE: Activities may be completed 

with or without assistive devices.





6-Indepedent-patient completes the activity by him/herself with no assistance 

from a helper.


5-Set-up or Clean-up Assistance-helper sets up or cleans up; patient completes 

activity. [place] assists only prior to or  


    following the activity.


4-Supervision or Touching Assistance-helper provides verbal cues and/or 

touching/steadying and/or contact guard assistance as patient completes 

activity. Assistance may be provided   


    throughout the activity or intermittently.


3-Partial/Moderate Assistance-helper does LESS THAN HALF the effort. [place] 

lifts, holds or supports trunk or limbs, but provides less than half the effort.


2-Substantial/Maximal Assistance-helper does MORE THAN HALF the effort. [place] 

lifts or holds trunk or limbs and provides more than half the effort.


0-Djxprjevz-[id number] does ALL the effort. Patient does none of the effort to 

complete the activity. Or, the assistance of 2 or more helpers is required for 

the patient to complete the  


    activity.


If activity was not attempted, code reason:


7-Patient Refused.


9-Not Applicable-not attempted and the patient did not perform the activity 

before the current illness, exacerbation or injury.


10-Not Attempted due to Environmental Limitations-(lack of equipment, weather 

restraints, etc.).


88-Not Attempted due to Medical Conditions or Safety Concerns.


Roll Left to Right (QC):  4


Sit to Lying (QC):  3 (min assist with right LE)


Sit to Stand (QC):  3 (min assist to come to a stand; he tends to push back 

some; skilled cues for hand placement and WB management. )


Chair/Bed-to-Chair Xfer(QC):  3


Car Transfer (QC):  3 (assist with right LE)





Gait Training


Does the Patient Walk?:  Yes


Walk 10 feet (QC):  3 (min assist for safety with heavy cues for TTWB status. )


Walk 50 ft with 2 Turns(QC):  3


Walk 150 ft (QC):  88


Walking 10ft/uneven surface-QC:  3


Gait Assistive Device:  FWW





Wheelchair Training


Does the Pt Use a Wheelchair?:  No


Wheel 50 ft with 2 turns (QC):  9


Wheel 150 ft (QC):  9


Type of Wheelchair:  Manual





Stair Training


1 Step (curb) (QC):  88 (pt unsafe to attempt due to TTWB status)


4 Steps (QC):  88


12 Steps (QC):  88





Balance


Picking up an Object (QC):  88





ADL-Treatment


Eating (QC):  6 (per pt report)


Oral Hygiene (QC):  4 (Pt required set up assist with min verbal cues for 

sequencing of task. Pt completed oral hygiene at bed level. )


Bathing Location:  L Arm, R Arm, L Upper Leg, R Upper Leg, Chest, Abdomen, 

Buttocks, Perineal Area


Shower/Bathe Self (QC):  3 (SPONGE BATH: Pt required CGA during stand for 

washing buttocks and perineal area. Pt required assist washing BLE lower legs 

and feet. )


Upper Body Dressing (QC):  5 (set up only)


Lower Body Dressing (QC):  2 (Pt able to manage clothing up/down off of hips. 

Required assistance with threading BLE into pants and underpants. )


On/Off Footwear (QC):  7


Toileting Hygiene (QC):  4 (CGA during mangement of clothing. Pt able to manage 

clothing up/down and complete hygiene.)


Toilet Transfer (QC):  3 (BSC over toilet. Pt able to sit with CGA, required min

A to stand from toilet. )





Assessment/Plan


Assessment and Plan


Assess & Plan/Chief Complaint


Assessment:


Right femur fracture s/p fall in Dr Patino's office


Pneumonia placed on Cefepime per DR Patino


Severe COPD with recurrent pneumonia in the past





Plan:


IRF protocol


Cefepime


Nebs


O2


Dr Patino appreciated





(1) Closed right femoral fracture


(2) Debility


Status:  Acute


(3) Paroxysmal atrial fibrillation


Status:  Chronic


(4) Dyspnea


Status:  Acute


(5) COPD (chronic obstructive pulmonary disease)


Status:  Chronic


(6) Pneumonia


Status:  Acute











ALEKS WILSON DO                Nov 22, 2019 10:15


POS Patient/Caregiver provided printed discharge information.

## 2022-03-28 NOTE — ED PROVIDER NOTE - NEUROLOGICAL, MLM
Awake, follows some commands, but has difficulty due to dementia.  Able to move upper and lower extremities voluntarily.

## 2022-03-28 NOTE — ED PROVIDER NOTE - MUSCULOSKELETAL, MLM
Spine appears normal, range of motion is not limited, no muscle or joint tenderness; normal distal pulses.

## 2022-03-28 NOTE — PHARMACOTHERAPY INTERVENTION NOTE - COMMENTS
Medication history complete, patient is from [facility], reviewed and confirmed medication with list provided by facility.

## 2022-03-28 NOTE — PHYSICAL THERAPY INITIAL EVALUATION ADULT - GENERAL OBSERVATIONS, REHAB EVAL
Pt was found lying on stretcher in ED, has left side facial bruise, pt is very confused but willing to participate in PT

## 2022-03-28 NOTE — ED PROVIDER NOTE - OBJECTIVE STATEMENT
Pt. is a 72 yo F with PMHx of normal pressure hydrocephalus with  shunt, hx of vascular dementia, HTN, hyperlipidemia, dermatomyositis, seizures presents [facility] ED [facility] Pt. is a 72 yo F with PMHx of normal pressure hydrocephalus with  shunt, hx of vascular dementia, HTN, hyperlipidemia, dermatomyositis, seizures presents to ED s/p unwitnessed fall at [facility].  Patient was found on the bedroom floor by staff with new abrasion on left side of face.  Patient was in the ED a week ago for a similar unwitnessed fall with injury to left side of face as well.  Patient does not remember falling and does not have any complaints but has advanced dementia and is a poor historian.

## 2022-03-28 NOTE — PHYSICAL THERAPY INITIAL EVALUATION ADULT - PERTINENT HX OF CURRENT PROBLEM, REHAB EVAL
Pt is a 72 yo F with PMHx of normal pressure hydrocephalus with  shunt, hx of vascular dementia, seizures presents to ED s/p unwitnessed fall at [facility].  Patient was found on the bedroom floor by staff with new abrasion on left side of face.  Patient was in the ED a week ago for a similar unwitnessed fall with injury to left side of face as well.  Patient does not remember falling and does not have any complaints but has advanced dementia and is a poor historian.

## 2022-03-31 NOTE — ED POST DISCHARGE NOTE - DETAILS
called pt [facility] and they ([facility]) said she is not returning there, pt is not admitted and sent to rehab unsure of rehab facility. will send letter to pt home. GOPAL SIMON called pt facially and they ([facility]) said she is not returning there, pt is not admitted and sent to rehab unsure of rehab facility. will send letter to pt home. GOPAL SIMON spoke to Nurse Shantel at rehab center asked for results to be  [id number] so she can give to the MD on to mckenna SIMON spoke to Nurse Shantel at  [facility] asked for results to be  [id number] so she can give to the MD on to mckenna SIMON

## 2022-05-12 NOTE — ED ADULT TRIAGE NOTE - BP NONINVASIVE SYSTOLIC (MM HG)
Aditi Jackson is a 73 year old female presenting for   Chief Complaint   Patient presents with   • Breathing Problem     Pt was seen in the ER on 4/20 after she saw Dr. Kaur in clinic. They did imaging. No blood clot. Was told to keep active and keep doing deep breathing exercises. Pt reports things are getting much better.   • Diarrhea     Pt states she has always had diarrhea with her fibro but since her knee replacement her bowel movements are different. She is having pain accorss her abdomen and the diarrhea is dark in color and stinks. She has to take imodium.     Denies Latex allergy or sensitivity.    Medication verified, no changes  Refills needed today: No    Health Maintenance Due   Topic Date Due   • DM/CKD Microalbumin  Never done   • Medicare Advantage- Medicare Wellness Visit  01/01/2022   • Depression Screening  10/06/2022             Last lab results:   No results found for: HGBA1C  CHOLESTEROL (mg/dL)   Date Value   05/10/2017 185     HDL (mg/dL)   Date Value   05/10/2017 52 (L)     TRIGLYCERIDE (mg/dL)   Date Value   05/10/2017 118     CALCULATED LDL (mg/dL)   Date Value   05/10/2017 109     No results found for: URMIC, UCR, MALBCR  No results found for: IFOB              Depression Screening:  Recent Review Flowsheet Data     Date 10/6/2021    Adult PHQ 2 Score 1    Adult PHQ 2 Interpretation No further screening needed    Little interest or pleasure in activity? Several days    Feeling down, depressed or hopeless? Not at all    Adult PHQ 9 Score 6    Adult PHQ 9 Interpretation Mild Depression    Trouble falling or staying asleep or sleeping all the time? Not at all    Feeling tired or having little energy? Several days    Poor appetite or overeating? Several days    Feeling bad about yourself or that you are a failure or have let yourself or family down? Several days    Trouble concentrating on things such as reading the newspaper or watching TV? Several days    Moving or speaking slowly  that other people have noticed or the opposite - being so fidgety or restless that you have been moving around a lot more than usual? Several days    Thoughts that you would be better off dead or of hurting yourself in some way? Not at all    If you reported any problems, how difficult have these problems made it to do your work, take care of things at home, or get along with other people? Somewhat difficult           Advance Directives:  not discussed     148

## 2022-06-06 ENCOUNTER — INPATIENT (INPATIENT)
Facility: HOSPITAL | Age: 72
LOS: 2 days | Discharge: NURSING, REHAB OR LONG-TERM CARE | DRG: 177 | End: 2022-06-09
Attending: HOSPITALIST | Admitting: INTERNAL MEDICINE
Payer: MEDICARE

## 2022-06-06 VITALS
RESPIRATION RATE: 18 BRPM | OXYGEN SATURATION: 91 % | SYSTOLIC BLOOD PRESSURE: 122 MMHG | DIASTOLIC BLOOD PRESSURE: 66 MMHG | HEIGHT: 61 IN | HEART RATE: 75 BPM | WEIGHT: 123.68 LBS | TEMPERATURE: 100 F

## 2022-06-06 DIAGNOSIS — Z98.2 PRESENCE OF CEREBROSPINAL FLUID DRAINAGE DEVICE: Chronic | ICD-10-CM

## 2022-06-06 DIAGNOSIS — J18.9 PNEUMONIA, UNSPECIFIED ORGANISM: ICD-10-CM

## 2022-06-06 LAB
ALBUMIN SERPL ELPH-MCNC: 3.4 G/DL — SIGNIFICANT CHANGE UP (ref 3.3–5)
ALP SERPL-CCNC: 78 U/L — SIGNIFICANT CHANGE UP (ref 40–120)
ALT FLD-CCNC: 56 U/L — HIGH (ref 10–45)
ANION GAP SERPL CALC-SCNC: 10 MMOL/L — SIGNIFICANT CHANGE UP (ref 5–17)
APPEARANCE UR: CLEAR — SIGNIFICANT CHANGE UP
APTT BLD: 27.3 SEC — LOW (ref 27.5–35.5)
AST SERPL-CCNC: 46 U/L — HIGH (ref 10–40)
BACTERIA # UR AUTO: ABNORMAL /HPF
BASOPHILS # BLD AUTO: 0.02 K/UL — SIGNIFICANT CHANGE UP (ref 0–0.2)
BASOPHILS NFR BLD AUTO: 0.2 % — SIGNIFICANT CHANGE UP (ref 0–2)
BILIRUB SERPL-MCNC: 0.5 MG/DL — SIGNIFICANT CHANGE UP (ref 0.2–1.2)
BILIRUB UR-MCNC: NEGATIVE — SIGNIFICANT CHANGE UP
BUN SERPL-MCNC: 18 MG/DL — SIGNIFICANT CHANGE UP (ref 7–23)
CALCIUM SERPL-MCNC: 8.8 MG/DL — SIGNIFICANT CHANGE UP (ref 8.4–10.5)
CHLORIDE SERPL-SCNC: 104 MMOL/L — SIGNIFICANT CHANGE UP (ref 96–108)
CO2 SERPL-SCNC: 25 MMOL/L — SIGNIFICANT CHANGE UP (ref 22–31)
COLOR SPEC: YELLOW — SIGNIFICANT CHANGE UP
CREAT SERPL-MCNC: 0.87 MG/DL — SIGNIFICANT CHANGE UP (ref 0.5–1.3)
DIFF PNL FLD: ABNORMAL
EGFR: 70 ML/MIN/1.73M2 — SIGNIFICANT CHANGE UP
EOSINOPHIL # BLD AUTO: 0.01 K/UL — SIGNIFICANT CHANGE UP (ref 0–0.5)
EOSINOPHIL NFR BLD AUTO: 0.1 % — SIGNIFICANT CHANGE UP (ref 0–6)
EPI CELLS # UR: ABNORMAL
GLUCOSE SERPL-MCNC: 153 MG/DL — HIGH (ref 70–99)
GLUCOSE UR QL: NEGATIVE — SIGNIFICANT CHANGE UP
HCT VFR BLD CALC: 38.1 % — SIGNIFICANT CHANGE UP (ref 34.5–45)
HGB BLD-MCNC: 12.7 G/DL — SIGNIFICANT CHANGE UP (ref 11.5–15.5)
HPIV3 RNA SPEC QL NAA+PROBE: DETECTED — SIGNIFICANT CHANGE UP
IMM GRANULOCYTES NFR BLD AUTO: 0.4 % — SIGNIFICANT CHANGE UP (ref 0–1.5)
INR BLD: 1.11 RATIO — SIGNIFICANT CHANGE UP (ref 0.88–1.16)
KETONES UR-MCNC: NEGATIVE — SIGNIFICANT CHANGE UP
LACTATE SERPL-SCNC: 1.7 MMOL/L — SIGNIFICANT CHANGE UP (ref 0.7–2)
LEUKOCYTE ESTERASE UR-ACNC: ABNORMAL
LYMPHOCYTES # BLD AUTO: 0.56 K/UL — LOW (ref 1–3.3)
LYMPHOCYTES # BLD AUTO: 6.2 % — LOW (ref 13–44)
MCHC RBC-ENTMCNC: 31.5 PG — SIGNIFICANT CHANGE UP (ref 27–34)
MCHC RBC-ENTMCNC: 33.3 GM/DL — SIGNIFICANT CHANGE UP (ref 32–36)
MCV RBC AUTO: 94.5 FL — SIGNIFICANT CHANGE UP (ref 80–100)
MONOCYTES # BLD AUTO: 0.51 K/UL — SIGNIFICANT CHANGE UP (ref 0–0.9)
MONOCYTES NFR BLD AUTO: 5.6 % — SIGNIFICANT CHANGE UP (ref 2–14)
NEUTROPHILS # BLD AUTO: 7.94 K/UL — HIGH (ref 1.8–7.4)
NEUTROPHILS NFR BLD AUTO: 87.5 % — HIGH (ref 43–77)
NITRITE UR-MCNC: POSITIVE
NRBC # BLD: 0 /100 WBCS — SIGNIFICANT CHANGE UP (ref 0–0)
PH UR: 8 — SIGNIFICANT CHANGE UP (ref 5–8)
PLATELET # BLD AUTO: 147 K/UL — LOW (ref 150–400)
POTASSIUM SERPL-MCNC: 4.1 MMOL/L — SIGNIFICANT CHANGE UP (ref 3.5–5.3)
POTASSIUM SERPL-SCNC: 4.1 MMOL/L — SIGNIFICANT CHANGE UP (ref 3.5–5.3)
PROT SERPL-MCNC: 7.8 G/DL — SIGNIFICANT CHANGE UP (ref 6–8.3)
PROT UR-MCNC: 15
PROTHROM AB SERPL-ACNC: 12.9 SEC — SIGNIFICANT CHANGE UP (ref 10.5–13.4)
RAPID RVP RESULT: DETECTED
RBC # BLD: 4.03 M/UL — SIGNIFICANT CHANGE UP (ref 3.8–5.2)
RBC # FLD: 13.1 % — SIGNIFICANT CHANGE UP (ref 10.3–14.5)
RBC CASTS # UR COMP ASSIST: SIGNIFICANT CHANGE UP /HPF (ref 0–4)
SARS-COV-2 RNA SPEC QL NAA+PROBE: SIGNIFICANT CHANGE UP
SODIUM SERPL-SCNC: 139 MMOL/L — SIGNIFICANT CHANGE UP (ref 135–145)
SP GR SPEC: 1.01 — SIGNIFICANT CHANGE UP (ref 1.01–1.02)
UROBILINOGEN FLD QL: NEGATIVE — SIGNIFICANT CHANGE UP
WBC # BLD: 9.08 K/UL — SIGNIFICANT CHANGE UP (ref 3.8–10.5)
WBC # FLD AUTO: 9.08 K/UL — SIGNIFICANT CHANGE UP (ref 3.8–10.5)
WBC UR QL: ABNORMAL /HPF (ref 0–5)

## 2022-06-06 PROCEDURE — 99223 1ST HOSP IP/OBS HIGH 75: CPT

## 2022-06-06 PROCEDURE — 71250 CT THORAX DX C-: CPT | Mod: 26,MA

## 2022-06-06 PROCEDURE — 70450 CT HEAD/BRAIN W/O DYE: CPT | Mod: 26,MA

## 2022-06-06 PROCEDURE — 99285 EMERGENCY DEPT VISIT HI MDM: CPT | Mod: FS

## 2022-06-06 PROCEDURE — 74176 CT ABD & PELVIS W/O CONTRAST: CPT | Mod: 26,MA

## 2022-06-06 PROCEDURE — 71045 X-RAY EXAM CHEST 1 VIEW: CPT | Mod: 26

## 2022-06-06 PROCEDURE — 93010 ELECTROCARDIOGRAM REPORT: CPT

## 2022-06-06 RX ORDER — SODIUM CHLORIDE 9 MG/ML
2200 INJECTION INTRAMUSCULAR; INTRAVENOUS; SUBCUTANEOUS ONCE
Refills: 0 | Status: COMPLETED | OUTPATIENT
Start: 2022-06-06 | End: 2022-06-06

## 2022-06-06 RX ORDER — PIPERACILLIN AND TAZOBACTAM 4; .5 G/20ML; G/20ML
3.38 INJECTION, POWDER, LYOPHILIZED, FOR SOLUTION INTRAVENOUS ONCE
Refills: 0 | Status: COMPLETED | OUTPATIENT
Start: 2022-06-06 | End: 2022-06-06

## 2022-06-06 RX ORDER — ALBUTEROL 90 UG/1
1 AEROSOL, METERED ORAL EVERY 4 HOURS
Refills: 0 | Status: DISCONTINUED | OUTPATIENT
Start: 2022-06-06 | End: 2022-06-09

## 2022-06-06 RX ORDER — LEVOTHYROXINE SODIUM 125 MCG
25 TABLET ORAL
Refills: 0 | Status: DISCONTINUED | OUTPATIENT
Start: 2022-06-06 | End: 2022-06-07

## 2022-06-06 RX ORDER — HYDRALAZINE HCL 50 MG
5 TABLET ORAL EVERY 6 HOURS
Refills: 0 | Status: DISCONTINUED | OUTPATIENT
Start: 2022-06-06 | End: 2022-06-09

## 2022-06-06 RX ORDER — AZITHROMYCIN 500 MG/1
500 TABLET, FILM COATED ORAL ONCE
Refills: 0 | Status: COMPLETED | OUTPATIENT
Start: 2022-06-06 | End: 2022-06-06

## 2022-06-06 RX ORDER — PANTOPRAZOLE SODIUM 20 MG/1
40 TABLET, DELAYED RELEASE ORAL
Refills: 0 | Status: DISCONTINUED | OUTPATIENT
Start: 2022-06-06 | End: 2022-06-09

## 2022-06-06 RX ORDER — LEVETIRACETAM 250 MG/1
500 TABLET, FILM COATED ORAL EVERY 12 HOURS
Refills: 0 | Status: DISCONTINUED | OUTPATIENT
Start: 2022-06-06 | End: 2022-06-07

## 2022-06-06 RX ORDER — ENOXAPARIN SODIUM 100 MG/ML
40 INJECTION SUBCUTANEOUS EVERY 24 HOURS
Refills: 0 | Status: DISCONTINUED | OUTPATIENT
Start: 2022-06-07 | End: 2022-06-09

## 2022-06-06 RX ORDER — ACETAMINOPHEN 500 MG
650 TABLET ORAL EVERY 6 HOURS
Refills: 0 | Status: DISCONTINUED | OUTPATIENT
Start: 2022-06-06 | End: 2022-06-09

## 2022-06-06 RX ORDER — PIPERACILLIN AND TAZOBACTAM 4; .5 G/20ML; G/20ML
3.38 INJECTION, POWDER, LYOPHILIZED, FOR SOLUTION INTRAVENOUS EVERY 8 HOURS
Refills: 0 | Status: DISCONTINUED | OUTPATIENT
Start: 2022-06-06 | End: 2022-06-07

## 2022-06-06 RX ORDER — SODIUM CHLORIDE 9 MG/ML
1000 INJECTION, SOLUTION INTRAVENOUS
Refills: 0 | Status: DISCONTINUED | OUTPATIENT
Start: 2022-06-06 | End: 2022-06-09

## 2022-06-06 RX ORDER — CEFTRIAXONE 500 MG/1
1000 INJECTION, POWDER, FOR SOLUTION INTRAMUSCULAR; INTRAVENOUS ONCE
Refills: 0 | Status: COMPLETED | OUTPATIENT
Start: 2022-06-06 | End: 2022-06-06

## 2022-06-06 RX ORDER — ACETAMINOPHEN 500 MG
975 TABLET ORAL ONCE
Refills: 0 | Status: COMPLETED | OUTPATIENT
Start: 2022-06-06 | End: 2022-06-06

## 2022-06-06 RX ORDER — AZITHROMYCIN 500 MG/1
500 TABLET, FILM COATED ORAL EVERY 24 HOURS
Refills: 0 | Status: DISCONTINUED | OUTPATIENT
Start: 2022-06-07 | End: 2022-06-07

## 2022-06-06 RX ORDER — ALBUTEROL 90 UG/1
2.5 AEROSOL, METERED ORAL EVERY 6 HOURS
Refills: 0 | Status: DISCONTINUED | OUTPATIENT
Start: 2022-06-06 | End: 2022-06-09

## 2022-06-06 RX ADMIN — Medication 975 MILLIGRAM(S): at 12:40

## 2022-06-06 RX ADMIN — CEFTRIAXONE 1000 MILLIGRAM(S): 500 INJECTION, POWDER, FOR SOLUTION INTRAMUSCULAR; INTRAVENOUS at 12:11

## 2022-06-06 RX ADMIN — LEVETIRACETAM 400 MILLIGRAM(S): 250 TABLET, FILM COATED ORAL at 17:57

## 2022-06-06 RX ADMIN — PIPERACILLIN AND TAZOBACTAM 25 GRAM(S): 4; .5 INJECTION, POWDER, LYOPHILIZED, FOR SOLUTION INTRAVENOUS at 21:06

## 2022-06-06 RX ADMIN — PIPERACILLIN AND TAZOBACTAM 200 GRAM(S): 4; .5 INJECTION, POWDER, LYOPHILIZED, FOR SOLUTION INTRAVENOUS at 14:43

## 2022-06-06 RX ADMIN — AZITHROMYCIN 255 MILLIGRAM(S): 500 TABLET, FILM COATED ORAL at 13:12

## 2022-06-06 RX ADMIN — CEFTRIAXONE 100 MILLIGRAM(S): 500 INJECTION, POWDER, FOR SOLUTION INTRAMUSCULAR; INTRAVENOUS at 11:41

## 2022-06-06 RX ADMIN — Medication 975 MILLIGRAM(S): at 11:40

## 2022-06-06 RX ADMIN — SODIUM CHLORIDE 2200 MILLILITER(S): 9 INJECTION INTRAMUSCULAR; INTRAVENOUS; SUBCUTANEOUS at 10:05

## 2022-06-06 RX ADMIN — SODIUM CHLORIDE 60 MILLILITER(S): 9 INJECTION, SOLUTION INTRAVENOUS at 16:01

## 2022-06-06 NOTE — ED ADULT NURSE NOTE - CHIEF COMPLAINT QUOTE
BIB EMS from [facility] for altered mental status x1 hour. Pt febrile on arrival,  in triage. Episode of vomiting at facility. Pt unable to follow commands. Hx seizures BIB EMS from [facility] for altered mental status x1 hour. Pt febrile on arrival,  in triage. Episode of vomiting at facility. Pt unable to follow commands. Hx seizures BIB EMS from [facility] for altered mental status x1 hour. Pt febrile on arrival,  in triage. Episode of vomiting at facility. Pt unable to follow commands. Hx seizures

## 2022-06-06 NOTE — H&P ADULT - NSHPPHYSICALEXAM_GEN_ALL_CORE
Vital Signs Last 24 Hrs  T(C): 37.2 (06 Jun 2022 14:03), Max: 39.4 (06 Jun 2022 10:06)  T(F): 99 (06 Jun 2022 14:03), Max: 103 (06 Jun 2022 10:06)  HR: 78 (06 Jun 2022 13:53) (75 - 86)  BP: 107/86 (06 Jun 2022 13:53) (107/86 - 127/55)  BP(mean): 91 (06 Jun 2022 13:53) (72 - 91)  RR: 15 (06 Jun 2022 13:53) (15 - 18)  SpO2: 100% (06 Jun 2022 13:53) (91% - 100%)    GENERAL: Not in distress. Alert    HEENT: AT/NC. clear conjuctiva, MM dry,   no pallor or icterus  CARDIOVASCULAR: RRR S1, S2. No murmur/rubs/gallop  LUNGS: BLAE+, + rales, no wheezing, + rhonchi.    ABDOMEN: ND. Soft,  NT, no guarding / rebound / rigidity. BS normoactive. No CVA tenderness.    BACK: No spine tenderness.  EXTREMITIES: no edema. no leg or calf TP.  SKIN: no rash.   NEUROLOGIC: not verbal , noncommunicating. moving limbs with painful stimuli  PSYCHIATRIC: Calm.  No agitation.

## 2022-06-06 NOTE — H&P ADULT - NSHPLABSRESULTS_GEN_ALL_CORE
12.7   9.08  )-----------( 147      ( 06 Jun 2022 10:05 )             38.1       06-06    139  |  104  |  18  ----------------------------<  153<H>  4.1   |  25  |  0.87    Ca    8.8      06 Jun 2022 10:05    TPro  7.8  /  Alb  3.4  /  TBili  0.5  /  DBili  x   /  AST  46<H>  /  ALT  56<H>  /  AlkPhos  78  06-06    PT/INR - ( 06 Jun 2022 10:05 )   PT: 12.9 sec;   INR: 1.11 ratio         PTT - ( 06 Jun 2022 10:05 )  PTT:27.3 sec    < from: CT Chest No Cont (06.06.22 @ 11:04) >    IMPRESSION:  Left lower lobe pulmonary infiltrate for which clinical correlation with   pneumonia is recommended.    Nonspecific 8 mm right lower lobe lung nodule; follow-up chest CT may be   pursued in 3 months to ensure stability or resolution.    Mildly enlarged 1.1 cm precarinal lymph node    Small densities in the trachea and right bronchus intermedius may   represent mucous secretion. Attention should be directed to this finding   on the follow-up chest CT to ensure resolution.    Dilated common bile measuring 11 mm in caliber, probably due to post   cholecystectomy status. If there is a clinical suspicion for biliary   obstruction, MRCP may be pursued for further evaluation.    Mild splenomegaly.    --- End of Report ---    < end of copied text >    EKG: reviewed by me: DIANA

## 2022-06-06 NOTE — ED ADULT NURSE NOTE - NSIMPLEMENTINTERV_GEN_ALL_ED
Implemented All Universal Safety Interventions:  [place] to call system. Call bell, personal items and telephone within reach. Instruct patient to call for assistance. Room bathroom lighting operational. Non-slip footwear when patient is off stretcher. Physically safe environment: no spills, clutter or unnecessary equipment. Stretcher in lowest position, wheels locked, appropriate side rails in place. Implemented All Universal Safety Interventions:  [place] to call system. Call bell, personal items and telephone within reach. Instruct patient to call for assistance. Room bathroom lighting operational. Non-slip footwear when patient is off stretcher. Physically safe environment: no spills, clutter or unnecessary equipment. Stretcher in lowest position, wheels locked, appropriate side rails in place. Implemented All Universal Safety Interventions:  [place] to call system. Call bell, personal items and telephone within reach. Instruct patient to call for assistance. Room bathroom lighting operational. Non-slip footwear when patient is off stretcher. Physically safe environment: no spills, clutter or unnecessary equipment. Stretcher in lowest position, wheels locked, appropriate side rails in place.

## 2022-06-06 NOTE — ED PROVIDER NOTE - CARE PLAN
1 Principal Discharge DX:	Pneumonia  Secondary Diagnosis:	Parainfluenza  Secondary Diagnosis:	UTI (urinary tract infection)

## 2022-06-06 NOTE — PATIENT PROFILE ADULT - FALL HARM RISK - TYPE OF ASSESSMENT
Type 2 DM, endocrinology following   -lantus 16U qhs  -lispro 6U TID pre-meal   -mISS  -metformin 1000mg bid  -per endo, metformin to be increased to 1000mg bid however pharmacy would not approve as exceeds maximum dosage inpatient  -continue to appreciate endo recs    #hypothyroidism   -TSH 2.813; TPO and antithyroid Abs neg   -levothyroxine 50mcg discontinued per endocrinology 4/29 given low concern for autoimmune hypothyroidism at this time; will assess necessity of continuation of levothyroxine  -continue to appreciate endocrine recs Admission

## 2022-06-06 NOTE — H&P ADULT - NSHPOUTPATIENTPROVIDERS_GEN_ALL_CORE
PCP:  [ [facility]] PCP:  [ [facility]] PCP:  [ [facility]] PCP: Dr. dias[ [facility]]. notified PCP: Dr. dias[ [facility] notified PCP: Dr. dias[ [facility] notified PCP: Dr. Armenta[ [facility]]. notified PCP: Dr. Armenta[ [facility] notified PCP: Dr. Armenta[ [facility]]. notified

## 2022-06-06 NOTE — PATIENT PROFILE ADULT - ARRIVAL FROM
[facility] facility [facility]/Assisted living facility [facility]/Assisted living facility

## 2022-06-06 NOTE — PATIENT PROFILE ADULT - FALL HARM RISK - HARM RISK INTERVENTIONS
Assistance with ambulation/Assistance OOB with selected safe patient handling equipment/Communicate Risk of Fall with Harm to all staff/Discuss with provider need for PT consult/Monitor gait and stability/Provide patient with walking aids - walker, cane, crutches/Reinforce activity limits and safety measures with patient and family/Tailored Fall Risk Interventions/Visual Cue: Yellow wristband and red socks/Bed in lowest position, wheels locked, appropriate side rails in place/Call bell, personal items and telephone in reach/Instruct patient to call for assistance before getting out of bed or chair/Non-slip footwear when patient is out of bed/[place] to call system/Physically safe environment - no spills, clutter or unnecessary equipment/Purposeful Proactive Rounding/Room/bathroom lighting operational, light cord in reach Assistance with ambulation/Assistance OOB with selected safe patient handling equipment/Communicate Risk of Fall with Harm to all staff/Discuss with provider need for PT consult/Monitor gait and stability/Provide patient with walking aids - walker, cane, crutches/Reinforce activity limits and safety measures with patient and family/Tailored Fall Risk Interventions/Visual Cue: Yellow wristband and red socks/Bed in lowest position, wheels locked, appropriate side rails in place/Call bell, personal items and telephone in reach/Instruct patient to call for assistance before getting out of bed or chair/Non-slip footwear when patient is out of bed/[place] to call system/Physically safe environment - no spills, clutter or unnecessary equipment/Purposeful Proactive Rounding/Room/bathroom lighting operational, light cord in reach Assistance with ambulation/Assistance OOB with selected safe patient handling equipment/Communicate Risk of Fall with Harm to all staff/Discuss with provider need for PT consult/Monitor gait and stability/Provide patient with walking aids - walker, cane, crutches/Reinforce activity limits and safety measures with patient and family/Tailored Fall Risk Interventions/Visual Cue: Yellow wristband and red socks/Bed in lowest position, wheels locked, appropriate side rails in place/Call bell, personal items and telephone in reach/Instruct patient to call for assistance before getting out of bed or chair/Non-slip footwear when patient is out of bed/[place] to call system/Physically safe environment - no spills, clutter or unnecessary equipment/Purposeful Proactive Rounding/Room/bathroom lighting operational, light cord in reach

## 2022-06-06 NOTE — ED PROVIDER NOTE - NS ED ATTENDING STATEMENT MOD
This was a shared visit with the [facility]. I reviewed and verified the documentation and independently performed the documented:

## 2022-06-06 NOTE — ED PROVIDER NOTE - CLINICAL SUMMARY MEDICAL DECISION MAKING FREE TEXT BOX
71 yo female with a medical history including Vascular dementia, HTN, HLD, Depression, NPH with  Shunt, Dermatomyositis and Seizures, sent in by NH staff for Altered Mental Status that started approx. 1 hour prior to arrival, with an episode of vomiting.     Febrile @ 103 rectal on arrival, Sepsis workup initiated: CBC, CMP, Lactate, UA, UC,BC. 73 yo female with a medical history including Vascular dementia, HTN, HLD, Depression, NPH with  Shunt, Dermatomyositis and Seizures, sent in by NH staff for Altered Mental Status that started approx. 1 hour prior to arrival, with an episode of vomiting.     Febrile @ 103 rectal on arrival, Sepsis workup initiated: CBC, CMP, Lactate, UA, UC,BC. 71 yo female with a medical history including Vascular dementia, HTN, HLD, Depression, NPH with  Shunt, Dermatomyositis and Seizures, sent in by NH staff for Altered Mental Status that started approx. 1 hour prior to arrival, with an episode of vomiting.     Febrile @ 103 rectal on arrival, Sepsis workup initiated: CBC, CMP, Lactate, UA, UC, BC, Abx.

## 2022-06-06 NOTE — ED PROVIDER NOTE - ATTENDING APP SHARED VISIT CONTRIBUTION OF CARE
Anabelle with [facility]. 71 yo female with a medical history including Vascular dementia, HTN, HLD, Depression, NPH with  Shunt, Dermatomyositis and Seizures, sent in by NH staff for Altered Mental Status that started approx. 1 hour prior to arrival, with an episode of vomiting.     Febrile @ 103 rectal on arrival, Sepsis workup initiated: CBC, CMP, Lactate, UA, UC, BC, Abx.    I performed a face to face bedside interview with patient regarding history of present illness, review of symptoms and past medical history. I completed an independent physical exam.  I have discussed the patient's plan of care with Physician Assistant (PA). I agree with note as stated above, having amended the EMR as needed to reflect my findings.   This includes History of Present Illness, HIV, Past Medical/Surgical/Family/Social History, Allergies and Home Medications, Review of Systems, Physical Exam, and any Progress Notes during the time I functioned as the attending physician for this patient. Anabelle with [facility]. 73 yo female with a medical history including Vascular dementia, HTN, HLD, Depression, NPH with  Shunt, Dermatomyositis and Seizures, sent in by NH staff for Altered Mental Status that started approx. 1 hour prior to arrival, with an episode of vomiting.     Febrile @ 103 rectal on arrival, Sepsis workup initiated: CBC, CMP, Lactate, UA, UC, BC, Abx.    I performed a face to face bedside interview with patient regarding history of present illness, review of symptoms and past medical history. I completed an independent physical exam.  I have discussed the patient's plan of care with Physician Assistant (PA). I agree with note as stated above, having amended the EMR as needed to reflect my findings.   This includes History of Present Illness, HIV, Past Medical/Surgical/Family/Social History, Allergies and Home Medications, Review of Systems, Physical Exam, and any Progress Notes during the time I functioned as the attending physician for this patient.

## 2022-06-06 NOTE — ED ADULT TRIAGE NOTE - ARRIVAL FROM
[facility]/Assisted living facility [facility]/Assisted living facility [facility]/Assisted living facility

## 2022-06-06 NOTE — H&P ADULT - HISTORY OF PRESENT ILLNESS
71 yo female with a medical history including Vascular dementia, HTN, HLD, Depression, NPH with  Shunt, Dermatomyositis and Seizures, sent in by NH staff for Altered Mental Status that started approx. 1 hour prior to arrival, with an episode of vomiting. in ER, febrile with temp 100. wbc normal. lactate, BMP normal. UA+, CT c/a/p: Left lower lobe pulmonary infiltrate for which clinical correlation with pneumonia is recommended. Nonspecific 8 mm right lower lobe lung nodule; follow-up chest CT may be pursued in 3 months to ensure stability or resolution. Mildly enlarged 1.1 cm precarinal lymph node. Small densities in the trachea and right bronchus intermedius may represent mucous secretion. Attention should be directed to this finding on the follow-up chest CT to ensure resolution. Dilated common bile measuring 11 mm in caliber, probably due to post cholecystectomy status. If there is a clinical suspicion for biliary obstruction, MRCP may be pursued for further evaluation. Mild splenomegaly. sepstic w/u initiate d by ED. NS 2200 mls along with Rocephin and zithromax was given and medical admission was called. nena has baseline dementia. not communicating    73 yo female with a medical history including Vascular dementia, HTN, HLD, Depression, NPH with  Shunt, Dermatomyositis and Seizures, sent in by NH staff for Altered Mental Status that started approx. 1 hour prior to arrival, with an episode of vomiting. in ER, febrile with temp 100. wbc normal. lactate, BMP normal. UA+, CT c/a/p: Left lower lobe pulmonary infiltrate for which clinical correlation with pneumonia is recommended. Nonspecific 8 mm right lower lobe lung nodule; follow-up chest CT may be pursued in 3 months to ensure stability or resolution. Mildly enlarged 1.1 cm precarinal lymph node. Small densities in the trachea and right bronchus intermedius may represent mucous secretion. Attention should be directed to this finding on the follow-up chest CT to ensure resolution. Dilated common bile measuring 11 mm in caliber, probably due to post cholecystectomy status. If there is a clinical suspicion for biliary obstruction, MRCP may be pursued for further evaluation. Mild splenomegaly. sepstic w/u initiate d by ED. NS 2200 mls along with Rocephin and zithromax was given and medical admission was called. nena has baseline dementia. not communicating

## 2022-06-06 NOTE — ED PROVIDER NOTE - NSICDXPASTMEDICALHX_GEN_ALL_CORE_FT
PAST MEDICAL HISTORY:  HTN (hypertension)     NPH (normal pressure hydrocephalus)     Seizures     Vascular dementia

## 2022-06-06 NOTE — ED PROVIDER NOTE - CONSTITUTIONAL MENTATION
Arousable, does not follow commands/awake/ALTERED LOC Arousable, follow some commands, does not answer questions/awake/ALTERED LOC

## 2022-06-06 NOTE — ED ADULT TRIAGE NOTE - NS ED NURSE AMBULANCES
[place] [employer] [facility] [place] [employer] [place] [employer] [facility] [place] [employer] [facility]

## 2022-06-06 NOTE — ED PROVIDER NOTE - DIMINISHED BREATH SOUNDS
mildly diminished at bases posteriorly. No wheezing, rales or ronchi/LEFT UPPER LOBE/LEFT LOWER LOBE

## 2022-06-06 NOTE — H&P ADULT - ASSESSMENT
Patient transferred care for [place], unable to get prior sleep studies to establish with local DME for supplies. HST ordered for today. Patient will complete, then we can set her up locally.      71 yo female with a medical history including Vascular dementia, HTN, HLD, Depression, NPH with  Shunt, Dermatomyositis and Seizures, sent in by NH staff for Altered Mental Status that started approx. 1 hour prior to arrival, with an episode of vomiting. in ER, febrile with temp 100. wbc normal. lactate, BMP normal. UA+, CT c/a/p: Left lower lobe pulmonary infiltrate for which clinical correlation with pneumonia is recommended. Nonspecific 8 mm right lower lobe lung nodule; follow-up chest CT may be pursued in 3 months to ensure stability or resolution. Mildly enlarged 1.1 cm precarinal lymph node. Small densities in the trachea and right bronchus intermedius may represent mucous secretion. Attention should be directed to this finding on the follow-up chest CT to ensure resolution. Dilated common bile measuring 11 mm in caliber, probably due to post cholecystectomy status. If there is a clinical suspicion for biliary obstruction, MRCP may be pursued for further evaluation. Mild splenomegaly. sepstic w/u initiate d by ED. NS 2200 mls along with Rocephin and zithromax was given and medical admission was called. patient has baseline dementia. not communicating    Acute metabolic encephalopathy  sepsis not present on admission  Pneumonia: likely aspiration,  UA+  lung nodule  mucus plug on C chest  h/o Vascular dementia, HTN, HLD, Depression, NPH with  Shunt, Dermatomyositis and Seizures,     plan:  admit to tele  NPO until SLP eval  IVF @ 60 mls/hr.. DC when taking PO.   watch for volume overload  I and O  s/p rocephin and Zithromax  will continue with zosyn and Zithromax  albuterol neb PRN  tylenol PRN  c/w keppra IV and LT4 25 IV. lasix IV PRN, Hydralazine IV PRN  hold other meds: metoprolol ER, losartan. lexapro,senna, melatonin. resume once PO  o2 PRN  sputum cx, urine legionella. dc zithromax if legionella neg  f/u BC, UC  Suction stat and PRN. informed RN  chest PT/PD  Pulm eval [ informed Dr. Lilly]    DVT-P: lovenox    will update claire lizarraga        71 yo female with a medical history including Vascular dementia, HTN, HLD, Depression, NPH with  Shunt, Dermatomyositis and Seizures, sent in by NH staff for Altered Mental Status that started approx. 1 hour prior to arrival, with an episode of vomiting. in ER, febrile with temp 100. wbc normal. lactate, BMP normal. UA+, CT c/a/p: Left lower lobe pulmonary infiltrate for which clinical correlation with pneumonia is recommended. Nonspecific 8 mm right lower lobe lung nodule; follow-up chest CT may be pursued in 3 months to ensure stability or resolution. Mildly enlarged 1.1 cm precarinal lymph node. Small densities in the trachea and right bronchus intermedius may represent mucous secretion. Attention should be directed to this finding on the follow-up chest CT to ensure resolution. Dilated common bile measuring 11 mm in caliber, probably due to post cholecystectomy status. If there is a clinical suspicion for biliary obstruction, MRCP may be pursued for further evaluation. Mild splenomegaly. sepstic w/u initiate d by ED. NS 2200 mls along with Rocephin and zithromax was given and medical admission was called. patient has baseline dementia. not communicating    Acute metabolic encephalopathy  sepsis not present on admission  Pneumonia: likely aspiration,  UA+  lung nodule  mucus plug on C chest  h/o Vascular dementia, HTN, HLD, Depression, NPH with  Shunt, Dermatomyositis and Seizures,     plan:  admit to tele  NPO until SLP eval  IVF @ 60 mls/hr.. DC when taking PO.   watch for volume overload  I and O  s/p rocephin and Zithromax  will continue with zosyn and Zithromax  albuterol neb PRN  tylenol PRN  c/w keppra IV and LT4 25 IV. lasix IV PRN, Hydralazine IV PRN  hold other meds: metoprolol ER, losartan. lexapro,senna, melatonin. resume once PO  o2 PRN  sputum cx, urine legionella. dc zithromax if legionella neg  f/u BC, UC  Suction stat and PRN. informed RN  chest PT/PD  check keppra level  aspiration fall, seizure precautions  Pulm eval [ informed Dr. Lilly]    DVT-P: lovenox    will update claire lizarraga        73 yo female with a medical history including Vascular dementia, HTN, HLD, Depression, NPH with  Shunt, Dermatomyositis and Seizures, sent in by NH staff for Altered Mental Status that started approx. 1 hour prior to arrival, with an episode of vomiting. in ER, febrile with temp 100. wbc normal. lactate, BMP normal. UA+, CT c/a/p: Left lower lobe pulmonary infiltrate for which clinical correlation with pneumonia is recommended. Nonspecific 8 mm right lower lobe lung nodule; follow-up chest CT may be pursued in 3 months to ensure stability or resolution. Mildly enlarged 1.1 cm precarinal lymph node. Small densities in the trachea and right bronchus intermedius may represent mucous secretion. Attention should be directed to this finding on the follow-up chest CT to ensure resolution. Dilated common bile measuring 11 mm in caliber, probably due to post cholecystectomy status. If there is a clinical suspicion for biliary obstruction, MRCP may be pursued for further evaluation. Mild splenomegaly. sepstic w/u initiate d by ED. NS 2200 mls along with Rocephin and zithromax was given and medical admission was called. patient has baseline dementia. not communicating    Acute metabolic encephalopathy  sepsis not present on admission  Pneumonia: likely aspiration,  UA+  lung nodule  mucus plug on C chest  h/o Vascular dementia, HTN, HLD, Depression, NPH with  Shunt, Dermatomyositis and Seizures,     plan:  admit to tele  NPO until SLP eval  IVF @ 60 mls/hr.. DC when taking PO.   watch for volume overload  I and O  s/p rocephin and Zithromax  will continue with zosyn and Zithromax  albuterol neb PRN  tylenol PRN  c/w keppra IV and LT4 25 IV. lasix IV PRN, Hydralazine IV PRN  hold other meds: metoprolol ER, losartan. lexapro,senna, melatonin. resume once PO  o2 PRN  sputum cx, urine legionella. dc zithromax if legionella neg  f/u BC, UC  Suction stat and PRN. informed RN  chest PT/PD  check keppra level  aspiration fall, seizure precautions  Pulm eval [ informed Dr. Lilly]    DVT-P: lovenox    will update claire lizarraga        71 yo female with a medical history including Vascular dementia, HTN, HLD, Depression, NPH with  Shunt, Dermatomyositis and Seizures, sent in by NH staff for Altered Mental Status that started approx. 1 hour prior to arrival, with an episode of vomiting. in ER, febrile with temp 100. wbc normal. lactate, BMP normal. UA+, CT c/a/p: Left lower lobe pulmonary infiltrate for which clinical correlation with pneumonia is recommended. Nonspecific 8 mm right lower lobe lung nodule; follow-up chest CT may be pursued in 3 months to ensure stability or resolution. Mildly enlarged 1.1 cm precarinal lymph node. Small densities in the trachea and right bronchus intermedius may represent mucous secretion. Attention should be directed to this finding on the follow-up chest CT to ensure resolution. Dilated common bile measuring 11 mm in caliber, probably due to post cholecystectomy status. If there is a clinical suspicion for biliary obstruction, MRCP may be pursued for further evaluation. Mild splenomegaly. sepstic w/u initiate d by ED. NS 2200 mls along with Rocephin and zithromax was given and medical admission was called. patient has baseline dementia. not communicating    Acute metabolic encephalopathy  sepsis not present on admission  Pneumonia: likely aspiration,  UA+  lung nodule  mucus plug on C chest  h/o Vascular dementia, HTN, HLD, Depression, NPH with  Shunt, Dermatomyositis and Seizures,     plan:  admit to tele  NPO until SLP eval  IVF @ 60 mls/hr.. DC when taking PO.   watch for volume overload  I and O  s/p rocephin and Zithromax  will continue with zosyn and Zithromax  albuterol neb PRN  tylenol PRN  c/w keppra IV and LT4 25 IV. lasix IV PRN, Hydralazine IV PRN  hold other meds: metoprolol ER, losartan. lexapro,senna, melatonin. resume once PO  o2 PRN  sputum cx, urine legionella. dc zithromax if legionella neg  f/u BC, UC  Suction stat and PRN. informed RN  chest PT/PD  check keppra level  aspiration fall, seizure precautions  Pulm eval [ informed Dr. Lilly]    DVT-P: lovenox  GOC: could not reach claire lizarraga. no MOLST in chart. palliative cx called.     called claire lizarraga [phone]. will try later        73 yo female with a medical history including Vascular dementia, HTN, HLD, Depression, NPH with  Shunt, Dermatomyositis and Seizures, sent in by NH staff for Altered Mental Status that started approx. 1 hour prior to arrival, with an episode of vomiting. in ER, febrile with temp 100. wbc normal. lactate, BMP normal. UA+, CT c/a/p: Left lower lobe pulmonary infiltrate for which clinical correlation with pneumonia is recommended. Nonspecific 8 mm right lower lobe lung nodule; follow-up chest CT may be pursued in 3 months to ensure stability or resolution. Mildly enlarged 1.1 cm precarinal lymph node. Small densities in the trachea and right bronchus intermedius may represent mucous secretion. Attention should be directed to this finding on the follow-up chest CT to ensure resolution. Dilated common bile measuring 11 mm in caliber, probably due to post cholecystectomy status. If there is a clinical suspicion for biliary obstruction, MRCP may be pursued for further evaluation. Mild splenomegaly. sepstic w/u initiate d by ED. NS 2200 mls along with Rocephin and zithromax was given and medical admission was called. patient has baseline dementia. not communicating    Acute metabolic encephalopathy  sepsis not present on admission  Pneumonia: likely aspiration,  UA+  lung nodule  mucus plug on C chest  h/o Vascular dementia, HTN, HLD, Depression, NPH with  Shunt, Dermatomyositis and Seizures,     plan:  admit to tele  NPO until SLP eval  IVF @ 60 mls/hr.. DC when taking PO.   watch for volume overload  I and O  s/p rocephin and Zithromax  will continue with zosyn and Zithromax  albuterol neb PRN  tylenol PRN  c/w keppra IV and LT4 25 IV. lasix IV PRN, Hydralazine IV PRN  hold other meds: metoprolol ER, losartan. lexapro,senna, melatonin. resume once PO  o2 PRN  sputum cx, urine legionella. dc zithromax if legionella neg  f/u BC, UC  Suction stat and PRN. informed RN  chest PT/PD  check keppra level  aspiration fall, seizure precautions  Pulm eval [ informed Dr. Lilly]    DVT-P: lovenox  GOC: could not reach claire lizarraga. no MOLST in chart. palliative cx called.     called claire lizarraga [phone]. will try later        71 yo female with a medical history including Vascular dementia, HTN, HLD, Depression, NPH with  Shunt, Dermatomyositis and Seizures, sent in by NH staff for Altered Mental Status that started approx. 1 hour prior to arrival, with an episode of vomiting. in ER, febrile with temp 100. wbc normal. lactate, BMP normal. UA+, CT c/a/p: Left lower lobe pulmonary infiltrate for which clinical correlation with pneumonia is recommended. Nonspecific 8 mm right lower lobe lung nodule; follow-up chest CT may be pursued in 3 months to ensure stability or resolution. Mildly enlarged 1.1 cm precarinal lymph node. Small densities in the trachea and right bronchus intermedius may represent mucous secretion. Attention should be directed to this finding on the follow-up chest CT to ensure resolution. Dilated common bile measuring 11 mm in caliber, probably due to post cholecystectomy status. If there is a clinical suspicion for biliary obstruction, MRCP may be pursued for further evaluation. Mild splenomegaly. sepstic w/u initiate d by ED. NS 2200 mls along with Rocephin and zithromax was given and medical admission was called. patient has baseline dementia. not communicating    Acute metabolic encephalopathy  sepsis not present on admission  Pneumonia: likely aspiration,  UA+  lung nodule  mucus plug on C chest  h/o Vascular dementia, HTN, HLD, Depression, NPH with  Shunt, Dermatomyositis and Seizures,     plan:  admit to tele  NPO until SLP eval  IVF @ 60 mls/hr.. DC when taking PO.   watch for volume overload  I and O  s/p rocephin and Zithromax  will continue with zosyn and Zithromax  albuterol neb PRN  tylenol PRN  c/w keppra IV and LT4 25 IV. lasix IV PRN, Hydralazine IV PRN  hold other meds: metoprolol ER, losartan. lexapro,senna, melatonin. resume once PO  o2 PRN  sputum cx, urine legionella. dc zithromax if legionella neg  f/u BC, UC  Suction stat and PRN. informed RN  chest PT/PD  check keppra level  aspiration fall, seizure precautions  Pulm eval [ informed Dr. Lilly]    DVT-P: lovenox  GOC: could not reach claire lizarraga. no MOLST in chart. palliative cx called.     called claire lizarraga [phone]. will try later        71 yo female with a medical history including Vascular dementia, HTN, HLD, Depression, NPH with  Shunt, Dermatomyositis and Seizures, sent in by NH staff for Altered Mental Status that started approx. 1 hour prior to arrival, with an episode of vomiting. in ER, febrile with temp 100. wbc normal. lactate, BMP normal. UA+, CT c/a/p: Left lower lobe pulmonary infiltrate for which clinical correlation with pneumonia is recommended. Nonspecific 8 mm right lower lobe lung nodule; follow-up chest CT may be pursued in 3 months to ensure stability or resolution. Mildly enlarged 1.1 cm precarinal lymph node. Small densities in the trachea and right bronchus intermedius may represent mucous secretion. Attention should be directed to this finding on the follow-up chest CT to ensure resolution. Dilated common bile measuring 11 mm in caliber, probably due to post cholecystectomy status. If there is a clinical suspicion for biliary obstruction, MRCP may be pursued for further evaluation. Mild splenomegaly. sepstic w/u initiate d by ED. NS 2200 mls along with Rocephin and zithromax was given and medical admission was called. patient has baseline dementia. not communicating    Acute metabolic encephalopathy  sepsis not present on admission  Pneumonia: likely aspiration,  UA+  lung nodule  mucus plug on C chest  h/o Vascular dementia, HTN, HLD, Depression, NPH with  Shunt, Dermatomyositis and Seizures,     plan:  admit to tele  NPO until SLP eval  IVF @ 60 mls/hr.. DC when taking PO.   watch for volume overload  I and O  s/p rocephin and Zithromax  will continue with zosyn and Zithromax  albuterol neb PRN  tylenol PRN  c/w keppra IV and LT4 25 IV. lasix IV PRN, Hydralazine IV PRN  hold other meds: metoprolol ER, losartan. lexapro,senna, melatonin. resume once PO  o2 PRN  sputum cx, urine legionella. dc zithromax if legionella neg  f/u BC, UC  Suction stat and PRN. informed RN  chest PT/PD  check keppra level  aspiration fall, seizure precautions  Pulm eval [ informed Dr. Lilly]    DVT-P: lovenox  GOC: could not reach claire lizarraga. no MOLST in chart. Melanie Childress will see him tomorrow    could not reach claire lizarraga [phone].        71 yo female with a medical history including Vascular dementia, HTN, HLD, Depression, NPH with  Shunt, Dermatomyositis and Seizures, sent in by NH staff for Altered Mental Status that started approx. 1 hour prior to arrival, with an episode of vomiting. in ER, febrile with temp 100. wbc normal. lactate, BMP normal. UA+, CT c/a/p: Left lower lobe pulmonary infiltrate for which clinical correlation with pneumonia is recommended. Nonspecific 8 mm right lower lobe lung nodule; follow-up chest CT may be pursued in 3 months to ensure stability or resolution. Mildly enlarged 1.1 cm precarinal lymph node. Small densities in the trachea and right bronchus intermedius may represent mucous secretion. Attention should be directed to this finding on the follow-up chest CT to ensure resolution. Dilated common bile measuring 11 mm in caliber, probably due to post cholecystectomy status. If there is a clinical suspicion for biliary obstruction, MRCP may be pursued for further evaluation. Mild splenomegaly. sepstic w/u initiate d by ED. NS 2200 mls along with Rocephin and zithromax was given and medical admission was called. patient has baseline dementia. not communicating    Acute metabolic encephalopathy  sepsis not present on admission  Pneumonia: likely aspiration,  UA+  lung nodule  mucus plug on C chest  h/o Vascular dementia, HTN, HLD, Depression, NPH with  Shunt, Dermatomyositis and Seizures,     plan:  admit to tele  NPO until SLP eval  IVF @ 60 mls/hr.. DC when taking PO.   watch for volume overload  I and O  s/p rocephin and Zithromax  will continue with zosyn and Zithromax  albuterol neb PRN  tylenol PRN  c/w keppra IV and LT4 25 IV. lasix IV PRN, Hydralazine IV PRN  hold other meds: metoprolol ER, losartan. lexapro,senna, melatonin. resume once PO  o2 PRN  sputum cx, urine legionella. dc zithromax if legionella neg  f/u BC, UC  Suction stat and PRN. informed RN  chest PT/PD  check keppra level  aspiration fall, seizure precautions  Pulm eval [ informed Dr. Lilly]    DVT-P: lovenox  GOC: could not reach claire lizarraga. no MOLST in chart. Melanie Childress will see him tomorrow    could not reach claire lizarraga [phone].        73 yo female with a medical history including Vascular dementia, HTN, HLD, Depression, NPH with  Shunt, Dermatomyositis and Seizures, sent in by NH staff for Altered Mental Status that started approx. 1 hour prior to arrival, with an episode of vomiting. in ER, febrile with temp 100. wbc normal. lactate, BMP normal. UA+, CT c/a/p: Left lower lobe pulmonary infiltrate for which clinical correlation with pneumonia is recommended. Nonspecific 8 mm right lower lobe lung nodule; follow-up chest CT may be pursued in 3 months to ensure stability or resolution. Mildly enlarged 1.1 cm precarinal lymph node. Small densities in the trachea and right bronchus intermedius may represent mucous secretion. Attention should be directed to this finding on the follow-up chest CT to ensure resolution. Dilated common bile measuring 11 mm in caliber, probably due to post cholecystectomy status. If there is a clinical suspicion for biliary obstruction, MRCP may be pursued for further evaluation. Mild splenomegaly. sepstic w/u initiate d by ED. NS 2200 mls along with Rocephin and zithromax was given and medical admission was called. patient has baseline dementia. not communicating    Acute metabolic encephalopathy  sepsis not present on admission  Pneumonia: likely aspiration,  UA+  lung nodule  mucus plug on C chest  h/o Vascular dementia, HTN, HLD, Depression, NPH with  Shunt, Dermatomyositis and Seizures,     plan:  admit to tele  NPO until SLP eval  IVF @ 60 mls/hr.. DC when taking PO.   watch for volume overload  I and O  s/p rocephin and Zithromax  will continue with zosyn and Zithromax  albuterol neb PRN  tylenol PRN  c/w keppra IV and LT4 25 IV. lasix IV PRN, Hydralazine IV PRN  hold other meds: metoprolol ER, losartan. lexapro,senna, melatonin. resume once PO  o2 PRN  sputum cx, urine legionella. dc zithromax if legionella neg  f/u BC, UC  Suction stat and PRN. informed RN  chest PT/PD  check keppra level  aspiration fall, seizure precautions  Pulm eval [ informed Dr. Lilly]    DVT-P: lovenox  GOC: could not reach claire lizarraga. no MOLST in chart. Melanie Childress will see him tomorrow    could not reach claire lizarraga [phone].        73 yo female with a medical history including Vascular dementia, HTN, HLD, Depression, NPH with  Shunt, Dermatomyositis and Seizures, sent in by NH staff for Altered Mental Status that started approx. 1 hour prior to arrival, with an episode of vomiting. in ER, febrile with temp 100. wbc normal. lactate, BMP normal. UA+, CT c/a/p: Left lower lobe pulmonary infiltrate for which clinical correlation with pneumonia is recommended. Nonspecific 8 mm right lower lobe lung nodule; follow-up chest CT may be pursued in 3 months to ensure stability or resolution. Mildly enlarged 1.1 cm precarinal lymph node. Small densities in the trachea and right bronchus intermedius may represent mucous secretion. Attention should be directed to this finding on the follow-up chest CT to ensure resolution. Dilated common bile measuring 11 mm in caliber, probably due to post cholecystectomy status. If there is a clinical suspicion for biliary obstruction, MRCP may be pursued for further evaluation. Mild splenomegaly. sepstic w/u initiate d by ED. NS 2200 mls along with Rocephin and zithromax was given and medical admission was called. patient has baseline dementia. not communicating    Acute metabolic encephalopathy  sepsis not present on admission  Pneumonia: likely aspiration,  UA+  lung nodule  mucus plug on C chest  h/o Vascular dementia, HTN, HLD, Depression, NPH with  Shunt, Dermatomyositis and Seizures,     plan:  admit to tele  NPO until SLP eval  IVF @ 60 mls/hr.. DC when taking PO.   watch for volume overload  I and O  s/p rocephin and Zithromax  will continue with zosyn and Zithromax  albuterol neb PRN  tylenol PRN  c/w keppra IV and LT4 25 IV. lasix IV PRN, Hydralazine IV PRN  hold other meds: metoprolol ER, losartan. lexapro,senna, melatonin. resume once PO  o2 PRN  sputum cx, urine legionella. dc zithromax if legionella neg  f/u BC, UC  Suction stat and PRN. informed RN  chest PT/PD  check keppra level  aspiration fall, seizure precautions  Pulm eval [ informed Dr. Lilly]    DVT-P: lovenox  GOC: could not reach claire lizarraga. no MOLST in chart. Melanie form palliative will see her tomorrow    could not reach claire lizarraga [phone]. could not leave VM        73 yo female with a medical history including Vascular dementia, HTN, HLD, Depression, NPH with  Shunt, Dermatomyositis and Seizures, sent in by NH staff for Altered Mental Status that started approx. 1 hour prior to arrival, with an episode of vomiting. in ER, febrile with temp 100. wbc normal. lactate, BMP normal. UA+, CT c/a/p: Left lower lobe pulmonary infiltrate for which clinical correlation with pneumonia is recommended. Nonspecific 8 mm right lower lobe lung nodule; follow-up chest CT may be pursued in 3 months to ensure stability or resolution. Mildly enlarged 1.1 cm precarinal lymph node. Small densities in the trachea and right bronchus intermedius may represent mucous secretion. Attention should be directed to this finding on the follow-up chest CT to ensure resolution. Dilated common bile measuring 11 mm in caliber, probably due to post cholecystectomy status. If there is a clinical suspicion for biliary obstruction, MRCP may be pursued for further evaluation. Mild splenomegaly. sepstic w/u initiate d by ED. NS 2200 mls along with Rocephin and zithromax was given and medical admission was called. patient has baseline dementia. not communicating    Acute metabolic encephalopathy  sepsis not present on admission  Pneumonia: likely aspiration,  UA+  lung nodule  mucus plug on C chest  h/o Vascular dementia, HTN, HLD, Depression, NPH with  Shunt, Dermatomyositis and Seizures,     plan:  admit to tele  NPO until SLP eval  IVF @ 60 mls/hr.. DC when taking PO.   watch for volume overload  I and O  s/p rocephin and Zithromax  will continue with zosyn and Zithromax  albuterol neb PRN  tylenol PRN  c/w keppra IV and LT4 25 IV. lasix IV PRN, Hydralazine IV PRN  hold other meds: metoprolol ER, losartan. lexapro,senna, melatonin. resume once PO  o2 PRN  sputum cx, urine legionella. dc zithromax if legionella neg  f/u BC, UC  Suction stat and PRN. informed RN  chest PT/PD  check keppra level  aspiration fall, seizure precautions  Pulm eval [ informed Dr. Lilly]    DVT-P: lovenox  GOC: could not reach claire lizarraga. no MOLST in chart. Melanie form palliative will see her tomorrow    could not reach claire lizarraga [phone]. could not leave VM        73 yo female with a medical history including Vascular dementia, HTN, HLD, Depression, NPH with  Shunt, Dermatomyositis and Seizures, sent in by NH staff for Altered Mental Status that started approx. 1 hour prior to arrival, with an episode of vomiting. in ER, febrile with temp 100. wbc normal. lactate, BMP normal. UA+, CT c/a/p: Left lower lobe pulmonary infiltrate for which clinical correlation with pneumonia is recommended. Nonspecific 8 mm right lower lobe lung nodule; follow-up chest CT may be pursued in 3 months to ensure stability or resolution. Mildly enlarged 1.1 cm precarinal lymph node. Small densities in the trachea and right bronchus intermedius may represent mucous secretion. Attention should be directed to this finding on the follow-up chest CT to ensure resolution. Dilated common bile measuring 11 mm in caliber, probably due to post cholecystectomy status. If there is a clinical suspicion for biliary obstruction, MRCP may be pursued for further evaluation. Mild splenomegaly. sepstic w/u initiate d by ED. NS 2200 mls along with Rocephin and zithromax was given and medical admission was called. patient has baseline dementia. not communicating    Acute metabolic encephalopathy  sepsis not present on admission  Pneumonia: likely aspiration,  UA+  lung nodule  mucus plug on C chest  h/o Vascular dementia, HTN, HLD, Depression, NPH with  Shunt, Dermatomyositis and Seizures,     plan:  admit to tele  NPO until SLP eval  IVF @ 60 mls/hr.. DC when taking PO.   watch for volume overload  I and O  s/p rocephin and Zithromax  will continue with zosyn and Zithromax  albuterol neb PRN  tylenol PRN  c/w keppra IV and LT4 25 IV. lasix IV PRN, Hydralazine IV PRN  hold other meds: metoprolol ER, losartan. lexapro,senna, melatonin. resume once PO  o2 PRN  sputum cx, urine legionella. dc zithromax if legionella neg  f/u BC, UC  Suction stat and PRN. informed RN  chest PT/PD  check keppra level  aspiration fall, seizure precautions  Pulm eval [ informed Dr. Lilly]    DVT-P: lovenox  GOC: could not reach claire lizarraga. no MOLST in chart. Melanie form palliative will see her tomorrow    could not reach claire lizarraga [phone]. could not leave VM        71 yo female with a medical history including Vascular dementia, HTN, HLD, Depression, NPH with  Shunt, Dermatomyositis and Seizures, sent in by NH staff for Altered Mental Status that started approx. 1 hour prior to arrival, with an episode of vomiting. in ER, febrile with temp 100. wbc normal. lactate, BMP normal. UA+, CT c/a/p: Left lower lobe pulmonary infiltrate for which clinical correlation with pneumonia is recommended. Nonspecific 8 mm right lower lobe lung nodule; follow-up chest CT may be pursued in 3 months to ensure stability or resolution. Mildly enlarged 1.1 cm precarinal lymph node. Small densities in the trachea and right bronchus intermedius may represent mucous secretion. Attention should be directed to this finding on the follow-up chest CT to ensure resolution. Dilated common bile measuring 11 mm in caliber, probably due to post cholecystectomy status. If there is a clinical suspicion for biliary obstruction, MRCP may be pursued for further evaluation. Mild splenomegaly. sepstic w/u initiate d by ED. NS 2200 mls along with Rocephin and zithromax was given and medical admission was called. patient has baseline dementia. not communicating    Acute metabolic encephalopathy  sepsis not present on admission  Pneumonia: likely aspiration, vs viral  parainfluenza+  UA+  lung nodule  mucus plug on C chest  h/o Vascular dementia, HTN, HLD, Depression, NPH with  Shunt, Dermatomyositis and Seizures,     plan:  admit to tele  NPO until SLP eval  IVF @ 60 mls/hr.. DC when taking PO.   watch for volume overload  I and O  s/p rocephin and Zithromax  will continue with zosyn and Zithromax  albuterol neb PRN  tylenol PRN  c/w keppra IV and LT4 25 IV. lasix IV PRN, Hydralazine IV PRN  hold other meds: metoprolol ER, losartan. lexapro,senna, melatonin. resume once PO  o2 PRN  sputum cx, urine legionella. dc zithromax if legionella neg  f/u BC, UC  Suction stat and PRN. informed RN  chest PT/PD  check keppra level  supportive care for parainfluenza  aspiration fall, seizure precautions  Pulm eval [ informed Dr. Lilly]    DVT-P: lovenox  GOC: could not reach son elham. no MOLST in chart. Melanie form palliative will see her tomorrow    could not reach son elham [phone]. could not leave VM        73 yo female with a medical history including Vascular dementia, HTN, HLD, Depression, NPH with  Shunt, Dermatomyositis and Seizures, sent in by NH staff for Altered Mental Status that started approx. 1 hour prior to arrival, with an episode of vomiting. in ER, febrile with temp 100. wbc normal. lactate, BMP normal. UA+, CT c/a/p: Left lower lobe pulmonary infiltrate for which clinical correlation with pneumonia is recommended. Nonspecific 8 mm right lower lobe lung nodule; follow-up chest CT may be pursued in 3 months to ensure stability or resolution. Mildly enlarged 1.1 cm precarinal lymph node. Small densities in the trachea and right bronchus intermedius may represent mucous secretion. Attention should be directed to this finding on the follow-up chest CT to ensure resolution. Dilated common bile measuring 11 mm in caliber, probably due to post cholecystectomy status. If there is a clinical suspicion for biliary obstruction, MRCP may be pursued for further evaluation. Mild splenomegaly. sepstic w/u initiate d by ED. NS 2200 mls along with Rocephin and zithromax was given and medical admission was called. patient has baseline dementia. not communicating    Acute metabolic encephalopathy  sepsis not present on admission  Pneumonia: likely aspiration, vs viral  parainfluenza+  UA+  lung nodule  mucus plug on C chest  h/o Vascular dementia, HTN, HLD, Depression, NPH with  Shunt, Dermatomyositis and Seizures,     plan:  admit to tele  NPO until SLP eval  IVF @ 60 mls/hr.. DC when taking PO.   watch for volume overload  I and O  s/p rocephin and Zithromax  will continue with zosyn and Zithromax  albuterol neb PRN  tylenol PRN  c/w keppra IV and LT4 25 IV. lasix IV PRN, Hydralazine IV PRN  hold other meds: metoprolol ER, losartan. lexapro,senna, melatonin. resume once PO  o2 PRN  sputum cx, urine legionella. dc zithromax if legionella neg  f/u BC, UC  Suction stat and PRN. informed RN  chest PT/PD  check keppra level  supportive care for parainfluenza  aspiration fall, seizure precautions  Pulm eval [ informed Dr. Lilly]    DVT-P: lovenox  GOC: could not reach son elham. no MOLST in chart. Melanie form palliative will see her tomorrow    could not reach son elham [phone]. could not leave VM        71 yo female with a medical history including Vascular dementia, HTN, HLD, Depression, NPH with  Shunt, Dermatomyositis and Seizures, sent in by NH staff for Altered Mental Status that started approx. 1 hour prior to arrival, with an episode of vomiting. in ER, febrile with temp 100. wbc normal. lactate, BMP normal. UA+, CT c/a/p: Left lower lobe pulmonary infiltrate for which clinical correlation with pneumonia is recommended. Nonspecific 8 mm right lower lobe lung nodule; follow-up chest CT may be pursued in 3 months to ensure stability or resolution. Mildly enlarged 1.1 cm precarinal lymph node. Small densities in the trachea and right bronchus intermedius may represent mucous secretion. Attention should be directed to this finding on the follow-up chest CT to ensure resolution. Dilated common bile measuring 11 mm in caliber, probably due to post cholecystectomy status. If there is a clinical suspicion for biliary obstruction, MRCP may be pursued for further evaluation. Mild splenomegaly. sepstic w/u initiate d by ED. NS 2200 mls along with Rocephin and zithromax was given and medical admission was called. patient has baseline dementia. not communicating    Acute metabolic encephalopathy  sepsis not present on admission  Pneumonia: likely aspiration, vs viral  parainfluenza+  UA+  lung nodule  mucus plug on C chest  h/o Vascular dementia, HTN, HLD, Depression, NPH with  Shunt, Dermatomyositis and Seizures,     plan:  admit to tele  NPO until SLP eval  IVF @ 60 mls/hr.. DC when taking PO.   watch for volume overload  I and O  s/p rocephin and Zithromax  will continue with zosyn and Zithromax  albuterol neb PRN  tylenol PRN  c/w keppra IV and LT4 25 IV. lasix IV PRN, Hydralazine IV PRN  hold other meds: metoprolol ER, losartan. lexapro,senna, melatonin. resume once PO  o2 PRN  sputum cx, urine legionella. dc zithromax if legionella neg  f/u BC, UC  Suction stat and PRN. informed RN  chest PT/PD  check keppra level  supportive care for parainfluenza  aspiration fall, seizure precautions  Pulm eval [ informed Dr. Lilly]    DVT-P: lovenox  GOC: could not reach son elham. no MOLST in chart. Melanie form palliative will see her tomorrow    could not reach son elham [phone]. could not leave VM        71 yo female with a medical history including Vascular dementia, HTN, HLD, Depression, NPH with  Shunt, Dermatomyositis and Seizures, sent in by NH staff for Altered Mental Status that started approx. 1 hour prior to arrival, with an episode of vomiting. in ER, febrile with temp 100. wbc normal. lactate, BMP normal. UA+, CT c/a/p: Left lower lobe pulmonary infiltrate for which clinical correlation with pneumonia is recommended. Nonspecific 8 mm right lower lobe lung nodule; follow-up chest CT may be pursued in 3 months to ensure stability or resolution. Mildly enlarged 1.1 cm precarinal lymph node. Small densities in the trachea and right bronchus intermedius may represent mucous secretion. Attention should be directed to this finding on the follow-up chest CT to ensure resolution. Dilated common bile measuring 11 mm in caliber, probably due to post cholecystectomy status. If there is a clinical suspicion for biliary obstruction, MRCP may be pursued for further evaluation. Mild splenomegaly. sepstic w/u initiate d by ED. NS 2200 mls along with Rocephin and zithromax was given and medical admission was called. patient has baseline dementia. not communicating    Acute metabolic encephalopathy  sepsis not present on admission  Pneumonia: likely aspiration, vs viral  parainfluenza+  UA+  lung nodule  mucus plug on C chest  h/o Vascular dementia, HTN, HLD, Depression, NPH with  Shunt, Dermatomyositis and Seizures,     plan:  admit to tele  NPO until SLP eval  IVF @ 60 mls/hr.. DC when taking PO.   watch for volume overload  I and O  s/p rocephin and Zithromax  will continue with zosyn and Zithromax  albuterol neb PRN  tylenol PRN  c/w keppra IV and LT4 25 IV. lasix IV PRN, Hydralazine IV PRN  hold other meds: metoprolol ER, losartan. lexapro,senna, melatonin. resume once PO  o2 PRN  sputum cx, urine legionella. dc zithromax if legionella neg  f/u BC, UC  Suction stat and PRN. informed RN  chest PT/PD  check keppra level  supportive care for parainfluenza  lung nodule: follow-up chest CT may be pursued in 3 months to ensure stability or resolution.  aspiration fall, seizure precautions  Pulm eval [ informed Dr. Lilly]    DVT-P: lovenox  [facility]: could not reach son elham. no MOLST in chart. Melanie form palliative will see her tomorrow    could not reach son elham [phone]. could not leave VM        73 yo female with a medical history including Vascular dementia, HTN, HLD, Depression, NPH with  Shunt, Dermatomyositis and Seizures, sent in by NH staff for Altered Mental Status that started approx. 1 hour prior to arrival, with an episode of vomiting. in ER, febrile with temp 100. wbc normal. lactate, BMP normal. UA+, CT c/a/p: Left lower lobe pulmonary infiltrate for which clinical correlation with pneumonia is recommended. Nonspecific 8 mm right lower lobe lung nodule; follow-up chest CT may be pursued in 3 months to ensure stability or resolution. Mildly enlarged 1.1 cm precarinal lymph node. Small densities in the trachea and right bronchus intermedius may represent mucous secretion. Attention should be directed to this finding on the follow-up chest CT to ensure resolution. Dilated common bile measuring 11 mm in caliber, probably due to post cholecystectomy status. If there is a clinical suspicion for biliary obstruction, MRCP may be pursued for further evaluation. Mild splenomegaly. sepstic w/u initiate d by ED. NS 2200 mls along with Rocephin and zithromax was given and medical admission was called. patient has baseline dementia. not communicating    Acute metabolic encephalopathy  sepsis not present on admission  Pneumonia: likely aspiration, vs viral  parainfluenza+  UA+  lung nodule  mucus plug on C chest  h/o Vascular dementia, HTN, HLD, Depression, NPH with  Shunt, Dermatomyositis and Seizures,     plan:  admit to tele  NPO until SLP eval  IVF @ 60 mls/hr.. DC when taking PO.   watch for volume overload  I and O  s/p rocephin and Zithromax  will continue with zosyn and Zithromax  albuterol neb PRN  tylenol PRN  c/w keppra IV and LT4 25 IV. lasix IV PRN, Hydralazine IV PRN  hold other meds: metoprolol ER, losartan. lexapro,senna, melatonin. resume once PO  o2 PRN  sputum cx, urine legionella. dc zithromax if legionella neg  f/u BC, UC  Suction stat and PRN. informed RN  chest PT/PD  check keppra level  supportive care for parainfluenza  lung nodule: follow-up chest CT may be pursued in 3 months to ensure stability or resolution.  aspiration fall, seizure precautions  Pulm eval [ informed Dr. Lilly]    DVT-P: lovenox  [facility]: could not reach son elham. no MOLST in chart. Melanie form palliative will see her tomorrow    could not reach son elham [phone]. could not leave VM        73 yo female with a medical history including Vascular dementia, HTN, HLD, Depression, NPH with  Shunt, Dermatomyositis and Seizures, sent in by NH staff for Altered Mental Status that started approx. 1 hour prior to arrival, with an episode of vomiting. in ER, febrile with temp 100. wbc normal. lactate, BMP normal. UA+, CT c/a/p: Left lower lobe pulmonary infiltrate for which clinical correlation with pneumonia is recommended. Nonspecific 8 mm right lower lobe lung nodule; follow-up chest CT may be pursued in 3 months to ensure stability or resolution. Mildly enlarged 1.1 cm precarinal lymph node. Small densities in the trachea and right bronchus intermedius may represent mucous secretion. Attention should be directed to this finding on the follow-up chest CT to ensure resolution. Dilated common bile measuring 11 mm in caliber, probably due to post cholecystectomy status. If there is a clinical suspicion for biliary obstruction, MRCP may be pursued for further evaluation. Mild splenomegaly. sepstic w/u initiate d by ED. NS 2200 mls along with Rocephin and zithromax was given and medical admission was called. patient has baseline dementia. not communicating    Acute metabolic encephalopathy  sepsis not present on admission  Pneumonia: likely aspiration, vs viral  parainfluenza+  UA+  lung nodule  mucus plug on C chest  h/o Vascular dementia, HTN, HLD, Depression, NPH with  Shunt, Dermatomyositis and Seizures,     plan:  admit to tele  NPO until SLP eval  IVF @ 60 mls/hr.. DC when taking PO.   watch for volume overload  I and O  s/p rocephin and Zithromax  will continue with zosyn and Zithromax  albuterol neb PRN  tylenol PRN  c/w keppra IV and LT4 25 IV. lasix IV PRN, Hydralazine IV PRN  hold other meds: metoprolol ER, losartan. lexapro,senna, melatonin. resume once PO  o2 PRN  sputum cx, urine legionella. dc zithromax if legionella neg  f/u BC, UC  Suction stat and PRN. informed RN  chest PT/PD  check keppra level  supportive care for parainfluenza  lung nodule: follow-up chest CT may be pursued in 3 months to ensure stability or resolution.  aspiration fall, seizure precautions  Pulm eval [ informed Dr. Lilly]    DVT-P: lovenox  [facility]: could not reach son elham. no MOLST in chart. Melanie form palliative will see her tomorrow    could not reach son elham [phone]. could not leave VM

## 2022-06-06 NOTE — ED PROVIDER NOTE - OBJECTIVE STATEMENT
71 yo female with a medical history including Vascular dementia, HTN, HLD, Depression, NPH with  Shunt, Dermatomyositis and Seizures, sent in by NH staff for Altered Mental Status that started approx. 1 hour prior to arrival, with an episode of vomiting.

## 2022-06-06 NOTE — ED ADULT TRIAGE NOTE - CHIEF COMPLAINT QUOTE
BIB EMS from [facility] for altered mental status x1 hour. Pt febrile on arrival. Episode of vomiting at facility. Pt unable to follow commands. Hx seizures BIB EMS from [facility] for altered mental status x1 hour. Pt febrile on arrival. Episode of vomiting at facility. Pt unable to follow commands. Hx seizures BIB EMS from [facility] for altered mental status x1 hour. Pt febrile on arrival. Episode of vomiting at facility. Pt unable to follow commands. Hx seizures BIB EMS from [facility] for altered mental status x1 hour. Pt febrile on arrival,  in triage. Episode of vomiting at facility. Pt unable to follow commands. Hx seizures BIB EMS from [facility] for altered mental status x1 hour. Pt febrile on arrival,  in triage. Episode of vomiting at facility. Pt unable to follow commands. Hx seizures BIB EMS from [facility] for altered mental status x1 hour. Pt febrile on arrival,  in triage. Episode of vomiting at facility. Pt unable to follow commands. Hx seizures

## 2022-06-07 LAB
ANION GAP SERPL CALC-SCNC: 7 MMOL/L — SIGNIFICANT CHANGE UP (ref 5–17)
BASOPHILS # BLD AUTO: 0.02 K/UL — SIGNIFICANT CHANGE UP (ref 0–0.2)
BASOPHILS NFR BLD AUTO: 0.3 % — SIGNIFICANT CHANGE UP (ref 0–2)
BUN SERPL-MCNC: 11 MG/DL — SIGNIFICANT CHANGE UP (ref 7–23)
CALCIUM SERPL-MCNC: 8.7 MG/DL — SIGNIFICANT CHANGE UP (ref 8.4–10.5)
CHLORIDE SERPL-SCNC: 108 MMOL/L — SIGNIFICANT CHANGE UP (ref 96–108)
CO2 SERPL-SCNC: 26 MMOL/L — SIGNIFICANT CHANGE UP (ref 22–31)
CREAT SERPL-MCNC: 0.65 MG/DL — SIGNIFICANT CHANGE UP (ref 0.5–1.3)
CULTURE RESULTS: SIGNIFICANT CHANGE UP
EGFR: 93 ML/MIN/1.73M2 — SIGNIFICANT CHANGE UP
EOSINOPHIL # BLD AUTO: 0.15 K/UL — SIGNIFICANT CHANGE UP (ref 0–0.5)
EOSINOPHIL NFR BLD AUTO: 2.4 % — SIGNIFICANT CHANGE UP (ref 0–6)
GLUCOSE SERPL-MCNC: 78 MG/DL — SIGNIFICANT CHANGE UP (ref 70–99)
HCT VFR BLD CALC: 33.3 % — LOW (ref 34.5–45)
HCV AB S/CO SERPL IA: 0.11 S/CO — SIGNIFICANT CHANGE UP (ref 0–0.99)
HCV AB SERPL-IMP: SIGNIFICANT CHANGE UP
HGB BLD-MCNC: 10.8 G/DL — LOW (ref 11.5–15.5)
IMM GRANULOCYTES NFR BLD AUTO: 0.3 % — SIGNIFICANT CHANGE UP (ref 0–1.5)
LEGIONELLA AG UR QL: NEGATIVE — SIGNIFICANT CHANGE UP
LYMPHOCYTES # BLD AUTO: 1.03 K/UL — SIGNIFICANT CHANGE UP (ref 1–3.3)
LYMPHOCYTES # BLD AUTO: 16.2 % — SIGNIFICANT CHANGE UP (ref 13–44)
MAGNESIUM SERPL-MCNC: 2.1 MG/DL — SIGNIFICANT CHANGE UP (ref 1.6–2.6)
MCHC RBC-ENTMCNC: 31.2 PG — SIGNIFICANT CHANGE UP (ref 27–34)
MCHC RBC-ENTMCNC: 32.4 GM/DL — SIGNIFICANT CHANGE UP (ref 32–36)
MCV RBC AUTO: 96.2 FL — SIGNIFICANT CHANGE UP (ref 80–100)
MONOCYTES # BLD AUTO: 0.46 K/UL — SIGNIFICANT CHANGE UP (ref 0–0.9)
MONOCYTES NFR BLD AUTO: 7.3 % — SIGNIFICANT CHANGE UP (ref 2–14)
NEUTROPHILS # BLD AUTO: 4.66 K/UL — SIGNIFICANT CHANGE UP (ref 1.8–7.4)
NEUTROPHILS NFR BLD AUTO: 73.5 % — SIGNIFICANT CHANGE UP (ref 43–77)
NRBC # BLD: 0 /100 WBCS — SIGNIFICANT CHANGE UP (ref 0–0)
PLATELET # BLD AUTO: 118 K/UL — LOW (ref 150–400)
POTASSIUM SERPL-MCNC: 3.5 MMOL/L — SIGNIFICANT CHANGE UP (ref 3.5–5.3)
POTASSIUM SERPL-SCNC: 3.5 MMOL/L — SIGNIFICANT CHANGE UP (ref 3.5–5.3)
RBC # BLD: 3.46 M/UL — LOW (ref 3.8–5.2)
RBC # FLD: 13.2 % — SIGNIFICANT CHANGE UP (ref 10.3–14.5)
SODIUM SERPL-SCNC: 141 MMOL/L — SIGNIFICANT CHANGE UP (ref 135–145)
SPECIMEN SOURCE: SIGNIFICANT CHANGE UP
WBC # BLD: 6.34 K/UL — SIGNIFICANT CHANGE UP (ref 3.8–10.5)
WBC # FLD AUTO: 6.34 K/UL — SIGNIFICANT CHANGE UP (ref 3.8–10.5)

## 2022-06-07 PROCEDURE — 99233 SBSQ HOSP IP/OBS HIGH 50: CPT

## 2022-06-07 RX ORDER — LEVOTHYROXINE SODIUM 125 MCG
50 TABLET ORAL DAILY
Refills: 0 | Status: DISCONTINUED | OUTPATIENT
Start: 2022-06-08 | End: 2022-06-09

## 2022-06-07 RX ORDER — LEVETIRACETAM 250 MG/1
500 TABLET, FILM COATED ORAL
Refills: 0 | Status: DISCONTINUED | OUTPATIENT
Start: 2022-06-07 | End: 2022-06-09

## 2022-06-07 RX ORDER — CEFTRIAXONE 500 MG/1
1000 INJECTION, POWDER, FOR SOLUTION INTRAMUSCULAR; INTRAVENOUS EVERY 24 HOURS
Refills: 0 | Status: DISCONTINUED | OUTPATIENT
Start: 2022-06-07 | End: 2022-06-09

## 2022-06-07 RX ADMIN — Medication 25 MICROGRAM(S): at 05:06

## 2022-06-07 RX ADMIN — ENOXAPARIN SODIUM 40 MILLIGRAM(S): 100 INJECTION SUBCUTANEOUS at 13:52

## 2022-06-07 RX ADMIN — LEVETIRACETAM 400 MILLIGRAM(S): 250 TABLET, FILM COATED ORAL at 05:06

## 2022-06-07 RX ADMIN — CEFTRIAXONE 100 MILLIGRAM(S): 500 INJECTION, POWDER, FOR SOLUTION INTRAMUSCULAR; INTRAVENOUS at 13:52

## 2022-06-07 RX ADMIN — LEVETIRACETAM 500 MILLIGRAM(S): 250 TABLET, FILM COATED ORAL at 17:37

## 2022-06-07 RX ADMIN — SODIUM CHLORIDE 60 MILLILITER(S): 9 INJECTION, SOLUTION INTRAVENOUS at 12:54

## 2022-06-07 RX ADMIN — PIPERACILLIN AND TAZOBACTAM 25 GRAM(S): 4; .5 INJECTION, POWDER, LYOPHILIZED, FOR SOLUTION INTRAVENOUS at 05:47

## 2022-06-07 NOTE — CONSULT NOTE ADULT - SUBJECTIVE AND OBJECTIVE BOX
PULMONARY CONSULT  Location of Patient : [facility] [address] ([facility])  Attending requesting Consult:Thanh Man  Chief Complaint :   Reason For consult : COPD / PNA      Initial HPI on admission:  HPI:  72 year old female with h/o Dementia, HTN, High chol Depression, NPH s/p  shunt, Dermatomyositis, seizure d/o who was sent from NH for AMS x 1 hour prior to arrival.  In ED had episode of n/v.  temp 100. wbc normal. lactate, BMP normal. UA+, CT c/a/p: Left lower lobe pulmonary infiltrate for which clinical correlation with pneumonia is recommended.   Nonspecific 8 mm right lower lobe lung nodule; follow-up chest CT may be pursued in 3 months to ensure stability or resolution. Mildly enlarged 1.1 cm precarinal lymph node. Small densities in the trachea and right bronchus intermedius may represent mucous secretion. Attention should be directed to this finding on the follow-up chest CT to ensure resolution. Dilated common bile measuring 11 mm in caliber, probably due to post cholecystectomy status. If there is a clinical suspicion for biliary obstruction, MRCP may be pursued for further evaluation. Mild splenomegaly. sepstic w/u initiate d by ED. NS 2200 mls along with Rocephin and zithromax was given and medical admission was called. paient has baseline dementia. not communicating   (2022 14:10)      BRIEF HOSPITAL COURSE:   Patient seen and examined comfrotable, non verbal, unable to provide history or ROS  workup also showed Parainfluenza 3       PAST MEDICAL & SURGICAL HISTORY:  NPH (normal pressure hydrocephalus)  Vascular dementia  HTN (hypertension)  Seizures  Dermatomyositis  Falls  S/P  shunt        Allergies  No Known Allergies    Intolerances      Due to current medical status patient unable to provide medical, social, family history.  History obtained from available medical record.     Medications:  MEDICATIONS  (STANDING):  ALBUTerol    90 MICROgram(s) HFA Inhaler 1 Puff(s) Inhalation every 4 hours  cefTRIAXone   IVPB 1000 milliGRAM(s) IV Intermittent every 24 hours  dextrose 5% + sodium chloride 0.45%. 1000 milliLiter(s) (60 mL/Hr) IV Continuous <Continuous>  enoxaparin Injectable 40 milliGRAM(s) SubCutaneous every 24 hours  levETIRAcetam  IVPB 500 milliGRAM(s) IV Intermittent every 12 hours  levothyroxine Injectable 25 MICROGram(s) IV Push <User Schedule>  pantoprazole    Tablet 40 milliGRAM(s) Oral before breakfast    MEDICATIONS  (PRN):  acetaminophen     Tablet .. 650 milliGRAM(s) Oral every 6 hours PRN Temp greater or equal to 38C (100.4F), Mild Pain (1 - 3)  ALBUTerol    0.083% 2.5 milliGRAM(s) Nebulizer every 6 hours PRN Bronchospasm  bisacodyl Suppository 10 milliGRAM(s) Rectal daily PRN Constipation  hydrALAZINE Injectable 5 milliGRAM(s) IV Push every 6 hours PRN SBP> 160 or DBP >100      Antibiotics History  azithromycin  IVPB 500 milliGRAM(s) IV Intermittent once, 22 @ 10:42, Stop order after: 1 Doses  azithromycin  IVPB 500 milliGRAM(s) IV Intermittent every 24 hours, 22 @ 00:00  cefTRIAXone   IVPB 1000 milliGRAM(s) IV Intermittent every 24 hours, 22 @ 14:00, Stop order after: 7 Days  cefTRIAXone   IVPB 1000 milliGRAM(s) IV Intermittent once, 22 @ 10:42, Stop order after: 1 Doses  cefTRIAXone   IVPB 1000 milliGRAM(s) IV Intermittent every 24 hours, 22 @ 00:00, Stop order after: 5 Days  piperacillin/tazobactam IVPB. 3.375 Gram(s) IV Intermittent once, 22 @ 14:27, Stop order after: 1 Doses  piperacillin/tazobactam IVPB.. 3.375 Gram(s) IV Intermittent every 8 hours, 22 @ 14:27, Stop order after: 7 Days      Heme Medications   enoxaparin Injectable 40 milliGRAM(s) SubCutaneous every 24 hours, 22 @ 00:00      GI Medications  bisacodyl Suppository 10 milliGRAM(s) Rectal daily, 22 @ 14:34, Routine PRN  pantoprazole    Tablet 40 milliGRAM(s) Oral before breakfast, 22 @ 13:48, Routine        Home Medications:  Last Order Reconciliation Date: 22 @ 14:24 (Admission Reconciliation)  escitalopram 20 mg oral tablet: 1 tab(s) orally once a day (22 @ 14:05)  furosemide 20 mg oral tablet: 1 tab(s) orally once a day (22 @ 14:05)  levETIRAcetam 500 mg oral tablet: 1 tab(s) orally 2 times a day (22 @ 14:05)  levothyroxine 50 mcg (0.05 mg) oral tablet: 1 tab(s) orally once a day (22 @ 14:05)  losartan 25 mg oral tablet: 1 tab(s) orally once a day (22 @ 14:05)  Melatonin 5 mg oral tablet: 1 tab(s) orally once a day (at bedtime) (22 @ 14:05)  Metoprolol Succinate ER 25 mg oral tablet, extended release: 1 tab(s) orally once a day (22 @ 14:05)  Senna 8.6 mg oral tablet: 1 tab(s) orally once a day (at bedtime) (22 @ 14:05)      LABS:                        10.8   6.34  )-----------( 118      ( 2022 08:15 )             33.3     06-07    141  |  108  |  11  ----------------------------<  78  3.5   |  26  |  0.65    Ca    8.7      2022 06:18  Mg     2.1     -    TPro  7.8  /  Alb  3.4  /  TBili  0.5  /  DBili  x   /  AST  46<H>  /  ALT  56<H>  /  AlkPhos  78  06-06       PT/INR - ( 2022 10:05 )   PT: 12.9 sec;   INR: 1.11 ratio         PTT - ( 2022 10:05 )  PTT:27.3 sec  Urinalysis Basic - ( 2022 11:30 )    Color: Yellow / Appearance: Clear / S.010 / pH: x  Gluc: x / Ketone: Negative  / Bili: Negative / Urobili: Negative   Blood: x / Protein: 15 / Nitrite: Positive   Leuk Esterase: Small / RBC: 0-4 /HPF / WBC 6-10 /HPF   Sq Epi: x / Non Sq Epi: Moderate / Bacteria: Moderate /HPF         CULTURES: (if applicable)    Culture - Urine (collected 22 @ 11:30)  Source: Clean Catch Clean Catch (Midstream)  Final Report (22 @ 10:46):    >=3 organisms. Probable collection contamination.      Rapid RVP Result: Detected (22 @ 10:15)  Parainfluenza 3 (RapRVP): Detected (22 @ 10:15)      RADIOLOGY  CT:  < from: CT Chest No Cont (22 @ 11:04) >    ACC: [id number] EXAM:  CT ABDOMEN AND PELVIS                      ACC: [id number] EXAM:  CT CHEST                          PROCEDURE DATE:  2022      FINDINGS:  CHEST:  LUNGS AND LARGE AIRWAYS: Patent central airways. Small densities in the trachea and right bronchus intermedius may represent mucous secretion. Mild bilateral atelectasis. Left lower lobe pulmonary infiltrate for   which clinical correlation with pneumonia is recommended. Nonspecific 8 mm right lower lobe lung nodule (image 41 series 2); follow-up chest CT may be pursued in 6 months to ensure stability or resolution.  PLEURA: No pleural effusion or pneumothorax.  VESSELS: No aortic aneurysm. Aortic and coronary artery calcifications are present.  HEART: Heart size is normal. No pericardial effusion.  MEDIASTINUM AND SVEN: Mildly enlarged 1.1 cm precarinal lymph node   CHEST WALL AND LOWER NECK: Within normal limits.    ABDOMEN AND PELVIS: Evaluation of the abdomen and pelvis is limited by lack of IV/oral contrast.  LIVER: Within normal limits.  BILE DUCTS: Dilated common bile measuring 11 mm in caliber, probably due to post cholecystectomy status.  GALLBLADDER: Cholecystectomy clips are present.  SPLEEN: Mild splenomegaly measuring 13.1 cm.  PANCREAS: Within normal limits.  ADRENALS: Nonspecific mild adrenal thickening bilaterally.  KIDNEYS/URETERS: The left kidney appears unremarkable. Small nonobstructive calculi in the right kidney. No evidence for a ureteral calculus. No hydronephrosis.    BLADDER: Within normal limits.  REPRODUCTIVE ORGANS: The uterus and adnexa appear grossly unremarkable.    BOWEL: No bowel obstruction or grossly thickened bowel wall. Colonic diverticulosis without evidence of diverticulitis. Appendix within normal limits.  PERITONEUM: No free air or ascites.  VESSELS: Within normal limits.  RETROPERITONEUM/LYMPH NODES: No lymphadenopathy.  ABDOMINAL WALL: Small fat-containing umbilical hernia.  shunt is present.  BONES: Within normal limits.    IMPRESSION:  Left lower lobe pulmonary infiltrate for which clinical correlation with pneumonia is recommended.  Nonspecific 8 mm right lower lobe lung nodule; follow-up chest CT may be pursued in 3 months to ensure stability or resolution.  Mildly enlarged 1.1 cm precarinal lymph node  Small densities in the trachea and right bronchus intermedius may represent mucous secretion. Attention should be directed to this finding on the follow-up chest CT to ensure resolution.  Dilated common bile measuring 11 mm in caliber, probably due to post cholecystectomy status. If there is a clinical suspicion for biliary obstruction, MRCP may be pursued for further evaluation.  Mild splenomegaly.    --- End of Report ---    < end of copied text >         VITALS:  T(C): 36.9 (22 @ 05:52), Max: 37.2 (22 @ 14:03)  T(F): 98.5 (22 @ 05:52), Max: 99 (22 @ 14:03)  HR: 67 (22 @ 05:52) (66 - 78)  BP: 98/62 (22 @ 05:52) (98/62 - 128/60)  BP(mean): 91 (22 @ 13:53) (91 - 91)  ABP: --  ABP(mean): --  RR: 16 (22 @ 05:52) (15 - 18)  SpO2: 99% (22 @ 05:52) (98% - 100%)  CVP(mm Hg): --  CVP(cm H2O): --    Ins and Outs       Height (cm): 157.5 (22 @ 15:23)  Weight (kg): 69 (22 @ 15:23)  BMI (kg/m2): 27.8 (22 @ 15:23)        I&O's Detail      Physical Examination:  GENERAL:               Alert, Consused, No acute distress.    HEENT:                     No JVD, dry MM  PULM:                     Bilateral air entry, Clear to auscultation bilaterally, no significant sputum production, traceRales, No Rhonchi, No Wheezing  CVS:                         S1, S2,  No Murmur  ABD:                        Soft, nondistended, nontender, normoactive bowel sounds,   EXT:                         No edema, nontender, No Cyanosis or Clubbing   Vascular:                Warm Extremities, Normal Capillary refill, Normal Distal Pulses  SKIN:                       Warm and well perfused, no rashes noted.   NEURO:                  Alert, not oriented, not interactive, does not follow commands  PSYC:                      Calm, no Insight.     PULMONARY CONSULT  Location of Patient : [facility] ([facility])  Attending requesting Consult:Thanh Man  Chief Complaint :   Reason For consult : COPD / PNA      Initial HPI on admission:  HPI:  72 year old female with h/o Dementia, HTN, High chol Depression, NPH s/p  shunt, Dermatomyositis, seizure d/o who was sent from NH for AMS x 1 hour prior to arrival.  In ED had episode of n/v.  temp 100. wbc normal. lactate, BMP normal. UA+, CT c/a/p: Left lower lobe pulmonary infiltrate for which clinical correlation with pneumonia is recommended.   Nonspecific 8 mm right lower lobe lung nodule; follow-up chest CT may be pursued in 3 months to ensure stability or resolution. Mildly enlarged 1.1 cm precarinal lymph node. Small densities in the trachea and right bronchus intermedius may represent mucous secretion. Attention should be directed to this finding on the follow-up chest CT to ensure resolution. Dilated common bile measuring 11 mm in caliber, probably due to post cholecystectomy status. If there is a clinical suspicion for biliary obstruction, MRCP may be pursued for further evaluation. Mild splenomegaly. sepstic w/u initiate d by ED. NS 2200 mls along with Rocephin and zithromax was given and medical admission was called. paient has baseline dementia. not communicating   (2022 14:10)      BRIEF HOSPITAL COURSE:   Patient seen and examined comfrotable, non verbal, unable to provide history or ROS  workup also showed Parainfluenza 3       PAST MEDICAL & SURGICAL HISTORY:  NPH (normal pressure hydrocephalus)  Vascular dementia  HTN (hypertension)  Seizures  Dermatomyositis  Falls  S/P  shunt        Allergies  No Known Allergies    Intolerances      Due to current medical status patient unable to provide medical, social, family history.  History obtained from available medical record.     Medications:  MEDICATIONS  (STANDING):  ALBUTerol    90 MICROgram(s) HFA Inhaler 1 Puff(s) Inhalation every 4 hours  cefTRIAXone   IVPB 1000 milliGRAM(s) IV Intermittent every 24 hours  dextrose 5% + sodium chloride 0.45%. 1000 milliLiter(s) (60 mL/Hr) IV Continuous <Continuous>  enoxaparin Injectable 40 milliGRAM(s) SubCutaneous every 24 hours  levETIRAcetam  IVPB 500 milliGRAM(s) IV Intermittent every 12 hours  levothyroxine Injectable 25 MICROGram(s) IV Push <User Schedule>  pantoprazole    Tablet 40 milliGRAM(s) Oral before breakfast    MEDICATIONS  (PRN):  acetaminophen     Tablet .. 650 milliGRAM(s) Oral every 6 hours PRN Temp greater or equal to 38C (100.4F), Mild Pain (1 - 3)  ALBUTerol    0.083% 2.5 milliGRAM(s) Nebulizer every 6 hours PRN Bronchospasm  bisacodyl Suppository 10 milliGRAM(s) Rectal daily PRN Constipation  hydrALAZINE Injectable 5 milliGRAM(s) IV Push every 6 hours PRN SBP> 160 or DBP >100      Antibiotics History  azithromycin  IVPB 500 milliGRAM(s) IV Intermittent once, 22 @ 10:42, Stop order after: 1 Doses  azithromycin  IVPB 500 milliGRAM(s) IV Intermittent every 24 hours, 22 @ 00:00  cefTRIAXone   IVPB 1000 milliGRAM(s) IV Intermittent every 24 hours, 22 @ 14:00, Stop order after: 7 Days  cefTRIAXone   IVPB 1000 milliGRAM(s) IV Intermittent once, 22 @ 10:42, Stop order after: 1 Doses  cefTRIAXone   IVPB 1000 milliGRAM(s) IV Intermittent every 24 hours, 22 @ 00:00, Stop order after: 5 Days  piperacillin/tazobactam IVPB. 3.375 Gram(s) IV Intermittent once, 22 @ 14:27, Stop order after: 1 Doses  piperacillin/tazobactam IVPB.. 3.375 Gram(s) IV Intermittent every 8 hours, 22 @ 14:27, Stop order after: 7 Days      Heme Medications   enoxaparin Injectable 40 milliGRAM(s) SubCutaneous every 24 hours, 22 @ 00:00      GI Medications  bisacodyl Suppository 10 milliGRAM(s) Rectal daily, 22 @ 14:34, Routine PRN  pantoprazole    Tablet 40 milliGRAM(s) Oral before breakfast, 22 @ 13:48, Routine        Home Medications:  Last Order Reconciliation Date: 22 @ 14:24 (Admission Reconciliation)  escitalopram 20 mg oral tablet: 1 tab(s) orally once a day (22 @ 14:05)  furosemide 20 mg oral tablet: 1 tab(s) orally once a day (22 @ 14:05)  levETIRAcetam 500 mg oral tablet: 1 tab(s) orally 2 times a day (22 @ 14:05)  levothyroxine 50 mcg (0.05 mg) oral tablet: 1 tab(s) orally once a day (22 @ 14:05)  losartan 25 mg oral tablet: 1 tab(s) orally once a day (22 @ 14:05)  Melatonin 5 mg oral tablet: 1 tab(s) orally once a day (at bedtime) (22 @ 14:05)  Metoprolol Succinate ER 25 mg oral tablet, extended release: 1 tab(s) orally once a day (22 @ 14:05)  Senna 8.6 mg oral tablet: 1 tab(s) orally once a day (at bedtime) (22 @ 14:05)      LABS:                        10.8   6.34  )-----------( 118      ( 2022 08:15 )             33.3     06-07    141  |  108  |  11  ----------------------------<  78  3.5   |  26  |  0.65    Ca    8.7      2022 06:18  Mg     2.1     -    TPro  7.8  /  Alb  3.4  /  TBili  0.5  /  DBili  x   /  AST  46<H>  /  ALT  56<H>  /  AlkPhos  78  06-06       PT/INR - ( 2022 10:05 )   PT: 12.9 sec;   INR: 1.11 ratio         PTT - ( 2022 10:05 )  PTT:27.3 sec  Urinalysis Basic - ( 2022 11:30 )    Color: Yellow / Appearance: Clear / S.010 / pH: x  Gluc: x / Ketone: Negative  / Bili: Negative / Urobili: Negative   Blood: x / Protein: 15 / Nitrite: Positive   Leuk Esterase: Small / RBC: 0-4 /HPF / WBC 6-10 /HPF   Sq Epi: x / Non Sq Epi: Moderate / Bacteria: Moderate /HPF         CULTURES: (if applicable)    Culture - Urine (collected 22 @ 11:30)  Source: Clean Catch Clean Catch (Midstream)  Final Report (22 @ 10:46):    >=3 organisms. Probable collection contamination.      Rapid RVP Result: Detected (22 @ 10:15)  Parainfluenza 3 (RapRVP): Detected (22 @ 10:15)      RADIOLOGY  CT:  < from: CT Chest No Cont (22 @ 11:04) >    ACC: [id number] EXAM:  CT ABDOMEN AND PELVIS                      ACC: [id number] EXAM:  CT CHEST                          PROCEDURE DATE:  2022      FINDINGS:  CHEST:  LUNGS AND LARGE AIRWAYS: Patent central airways. Small densities in the trachea and right bronchus intermedius may represent mucous secretion. Mild bilateral atelectasis. Left lower lobe pulmonary infiltrate for   which clinical correlation with pneumonia is recommended. Nonspecific 8 mm right lower lobe lung nodule (image 41 series 2); follow-up chest CT may be pursued in 6 months to ensure stability or resolution.  PLEURA: No pleural effusion or pneumothorax.  VESSELS: No aortic aneurysm. Aortic and coronary artery calcifications are present.  HEART: Heart size is normal. No pericardial effusion.  MEDIASTINUM AND SVEN: Mildly enlarged 1.1 cm precarinal lymph node   CHEST WALL AND LOWER NECK: Within normal limits.    ABDOMEN AND PELVIS: Evaluation of the abdomen and pelvis is limited by lack of IV/oral contrast.  LIVER: Within normal limits.  BILE DUCTS: Dilated common bile measuring 11 mm in caliber, probably due to post cholecystectomy status.  GALLBLADDER: Cholecystectomy clips are present.  SPLEEN: Mild splenomegaly measuring 13.1 cm.  PANCREAS: Within normal limits.  ADRENALS: Nonspecific mild adrenal thickening bilaterally.  KIDNEYS/URETERS: The left kidney appears unremarkable. Small nonobstructive calculi in the right kidney. No evidence for a ureteral calculus. No hydronephrosis.    BLADDER: Within normal limits.  REPRODUCTIVE ORGANS: The uterus and adnexa appear grossly unremarkable.    BOWEL: No bowel obstruction or grossly thickened bowel wall. Colonic diverticulosis without evidence of diverticulitis. Appendix within normal limits.  PERITONEUM: No free air or ascites.  VESSELS: Within normal limits.  RETROPERITONEUM/LYMPH NODES: No lymphadenopathy.  ABDOMINAL WALL: Small fat-containing umbilical hernia.  shunt is present.  BONES: Within normal limits.    IMPRESSION:  Left lower lobe pulmonary infiltrate for which clinical correlation with pneumonia is recommended.  Nonspecific 8 mm right lower lobe lung nodule; follow-up chest CT may be pursued in 3 months to ensure stability or resolution.  Mildly enlarged 1.1 cm precarinal lymph node  Small densities in the trachea and right bronchus intermedius may represent mucous secretion. Attention should be directed to this finding on the follow-up chest CT to ensure resolution.  Dilated common bile measuring 11 mm in caliber, probably due to post cholecystectomy status. If there is a clinical suspicion for biliary obstruction, MRCP may be pursued for further evaluation.  Mild splenomegaly.    --- End of Report ---    < end of copied text >         VITALS:  T(C): 36.9 (22 @ 05:52), Max: 37.2 (22 @ 14:03)  T(F): 98.5 (22 @ 05:52), Max: 99 (22 @ 14:03)  HR: 67 (22 @ 05:52) (66 - 78)  BP: 98/62 (22 @ 05:52) (98/62 - 128/60)  BP(mean): 91 (22 @ 13:53) (91 - 91)  ABP: --  ABP(mean): --  RR: 16 (22 @ 05:52) (15 - 18)  SpO2: 99% (22 @ 05:52) (98% - 100%)  CVP(mm Hg): --  CVP(cm H2O): --    Ins and Outs       Height (cm): 157.5 (22 @ 15:23)  Weight (kg): 69 (22 @ 15:23)  BMI (kg/m2): 27.8 (22 @ 15:23)        I&O's Detail      Physical Examination:  GENERAL:               Alert, Consused, No acute distress.    HEENT:                     No JVD, dry MM  PULM:                     Bilateral air entry, Clear to auscultation bilaterally, no significant sputum production, traceRales, No Rhonchi, No Wheezing  CVS:                         S1, S2,  No Murmur  ABD:                        Soft, nondistended, nontender, normoactive bowel sounds,   EXT:                         No edema, nontender, No Cyanosis or Clubbing   Vascular:                Warm Extremities, Normal Capillary refill, Normal Distal Pulses  SKIN:                       Warm and well perfused, no rashes noted.   NEURO:                  Alert, not oriented, not interactive, does not follow commands  PSYC:                      Calm, no Insight.     PULMONARY CONSULT  Location of Patient : [facility] [address] ([facility]  Attending requesting Consult:Thanh Man  Chief Complaint :   Reason For consult : COPD / PNA      Initial HPI on admission:  HPI:  72 year old female with h/o Dementia, HTN, High chol Depression, NPH s/p  shunt, Dermatomyositis, seizure d/o who was sent from NH for AMS x 1 hour prior to arrival.  In ED had episode of n/v.  temp 100. wbc normal. lactate, BMP normal. UA+, CT c/a/p: Left lower lobe pulmonary infiltrate for which clinical correlation with pneumonia is recommended.   Nonspecific 8 mm right lower lobe lung nodule; follow-up chest CT may be pursued in 3 months to ensure stability or resolution. Mildly enlarged 1.1 cm precarinal lymph node. Small densities in the trachea and right bronchus intermedius may represent mucous secretion. Attention should be directed to this finding on the follow-up chest CT to ensure resolution. Dilated common bile measuring 11 mm in caliber, probably due to post cholecystectomy status. If there is a clinical suspicion for biliary obstruction, MRCP may be pursued for further evaluation. Mild splenomegaly. sepstic w/u initiate d by ED. NS 2200 mls along with Rocephin and zithromax was given and medical admission was called. paient has baseline dementia. not communicating   (2022 14:10)      BRIEF HOSPITAL COURSE:   Patient seen and examined comfrotable, non verbal, unable to provide history or ROS  workup also showed Parainfluenza 3       PAST MEDICAL & SURGICAL HISTORY:  NPH (normal pressure hydrocephalus)  Vascular dementia  HTN (hypertension)  Seizures  Dermatomyositis  Falls  S/P  shunt        Allergies  No Known Allergies    Intolerances      Due to current medical status patient unable to provide medical, social, family history.  History obtained from available medical record.     Medications:  MEDICATIONS  (STANDING):  ALBUTerol    90 MICROgram(s) HFA Inhaler 1 Puff(s) Inhalation every 4 hours  cefTRIAXone   IVPB 1000 milliGRAM(s) IV Intermittent every 24 hours  dextrose 5% + sodium chloride 0.45%. 1000 milliLiter(s) (60 mL/Hr) IV Continuous <Continuous>  enoxaparin Injectable 40 milliGRAM(s) SubCutaneous every 24 hours  levETIRAcetam  IVPB 500 milliGRAM(s) IV Intermittent every 12 hours  levothyroxine Injectable 25 MICROGram(s) IV Push <User Schedule>  pantoprazole    Tablet 40 milliGRAM(s) Oral before breakfast    MEDICATIONS  (PRN):  acetaminophen     Tablet .. 650 milliGRAM(s) Oral every 6 hours PRN Temp greater or equal to 38C (100.4F), Mild Pain (1 - 3)  ALBUTerol    0.083% 2.5 milliGRAM(s) Nebulizer every 6 hours PRN Bronchospasm  bisacodyl Suppository 10 milliGRAM(s) Rectal daily PRN Constipation  hydrALAZINE Injectable 5 milliGRAM(s) IV Push every 6 hours PRN SBP> 160 or DBP >100      Antibiotics History  azithromycin  IVPB 500 milliGRAM(s) IV Intermittent once, 22 @ 10:42, Stop order after: 1 Doses  azithromycin  IVPB 500 milliGRAM(s) IV Intermittent every 24 hours, 22 @ 00:00  cefTRIAXone   IVPB 1000 milliGRAM(s) IV Intermittent every 24 hours, 22 @ 14:00, Stop order after: 7 Days  cefTRIAXone   IVPB 1000 milliGRAM(s) IV Intermittent once, 22 @ 10:42, Stop order after: 1 Doses  cefTRIAXone   IVPB 1000 milliGRAM(s) IV Intermittent every 24 hours, 22 @ 00:00, Stop order after: 5 Days  piperacillin/tazobactam IVPB. 3.375 Gram(s) IV Intermittent once, 22 @ 14:27, Stop order after: 1 Doses  piperacillin/tazobactam IVPB.. 3.375 Gram(s) IV Intermittent every 8 hours, 22 @ 14:27, Stop order after: 7 Days      Heme Medications   enoxaparin Injectable 40 milliGRAM(s) SubCutaneous every 24 hours, 22 @ 00:00      GI Medications  bisacodyl Suppository 10 milliGRAM(s) Rectal daily, 22 @ 14:34, Routine PRN  pantoprazole    Tablet 40 milliGRAM(s) Oral before breakfast, 22 @ 13:48, Routine        Home Medications:  Last Order Reconciliation Date: 22 @ 14:24 (Admission Reconciliation)  escitalopram 20 mg oral tablet: 1 tab(s) orally once a day (22 @ 14:05)  furosemide 20 mg oral tablet: 1 tab(s) orally once a day (22 @ 14:05)  levETIRAcetam 500 mg oral tablet: 1 tab(s) orally 2 times a day (22 @ 14:05)  levothyroxine 50 mcg (0.05 mg) oral tablet: 1 tab(s) orally once a day (22 @ 14:05)  losartan 25 mg oral tablet: 1 tab(s) orally once a day (22 @ 14:05)  Melatonin 5 mg oral tablet: 1 tab(s) orally once a day (at bedtime) (22 @ 14:05)  Metoprolol Succinate ER 25 mg oral tablet, extended release: 1 tab(s) orally once a day (22 @ 14:05)  Senna 8.6 mg oral tablet: 1 tab(s) orally once a day (at bedtime) (22 @ 14:05)      LABS:                        10.8   6.34  )-----------( 118      ( 2022 08:15 )             33.3     06-07    141  |  108  |  11  ----------------------------<  78  3.5   |  26  |  0.65    Ca    8.7      2022 06:18  Mg     2.1     -    TPro  7.8  /  Alb  3.4  /  TBili  0.5  /  DBili  x   /  AST  46<H>  /  ALT  56<H>  /  AlkPhos  78  06-06       PT/INR - ( 2022 10:05 )   PT: 12.9 sec;   INR: 1.11 ratio         PTT - ( 2022 10:05 )  PTT:27.3 sec  Urinalysis Basic - ( 2022 11:30 )    Color: Yellow / Appearance: Clear / S.010 / pH: x  Gluc: x / Ketone: Negative  / Bili: Negative / Urobili: Negative   Blood: x / Protein: 15 / Nitrite: Positive   Leuk Esterase: Small / RBC: 0-4 /HPF / WBC 6-10 /HPF   Sq Epi: x / Non Sq Epi: Moderate / Bacteria: Moderate /HPF         CULTURES: (if applicable)    Culture - Urine (collected 22 @ 11:30)  Source: Clean Catch Clean Catch (Midstream)  Final Report (22 @ 10:46):    >=3 organisms. Probable collection contamination.      Rapid RVP Result: Detected (22 @ 10:15)  Parainfluenza 3 (RapRVP): Detected (22 @ 10:15)      RADIOLOGY  CT:  < from: CT Chest No Cont (22 @ 11:04) >    ACC: [id number] EXAM:  CT ABDOMEN AND PELVIS                      ACC: [id number] EXAM:  CT CHEST                          PROCEDURE DATE:  2022      FINDINGS:  CHEST:  LUNGS AND LARGE AIRWAYS: Patent central airways. Small densities in the trachea and right bronchus intermedius may represent mucous secretion. Mild bilateral atelectasis. Left lower lobe pulmonary infiltrate for   which clinical correlation with pneumonia is recommended. Nonspecific 8 mm right lower lobe lung nodule (image 41 series 2); follow-up chest CT may be pursued in 6 months to ensure stability or resolution.  PLEURA: No pleural effusion or pneumothorax.  VESSELS: No aortic aneurysm. Aortic and coronary artery calcifications are present.  HEART: Heart size is normal. No pericardial effusion.  MEDIASTINUM AND SVEN: Mildly enlarged 1.1 cm precarinal lymph node   CHEST WALL AND LOWER NECK: Within normal limits.    ABDOMEN AND PELVIS: Evaluation of the abdomen and pelvis is limited by lack of IV/oral contrast.  LIVER: Within normal limits.  BILE DUCTS: Dilated common bile measuring 11 mm in caliber, probably due to post cholecystectomy status.  GALLBLADDER: Cholecystectomy clips are present.  SPLEEN: Mild splenomegaly measuring 13.1 cm.  PANCREAS: Within normal limits.  ADRENALS: Nonspecific mild adrenal thickening bilaterally.  KIDNEYS/URETERS: The left kidney appears unremarkable. Small nonobstructive calculi in the right kidney. No evidence for a ureteral calculus. No hydronephrosis.    BLADDER: Within normal limits.  REPRODUCTIVE ORGANS: The uterus and adnexa appear grossly unremarkable.    BOWEL: No bowel obstruction or grossly thickened bowel wall. Colonic diverticulosis without evidence of diverticulitis. Appendix within normal limits.  PERITONEUM: No free air or ascites.  VESSELS: Within normal limits.  RETROPERITONEUM/LYMPH NODES: No lymphadenopathy.  ABDOMINAL WALL: Small fat-containing umbilical hernia.  shunt is present.  BONES: Within normal limits.    IMPRESSION:  Left lower lobe pulmonary infiltrate for which clinical correlation with pneumonia is recommended.  Nonspecific 8 mm right lower lobe lung nodule; follow-up chest CT may be pursued in 3 months to ensure stability or resolution.  Mildly enlarged 1.1 cm precarinal lymph node  Small densities in the trachea and right bronchus intermedius may represent mucous secretion. Attention should be directed to this finding on the follow-up chest CT to ensure resolution.  Dilated common bile measuring 11 mm in caliber, probably due to post cholecystectomy status. If there is a clinical suspicion for biliary obstruction, MRCP may be pursued for further evaluation.  Mild splenomegaly.    --- End of Report ---    < end of copied text >         VITALS:  T(C): 36.9 (22 @ 05:52), Max: 37.2 (22 @ 14:03)  T(F): 98.5 (22 @ 05:52), Max: 99 (22 @ 14:03)  HR: 67 (22 @ 05:52) (66 - 78)  BP: 98/62 (22 @ 05:52) (98/62 - 128/60)  BP(mean): 91 (22 @ 13:53) (91 - 91)  ABP: --  ABP(mean): --  RR: 16 (22 @ 05:52) (15 - 18)  SpO2: 99% (22 @ 05:52) (98% - 100%)  CVP(mm Hg): --  CVP(cm H2O): --    Ins and Outs       Height (cm): 157.5 (22 @ 15:23)  Weight (kg): 69 (22 @ 15:23)  BMI (kg/m2): 27.8 (22 @ 15:23)        I&O's Detail      Physical Examination:  GENERAL:               Alert, Consused, No acute distress.    HEENT:                     No JVD, dry MM  PULM:                     Bilateral air entry, Clear to auscultation bilaterally, no significant sputum production, traceRales, No Rhonchi, No Wheezing  CVS:                         S1, S2,  No Murmur  ABD:                        Soft, nondistended, nontender, normoactive bowel sounds,   EXT:                         No edema, nontender, No Cyanosis or Clubbing   Vascular:                Warm Extremities, Normal Capillary refill, Normal Distal Pulses  SKIN:                       Warm and well perfused, no rashes noted.   NEURO:                  Alert, not oriented, not interactive, does not follow commands  PSYC:                      Calm, no Insight.

## 2022-06-07 NOTE — CONSULT NOTE ADULT - ASSESSMENT
Assessment  Pneumonia  + Viral : Parainfluenza 3    Possible underlying bacterial vs Aspiration (CT shows: Small densities in the trachea and right bronchus intermedius may represent mucous secretion)  Abnormal CT chest Nonspecific 8 mm right lower lobe lung nodule and Mildly enlarged 1.1 cm precarinal lymph node   Underlying -  Dementia, HTN, High chol Depression, NPH s/p  shunt, Dermatomyositis, seizure d/o       Plan  Continue Empiric antibiotics  Taper n/c  to maintain sat > 90-95%  Consider speech and swallow eval  Keep HOB elevated  Aspiration precautions      On Discharge will need to set up  CT chest to ensure stability or resolution of abnormal CT findings     if family desires  Consider Palliative care eval for advanced directives to be set prior to discharge.     GI/DVT PPX    D/w Dr Payton

## 2022-06-07 NOTE — DIETITIAN INITIAL EVALUATION ADULT - PERTINENT LABORATORY DATA
06-07    141  |  108  |  11  ----------------------------<  78  3.5   |  26  |  0.65    Ca    8.7      07 Jun 2022 06:18  Mg     2.1     06-07    TPro  7.8  /  Alb  3.4  /  TBili  0.5  /  DBili  x   /  AST  46<H>  /  ALT  56<H>  /  AlkPhos  78  06-06

## 2022-06-07 NOTE — DIETITIAN INITIAL EVALUATION ADULT - PERTINENT MEDS FT
MEDICATIONS  (STANDING):  ALBUTerol    90 MICROgram(s) HFA Inhaler 1 Puff(s) Inhalation every 4 hours  cefTRIAXone   IVPB 1000 milliGRAM(s) IV Intermittent every 24 hours  dextrose 5% + sodium chloride 0.45%. 1000 milliLiter(s) (60 mL/Hr) IV Continuous <Continuous>  enoxaparin Injectable 40 milliGRAM(s) SubCutaneous every 24 hours  levETIRAcetam  IVPB 500 milliGRAM(s) IV Intermittent every 12 hours  levothyroxine Injectable 25 MICROGram(s) IV Push <User Schedule>  pantoprazole    Tablet 40 milliGRAM(s) Oral before breakfast    MEDICATIONS  (PRN):  acetaminophen     Tablet .. 650 milliGRAM(s) Oral every 6 hours PRN Temp greater or equal to 38C (100.4F), Mild Pain (1 - 3)  ALBUTerol    0.083% 2.5 milliGRAM(s) Nebulizer every 6 hours PRN Bronchospasm  bisacodyl Suppository 10 milliGRAM(s) Rectal daily PRN Constipation  hydrALAZINE Injectable 5 milliGRAM(s) IV Push every 6 hours PRN SBP> 160 or DBP >100

## 2022-06-07 NOTE — PROGRESS NOTE ADULT - ASSESSMENT
73 yo female with a medical history including Vascular dementia, HTN, HLD, Depression, NPH with  Shunt, Dermatomyositis and Seizures, sent in by NH staff for Altered Mental Status that started approx. 1 hour prior to arrival, with an episode of vomiting. in ER, febrile with temp 100. Pt found with possible PNA and     Acute metabolic encephalopathy  sepsis not present on admission  Possible gram neg pna   Parainfluenza infection   - cont supportive care  - cont rocephin and azithromycin  - sputum cx, urine legionella   - per speech and swallow: pureed and mildly thickened liquid  - check keppra level    Pyuria   - urine cx contaminated    lung nodule on CT chest  - repeat CT scan in 3 months outpatient       h/o Vascular dementia, HTN, HLD, Depression, NPH with  Shunt, Dermatomyositis and Seizures  Hypothyroid   - cont keppra  - cont synthyroid  - BP better now, might resume BP tomorrow        DVT-P: lovenox  [facility]: full code     updated son elham [phone]. 6/7        71 yo female with a medical history including Vascular dementia, HTN, HLD, Depression, NPH with  Shunt, Dermatomyositis and Seizures, sent in by NH staff for Altered Mental Status that started approx. 1 hour prior to arrival, with an episode of vomiting. in ER, febrile with temp 100. Pt found with possible PNA and     Acute metabolic encephalopathy  sepsis not present on admission  Possible gram neg pna   Parainfluenza infection   - cont supportive care  - cont rocephin and azithromycin  - sputum cx, urine legionella   - per speech and swallow: pureed and mildly thickened liquid  - check keppra level    Pyuria   - urine cx contaminated    lung nodule on CT chest  - repeat CT scan in 3 months outpatient       h/o Vascular dementia, HTN, HLD, Depression, NPH with  Shunt, Dermatomyositis and Seizures  Hypothyroid   - cont keppra  - cont synthyroid  - BP better now, might resume BP tomorrow        DVT-P: lovenox  [facility]: full code     updated son elham [phone]. 6/7        73 yo female with a medical history including Vascular dementia, HTN, HLD, Depression, NPH with  Shunt, Dermatomyositis and Seizures, sent in by NH staff for Altered Mental Status that started approx. 1 hour prior to arrival, with an episode of vomiting. in ER, febrile with temp 100. Pt found with possible PNA and     Acute metabolic encephalopathy  sepsis not present on admission  Possible gram neg pna   Parainfluenza infection   - cont supportive care  - cont rocephin and azithromycin  - sputum cx, urine legionella   - per speech and swallow: pureed and mildly thickened liquid  - check keppra level    Pyuria   - urine cx contaminated    lung nodule on CT chest  - repeat CT scan in 3 months outpatient       h/o Vascular dementia, HTN, HLD, Depression, NPH with  Shunt, Dermatomyositis and Seizures  Hypothyroid   - cont keppra  - cont synthyroid  - BP better now, might resume BP tomorrow        DVT-P: lovenox  [facility]: full code     updated son elham [phone]. 6/7

## 2022-06-07 NOTE — SWALLOW BEDSIDE ASSESSMENT ADULT - SLP GENERAL OBSERVATIONS
Pt. found in fair spirits and without signs of pain or distress. Pt. with O2 supplement via NC at 2L. Pt. notable for significant dysphonia and wet, gurgly voice prior to PO trials. Pt. unable to follow simple commands and unable to answer biographical or simple yes/no questions.

## 2022-06-07 NOTE — SWALLOW BEDSIDE ASSESSMENT ADULT - COMMENTS
6/6 Head CT  no acute intracranial hemorrhage, right side posterior approach ventriculoperitoneal shunt catheter  in place with moderate ventriculomegaly.  Mild chronic white matter microvascular type changes.    6/6 CXR lungs clear    6/7 WBC 6.34 6/6 Head CT  no acute intracranial hemorrhage, right side posterior approach ventriculoperitoneal shunt catheter  in place with moderate ventriculomegaly.  Mild chronic white matter microvascular type changes.  Chest CT: 6/6 Left lower lobe pulmonary infiltrate for which clinical correlation with pneumonia is recommended  WBC: 6.34    6/7 WBC 6.34

## 2022-06-07 NOTE — DIETITIAN INITIAL EVALUATION ADULT - OTHER INFO
unable to obtain hx. from this pt. due to dementia. Pt. NPO. no reported n/v/ diarrhea/dysphagia. skin intact. no edema noted. weight hx. unknown. + parainfluenza. Hx. dementia/ HTN. advance diet as soon as medically appropriate

## 2022-06-07 NOTE — SWALLOW BEDSIDE ASSESSMENT ADULT - SWALLOW EVAL: DIAGNOSIS
Pt. presents with a mild oropharyngeal dysphagia superimposed upon by severe cognitive linguistic impairments. Pt. given PO trials of thin and mildly thick liquids with puree and minced and moist solids. Pt. demonstrates adequate utensil stripping, containment, and bolus prep. A-P transit suspected timely with timely swallow trigger. Hyolaryngeal elevation / excursion mildly reduced, yet palpable. Multiple swallows with delayed throat clear observed with thin liquids. Audible swallow without other signs/symptoms of penetration / aspiration across remaining consistencies. Recommend puree with mildly thick liquids and 1:1 feeds. This service to follow and monitor for tolerance or assess for diet upgrade as appropriate.

## 2022-06-07 NOTE — GOALS OF CARE CONVERSATION - ADVANCED CARE PLANNING - CONVERSATION DETAILS
Met with patient at bedside. Pt. from [facility], has hx. LBdementia. Also has hx. HTN, NPH with  shunt, dermatomyositis, seizures. Admitted with possible biliary obstruction. LLL pulmonary nodule. Pt. responds to simple questions with nod of head, but not verbal. I called her son Josh to discuss GOC, no answer, left message. Will await call back. Met with patient at bedside. Pt. from [facility], has hx. LBdementia. Also has hx. HTN, NPH with  shunt, dermatomyositis, seizures. Admitted with possible biliary obstruction. LLL pulmonary nodule. Pt. responds to simple questions with nod of head, but not verbal. I called her son Josh to discuss GOC, no answer, left message. Will await call back. Met with patient at bedside. Pt. from [facility], has hx. LBdementia. Also has hx. HTN, NPH with  shunt, dermatomyositis, seizures. Admitted with possible biliary obstruction. LLL pulmonary nodule. Pt. responds to simple questions with nod of head, but not verbal. I called her son Josh to discuss GOC, no answer, left message. Will await call back.

## 2022-06-08 ENCOUNTER — TRANSCRIPTION ENCOUNTER (OUTPATIENT)
Age: 72
End: 2022-06-08

## 2022-06-08 LAB
ANION GAP SERPL CALC-SCNC: 7 MMOL/L — SIGNIFICANT CHANGE UP (ref 5–17)
BASOPHILS # BLD AUTO: 0.02 K/UL — SIGNIFICANT CHANGE UP (ref 0–0.2)
BASOPHILS NFR BLD AUTO: 0.4 % — SIGNIFICANT CHANGE UP (ref 0–2)
BUN SERPL-MCNC: 9 MG/DL — SIGNIFICANT CHANGE UP (ref 7–23)
CALCIUM SERPL-MCNC: 8.6 MG/DL — SIGNIFICANT CHANGE UP (ref 8.4–10.5)
CHLORIDE SERPL-SCNC: 110 MMOL/L — HIGH (ref 96–108)
CO2 SERPL-SCNC: 27 MMOL/L — SIGNIFICANT CHANGE UP (ref 22–31)
CREAT SERPL-MCNC: 0.67 MG/DL — SIGNIFICANT CHANGE UP (ref 0.5–1.3)
EGFR: 92 ML/MIN/1.73M2 — SIGNIFICANT CHANGE UP
EOSINOPHIL # BLD AUTO: 0.14 K/UL — SIGNIFICANT CHANGE UP (ref 0–0.5)
EOSINOPHIL NFR BLD AUTO: 3.1 % — SIGNIFICANT CHANGE UP (ref 0–6)
GLUCOSE SERPL-MCNC: 90 MG/DL — SIGNIFICANT CHANGE UP (ref 70–99)
HCT VFR BLD CALC: 35.6 % — SIGNIFICANT CHANGE UP (ref 34.5–45)
HGB BLD-MCNC: 11.9 G/DL — SIGNIFICANT CHANGE UP (ref 11.5–15.5)
IMM GRANULOCYTES NFR BLD AUTO: 0.2 % — SIGNIFICANT CHANGE UP (ref 0–1.5)
LYMPHOCYTES # BLD AUTO: 1.23 K/UL — SIGNIFICANT CHANGE UP (ref 1–3.3)
LYMPHOCYTES # BLD AUTO: 27 % — SIGNIFICANT CHANGE UP (ref 13–44)
MCHC RBC-ENTMCNC: 31.6 PG — SIGNIFICANT CHANGE UP (ref 27–34)
MCHC RBC-ENTMCNC: 33.4 GM/DL — SIGNIFICANT CHANGE UP (ref 32–36)
MCV RBC AUTO: 94.7 FL — SIGNIFICANT CHANGE UP (ref 80–100)
MONOCYTES # BLD AUTO: 0.47 K/UL — SIGNIFICANT CHANGE UP (ref 0–0.9)
MONOCYTES NFR BLD AUTO: 10.3 % — SIGNIFICANT CHANGE UP (ref 2–14)
NEUTROPHILS # BLD AUTO: 2.69 K/UL — SIGNIFICANT CHANGE UP (ref 1.8–7.4)
NEUTROPHILS NFR BLD AUTO: 59 % — SIGNIFICANT CHANGE UP (ref 43–77)
NRBC # BLD: 0 /100 WBCS — SIGNIFICANT CHANGE UP (ref 0–0)
PLATELET # BLD AUTO: 137 K/UL — LOW (ref 150–400)
POTASSIUM SERPL-MCNC: 3.7 MMOL/L — SIGNIFICANT CHANGE UP (ref 3.5–5.3)
POTASSIUM SERPL-SCNC: 3.7 MMOL/L — SIGNIFICANT CHANGE UP (ref 3.5–5.3)
RBC # BLD: 3.76 M/UL — LOW (ref 3.8–5.2)
RBC # FLD: 13 % — SIGNIFICANT CHANGE UP (ref 10.3–14.5)
SODIUM SERPL-SCNC: 144 MMOL/L — SIGNIFICANT CHANGE UP (ref 135–145)
WBC # BLD: 4.56 K/UL — SIGNIFICANT CHANGE UP (ref 3.8–10.5)
WBC # FLD AUTO: 4.56 K/UL — SIGNIFICANT CHANGE UP (ref 3.8–10.5)

## 2022-06-08 PROCEDURE — 99497 ADVNCD CARE PLAN 30 MIN: CPT | Mod: 25

## 2022-06-08 PROCEDURE — 99223 1ST HOSP IP/OBS HIGH 75: CPT

## 2022-06-08 PROCEDURE — 99233 SBSQ HOSP IP/OBS HIGH 50: CPT

## 2022-06-08 RX ORDER — METOPROLOL TARTRATE 50 MG
25 TABLET ORAL DAILY
Refills: 0 | Status: DISCONTINUED | OUTPATIENT
Start: 2022-06-08 | End: 2022-06-09

## 2022-06-08 RX ORDER — CEFUROXIME AXETIL 250 MG
1 TABLET ORAL
Qty: 4 | Refills: 0
Start: 2022-06-08 | End: 2022-06-09

## 2022-06-08 RX ORDER — AZITHROMYCIN 500 MG/1
500 TABLET, FILM COATED ORAL EVERY 24 HOURS
Refills: 0 | Status: DISCONTINUED | OUTPATIENT
Start: 2022-06-08 | End: 2022-06-08

## 2022-06-08 RX ORDER — LANOLIN ALCOHOL/MO/W.PET/CERES
1 CREAM (GRAM) TOPICAL
Qty: 0 | Refills: 0 | DISCHARGE
Start: 2022-06-08

## 2022-06-08 RX ORDER — CEFUROXIME AXETIL 250 MG
1 TABLET ORAL
Qty: 2 | Refills: 0
Start: 2022-06-08 | End: 2022-06-08

## 2022-06-08 RX ADMIN — LEVETIRACETAM 500 MILLIGRAM(S): 250 TABLET, FILM COATED ORAL at 17:09

## 2022-06-08 RX ADMIN — CEFTRIAXONE 100 MILLIGRAM(S): 500 INJECTION, POWDER, FOR SOLUTION INTRAMUSCULAR; INTRAVENOUS at 14:15

## 2022-06-08 RX ADMIN — Medication 25 MILLIGRAM(S): at 17:09

## 2022-06-08 RX ADMIN — ENOXAPARIN SODIUM 40 MILLIGRAM(S): 100 INJECTION SUBCUTANEOUS at 14:15

## 2022-06-08 RX ADMIN — AZITHROMYCIN 255 MILLIGRAM(S): 500 TABLET, FILM COATED ORAL at 08:12

## 2022-06-08 RX ADMIN — Medication 50 MICROGRAM(S): at 06:03

## 2022-06-08 RX ADMIN — PANTOPRAZOLE SODIUM 40 MILLIGRAM(S): 20 TABLET, DELAYED RELEASE ORAL at 07:07

## 2022-06-08 RX ADMIN — LEVETIRACETAM 500 MILLIGRAM(S): 250 TABLET, FILM COATED ORAL at 06:03

## 2022-06-08 NOTE — CONSULT NOTE ADULT - CONVERSATION DETAILS
Spoke w/ son Josh today, reviewed how pt is doing today. Josh is pts only child and pts  is . Asked him how his mother has been doing with dementia over past few years, son says she was dx approx 4-5 years ago and was having some " ups and downs" over these past few years. Son says after  shunt placed, pt was better and able to walk w/ walker. Over past year says pt has had some falls and that why she was in [facility]. Now he feels she is weak, but hopes she may recover . I asked him if his mother has any advanced directives, or paperwork stating what her wishes would be if she became very sick. He was not aware of any and says they never seriously discussed those things. I did review with him the disease process of dementia, as well as low success rate of cpr/intubation, given age and co-morbidities.  We also discussed feeding tubes, and I reviewed the potential adverse events associated w/ feeding  tubes in dementia pts.  Son was thankful for all of the information and is going to think about everything. He has my direct contact info for questions that may arise. I told him we will continue this conversation t/o pts hospital stay.

## 2022-06-08 NOTE — CONSULT NOTE ADULT - SUBJECTIVE AND OBJECTIVE BOX
HPI: 71 yo female with a medical history of Vascular dementia, HTN, HLD, Depression, NPH with  Shunt, Dermatomyositis and Seizures, sent in by NH staff for Altered Mental Status that started approx. 1 hour prior to arrival, with an episode of vomiting. In  ER, febrile with temp 100, wbc normal, lactate, BMP normal. UA+, CT c/a/p: Left lower lobe pulmonary infiltrate for which clinical correlation with pneumonia is recommended. Nonspecific 8 mm right lower lobe lung nodule; follow-up chest CT may be pursued in 3 months to ensure stability or resolution. Mildly enlarged 1.1 cm precarinal lymph node. Small densities in the trachea and right bronchus intermedius may represent mucous secretion. Attention should be directed to this finding on the follow-up chest CT to ensure resolution. Dilated common bile measuring 11 mm in caliber, probably due to post cholecystectomy status. If there is a clinical suspicion for biliary obstruction, MRCP may be pursued for further evaluation. Mild splenomegaly. sepstic w/u initiate d by ED. NS 2200 mls along with Rocephin and zithromax was given and medical admission was called.  patient has baseline dementia. Was not communicating.         PAST MEDICAL & SURGICAL HISTORY:  NPH (normal pressure hydrocephalus)      Vascular dementia      HTN (hypertension)      Seizures      Dermatomyositis      Falls      S/P  shunt          SOCIAL HISTORY:    Admitted from:  [facility] [place]/ [facility]   Substance abuse history:              Tobacco hx:                  Alcohol hx:              Home Opioid hx:  Pentecostalism:                                    Preferred Language:                    Surrogate/HCP/: Myles Braxton             Phone#: [phone]    FAMILY HISTORY:    Baseline ADLs (prior to admission):    Allergies    No Known Allergies    Intolerances      Present Symptoms:   Dyspnea:   Nausea/Vomiting:   Anxiety:  Depressed   Fatigue:  Loss of appetite:   Pain:                                location:          Review of Systems:  Unable to obtain due to poor mentation    MEDICATIONS  (STANDING):  ALBUTerol    90 MICROgram(s) HFA Inhaler 1 Puff(s) Inhalation every 4 hours  azithromycin  IVPB 500 milliGRAM(s) IV Intermittent every 24 hours  cefTRIAXone   IVPB 1000 milliGRAM(s) IV Intermittent every 24 hours  dextrose 5% + sodium chloride 0.45%. 1000 milliLiter(s) (60 mL/Hr) IV Continuous <Continuous>  enoxaparin Injectable 40 milliGRAM(s) SubCutaneous every 24 hours  levETIRAcetam 500 milliGRAM(s) Oral two times a day  levothyroxine 50 MICROGram(s) Oral daily  pantoprazole    Tablet 40 milliGRAM(s) Oral before breakfast    MEDICATIONS  (PRN):  acetaminophen     Tablet .. 650 milliGRAM(s) Oral every 6 hours PRN Temp greater or equal to 38C (100.4F), Mild Pain (1 - 3)  ALBUTerol    0.083% 2.5 milliGRAM(s) Nebulizer every 6 hours PRN Bronchospasm  bisacodyl Suppository 10 milliGRAM(s) Rectal daily PRN Constipation  hydrALAZINE Injectable 5 milliGRAM(s) IV Push every 6 hours PRN SBP> 160 or DBP >100      PHYSICAL EXAM:    Vital Signs Last 24 Hrs  T(C): 36.7 (08 Jun 2022 05:04), Max: 36.9 (07 Jun 2022 19:55)  T(F): 98.1 (08 Jun 2022 05:04), Max: 98.5 (07 Jun 2022 19:55)  HR: 64 (08 Jun 2022 05:04) (63 - 72)  BP: 125/58 (08 Jun 2022 05:04) (125/58 - 148/62)  BP(mean): --  RR: 16 (08 Jun 2022 05:04) (16 - 18)  SpO2: 96% (08 Jun 2022 05:04) (94% - 98%)    General: lethargic ,nonverbal, in bed, comfortable     Karnofsky Performance Score/Palliative Performance Status Version2:  40   %  PPSV:40%  HEENT: normal  dry mouth    Lungs: dim at bases , breathing comfortably   CV: normal rate   GI: normal    : normal  incontinent    Musculoskeletal: normal  w/ weakness             Skin: nml, w/d     Neuro: + deficits mainly non verbal at present   Oral intake ability: minimal -moderate needs assist   Diet: puree w/ mild thick flds  per speech    LABS:                        11.9   4.56  )-----------( 137      ( 08 Jun 2022 07:48 )             35.6     06-08    144  |  110<H>  |  9   ----------------------------<  90  3.7   |  27  |  0.67    Ca    8.6      08 Jun 2022 07:48  Mg     2.1     06-07          RADIOLOGY & ADDITIONAL STUDIES: < from: CT Head No Cont (06.06.22 @ 11:03) >  PROCEDURE DATE:  06/06/2022          INTERPRETATION:  .    CLINICAL INFORMATION: Lethargy.    TECHNIQUE: Multiple axial CT images of the head were obtained without   contrast. Sagittal and coronal reconstructed images were acquired from   the source data.    COMPARISON: No prior CT studies of the brain are available for comparison   at this institution.    FINDINGS: A right-sided posterior parietal approach ventriculoperitoneal   shunt catheter is seen. The tip approximates the septum pellucidum.   Moderate ventriculomegaly is seen. No abnormal extra-axial fluid   collections are noted.    There is no acute intracranial hemorrhage, shift of the midline   structures, or herniation. Thereis diffuse cerebral volume loss with   prominence of the sulci, fissures, and cisternal spaces which is normal   for the patient's age. There is mild periventricular white matter   hypoattenuation statistically compatible with microvascular changes given   calcific atherosclerotic disease of the intracranial arteries.    Scattered mucosal thickening is seen throughout the paranasal sinuses.   The mastoid air cells are clear. The orbits appear unremarkable.    IMPRESSION: No acute intracranial hemorrhage.    Right-sided posterior parietal approach ventriculoperitoneal shunt   catheter in place with moderate ventriculomegaly.    Mild chronic white matter microvascular type changes.        < from: CT Chest No Cont (06.06.22 @ 11:04) >    ACC: [id number] EXAM:  CT ABDOMEN AND PELVIS                        ACC: [id number] EXAM:  CT CHEST                          PROCEDURE DATE:  06/06/2022          INTERPRETATION:  CLINICAL INFORMATION: Reported hypoxia on room air.   Fever and altered mental status    COMPARISON: None.    CONTRAST/COMPLICATIONS:  IV Contrast: NONE  Oral Contrast: NONE  Complications: None reported at time of study completion    PROCEDURE:  CT of the Chest, Abdomen and Pelvis was performed.  Sagittal and coronal reformats were performed.    FINDINGS:  CHEST:  LUNGS AND LARGE AIRWAYS: Patent central airways. Small densities in the   trachea and right bronchus intermedius may represent mucous secretion.   Mild bilateral atelectasis. Left lower lobe pulmonary infiltrate for   which clinical correlation with pneumonia is recommended. Nonspecific 8   mm right lower lobe lung nodule (image 41 series 2); follow-up chest CT   may be pursued in 6 months to ensure stability or resolution.  PLEURA: No pleural effusion or pneumothorax.  VESSELS: No aortic aneurysm. Aortic and coronary artery calcifications   are present.  HEART: Heart size is normal. No pericardial effusion.  MEDIASTINUM AND SVEN: Mildly enlarged 1.1 cm precarinal lymph node  CHEST WALL AND LOWER NECK: Within normal limits.    ABDOMEN AND PELVIS: Evaluation of the abdomen and pelvis is limited by   lack of IV/oral contrast.  LIVER: Within normal limits.  BILE DUCTS: Dilated common bile measuring 11 mm in caliber, probably due   to post cholecystectomy status.  GALLBLADDER: Cholecystectomy clips are present.  SPLEEN: Mild splenomegaly measuring 13.1 cm.  PANCREAS: Within normal limits.  ADRENALS: Nonspecific mild adrenal thickening bilaterally.  KIDNEYS/URETERS: The left kidney appears unremarkable. Small   nonobstructive calculi in the right kidney. No evidence for a ureteral   calculus. No hydronephrosis.    BLADDER: Within normal limits.  REPRODUCTIVE ORGANS: The uterus and adnexa appear grossly unremarkable.    BOWEL: No bowel obstruction or grossly thickened bowel wall. Colonic   diverticulosis without evidence of diverticulitis. Appendix within normal   limits.  PERITONEUM: No free air or ascites.  VESSELS: Within normal limits.  RETROPERITONEUM/LYMPH NODES: No lymphadenopathy.  ABDOMINAL WALL: Small fat-containing umbilical hernia.  shunt is   present.  BONES: Within normal limits.    IMPRESSION:  Left lower lobe pulmonary infiltrate for which clinical correlation with   pneumonia is recommended.    Nonspecific 8 mm right lower lobe lung nodule; follow-up chest CT may be   pursued in 3 months to ensure stability or resolution.    Mildly enlarged 1.1 cm precarinal lymph node    Small densities in the trachea and right bronchus intermedius may   represent mucous secretion. Attention should be directed to this finding   on the follow-up chest CT to ensure resolution.    Dilated common bile measuring 11 mm in caliber, probably due to post   cholecystectomy status. If there is a clinical suspicion for biliary   obstruction, MRCP may be pursued for further evaluation.    Mild splenomegaly.        ADVANCE DIRECTIVES: no, full code   Advanced Care Planning discussion total time spent:   HPI: 73 yo female with a medical history of Vascular dementia, HTN, HLD, Depression, NPH with  Shunt, Dermatomyositis and Seizures, sent in by NH staff for Altered Mental Status that started approx. 1 hour prior to arrival, with an episode of vomiting. In  ER, febrile with temp 100, wbc normal, lactate, BMP normal. UA+, CT c/a/p: Left lower lobe pulmonary infiltrate for which clinical correlation with pneumonia is recommended. Nonspecific 8 mm right lower lobe lung nodule; follow-up chest CT may be pursued in 3 months to ensure stability or resolution. Mildly enlarged 1.1 cm precarinal lymph node. Small densities in the trachea and right bronchus intermedius may represent mucous secretion. Attention should be directed to this finding on the follow-up chest CT to ensure resolution. Dilated common bile measuring 11 mm in caliber, probably due to post cholecystectomy status. If there is a clinical suspicion for biliary obstruction, MRCP may be pursued for further evaluation. Mild splenomegaly. sepstic w/u initiate d by ED. NS 2200 mls along with Rocephin and zithromax was given and medical admission was called.  patient has baseline dementia. Was not communicating.         PAST MEDICAL & SURGICAL HISTORY:  NPH (normal pressure hydrocephalus)      Vascular dementia      HTN (hypertension)      Seizures      Dermatomyositis      Falls      S/P  shunt          SOCIAL HISTORY:    Admitted from:  [facility] [place] [facility]/ [facility]   Substance abuse history:              Tobacco hx:                  Alcohol hx:              Home Opioid hx:  Worship:                                    Preferred Language:                    Surrogate/HCP/: Myles Braxton             Phone#: [phone]    FAMILY HISTORY:    Baseline ADLs (prior to admission):    Allergies    No Known Allergies    Intolerances      Present Symptoms:   Dyspnea:   Nausea/Vomiting:   Anxiety:  Depressed   Fatigue:  Loss of appetite:   Pain:                                location:          Review of Systems:  Unable to obtain due to poor mentation    MEDICATIONS  (STANDING):  ALBUTerol    90 MICROgram(s) HFA Inhaler 1 Puff(s) Inhalation every 4 hours  azithromycin  IVPB 500 milliGRAM(s) IV Intermittent every 24 hours  cefTRIAXone   IVPB 1000 milliGRAM(s) IV Intermittent every 24 hours  dextrose 5% + sodium chloride 0.45%. 1000 milliLiter(s) (60 mL/Hr) IV Continuous <Continuous>  enoxaparin Injectable 40 milliGRAM(s) SubCutaneous every 24 hours  levETIRAcetam 500 milliGRAM(s) Oral two times a day  levothyroxine 50 MICROGram(s) Oral daily  pantoprazole    Tablet 40 milliGRAM(s) Oral before breakfast    MEDICATIONS  (PRN):  acetaminophen     Tablet .. 650 milliGRAM(s) Oral every 6 hours PRN Temp greater or equal to 38C (100.4F), Mild Pain (1 - 3)  ALBUTerol    0.083% 2.5 milliGRAM(s) Nebulizer every 6 hours PRN Bronchospasm  bisacodyl Suppository 10 milliGRAM(s) Rectal daily PRN Constipation  hydrALAZINE Injectable 5 milliGRAM(s) IV Push every 6 hours PRN SBP> 160 or DBP >100      PHYSICAL EXAM:    Vital Signs Last 24 Hrs  T(C): 36.7 (08 Jun 2022 05:04), Max: 36.9 (07 Jun 2022 19:55)  T(F): 98.1 (08 Jun 2022 05:04), Max: 98.5 (07 Jun 2022 19:55)  HR: 64 (08 Jun 2022 05:04) (63 - 72)  BP: 125/58 (08 Jun 2022 05:04) (125/58 - 148/62)  BP(mean): --  RR: 16 (08 Jun 2022 05:04) (16 - 18)  SpO2: 96% (08 Jun 2022 05:04) (94% - 98%)    General: lethargic ,nonverbal, in bed, comfortable     Karnofsky Performance Score/Palliative Performance Status Version2:  40   %  PPSV:40%  HEENT: normal  dry mouth    Lungs: dim at bases , breathing comfortably   CV: normal rate   GI: normal    : normal  incontinent    Musculoskeletal: normal  w/ weakness             Skin: nml, w/d     Neuro: + deficits mainly non verbal at present   Oral intake ability: minimal -moderate needs assist   Diet: puree w/ mild thick flds  per speech    LABS:                        11.9   4.56  )-----------( 137      ( 08 Jun 2022 07:48 )             35.6     06-08    144  |  110<H>  |  9   ----------------------------<  90  3.7   |  27  |  0.67    Ca    8.6      08 Jun 2022 07:48  Mg     2.1     06-07          RADIOLOGY & ADDITIONAL STUDIES: < from: CT Head No Cont (06.06.22 @ 11:03) >  PROCEDURE DATE:  06/06/2022          INTERPRETATION:  .    CLINICAL INFORMATION: Lethargy.    TECHNIQUE: Multiple axial CT images of the head were obtained without   contrast. Sagittal and coronal reconstructed images were acquired from   the source data.    COMPARISON: No prior CT studies of the brain are available for comparison   at this institution.    FINDINGS: A right-sided posterior parietal approach ventriculoperitoneal   shunt catheter is seen. The tip approximates the septum pellucidum.   Moderate ventriculomegaly is seen. No abnormal extra-axial fluid   collections are noted.    There is no acute intracranial hemorrhage, shift of the midline   structures, or herniation. Thereis diffuse cerebral volume loss with   prominence of the sulci, fissures, and cisternal spaces which is normal   for the patient's age. There is mild periventricular white matter   hypoattenuation statistically compatible with microvascular changes given   calcific atherosclerotic disease of the intracranial arteries.    Scattered mucosal thickening is seen throughout the paranasal sinuses.   The mastoid air cells are clear. The orbits appear unremarkable.    IMPRESSION: No acute intracranial hemorrhage.    Right-sided posterior parietal approach ventriculoperitoneal shunt   catheter in place with moderate ventriculomegaly.    Mild chronic white matter microvascular type changes.        < from: CT Chest No Cont (06.06.22 @ 11:04) >    ACC: [id number] EXAM:  CT ABDOMEN AND PELVIS                        ACC: [id number] EXAM:  CT CHEST                          PROCEDURE DATE:  06/06/2022          INTERPRETATION:  CLINICAL INFORMATION: Reported hypoxia on room air.   Fever and altered mental status    COMPARISON: None.    CONTRAST/COMPLICATIONS:  IV Contrast: NONE  Oral Contrast: NONE  Complications: None reported at time of study completion    PROCEDURE:  CT of the Chest, Abdomen and Pelvis was performed.  Sagittal and coronal reformats were performed.    FINDINGS:  CHEST:  LUNGS AND LARGE AIRWAYS: Patent central airways. Small densities in the   trachea and right bronchus intermedius may represent mucous secretion.   Mild bilateral atelectasis. Left lower lobe pulmonary infiltrate for   which clinical correlation with pneumonia is recommended. Nonspecific 8   mm right lower lobe lung nodule (image 41 series 2); follow-up chest CT   may be pursued in 6 months to ensure stability or resolution.  PLEURA: No pleural effusion or pneumothorax.  VESSELS: No aortic aneurysm. Aortic and coronary artery calcifications   are present.  HEART: Heart size is normal. No pericardial effusion.  MEDIASTINUM AND SVEN: Mildly enlarged 1.1 cm precarinal lymph node  CHEST WALL AND LOWER NECK: Within normal limits.    ABDOMEN AND PELVIS: Evaluation of the abdomen and pelvis is limited by   lack of IV/oral contrast.  LIVER: Within normal limits.  BILE DUCTS: Dilated common bile measuring 11 mm in caliber, probably due   to post cholecystectomy status.  GALLBLADDER: Cholecystectomy clips are present.  SPLEEN: Mild splenomegaly measuring 13.1 cm.  PANCREAS: Within normal limits.  ADRENALS: Nonspecific mild adrenal thickening bilaterally.  KIDNEYS/URETERS: The left kidney appears unremarkable. Small   nonobstructive calculi in the right kidney. No evidence for a ureteral   calculus. No hydronephrosis.    BLADDER: Within normal limits.  REPRODUCTIVE ORGANS: The uterus and adnexa appear grossly unremarkable.    BOWEL: No bowel obstruction or grossly thickened bowel wall. Colonic   diverticulosis without evidence of diverticulitis. Appendix within normal   limits.  PERITONEUM: No free air or ascites.  VESSELS: Within normal limits.  RETROPERITONEUM/LYMPH NODES: No lymphadenopathy.  ABDOMINAL WALL: Small fat-containing umbilical hernia.  shunt is   present.  BONES: Within normal limits.    IMPRESSION:  Left lower lobe pulmonary infiltrate for which clinical correlation with   pneumonia is recommended.    Nonspecific 8 mm right lower lobe lung nodule; follow-up chest CT may be   pursued in 3 months to ensure stability or resolution.    Mildly enlarged 1.1 cm precarinal lymph node    Small densities in the trachea and right bronchus intermedius may   represent mucous secretion. Attention should be directed to this finding   on the follow-up chest CT to ensure resolution.    Dilated common bile measuring 11 mm in caliber, probably due to post   cholecystectomy status. If there is a clinical suspicion for biliary   obstruction, MRCP may be pursued for further evaluation.    Mild splenomegaly.        ADVANCE DIRECTIVES: no, full code   Advanced Care Planning discussion total time spent:   HPI: 71 yo female with a medical history of Vascular dementia, HTN, HLD, Depression, NPH with  Shunt, Dermatomyositis and Seizures, sent in by NH staff for Altered Mental Status that started approx. 1 hour prior to arrival, with an episode of vomiting. In  ER, febrile with temp 100, wbc normal, lactate, BMP normal. UA+, CT c/a/p: Left lower lobe pulmonary infiltrate for which clinical correlation with pneumonia is recommended. Nonspecific 8 mm right lower lobe lung nodule; follow-up chest CT may be pursued in 3 months to ensure stability or resolution. Mildly enlarged 1.1 cm precarinal lymph node. Small densities in the trachea and right bronchus intermedius may represent mucous secretion. Attention should be directed to this finding on the follow-up chest CT to ensure resolution. Dilated common bile measuring 11 mm in caliber, probably due to post cholecystectomy status. If there is a clinical suspicion for biliary obstruction, MRCP may be pursued for further evaluation. Mild splenomegaly. sepstic w/u initiate d by ED. NS 2200 mls along with Rocephin and zithromax was given and medical admission was called.  patient has baseline dementia. Was not communicating.         PAST MEDICAL & SURGICAL HISTORY:  NPH (normal pressure hydrocephalus)      Vascular dementia      HTN (hypertension)      Seizures      Dermatomyositis      Falls      S/P  shunt          SOCIAL HISTORY:    [place]  [facility] [place]/ [facility]   Substance abuse history:              Tobacco hx:                  Alcohol hx:              Home Opioid hx:  Episcopal:                                    Preferred Language:                    Surrogate/HCP/: Myles Braxton             Phone#: [phone]    FAMILY HISTORY:    Baseline ADLs (prior to admission):    Allergies    No Known Allergies    Intolerances      Present Symptoms:   Dyspnea:   Nausea/Vomiting:   Anxiety:  Depressed   Fatigue:  Loss of appetite:   Pain:                                location:          Review of Systems:  Unable to obtain due to poor mentation    MEDICATIONS  (STANDING):  ALBUTerol    90 MICROgram(s) HFA Inhaler 1 Puff(s) Inhalation every 4 hours  azithromycin  IVPB 500 milliGRAM(s) IV Intermittent every 24 hours  cefTRIAXone   IVPB 1000 milliGRAM(s) IV Intermittent every 24 hours  dextrose 5% + sodium chloride 0.45%. 1000 milliLiter(s) (60 mL/Hr) IV Continuous <Continuous>  enoxaparin Injectable 40 milliGRAM(s) SubCutaneous every 24 hours  levETIRAcetam 500 milliGRAM(s) Oral two times a day  levothyroxine 50 MICROGram(s) Oral daily  pantoprazole    Tablet 40 milliGRAM(s) Oral before breakfast    MEDICATIONS  (PRN):  acetaminophen     Tablet .. 650 milliGRAM(s) Oral every 6 hours PRN Temp greater or equal to 38C (100.4F), Mild Pain (1 - 3)  ALBUTerol    0.083% 2.5 milliGRAM(s) Nebulizer every 6 hours PRN Bronchospasm  bisacodyl Suppository 10 milliGRAM(s) Rectal daily PRN Constipation  hydrALAZINE Injectable 5 milliGRAM(s) IV Push every 6 hours PRN SBP> 160 or DBP >100      PHYSICAL EXAM:    Vital Signs Last 24 Hrs  T(C): 36.7 (08 Jun 2022 05:04), Max: 36.9 (07 Jun 2022 19:55)  T(F): 98.1 (08 Jun 2022 05:04), Max: 98.5 (07 Jun 2022 19:55)  HR: 64 (08 Jun 2022 05:04) (63 - 72)  BP: 125/58 (08 Jun 2022 05:04) (125/58 - 148/62)  BP(mean): --  RR: 16 (08 Jun 2022 05:04) (16 - 18)  SpO2: 96% (08 Jun 2022 05:04) (94% - 98%)    General: lethargic ,nonverbal, in bed, comfortable     Karnofsky Performance Score/Palliative Performance Status Version2:  40   %  PPSV:40%  HEENT: normal  dry mouth    Lungs: dim at bases , breathing comfortably   CV: normal rate   GI: normal    : normal  incontinent    Musculoskeletal: normal  w/ weakness             Skin: nml, w/d     Neuro: + deficits mainly non verbal at present   Oral intake ability: minimal -moderate needs assist   Diet: puree w/ mild thick flds  per speech    LABS:                        11.9   4.56  )-----------( 137      ( 08 Jun 2022 07:48 )             35.6     06-08    144  |  110<H>  |  9   ----------------------------<  90  3.7   |  27  |  0.67    Ca    8.6      08 Jun 2022 07:48  Mg     2.1     06-07          RADIOLOGY & ADDITIONAL STUDIES: < from: CT Head No Cont (06.06.22 @ 11:03) >  PROCEDURE DATE:  06/06/2022          INTERPRETATION:  .    CLINICAL INFORMATION: Lethargy.    TECHNIQUE: Multiple axial CT images of the head were obtained without   contrast. Sagittal and coronal reconstructed images were acquired from   the source data.    COMPARISON: No prior CT studies of the brain are available for comparison   at this institution.    FINDINGS: A right-sided posterior parietal approach ventriculoperitoneal   shunt catheter is seen. The tip approximates the septum pellucidum.   Moderate ventriculomegaly is seen. No abnormal extra-axial fluid   collections are noted.    There is no acute intracranial hemorrhage, shift of the midline   structures, or herniation. Thereis diffuse cerebral volume loss with   prominence of the sulci, fissures, and cisternal spaces which is normal   for the patient's age. There is mild periventricular white matter   hypoattenuation statistically compatible with microvascular changes given   calcific atherosclerotic disease of the intracranial arteries.    Scattered mucosal thickening is seen throughout the paranasal sinuses.   The mastoid air cells are clear. The orbits appear unremarkable.    IMPRESSION: No acute intracranial hemorrhage.    Right-sided posterior parietal approach ventriculoperitoneal shunt   catheter in place with moderate ventriculomegaly.    Mild chronic white matter microvascular type changes.        < from: CT Chest No Cont (06.06.22 @ 11:04) >    ACC: [id number] EXAM:  CT ABDOMEN AND PELVIS                        ACC: [id number] EXAM:  CT CHEST                          PROCEDURE DATE:  06/06/2022          INTERPRETATION:  CLINICAL INFORMATION: Reported hypoxia on room air.   Fever and altered mental status    COMPARISON: None.    CONTRAST/COMPLICATIONS:  IV Contrast: NONE  Oral Contrast: NONE  Complications: None reported at time of study completion    PROCEDURE:  CT of the Chest, Abdomen and Pelvis was performed.  Sagittal and coronal reformats were performed.    FINDINGS:  CHEST:  LUNGS AND LARGE AIRWAYS: Patent central airways. Small densities in the   trachea and right bronchus intermedius may represent mucous secretion.   Mild bilateral atelectasis. Left lower lobe pulmonary infiltrate for   which clinical correlation with pneumonia is recommended. Nonspecific 8   mm right lower lobe lung nodule (image 41 series 2); follow-up chest CT   may be pursued in 6 months to ensure stability or resolution.  PLEURA: No pleural effusion or pneumothorax.  VESSELS: No aortic aneurysm. Aortic and coronary artery calcifications   are present.  HEART: Heart size is normal. No pericardial effusion.  MEDIASTINUM AND SVEN: Mildly enlarged 1.1 cm precarinal lymph node  CHEST WALL AND LOWER NECK: Within normal limits.    ABDOMEN AND PELVIS: Evaluation of the abdomen and pelvis is limited by   lack of IV/oral contrast.  LIVER: Within normal limits.  BILE DUCTS: Dilated common bile measuring 11 mm in caliber, probably due   to post cholecystectomy status.  GALLBLADDER: Cholecystectomy clips are present.  SPLEEN: Mild splenomegaly measuring 13.1 cm.  PANCREAS: Within normal limits.  ADRENALS: Nonspecific mild adrenal thickening bilaterally.  KIDNEYS/URETERS: The left kidney appears unremarkable. Small   nonobstructive calculi in the right kidney. No evidence for a ureteral   calculus. No hydronephrosis.    BLADDER: Within normal limits.  REPRODUCTIVE ORGANS: The uterus and adnexa appear grossly unremarkable.    BOWEL: No bowel obstruction or grossly thickened bowel wall. Colonic   diverticulosis without evidence of diverticulitis. Appendix within normal   limits.  PERITONEUM: No free air or ascites.  VESSELS: Within normal limits.  RETROPERITONEUM/LYMPH NODES: No lymphadenopathy.  ABDOMINAL WALL: Small fat-containing umbilical hernia.  shunt is   present.  BONES: Within normal limits.    IMPRESSION:  Left lower lobe pulmonary infiltrate for which clinical correlation with   pneumonia is recommended.    Nonspecific 8 mm right lower lobe lung nodule; follow-up chest CT may be   pursued in 3 months to ensure stability or resolution.    Mildly enlarged 1.1 cm precarinal lymph node    Small densities in the trachea and right bronchus intermedius may   represent mucous secretion. Attention should be directed to this finding   on the follow-up chest CT to ensure resolution.    Dilated common bile measuring 11 mm in caliber, probably due to post   cholecystectomy status. If there is a clinical suspicion for biliary   obstruction, MRCP may be pursued for further evaluation.    Mild splenomegaly.        ADVANCE DIRECTIVES: no, full code   Advanced Care Planning discussion total time spent:

## 2022-06-08 NOTE — PROGRESS NOTE ADULT - ASSESSMENT
Physical Examination:  GENERAL:               Alert, Consused, No acute distress.    HEENT:                     No JVD, dry MM  PULM:                     Bilateral air entry,  trace Rales, No Rhonchi, No Wheezing  CVS:                         S1, S2,  No Murmur  ABD:                        Soft, nondistended, nontender, normoactive bowel sounds,   EXT:                         No edema, nontender, No Cyanosis or Clubbing   Vascular:                Warm Extremities, Normal Capillary refill, Normal Distal Pulses  SKIN:                       Warm and well perfused, no rashes noted.   NEURO:                  Alert, not oriented, not interactive, does not follow commands  PSYC:                      Calm, no Insight.    Assessment  Pneumonia  + Viral : Parainfluenza 3    Possible underlying bacterial vs Aspiration (CT shows: Small densities in the trachea and right bronchus intermedius may represent mucous secretion)  Abnormal CT chest Nonspecific 8 mm right lower lobe lung nodule and Mildly enlarged 1.1 cm precarinal lymph node   Underlying -  Dementia, HTN, High chol Depression, NPH s/p  shunt, Dermatomyositis, seizure d/o       Plan  Continue Empiric antibiotics  Taper n/c  to maintain sat > 90-95%  Dysphagia diet per swallow evaluation   Keep HOB elevated  Aspiration precautions    On Discharge will need to set up  CT chest to ensure stability or resolution of abnormal CT findings     if family desires  Consider Palliative care eval for advanced directives to be set prior to discharge.     GI/DVT PPX

## 2022-06-08 NOTE — DISCHARGE NOTE PROVIDER - HOSPITAL COURSE
71 yo female with a medical history including Vascular dementia, HTN, HLD, Depression, NPH with  Shunt, Dermatomyositis and Seizures, sent in by NH staff for Altered Mental Status that started approx. 1 hour prior to arrival, with an episode of vomiting. in ER, febrile with temp 100. Pt found with parainfluenza infection and possible aspiration PNA. Metal status improved. Pt is saturating well on RA. Pt has been seen by pulmonary, treated with IV abx and is stable for discharge. Pt also seen by speech and swallow and was placed on appropriate diet.     discharge provider: Thanh Payton DO    antibiotics for possible aspiration pna: ceftin 500mg BID till 6/10/22      PMD- Dr. Armenta - notified       73 yo female with a medical history including Vascular dementia, HTN, HLD, Depression, NPH with  Shunt, Dermatomyositis and Seizures, sent in by NH staff for Altered Mental Status that started approx. 1 hour prior to arrival, with an episode of vomiting. in ER, febrile with temp 100. Pt found with parainfluenza infection and possible aspiration PNA. Metal status improved. Pt is saturating well on RA. Pt has been seen by pulmonary, treated with IV abx and is stable for discharge. Pt also seen by speech and swallow and was placed on appropriate diet.     discharge provider: Thanh Payton DO    antibiotics for possible aspiration pna: ceftin 500mg BID till 6/10/22      PMD- Dr. Armenta - notified       71 yo female with a medical history including Vascular dementia, HTN, HLD, Depression, NPH with  Shunt, Dermatomyositis and Seizures, sent in by NH staff for Altered Mental Status that started approx. 1 hour prior to arrival, with an episode of vomiting. in ER, febrile with temp 100. Pt found with parainfluenza infection and possible aspiration PNA. Metal status improved. Pt is saturating well on RA. Pt has been seen by pulmonary, treated with IV abx and is stable for discharge. Pt also seen by speech and swallow and was placed on appropriate diet.     discharge provider: Thanh Payton DO    antibiotics for possible aspiration pna: ceftin 500mg BID till 6/10/22      PMD- Dr. Armenta       73 yo female with a medical history including Vascular dementia, HTN, HLD, Depression, NPH with  Shunt, Dermatomyositis and Seizures, sent in by NH staff for Altered Mental Status that started approx. 1 hour prior to arrival, with an episode of vomiting. in ER, febrile with temp 100. Pt found with parainfluenza infection and possible aspiration PNA. Metal status improved. Pt is saturating well on RA. Pt has been seen by pulmonary, treated with IV abx and is stable for discharge. Pt also seen by speech and swallow and was placed on appropriate diet.     discharge provider: Thanh Payton DO    antibiotics for possible aspiration pna: ceftin 500mg BID till 6/10/22      PMD- Dr. Armenta       73 yo female with a medical history including Vascular dementia, HTN, HLD, Depression, NPH with  Shunt, Dermatomyositis and Seizures, sent in by NH staff for Altered Mental Status that started approx. 1 hour prior to arrival, with an episode of vomiting. in ER, febrile with temp 100. Pt found with parainfluenza infection and possible aspiration PNA. Metal status improved. Pt is saturating well on RA. Pt has been seen by pulmonary, treated with IV abx and is stable for discharge. Pt also seen by speech and swallow and was placed on appropriate diet.     discharge provider: Thanh Payton DO    antibiotics for possible aspiration pna: ceftin 500mg BID till 6/10/22    PMD- Dr. Armenta

## 2022-06-08 NOTE — DISCHARGE NOTE PROVIDER - CARE PROVIDER_API CALL
Semaj Armenta (MD)  Family Medicine  [address] [place] [address]  Phone: [phone]  Fax: [phone]  Follow Up Time:    Semaj Armenta (MD)  Family Medicine  [address] [place] [address]  Phone: [phone]  Fax: [phone]  Follow Up Time:    Semaj Armenta (MD)  Family Medicine  [address] [place] [address]  Phone: [phone]  Fax: [phone]  Follow Up Time:

## 2022-06-08 NOTE — DISCHARGE NOTE PROVIDER - NSDCCPCAREPLAN_GEN_ALL_CORE_FT
PRINCIPAL DISCHARGE DIAGNOSIS  Diagnosis: Parainfluenza infection  Assessment and Plan of Treatment: you were seen by a pulmonologist. Your respiratory status has been stable.      SECONDARY DISCHARGE DIAGNOSES  Diagnosis: Pneumonia, aspiration  Assessment and Plan of Treatment: You were treated with antibiotics.     PRINCIPAL DISCHARGE DIAGNOSIS  Diagnosis: Parainfluenza infection  Assessment and Plan of Treatment: you were seen by a pulmonologist. Your respiratory status has been stable.      SECONDARY DISCHARGE DIAGNOSES  Diagnosis: PNA (pneumonia)  Assessment and Plan of Treatment: You were treated with antibiotics and symptoms improved.

## 2022-06-08 NOTE — PROGRESS NOTE ADULT - ASSESSMENT
73 yo female with a medical history including Vascular dementia, HTN, HLD, Depression, NPH with  Shunt, Dermatomyositis and Seizures, sent in by NH staff for Altered Mental Status that started approx. 1 hour prior to arrival, with an episode of vomiting. in ER, febrile with temp 100. Pt found with parainfluenza infection and possible PNA. Metal status now improved    Acute metabolic encephalopathy - resolved  sepsis not present on admission  Possible aspiration pna   Parainfluenza infection   - mental status back to baseline  - cont supportive care  - cont rocephin, day 3/5  - urine legionella negative   - per speech and swallow: pureed and mildly thickened liquid  - keppra level pending  - speech and swallow: upgrade to minced and moist diet with thins    Pyuria   - urine cx contaminated    0.8 mm lung nodule on CT chest  - repeat CT scan in 3 months outpatient   - spoke to pt's son, would like to monitor outpatient, not sure if he will pursue treatment at this time if nodule is malignant.      h/o Vascular dementia, HTN, HLD, Depression, NPH with  Shunt, Dermatomyositis and Seizures  Hypothyroid   - cont keppra  - cont synthyroid  - BP improved. resume Toprol 25mg  - hold losartan   - resume lasix on discharge      DVT-P: lovenox    GOC: pt's son would like full code for now.     updated son elham [phone]. 6/8        71 yo female with a medical history including Vascular dementia, HTN, HLD, Depression, NPH with  Shunt, Dermatomyositis and Seizures, sent in by NH staff for Altered Mental Status that started approx. 1 hour prior to arrival, with an episode of vomiting. in ER, febrile with temp 100. Pt found with parainfluenza infection and possible PNA. Metal status now improved    Acute metabolic encephalopathy - resolved  sepsis not present on admission  Possible aspiration pna   Parainfluenza infection   - mental status back to baseline  - cont supportive care  - cont rocephin, day 3/5  - urine legionella negative   - per speech and swallow: pureed and mildly thickened liquid  - keppra level pending  - speech and swallow: upgrade to minced and moist diet with thins    Pyuria   - urine cx contaminated    0.8 mm lung nodule on CT chest  - repeat CT scan in 3 months outpatient   - spoke to pt's son, would like to monitor outpatient, not sure if he will pursue treatment at this time if nodule is malignant.      h/o Vascular dementia, HTN, HLD, Depression, NPH with  Shunt, Dermatomyositis and Seizures  Hypothyroid   - cont keppra  - cont synthyroid  - BP improved. resume Toprol 25mg  - hold losartan   - resume lasix on discharge      DVT-P: lovenox    GOC: pt's son would like full code for now.     updated son elham [phone]. 6/8        71 yo female with a medical history including Vascular dementia, HTN, HLD, Depression, NPH with  Shunt, Dermatomyositis and Seizures, sent in by NH staff for Altered Mental Status that started approx. 1 hour prior to arrival, with an episode of vomiting. in ER, febrile with temp 100. Pt found with parainfluenza infection and possible PNA. Metal status now improved    Acute metabolic encephalopathy - resolved  sepsis not present on admission  Possible aspiration pna   Parainfluenza infection   - mental status back to baseline  - cont supportive care  - cont rocephin, day 3/5  - urine legionella negative   - per speech and swallow: pureed and mildly thickened liquid  - keppra level pending  - speech and swallow: upgrade to minced and moist diet with thins    Pyuria   - urine cx contaminated    0.8 mm lung nodule on CT chest  - repeat CT scan in 3 months outpatient   - spoke to pt's son, would like to monitor outpatient, not sure if he will pursue treatment at this time if nodule is malignant.      h/o Vascular dementia, HTN, HLD, Depression, NPH with  Shunt, Dermatomyositis and Seizures  Hypothyroid   - cont keppra  - cont synthyroid  - BP improved. resume Toprol 25mg  - hold losartan   - resume lasix on discharge      DVT-P: lovenox    GOC: pt's son would like full code for now.     updated son elham [phone]. 6/8        71 yo female with a medical history including Vascular dementia, HTN, HLD, Depression, NPH with  Shunt, Dermatomyositis and Seizures, sent in by NH staff for Altered Mental Status that started approx. 1 hour prior to arrival, with an episode of vomiting. in ER, febrile with temp 100. Pt found with parainfluenza infection and possible PNA. Metal status now improved    Acute metabolic encephalopathy - resolved  sepsis not present on admission  Possible gram neg PNA  Parainfluenza infection   - mental status back to baseline  - cont supportive care  - cont rocephin, day 3/5  - urine legionella negative   - per speech and swallow: pureed and mildly thickened liquid  - keppra level pending  - speech and swallow: upgrade to minced and moist diet with thins    Pyuria   - urine cx contaminated    0.8 mm lung nodule on CT chest  - repeat CT scan in 3 months outpatient   - spoke to pt's son, would like to monitor outpatient, not sure if he will pursue treatment at this time if nodule is malignant.      h/o Vascular dementia, HTN, HLD, Depression, NPH with  Shunt, Dermatomyositis and Seizures  Hypothyroid   - cont keppra  - cont synthyroid  - BP improved. resume Toprol 25mg  - hold losartan   - resume lasix on discharge      DVT-P: lovenox    GOC: pt's son would like full code for now.     updated son elham [phone]. 6/8        71 yo female with a medical history including Vascular dementia, HTN, HLD, Depression, NPH with  Shunt, Dermatomyositis and Seizures, sent in by NH staff for Altered Mental Status that started approx. 1 hour prior to arrival, with an episode of vomiting. in ER, febrile with temp 100. Pt found with parainfluenza infection and possible PNA. Metal status now improved    Acute metabolic encephalopathy - resolved  sepsis not present on admission  Possible gram neg PNA  Parainfluenza infection   - mental status back to baseline  - cont supportive care  - cont rocephin, day 3/5  - urine legionella negative   - per speech and swallow: pureed and mildly thickened liquid  - keppra level pending  - speech and swallow: upgrade to minced and moist diet with thins    Pyuria   - urine cx contaminated    0.8 mm lung nodule on CT chest  - repeat CT scan in 3 months outpatient   - spoke to pt's son, would like to monitor outpatient, not sure if he will pursue treatment at this time if nodule is malignant.      h/o Vascular dementia, HTN, HLD, Depression, NPH with  Shunt, Dermatomyositis and Seizures  Hypothyroid   - cont keppra  - cont synthyroid  - BP improved. resume Toprol 25mg  - hold losartan   - resume lasix on discharge      DVT-P: lovenox    GOC: pt's son would like full code for now.     updated son elham [phone]. 6/8        73 yo female with a medical history including Vascular dementia, HTN, HLD, Depression, NPH with  Shunt, Dermatomyositis and Seizures, sent in by NH staff for Altered Mental Status that started approx. 1 hour prior to arrival, with an episode of vomiting. in ER, febrile with temp 100. Pt found with parainfluenza infection and possible PNA. Metal status now improved    Acute metabolic encephalopathy - resolved  sepsis not present on admission  Possible gram neg PNA  Parainfluenza infection   - mental status back to baseline  - cont supportive care  - cont rocephin, day 3/5  - urine legionella negative   - per speech and swallow: pureed and mildly thickened liquid  - keppra level pending  - speech and swallow: upgrade to minced and moist diet with thins    Pyuria   - urine cx contaminated    0.8 mm lung nodule on CT chest  - repeat CT scan in 3 months outpatient   - spoke to pt's son, would like to monitor outpatient, not sure if he will pursue treatment at this time if nodule is malignant.      h/o Vascular dementia, HTN, HLD, Depression, NPH with  Shunt, Dermatomyositis and Seizures  Hypothyroid   - cont keppra  - cont synthyroid  - BP improved. resume Toprol 25mg  - hold losartan   - resume lasix on discharge      DVT-P: lovenox    GOC: pt's son would like full code for now.     updated son elham [phone]. 6/8

## 2022-06-08 NOTE — DISCHARGE NOTE PROVIDER - NSDCMRMEDTOKEN_GEN_ALL_CORE_FT
escitalopram 20 mg oral tablet: 1 tab(s) orally once a day  furosemide 20 mg oral tablet: 1 tab(s) orally once a day  levETIRAcetam 500 mg oral tablet: 1 tab(s) orally 2 times a day  levothyroxine 50 mcg (0.05 mg) oral tablet: 1 tab(s) orally once a day  melatonin 5 mg oral tablet: 1 tab(s) orally once a day (at bedtime)  Metoprolol Succinate ER 25 mg oral tablet, extended release: 1 tab(s) orally once a day  Senna 8.6 mg oral tablet: 1 tab(s) orally once a day (at bedtime)   cefuroxime 500 mg oral tablet: 1 tab(s) orally 2 times a day   escitalopram 20 mg oral tablet: 1 tab(s) orally once a day  furosemide 20 mg oral tablet: 1 tab(s) orally once a day  levETIRAcetam 500 mg oral tablet: 1 tab(s) orally 2 times a day  levothyroxine 50 mcg (0.05 mg) oral tablet: 1 tab(s) orally once a day  melatonin 5 mg oral tablet: 1 tab(s) orally once a day (at bedtime)  Metoprolol Succinate ER 25 mg oral tablet, extended release: 1 tab(s) orally once a day  Senna 8.6 mg oral tablet: 1 tab(s) orally once a day (at bedtime)   cefuroxime 500 mg oral tablet: 1 tab(s) orally 2 times a day x 2 days  escitalopram 20 mg oral tablet: 1 tab(s) orally once a day  furosemide 20 mg oral tablet: 1 tab(s) orally once a day  levETIRAcetam 500 mg oral tablet: 1 tab(s) orally 2 times a day  levothyroxine 50 mcg (0.05 mg) oral tablet: 1 tab(s) orally once a day  melatonin 5 mg oral tablet: 1 tab(s) orally once a day (at bedtime)  Metoprolol Succinate ER 25 mg oral tablet, extended release: 1 tab(s) orally once a day  Senna 8.6 mg oral tablet: 1 tab(s) orally once a day (at bedtime)

## 2022-06-08 NOTE — CONSULT NOTE ADULT - ASSESSMENT
A/P  73 yo female with a medical history including Vascular dementia, HTN, HLD, Depression, NPH with  Shunt, Dermatomyositis and Seizures, sent in by NH staff for Altered Mental Status that started approx. 1 hour prior to arrival, with an episode of vomiting. In ER, febrile with temp 100. Pt found with possible PNA and Acute metabolic encephalopathy.      Assessment/plan:  sepsis not present on admission  Possible gram neg pna   Parainfluenza infection   - cont supportive care  - cont rocephin and azithromycin  -speech and swallow eval   lung nodule on CT chest  - repeat CT scan in 3 months   h/o Vascular dementia, HTN, HLD,   Depression, NPH with  Shunt, Dermatomyositis and Seizures  Hypothyroid   - cont keppra  - cont synthyroid  - supportive care     Palliative :  asked for Goc assist, pt from [facility], w/ hx dementia, depression, htn, hld, Nph w/ shunt placed  .Chart reviewed, pt seen , wakes says few words, oriented x 1, rests comfortably.    Spoke with pts son Josh today - pls see GOC note above.   Pt resting comfortably and on IV antbx at present time.    Reviewed Goc , cpr/intubation/feeding tubes w/ son and he has to think about everything and will call w/ any questions.  Will  follow clinical course w/ med team.  Goc - on-going     A/P  71 yo female with a medical history including Vascular dementia, HTN, HLD, Depression, NPH with  Shunt, Dermatomyositis and Seizures, sent in by NH staff for Altered Mental Status that started approx. 1 hour prior to arrival, with an episode of vomiting. In ER, febrile with temp 100. Pt found with possible PNA and Acute metabolic encephalopathy.      Assessment/plan:  sepsis not present on admission  Possible gram neg pna   Parainfluenza infection   - cont supportive care  - cont rocephin and azithromycin  -speech and swallow eval   lung nodule on CT chest  - repeat CT scan in 3 months   h/o Vascular dementia, HTN, HLD,   Depression, NPH with  Shunt, Dermatomyositis and Seizures  Hypothyroid   - cont keppra  - cont synthyroid  - supportive care     Palliative :  asked for Goc assist, pt from [facility], w/ hx dementia, depression, htn, hld, Nph w/ shunt placed  .Chart reviewed, pt seen , wakes says few words, oriented x 1, rests comfortably.    Spoke with pts son Josh today - pls see GOC note above.   Pt resting comfortably and on IV antbx at present time.    Reviewed Goc , cpr/intubation/feeding tubes w/ son and he has to think about everything and will call w/ any questions.  Will  follow clinical course w/ med team.  Goc - on-going     A/P  73 yo female with a medical history including Vascular dementia, HTN, HLD, Depression, NPH with  Shunt, Dermatomyositis and Seizures, sent in by NH staff for Altered Mental Status that started approx. 1 hour prior to arrival, with an episode of vomiting. In ER, febrile with temp 100. Pt found with possible PNA and Acute metabolic encephalopathy.      Assessment/plan:  sepsis not present on admission  Possible gram neg pna   Parainfluenza infection   - cont supportive care  - cont rocephin and azithromycin  -speech and swallow eval   lung nodule on CT chest  - repeat CT scan in 3 months   h/o Vascular dementia, HTN, HLD,   Depression, NPH with  Shunt, Dermatomyositis and Seizures  Hypothyroid   - cont keppra  - cont synthyroid  - supportive care     Palliative :  asked for Goc assist, pt from [facility], w/ hx dementia, depression, htn, hld, Nph w/ shunt placed  .Chart reviewed, pt seen , wakes says few words, oriented x 1, rests comfortably.    Spoke with pts son Josh today - pls see GOC note above.   Pt resting comfortably and on IV antbx at present time.    Reviewed Goc , cpr/intubation/feeding tubes w/ son and he has to think about everything and will call w/ any questions.  Will  follow clinical course w/ med team.  Goc - on-going

## 2022-06-09 ENCOUNTER — TRANSCRIPTION ENCOUNTER (OUTPATIENT)
Age: 72
End: 2022-06-09

## 2022-06-09 VITALS
TEMPERATURE: 98 F | DIASTOLIC BLOOD PRESSURE: 68 MMHG | SYSTOLIC BLOOD PRESSURE: 164 MMHG | HEART RATE: 64 BPM | OXYGEN SATURATION: 97 % | RESPIRATION RATE: 16 BRPM

## 2022-06-09 LAB
ANION GAP SERPL CALC-SCNC: 13 MMOL/L — SIGNIFICANT CHANGE UP (ref 5–17)
BASOPHILS # BLD AUTO: 0.03 K/UL — SIGNIFICANT CHANGE UP (ref 0–0.2)
BASOPHILS NFR BLD AUTO: 0.7 % — SIGNIFICANT CHANGE UP (ref 0–2)
BUN SERPL-MCNC: 12 MG/DL — SIGNIFICANT CHANGE UP (ref 7–23)
CALCIUM SERPL-MCNC: 9.3 MG/DL — SIGNIFICANT CHANGE UP (ref 8.4–10.5)
CHLORIDE SERPL-SCNC: 108 MMOL/L — SIGNIFICANT CHANGE UP (ref 96–108)
CO2 SERPL-SCNC: 18 MMOL/L — LOW (ref 22–31)
CREAT SERPL-MCNC: 0.4 MG/DL — LOW (ref 0.5–1.3)
EGFR: 104 ML/MIN/1.73M2 — SIGNIFICANT CHANGE UP
EOSINOPHIL # BLD AUTO: 0.25 K/UL — SIGNIFICANT CHANGE UP (ref 0–0.5)
EOSINOPHIL NFR BLD AUTO: 5.4 % — SIGNIFICANT CHANGE UP (ref 0–6)
GLUCOSE SERPL-MCNC: 79 MG/DL — SIGNIFICANT CHANGE UP (ref 70–99)
HCT VFR BLD CALC: 37.6 % — SIGNIFICANT CHANGE UP (ref 34.5–45)
HGB BLD-MCNC: 12.3 G/DL — SIGNIFICANT CHANGE UP (ref 11.5–15.5)
IMM GRANULOCYTES NFR BLD AUTO: 0.2 % — SIGNIFICANT CHANGE UP (ref 0–1.5)
LYMPHOCYTES # BLD AUTO: 1.62 K/UL — SIGNIFICANT CHANGE UP (ref 1–3.3)
LYMPHOCYTES # BLD AUTO: 35.3 % — SIGNIFICANT CHANGE UP (ref 13–44)
MCHC RBC-ENTMCNC: 31.1 PG — SIGNIFICANT CHANGE UP (ref 27–34)
MCHC RBC-ENTMCNC: 32.7 GM/DL — SIGNIFICANT CHANGE UP (ref 32–36)
MCV RBC AUTO: 95.2 FL — SIGNIFICANT CHANGE UP (ref 80–100)
MONOCYTES # BLD AUTO: 0.4 K/UL — SIGNIFICANT CHANGE UP (ref 0–0.9)
MONOCYTES NFR BLD AUTO: 8.7 % — SIGNIFICANT CHANGE UP (ref 2–14)
NEUTROPHILS # BLD AUTO: 2.28 K/UL — SIGNIFICANT CHANGE UP (ref 1.8–7.4)
NEUTROPHILS NFR BLD AUTO: 49.7 % — SIGNIFICANT CHANGE UP (ref 43–77)
NRBC # BLD: 0 /100 WBCS — SIGNIFICANT CHANGE UP (ref 0–0)
PLATELET # BLD AUTO: 156 K/UL — SIGNIFICANT CHANGE UP (ref 150–400)
POTASSIUM SERPL-MCNC: 4.6 MMOL/L — SIGNIFICANT CHANGE UP (ref 3.5–5.3)
POTASSIUM SERPL-SCNC: 4.6 MMOL/L — SIGNIFICANT CHANGE UP (ref 3.5–5.3)
RBC # BLD: 3.95 M/UL — SIGNIFICANT CHANGE UP (ref 3.8–5.2)
RBC # FLD: 12.9 % — SIGNIFICANT CHANGE UP (ref 10.3–14.5)
SODIUM SERPL-SCNC: 139 MMOL/L — SIGNIFICANT CHANGE UP (ref 135–145)
WBC # BLD: 4.59 K/UL — SIGNIFICANT CHANGE UP (ref 3.8–10.5)
WBC # FLD AUTO: 4.59 K/UL — SIGNIFICANT CHANGE UP (ref 3.8–10.5)

## 2022-06-09 PROCEDURE — 96365 THER/PROPH/DIAG IV INF INIT: CPT

## 2022-06-09 PROCEDURE — 80177 DRUG SCRN QUAN LEVETIRACETAM: CPT

## 2022-06-09 PROCEDURE — 92526 ORAL FUNCTION THERAPY: CPT

## 2022-06-09 PROCEDURE — 87449 NOS EACH ORGANISM AG IA: CPT

## 2022-06-09 PROCEDURE — 99285 EMERGENCY DEPT VISIT HI MDM: CPT | Mod: 25

## 2022-06-09 PROCEDURE — 83605 ASSAY OF LACTIC ACID: CPT

## 2022-06-09 PROCEDURE — 70450 CT HEAD/BRAIN W/O DYE: CPT | Mod: MA

## 2022-06-09 PROCEDURE — 36415 COLL VENOUS BLD VENIPUNCTURE: CPT

## 2022-06-09 PROCEDURE — 71045 X-RAY EXAM CHEST 1 VIEW: CPT

## 2022-06-09 PROCEDURE — 92610 EVALUATE SWALLOWING FUNCTION: CPT

## 2022-06-09 PROCEDURE — 81001 URINALYSIS AUTO W/SCOPE: CPT

## 2022-06-09 PROCEDURE — 85025 COMPLETE CBC W/AUTO DIFF WBC: CPT

## 2022-06-09 PROCEDURE — 80053 COMPREHEN METABOLIC PANEL: CPT

## 2022-06-09 PROCEDURE — 86803 HEPATITIS C AB TEST: CPT

## 2022-06-09 PROCEDURE — 74176 CT ABD & PELVIS W/O CONTRAST: CPT | Mod: MA

## 2022-06-09 PROCEDURE — 93005 ELECTROCARDIOGRAM TRACING: CPT

## 2022-06-09 PROCEDURE — 87040 BLOOD CULTURE FOR BACTERIA: CPT

## 2022-06-09 PROCEDURE — 0225U NFCT DS DNA&RNA 21 SARSCOV2: CPT

## 2022-06-09 PROCEDURE — 80048 BASIC METABOLIC PNL TOTAL CA: CPT

## 2022-06-09 PROCEDURE — 87086 URINE CULTURE/COLONY COUNT: CPT

## 2022-06-09 PROCEDURE — 85730 THROMBOPLASTIN TIME PARTIAL: CPT

## 2022-06-09 PROCEDURE — 99239 HOSP IP/OBS DSCHRG MGMT >30: CPT

## 2022-06-09 PROCEDURE — 85610 PROTHROMBIN TIME: CPT

## 2022-06-09 PROCEDURE — 71250 CT THORAX DX C-: CPT | Mod: MA

## 2022-06-09 PROCEDURE — 83735 ASSAY OF MAGNESIUM: CPT

## 2022-06-09 RX ORDER — CEFUROXIME AXETIL 250 MG
1 TABLET ORAL
Qty: 4 | Refills: 0
Start: 2022-06-09 | End: 2022-06-10

## 2022-06-09 RX ADMIN — LEVETIRACETAM 500 MILLIGRAM(S): 250 TABLET, FILM COATED ORAL at 05:25

## 2022-06-09 RX ADMIN — Medication 50 MICROGRAM(S): at 05:25

## 2022-06-09 RX ADMIN — CEFTRIAXONE 100 MILLIGRAM(S): 500 INJECTION, POWDER, FOR SOLUTION INTRAMUSCULAR; INTRAVENOUS at 11:14

## 2022-06-09 RX ADMIN — SODIUM CHLORIDE 60 MILLILITER(S): 9 INJECTION, SOLUTION INTRAVENOUS at 08:37

## 2022-06-09 RX ADMIN — PANTOPRAZOLE SODIUM 40 MILLIGRAM(S): 20 TABLET, DELAYED RELEASE ORAL at 05:26

## 2022-06-09 RX ADMIN — Medication 25 MILLIGRAM(S): at 05:25

## 2022-06-09 NOTE — PROGRESS NOTE ADULT - SUBJECTIVE AND OBJECTIVE BOX
Patient is a 72y old  Female who presents with a chief complaint of AMS. PNA, UTI (2022 11:32)      Subjective and overnight events:  Patient seen and examined at bedside.    ALLERGIES:  No Known Allergies    MEDICATIONS  (STANDING):  ALBUTerol    90 MICROgram(s) HFA Inhaler 1 Puff(s) Inhalation every 4 hours  azithromycin  IVPB 500 milliGRAM(s) IV Intermittent every 24 hours  cefTRIAXone   IVPB 1000 milliGRAM(s) IV Intermittent every 24 hours  dextrose 5% + sodium chloride 0.45%. 1000 milliLiter(s) (60 mL/Hr) IV Continuous <Continuous>  enoxaparin Injectable 40 milliGRAM(s) SubCutaneous every 24 hours  levETIRAcetam 500 milliGRAM(s) Oral two times a day  levothyroxine 50 MICROGram(s) Oral daily  pantoprazole    Tablet 40 milliGRAM(s) Oral before breakfast    MEDICATIONS  (PRN):  acetaminophen     Tablet .. 650 milliGRAM(s) Oral every 6 hours PRN Temp greater or equal to 38C (100.4F), Mild Pain (1 - 3)  ALBUTerol    0.083% 2.5 milliGRAM(s) Nebulizer every 6 hours PRN Bronchospasm  bisacodyl Suppository 10 milliGRAM(s) Rectal daily PRN Constipation  hydrALAZINE Injectable 5 milliGRAM(s) IV Push every 6 hours PRN SBP> 160 or DBP >100    Vital Signs Last 24 Hrs  T(F): 98.1 (2022 05:04), Max: 98.5 (2022 19:55)  HR: 64 (2022 05:04) (63 - 72)  BP: 125/58 (2022 05:04) (125/58 - 148/62)  RR: 16 (2022 05:04) (16 - 18)  SpO2: 96% (2022 05:04) (94% - 98%)  I&O's Summary    2022 07:01  -  2022 07:00  --------------------------------------------------------  IN: 1860 mL / OUT: 1025 mL / NET: 835 mL    2022 07:01  -  2022 14:19  --------------------------------------------------------  IN: 780 mL / OUT: 825 mL / NET: -45 mL      PHYSICAL EXAM:  General: NAD, A/O x 3  ENT: MMM  Neck: Supple, No JVD  Lungs: Clear to auscultation bilaterally  Cardio: RRR, S1/S2, No murmurs  Abdomen: Soft, Nontender, Nondistended; Bowel sounds present  Extremities: No calf tenderness, No pitting edema    LABS:                        11.9   4.56  )-----------( 137      ( 2022 07:48 )             35.6     -    144  |  110  |  9   ----------------------------<  90  3.7   |  27  |  0.67    Ca    8.6      2022 07:48  Mg     2.1     06-    TPro  7.8  /  Alb  3.4  /  TBili  0.5  /  DBili  x   /  AST  46  /  ALT  56  /  AlkPhos  78  06-      PT/INR - ( 2022 10:05 )   PT: 12.9 sec;   INR: 1.11 ratio         PTT - ( 2022 10:05 )  PTT:27.3 sec  Lactate, Blood: 1.7 mmol/L ( @ 10:05)          Urinalysis Basic - ( 2022 11:30 )    Color: Yellow / Appearance: Clear / S.010 / pH: x  Gluc: x / Ketone: Negative  / Bili: Negative / Urobili: Negative   Blood: x / Protein: 15 / Nitrite: Positive   Leuk Esterase: Small / RBC: 0-4 /HPF / WBC 6-10 /HPF   Sq Epi: x / Non Sq Epi: Moderate / Bacteria: Moderate /HPF        Culture - Urine (collected 2022 11:30)  Source: Clean Catch Clean Catch (Midstream)  Final Report (2022 10:46):    >=3 organisms. Probable collection contamination.    Culture - Blood (collected 2022 10:05)  Source: .Blood Blood-Peripheral  Preliminary Report (2022 14:01):    No growth to date.    Culture - Blood (collected 2022 10:05)  Source: .Blood Blood-Peripheral  Preliminary Report (2022 14:01):    No growth to date.          RADIOLOGY & ADDITIONAL TESTS:    Care Discussed with Consultants/Other Providers:   
Follow-up Pulmonary Progress Note  Chief Complaint : Pneumonia due to organism        patient seen and examined  non verbal  comfortable   on Room air      Allergies :No Known Allergies      PAST MEDICAL & SURGICAL HISTORY:  NPH (normal pressure hydrocephalus)    Vascular dementia    HTN (hypertension)    Seizures    Dermatomyositis    Falls    S/P  shunt        Medications:  MEDICATIONS  (STANDING):  ALBUTerol    90 MICROgram(s) HFA Inhaler 1 Puff(s) Inhalation every 4 hours  cefTRIAXone   IVPB 1000 milliGRAM(s) IV Intermittent every 24 hours  dextrose 5% + sodium chloride 0.45%. 1000 milliLiter(s) (60 mL/Hr) IV Continuous <Continuous>  enoxaparin Injectable 40 milliGRAM(s) SubCutaneous every 24 hours  levETIRAcetam 500 milliGRAM(s) Oral two times a day  levothyroxine 50 MICROGram(s) Oral daily  metoprolol succinate ER 25 milliGRAM(s) Oral daily  pantoprazole    Tablet 40 milliGRAM(s) Oral before breakfast    MEDICATIONS  (PRN):  acetaminophen     Tablet .. 650 milliGRAM(s) Oral every 6 hours PRN Temp greater or equal to 38C (100.4F), Mild Pain (1 - 3)  ALBUTerol    0.083% 2.5 milliGRAM(s) Nebulizer every 6 hours PRN Bronchospasm  bisacodyl Suppository 10 milliGRAM(s) Rectal daily PRN Constipation  hydrALAZINE Injectable 5 milliGRAM(s) IV Push every 6 hours PRN SBP> 160 or DBP >100      Antibiotics History  azithromycin  IVPB 500 milliGRAM(s) IV Intermittent once, 06-06-22 @ 10:42, Stop order after: 1 Doses  azithromycin  IVPB 500 milliGRAM(s) IV Intermittent every 24 hours, 06-07-22 @ 00:00  azithromycin  IVPB 500 milliGRAM(s) IV Intermittent every 24 hours, 06-08-22 @ 07:25, Stop order after: 4 Days  cefTRIAXone   IVPB 1000 milliGRAM(s) IV Intermittent every 24 hours, 06-07-22 @ 14:00, Stop order after: 7 Days  cefTRIAXone   IVPB 1000 milliGRAM(s) IV Intermittent once, 06-06-22 @ 10:42, Stop order after: 1 Doses  cefTRIAXone   IVPB 1000 milliGRAM(s) IV Intermittent every 24 hours, 06-07-22 @ 00:00, Stop order after: 5 Days  piperacillin/tazobactam IVPB. 3.375 Gram(s) IV Intermittent once, 06-06-22 @ 14:27, Stop order after: 1 Doses  piperacillin/tazobactam IVPB.. 3.375 Gram(s) IV Intermittent every 8 hours, 06-06-22 @ 14:27, Stop order after: 7 Days      Heme Medications   enoxaparin Injectable 40 milliGRAM(s) SubCutaneous every 24 hours, 06-07-22 @ 00:00      GI Medications  bisacodyl Suppository 10 milliGRAM(s) Rectal daily, 06-06-22 @ 14:34, Routine PRN  pantoprazole    Tablet 40 milliGRAM(s) Oral before breakfast, 06-06-22 @ 13:48, Routine        LABS:                        12.3   4.59  )-----------( 156      ( 09 Jun 2022 06:30 )             37.6     06-09    139  |  108  |  12  ----------------------------<  79  4.6   |  18<L>  |  0.40<L>    Ca    9.3      09 Jun 2022 06:30               CULTURES: (if applicable)    Culture - Urine (collected 06-06-22 @ 11:30)  Source: Clean Catch Clean Catch (Midstream)  Final Report (06-07-22 @ 10:46):    >=3 organisms. Probable collection contamination.    Culture - Blood (collected 06-06-22 @ 10:05)  Source: .Blood Blood-Peripheral  Preliminary Report (06-07-22 @ 14:01):    No growth to date.    Culture - Blood (collected 06-06-22 @ 10:05)  Source: .Blood Blood-Peripheral  Preliminary Report (06-07-22 @ 14:01):    No growth to date.      Rapid RVP Result: Detected (06-06-22 @ 10:15)       RADIOLOGY  CXR:     < from: CT Chest No Cont (06.06.22 @ 11:04) >  IMPRESSION:  Left lower lobe pulmonary infiltrate for which clinical correlation with   pneumonia is recommended.    Nonspecific 8 mm right lower lobe lung nodule; follow-up chest CT may be   pursued in 3 months to ensure stability or resolution.    Mildly enlarged 1.1 cm precarinal lymph node    Small densities in the trachea and right bronchus intermedius may   represent mucous secretion. Attention should be directed to this finding   on the follow-up chest CT to ensure resolution.    Dilated common bile measuring 11 mm in caliber, probably due to post   cholecystectomy status. If there is a clinical suspicion for biliary   obstruction, MRCP may be pursued for further evaluation.    Mild splenomegaly.      < end of copied text >        VITALS:  T(C): 36.5 (06-09-22 @ 05:16), Max: 36.6 (06-08-22 @ 22:30)  T(F): 97.7 (06-09-22 @ 05:16), Max: 97.9 (06-08-22 @ 22:30)  HR: 64 (06-09-22 @ 05:16) (64 - 68)  BP: 164/68 (06-09-22 @ 05:16) (110/58 - 164/68)  BP(mean): --  ABP: --  ABP(mean): --  RR: 16 (06-09-22 @ 05:16) (14 - 16)  SpO2: 97% (06-09-22 @ 05:16) (96% - 98%)  CVP(mm Hg): --  CVP(cm H2O): --    Ins and Outs     06-08-22 @ 07:01  -  06-09-22 @ 07:00  --------------------------------------------------------  IN: 1370 mL / OUT: 1475 mL / NET: -105 mL    06-09-22 @ 07:01  -  06-09-22 @ 11:24  --------------------------------------------------------  IN: 420 mL / OUT: 925 mL / NET: -505 mL        Height (cm): 157.5 (06-06-22 @ 15:23)  Weight (kg): 69 (06-06-22 @ 15:23)  BMI (kg/m2): 27.8 (06-06-22 @ 15:23)        I&O's Detail    08 Jun 2022 07:01  -  09 Jun 2022 07:00  --------------------------------------------------------  IN:    Oral Fluid: 1370 mL  Total IN: 1370 mL    OUT:    Incontinent per Collection Bag (mL): 1475 mL  Total OUT: 1475 mL    Total NET: -105 mL      09 Jun 2022 07:01  -  09 Jun 2022 11:24  --------------------------------------------------------  IN:    Oral Fluid: 420 mL  Total IN: 420 mL    OUT:    Incontinent per Collection Bag (mL): 925 mL  Total OUT: 925 mL    Total NET: -505 mL                    
Follow-up Pulmonary Progress Note  Chief Complaint : Pneumonia due to organism    No new events overnight.  Denies SOB/CP.     Allergies :No Known Allergies      PAST MEDICAL & SURGICAL HISTORY:  NPH (normal pressure hydrocephalus)    Vascular dementia    HTN (hypertension)    Seizures    Dermatomyositis    Falls    S/P  shunt        Medications:  MEDICATIONS  (STANDING):  ALBUTerol    90 MICROgram(s) HFA Inhaler 1 Puff(s) Inhalation every 4 hours  azithromycin  IVPB 500 milliGRAM(s) IV Intermittent every 24 hours  cefTRIAXone   IVPB 1000 milliGRAM(s) IV Intermittent every 24 hours  dextrose 5% + sodium chloride 0.45%. 1000 milliLiter(s) (60 mL/Hr) IV Continuous <Continuous>  enoxaparin Injectable 40 milliGRAM(s) SubCutaneous every 24 hours  levETIRAcetam 500 milliGRAM(s) Oral two times a day  levothyroxine 50 MICROGram(s) Oral daily  pantoprazole    Tablet 40 milliGRAM(s) Oral before breakfast    MEDICATIONS  (PRN):  acetaminophen     Tablet .. 650 milliGRAM(s) Oral every 6 hours PRN Temp greater or equal to 38C (100.4F), Mild Pain (1 - 3)  ALBUTerol    0.083% 2.5 milliGRAM(s) Nebulizer every 6 hours PRN Bronchospasm  bisacodyl Suppository 10 milliGRAM(s) Rectal daily PRN Constipation  hydrALAZINE Injectable 5 milliGRAM(s) IV Push every 6 hours PRN SBP> 160 or DBP >100      Antibiotics History  azithromycin  IVPB 500 milliGRAM(s) IV Intermittent once, 22 @ 10:42, Stop order after: 1 Doses  azithromycin  IVPB 500 milliGRAM(s) IV Intermittent every 24 hours, 22 @ 00:00  azithromycin  IVPB 500 milliGRAM(s) IV Intermittent every 24 hours, 22 @ 07:25, Stop order after: 4 Days  cefTRIAXone   IVPB 1000 milliGRAM(s) IV Intermittent every 24 hours, 22 @ 14:00, Stop order after: 7 Days  cefTRIAXone   IVPB 1000 milliGRAM(s) IV Intermittent once, 22 @ 10:42, Stop order after: 1 Doses  cefTRIAXone   IVPB 1000 milliGRAM(s) IV Intermittent every 24 hours, 22 @ 00:00, Stop order after: 5 Days  piperacillin/tazobactam IVPB. 3.375 Gram(s) IV Intermittent once, 22 @ 14:27, Stop order after: 1 Doses  piperacillin/tazobactam IVPB.. 3.375 Gram(s) IV Intermittent every 8 hours, 22 @ 14:27, Stop order after: 7 Days      Heme Medications   enoxaparin Injectable 40 milliGRAM(s) SubCutaneous every 24 hours, 22 @ 00:00      GI Medications  bisacodyl Suppository 10 milliGRAM(s) Rectal daily, 22 @ 14:34, Routine PRN  pantoprazole    Tablet 40 milliGRAM(s) Oral before breakfast, 22 @ 13:48, Routine        LABS:                        11.9   4.56  )-----------( 137      ( 2022 07:48 )             35.6         144  |  110<H>  |  9   ----------------------------<  90  3.7   |  27  |  0.67    Ca    8.6      2022 07:48  Mg     2.1                     Urinalysis Basic - ( 2022 11:30 )    Color: Yellow / Appearance: Clear / S.010 / pH: x  Gluc: x / Ketone: Negative  / Bili: Negative / Urobili: Negative   Blood: x / Protein: 15 / Nitrite: Positive   Leuk Esterase: Small / RBC: 0-4 /HPF / WBC 6-10 /HPF   Sq Epi: x / Non Sq Epi: Moderate / Bacteria: Moderate /HPF              CULTURES: (if applicable)    Culture - Urine (collected 22 @ 11:30)  Source: Clean Catch Clean Catch (Midstream)  Final Report (22 @ 10:46):    >=3 organisms. Probable collection contamination.    Culture - Blood (collected 22 @ 10:05)  Source: .Blood Blood-Peripheral  Preliminary Report (22 @ 14:01):    No growth to date.    Culture - Blood (collected 22 @ 10:05)  Source: .Blood Blood-Peripheral  Preliminary Report (22 @ 14:01):    No growth to date.    Rapid RVP Result: Detected (22 @ 10:15)    VITALS:  T(C): 36.7 (22 @ 05:04), Max: 36.9 (22 @ 19:55)  T(F): 98.1 (22 @ 05:04), Max: 98.5 (22 @ 19:55)  HR: 64 (22 05:04) (63 - 72)  BP: 125/58 (22 @ 05:04) (125/58 - 148/62)  BP(mean): --  ABP: --  ABP(mean): --  RR: 16 (22 @ 05:04) (16 - 18)  SpO2: 96% (22 05:04) (94% - 98%)  CVP(mm Hg): --  CVP(cm H2O): --    Ins and Outs     22 @ 07:01  -  22 @ 07:00  --------------------------------------------------------  IN: 1860 mL / OUT: 1025 mL / NET: 835 mL        Height (cm): 157.5 (22 @ 15:23)  Weight (kg): 69 (22 @ 15:23)  BMI (kg/m2): 27.8 (22 @ 15:23)        I&O's Detail    2022 07:01  -  2022 07:00  --------------------------------------------------------  IN:    dextrose 5% + sodium chloride 0.45%: 780 mL    Oral Fluid: 1080 mL  Total IN: 1860 mL    OUT:    Incontinent per Collection Bag (mL): 600 mL    Voided (mL): 425 mL  Total OUT: 1025 mL    Total NET: 835 mL          
Patient is a 72y old  Female who presents with a chief complaint of AMS. PNA, UTI (2022 12:45)      Subjective and overnight events:  Patient seen and examined at bedside. pt has underlying dementia. awake alert this morning and answering simple questions. reports that she feels fine, no pain anywhere. no fever, chills, sob, cp, abd pain    ALLERGIES:  No Known Allergies    MEDICATIONS  (STANDING):  ALBUTerol    90 MICROgram(s) HFA Inhaler 1 Puff(s) Inhalation every 4 hours  cefTRIAXone   IVPB 1000 milliGRAM(s) IV Intermittent every 24 hours  dextrose 5% + sodium chloride 0.45%. 1000 milliLiter(s) (60 mL/Hr) IV Continuous <Continuous>  enoxaparin Injectable 40 milliGRAM(s) SubCutaneous every 24 hours  levETIRAcetam  IVPB 500 milliGRAM(s) IV Intermittent every 12 hours  levothyroxine Injectable 25 MICROGram(s) IV Push <User Schedule>  pantoprazole    Tablet 40 milliGRAM(s) Oral before breakfast    MEDICATIONS  (PRN):  acetaminophen     Tablet .. 650 milliGRAM(s) Oral every 6 hours PRN Temp greater or equal to 38C (100.4F), Mild Pain (1 - 3)  ALBUTerol    0.083% 2.5 milliGRAM(s) Nebulizer every 6 hours PRN Bronchospasm  bisacodyl Suppository 10 milliGRAM(s) Rectal daily PRN Constipation  hydrALAZINE Injectable 5 milliGRAM(s) IV Push every 6 hours PRN SBP> 160 or DBP >100    Vital Signs Last 24 Hrs  T(F): 97.5 (2022 15:44), Max: 98.5 (2022 05:52)  HR: 63 (2022 15:44) (63 - 67)  BP: 137/63 (2022 15:44) (98/62 - 137/63)  RR: 16 (2022 15:44) (16 - 16)  SpO2: 94% (2022 15:44) (94% - 100%)  I&O's Summary    2022 07:01  -  2022 17:08  --------------------------------------------------------  IN: 360 mL / OUT: 525 mL / NET: -165 mL      PHYSICAL EXAM:  General: NAD, A/O x 1  ENT: MMM  Neck: Supple, No JVD  Lungs: Clear to auscultation bilaterally  Cardio: RRR, S1/S2, + murmurs  Abdomen: Soft, Nontender, Nondistended; Bowel sounds present  Extremities: No calf tenderness, No pitting edema    LABS:                        10.8   6.34  )-----------( 118      ( 2022 08:15 )             33.3     06-07    141  |  108  |  11  ----------------------------<  78  3.5   |  26  |  0.65    Ca    8.7      2022 06:18  Mg     2.1     -    TPro  7.8  /  Alb  3.4  /  TBili  0.5  /  DBili  x   /  AST  46  /  ALT  56  /  AlkPhos  78  06-06      PT/INR - ( 2022 10:05 )   PT: 12.9 sec;   INR: 1.11 ratio         PTT - ( 2022 10:05 )  PTT:27.3 sec  Lactate, Blood: 1.7 mmol/L ( @ 10:05)      Urinalysis Basic - ( 2022 11:30 )    Color: Yellow / Appearance: Clear / S.010 / pH: x  Gluc: x / Ketone: Negative  / Bili: Negative / Urobili: Negative   Blood: x / Protein: 15 / Nitrite: Positive   Leuk Esterase: Small / RBC: 0-4 /HPF / WBC 6-10 /HPF   Sq Epi: x / Non Sq Epi: Moderate / Bacteria: Moderate /HPF      Culture - Urine (collected 2022 11:30)  Source: Clean Catch Clean Catch (Midstream)  Final Report (2022 10:46):    >=3 organisms. Probable collection contamination.    Culture - Blood (collected 2022 10:05)  Source: .Blood Blood-Peripheral  Preliminary Report (2022 14:01):    No growth to date.    Culture - Blood (collected 2022 10:05)  Source: .Blood Blood-Peripheral  Preliminary Report (2022 14:01):    No growth to date.          RADIOLOGY & ADDITIONAL TESTS:  < from: CT Chest No Cont (22 @ 11:04) >  IMPRESSION:  Left lower lobe pulmonary infiltrate for which clinical correlation with   pneumonia is recommended.    Nonspecific 8 mm right lower lobe lung nodule; follow-up chest CT may be   pursued in 3 months to ensure stability or resolution.    Mildly enlarged 1.1 cm precarinal lymph node    Small densities in the trachea and right bronchus intermedius may   represent mucous secretion. Attention should be directed to this finding   on the follow-up chest CT to ensure resolution.    Dilated common bile measuring 11 mm in caliber, probably due to post   cholecystectomy status. If there is a clinical suspicion for biliary   obstruction, MRCP may be pursued for further evaluation.    Mild splenomegaly.    --- End of Report ---    < end of copied text >      Care Discussed with Consultants/Other Providers: dr. malcolm, cont abx   
CC: Patient is a 72y old  Female who presents with a chief complaint of AMS. PNA, UTI (09 Jun 2022 11:24)      Interval History:  Patient seen and examined at bedside.  No overnight events  No complaints this morning    ROS:  CONSTITUTIONAL: No fever, weight loss, or fatigue  RESPIRATORY: No cough, wheezing, chills or hemoptysis; No shortness of breath  CARDIOVASCULAR: No chest pain, palpitations, dizziness, or leg swelling  GASTROINTESTINAL: No abdominal or epigastric pain. No nausea, vomiting, or hematemesis; No diarrhea or constipation. No melena or hematochezia.  GENITOURINARY: No dysuria, frequency, hematuria, or incontinence  NEUROLOGICAL: No headaches, memory loss, loss of strength, numbness, or tremors    ALLERGIES:  No Known Allergies    MEDICATIONS  (STANDING):  ALBUTerol    90 MICROgram(s) HFA Inhaler 1 Puff(s) Inhalation every 4 hours  cefTRIAXone   IVPB 1000 milliGRAM(s) IV Intermittent every 24 hours  dextrose 5% + sodium chloride 0.45%. 1000 milliLiter(s) (60 mL/Hr) IV Continuous <Continuous>  enoxaparin Injectable 40 milliGRAM(s) SubCutaneous every 24 hours  levETIRAcetam 500 milliGRAM(s) Oral two times a day  levothyroxine 50 MICROGram(s) Oral daily  metoprolol succinate ER 25 milliGRAM(s) Oral daily  pantoprazole    Tablet 40 milliGRAM(s) Oral before breakfast    MEDICATIONS  (PRN):  acetaminophen     Tablet .. 650 milliGRAM(s) Oral every 6 hours PRN Temp greater or equal to 38C (100.4F), Mild Pain (1 - 3)  ALBUTerol    0.083% 2.5 milliGRAM(s) Nebulizer every 6 hours PRN Bronchospasm  bisacodyl Suppository 10 milliGRAM(s) Rectal daily PRN Constipation  hydrALAZINE Injectable 5 milliGRAM(s) IV Push every 6 hours PRN SBP> 160 or DBP >100    Vital Signs Last 24 Hrs  T(F): 97.7 (09 Jun 2022 05:16), Max: 97.9 (08 Jun 2022 22:30)  HR: 64 (09 Jun 2022 05:16) (64 - 68)  BP: 164/68 (09 Jun 2022 05:16) (110/58 - 164/68)  RR: 16 (09 Jun 2022 05:16) (14 - 16)  SpO2: 97% (09 Jun 2022 05:16) (96% - 98%)  I&O's Summary    08 Jun 2022 07:01  -  09 Jun 2022 07:00  --------------------------------------------------------  IN: 1370 mL / OUT: 1475 mL / NET: -105 mL    09 Jun 2022 07:01  -  09 Jun 2022 12:13  --------------------------------------------------------  IN: 420 mL / OUT: 925 mL / NET: -505 mL      BMI (kg/m2): 27.8 (06-06-22 @ 15:23)    PHYSICAL EXAM:  GENERAL: NAD  HENT:  Atraumatic, Normocephalic; No tonsillar erythema, exudates, or enlargement; Moist mucous membranes  EYES: EOMI, PERRLA, conjunctiva and sclera clear, no lid-lag; moist conjunctivae  NECK: Supple, No JVD, Normal thyroid, FROM of neck  NERVOUS SYSTEM:  CN II - XII intact; Sensation intact; follows commands  CHEST/LUNG: Clear to percussion bilaterally; No rales, rhonchi, wheezing, or rubs; normal respiratory effort, no intercostal retractions  HEART: Regular rate and rhythm; No murmurs, rubs, or gallops; No pitting edema  ABDOMEN: Soft, Nontender, Nondistended; Bowel sounds present; No HSM or masses  MUSCULOSKELETAL/EXTREMITIES:  2+ Peripheral Pulses, No clubbing or digital cyanosis; FROM of extremeties (pain, crepitation or contracture)  PSYCH: Appropriate affect, Alert & Oriented x 3, Good Memory; Good insight    LABS:                        12.3   4.59  )-----------( 156      ( 09 Jun 2022 06:30 )             37.6       06-09    139  |  108  |  12  ----------------------------<  79  4.6   |  18  |  0.40    Ca    9.3      09 Jun 2022 06:30  Mg     2.1     06-07    Culture - Urine (collected 06 Jun 2022 11:30)  Source: Clean Catch Clean Catch (Midstream)  Final Report (07 Jun 2022 10:46):    >=3 organisms. Probable collection contamination.    Culture - Blood (collected 06 Jun 2022 10:05)  Source: .Blood Blood-Peripheral  Preliminary Report (07 Jun 2022 14:01):    No growth to date.    Culture - Blood (collected 06 Jun 2022 10:05)  Source: .Blood Blood-Peripheral  Preliminary Report (07 Jun 2022 14:01):    No growth to date.      Care Discussed with Consultants/Other Providers: Yes

## 2022-06-09 NOTE — PROGRESS NOTE ADULT - ASSESSMENT
71 yo female with a medical history including Vascular dementia, HTN, HLD, Depression, NPH with  Shunt, Dermatomyositis and Seizures, sent in by NH staff for Altered Mental Status that started approx. 1 hour prior to arrival, with an episode of vomiting. in ER, febrile with temp 100. Pt found with parainfluenza infection and possible PNA. Metal status now improved    Acute metabolic encephalopathy - resolved  sepsis not present on admission  Possible gram neg PNA  Parainfluenza infection   - mental status back to baseline  - cont supportive care  - cont rocephin, day 3/5  - urine legionella negative   - per speech and swallow: pureed and mildly thickened liquid  - keppra level  - speech and swallow: minced and moist diet with thins    Pyuria   - urine cx contaminated    0.8 mm lung nodule on CT chest  - repeat CT scan in 3 months outpatient   - spoke to pt's son, would like to monitor outpatient, not sure if he will pursue treatment at this time if nodule is malignant.      h/o Vascular dementia, HTN, HLD, Depression, NPH with  Shunt, Dermatomyositis and Seizures  Hypothyroid   - cont keppra  - cont synthyroid  - BP improved. resume Toprol 25mg  - hold losartan   - resume lasix on discharge      DVT-P: lovenox    GOC: pt's son would like full code for now.     Son elham [phone].        73 yo female with a medical history including Vascular dementia, HTN, HLD, Depression, NPH with  Shunt, Dermatomyositis and Seizures, sent in by NH staff for Altered Mental Status that started approx. 1 hour prior to arrival, with an episode of vomiting. in ER, febrile with temp 100. Pt found with parainfluenza infection and possible PNA. Metal status now improved    Acute metabolic encephalopathy - resolved  sepsis not present on admission  Possible gram neg PNA  Parainfluenza infection   - mental status back to baseline  - cont supportive care  - cont rocephin, day 3/5  - urine legionella negative   - per speech and swallow: pureed and mildly thickened liquid  - keppra level  - speech and swallow: minced and moist diet with thins    Pyuria   - urine cx contaminated    0.8 mm lung nodule on CT chest  - repeat CT scan in 3 months outpatient   - spoke to pt's son, would like to monitor outpatient, not sure if he will pursue treatment at this time if nodule is malignant.      h/o Vascular dementia, HTN, HLD, Depression, NPH with  Shunt, Dermatomyositis and Seizures  Hypothyroid   - cont keppra  - cont synthyroid  - BP improved. resume Toprol 25mg  - hold losartan   - resume lasix on discharge      DVT-P: lovenox    GOC: pt's son would like full code for now.     Son elham [phone].        71 yo female with a medical history including Vascular dementia, HTN, HLD, Depression, NPH with  Shunt, Dermatomyositis and Seizures, sent in by NH staff for Altered Mental Status that started approx. 1 hour prior to arrival, with an episode of vomiting. in ER, febrile with temp 100. Pt found with parainfluenza infection and possible PNA. Metal status now improved    Acute metabolic encephalopathy - resolved  sepsis not present on admission  Possible gram neg PNA  Parainfluenza infection   - mental status back to baseline  - cont supportive care  - cont rocephin, day 3/5  - urine legionella negative   - per speech and swallow: pureed and mildly thickened liquid  - keppra level  - speech and swallow: minced and moist diet with thins    Pyuria   - urine cx contaminated    0.8 mm lung nodule on CT chest  - repeat CT scan in 3 months outpatient   - spoke to pt's son, would like to monitor outpatient, not sure if he will pursue treatment at this time if nodule is malignant.      h/o Vascular dementia, HTN, HLD, Depression, NPH with  Shunt, Dermatomyositis and Seizures  Hypothyroid   - cont keppra  - cont synthyroid  - BP improved. resume Toprol 25mg  - hold losartan   - resume lasix on discharge      DVT-P: lovenox    GOC: pt's son would like full code for now.     Son elham [phone].

## 2022-06-09 NOTE — PROGRESS NOTE ADULT - ASSESSMENT
Physical Examination:  GENERAL:               Alert, Consused, No acute distress.    HEENT:                     No JVD, dry MM  PULM:                     Bilateral air entry,  trace Rales, No Rhonchi, No Wheezing  CVS:                         S1, S2,  No Murmur  ABD:                        Soft, nondistended, nontender, normoactive bowel sounds,   EXT:                         No edema, nontender, No Cyanosis or Clubbing   NEURO:                  Alert, not oriented, not interactive, does not follow commands  PSYC:                      Calm, no Insight.    Assessment  Pneumonia  + Viral : Parainfluenza 3    Possible underlying bacterial vs Aspiration (CT shows: Small densities in the trachea and right bronchus intermedius may represent mucous secretion)  Abnormal CT chest Nonspecific 8 mm right lower lobe lung nodule and Mildly enlarged 1.1 cm precarinal lymph node   Underlying -  Dementia, HTN, High chol Depression, NPH s/p  shunt, Dermatomyositis, seizure d/o       Plan  Continue Empiric antibiotics   Taper n/c  to maintain sat > 90-95%  Dysphagia diet per swallow evaluation   Keep HOB elevated  Aspiration precautions    On Discharge will need to set up  CT chest to ensure stability or resolution of abnormal CT findings in 3-6 months     if family desires  Appreciate Palliative care - ongoing discussion about goals of care    GI/DVT PPX  d/w dr. fields

## 2022-06-09 NOTE — DISCHARGE NOTE NURSING/CASE MANAGEMENT/SOCIAL WORK - PATIENT PORTAL LINK FT
You can access the FollowMyHealth Patient Portal offered by [facility] by registering at the following website: [email or web address]. By joining [email or web address] FollowMyHealth portal, you will also be able to view your health information using other applications (apps) compatible with our system. You can access the FollowMyHealth Patient Portal offered by [facility] by registering at the following website: [email or web address]. By joining [email or web address]’s FollowMyHealth portal, you will also be able to view your health information using other applications (apps) compatible with our system. You can access the FollowMyHealth Patient Portal offered by [facility] by registering at the following website: [email or web address]. By joining [email or web address]’s FollowMyHealth portal, you will also be able to view your health information using other applications (apps) compatible with our system.

## 2022-06-11 LAB
CULTURE RESULTS: SIGNIFICANT CHANGE UP
LEVETIRACETAM SERPL-MCNC: 17.5 UG/ML — SIGNIFICANT CHANGE UP (ref 10–40)
SPECIMEN SOURCE: SIGNIFICANT CHANGE UP

## 2022-06-22 ENCOUNTER — APPOINTMENT (OUTPATIENT)
Dept: CARE COORDINATION | Facility: HOME HEALTH | Age: 72
End: 2022-06-22

## 2022-06-22 VITALS
RESPIRATION RATE: 18 BRPM | SYSTOLIC BLOOD PRESSURE: 110 MMHG | DIASTOLIC BLOOD PRESSURE: 70 MMHG | OXYGEN SATURATION: 94 % | HEART RATE: 70 BPM

## 2022-06-22 DIAGNOSIS — E78.5 HYPERLIPIDEMIA, UNSPECIFIED: ICD-10-CM

## 2022-06-22 DIAGNOSIS — J18.9 PNEUMONIA, UNSPECIFIED ORGANISM: ICD-10-CM

## 2022-06-22 DIAGNOSIS — I10 ESSENTIAL (PRIMARY) HYPERTENSION: ICD-10-CM

## 2022-06-22 DIAGNOSIS — F03.90 UNSPECIFIED DEMENTIA W/OUT BEHAVIORAL DISTURBANCE: ICD-10-CM

## 2022-06-22 PROCEDURE — 99495 TRANSJ CARE MGMT MOD F2F 14D: CPT

## 2022-06-23 PROBLEM — I10 BENIGN HYPERTENSION: Status: ACTIVE | Noted: 2022-06-23

## 2022-06-23 PROBLEM — J18.9 PNEUMONIA: Status: ACTIVE | Noted: 2022-06-23

## 2022-06-23 PROBLEM — E78.5 HYPERLIPIDEMIA: Status: ACTIVE | Noted: 2022-06-23

## 2022-06-23 RX ORDER — METOPROLOL SUCCINATE 25 MG/1
25 TABLET, EXTENDED RELEASE ORAL
Qty: 30 | Refills: 0 | Status: ACTIVE | COMMUNITY
Start: 2022-06-14

## 2022-06-23 RX ORDER — LOSARTAN POTASSIUM 25 MG/1
25 TABLET, FILM COATED ORAL DAILY
Refills: 0 | Status: ACTIVE | COMMUNITY
Start: 2022-06-14

## 2022-06-23 RX ORDER — CHLORHEXIDINE GLUCONATE 4 %
5 LIQUID (ML) TOPICAL
Qty: 30 | Refills: 0 | Status: ACTIVE | COMMUNITY
Start: 2022-01-07

## 2022-06-23 RX ORDER — LEVETIRACETAM 500 MG/1
500 TABLET, FILM COATED ORAL TWICE DAILY
Refills: 0 | Status: ACTIVE | COMMUNITY
Start: 2022-06-14

## 2022-06-23 RX ORDER — LEVOTHYROXINE SODIUM 0.05 MG/1
50 TABLET ORAL
Refills: 0 | Status: ACTIVE | COMMUNITY
Start: 2022-06-14

## 2022-06-23 RX ORDER — ESCITALOPRAM OXALATE 20 MG/1
20 TABLET ORAL DAILY
Refills: 0 | Status: ACTIVE | COMMUNITY
Start: 2022-01-28

## 2022-06-23 RX ORDER — FUROSEMIDE 20 MG/1
20 TABLET ORAL DAILY
Qty: 30 | Refills: 0 | Status: ACTIVE | COMMUNITY
Start: 2022-06-14

## 2022-06-23 NOTE — HISTORY OF PRESENT ILLNESS
[Post-hospitalization from ___ Hospital] : Post-hospitalization from [unfilled] Hospital [Admitted on: ___] : The patient was admitted on [unfilled] [Discharged on ___] : discharged on [unfilled] [FreeTextEntry2] : \par  73 yo female with a medical history including Vascular dementia, HTN, HLD, Depression, NPH with  Shunt, Dermatomyositis and Seizures, sent in by NH staff for Altered Mental Status that started approx. 1 hour prior to arrival, with an episode of vomiting. Pt found with parainfluenza infection and possible aspiration PNA. Metal status improved. Patient d/c NH with hcs through facility.\par \par Visit completed by assistance of RN at facility\par Pna: denies fever, chills, SOB, LÓPEZ, dizziness, lightheadedness, completed abx\par Vascular dementia: denies any worsening s/s, safety precautions maintained\par Htn/hld: denies CP, SOB, LÓPEZ, compliant with regimen

## 2022-06-23 NOTE — PHYSICAL EXAM
[No Acute Distress] : no acute distress [Well Nourished] : well nourished [Well Developed] : well developed [No Respiratory Distress] : no respiratory distress  [Clear to Auscultation] : lungs were clear to auscultation bilaterally [No Accessory Muscle Use] : no accessory muscle use [Normal Rate] : normal rate  [Regular Rhythm] : with a regular rhythm [Normal S1, S2] : normal S1 and S2 [Pedal Pulses Present] : the pedal pulses are present [No Edema] : there was no peripheral edema [Soft] : abdomen soft [Non Tender] : non-tender [Non-distended] : non-distended [Normal Bowel Sounds] : normal bowel sounds [de-identified] : uses WC and assist x 2 [de-identified] : flat affect, confused

## 2022-06-23 NOTE — REVIEW OF SYSTEMS
[Negative] : Psychiatric [FreeTextEntry5] : see HPI  [FreeTextEntry6] : see HPI  [de-identified] : see HPI

## 2022-06-23 NOTE — PHYSICAL EXAM
[No Acute Distress] : no acute distress [Well Nourished] : well nourished [Well Developed] : well developed [No Respiratory Distress] : no respiratory distress  [Clear to Auscultation] : lungs were clear to auscultation bilaterally [No Accessory Muscle Use] : no accessory muscle use [Normal Rate] : normal rate  [Regular Rhythm] : with a regular rhythm [Normal S1, S2] : normal S1 and S2 [Pedal Pulses Present] : the pedal pulses are present [No Edema] : there was no peripheral edema [Soft] : abdomen soft [Non Tender] : non-tender [Non-distended] : non-distended [Normal Bowel Sounds] : normal bowel sounds [de-identified] : uses WC and assist x 2 [de-identified] : flat affect, confused

## 2022-06-23 NOTE — REVIEW OF SYSTEMS
[Negative] : Psychiatric [FreeTextEntry5] : see HPI  [FreeTextEntry6] : see HPI  [de-identified] : see HPI

## 2022-06-23 NOTE — PLAN
[FreeTextEntry1] : Reminded RN of CN/NP role and yellow card, advised to call with any questions/concerns, verbalized understanding

## 2022-10-03 NOTE — ED ADULT NURSE NOTE - HISTORY OF COVID-19 VACCINATION
What Type Of Note Output Would You Prefer (Optional)?: Standard Output
How Severe Is Your Skin Lesion?: mild
Has Your Skin Lesion Been Treated?: not been treated
Is This A New Presentation, Or A Follow-Up?: Growths
Vaccine status unknown

## 2022-10-07 NOTE — PHYSICAL THERAPY INITIAL EVALUATION ADULT - LEVEL OF INDEPENDENCE: SCOOT/BRIDGE, REHAB EVAL
initially resisting transition to sitting at EOB /dangling and expressed fear of falling off stretcher/moderate assist (50% patients effort)
- - -

## 2022-11-08 NOTE — H&P ADULT - ASSESSMENT
[FreeTextEntry1] : Will plan for generator changes as soon as possible ASSESSMENT:  71F with history of dementia HTN,  shunt for NPH, seizures, recurrent falls and was recently seen in [place] ED 6/3/2021 for left subacute SDH s/p fall, now transferred from [place] ED s/p fall with worsened L SDH without midline shift and facial fractures  VPS adjusted at [place] from 5 to 7  [place] MRN: [id number]    PLAN:  - Admit to trauma surgery, Dr. Fritz  - F/u neurosurgery recommendations  - F/u interval CT head  - F/u Plastics for facial fracture management    - Q4h neurological checks  - Hold AC/Antiplatelet agents  - PT eval  - SW/CM  - Medical reconciliation  - Discussed with trauma surgery attending    Tavo Hickman, PGY 3  Surgery [id number] ASSESSMENT:  71F with history of dementia HTN,  shunt for NPH, seizures, recurrent falls and was recently seen in [place] ED 6/3/2021 for left subacute SDH s/p fall, now transferred from [place] ED s/p fall with worsened L SDH without midline shift and facial fractures  VPS adjusted at [place] from 5 to 7  Discussed with patient's son, Josh Braxton ([phone]), pt is AAOx1 at baseline  [place] MRN: [id number]    PLAN:  - Admit to trauma surgery, Dr. Fritz  - F/u neurosurgery recommendations  - F/u interval CT head  - F/u Plastics for facial fracture management    - Q4h neurological checks  - Hold AC/Antiplatelet agents  - PT eval  - SW/CM  - Medical reconciliation  - Discussed with trauma surgery attending    Tavo Hickman, PGY 3  Surgery [id number]

## 2022-12-06 NOTE — ED PROVIDER NOTE - NSDCPRINTRESULTS_ED_ALL_ED
Patient with constipation  Continue MiraLax, senna q h s  · Continue current regimen  MiraLax increased to b i d   Dosing Patient requests all Lab, Cardiology, and Radiology Results on their Discharge Instructions

## 2022-12-24 NOTE — PROGRESS NOTE ADULT - SUBJECTIVE AND OBJECTIVE BOX
Bed: 12  Expected date:   Expected time:   Means of arrival:   Comments:  pec   Patient seen and examined at bedside.    --Anticoagulation--    T(C): 36.4 (06-07-21 @ 05:07), Max: 36.8 (06-06-21 @ 13:33)  HR: 62 (06-07-21 @ 05:07) (57 - 87)  BP: 138/80 (06-07-21 @ 05:07) (105/57 - 148/73)  RR: 18 (06-07-21 @ 05:07) (18 - 18)  SpO2: 98% (06-07-21 @ 05:07) (98% - 99%)  Wt(kg): --    Exam:  Wide awake, Ox1, otherwise nonsensical, intermittently fc x4, CALLAHAN w/ LOL strength, pupils 3R, +facial bruising R>L.

## 2023-06-08 ENCOUNTER — INPATIENT (INPATIENT)
Facility: HOSPITAL | Age: 73
LOS: 6 days | Discharge: NURSING, REHAB OR LONG-TERM CARE | DRG: 522 | End: 2023-06-15
Attending: HOSPITALIST | Admitting: INTERNAL MEDICINE
Payer: MEDICARE

## 2023-06-08 VITALS
DIASTOLIC BLOOD PRESSURE: 20 MMHG | TEMPERATURE: 98 F | SYSTOLIC BLOOD PRESSURE: 148 MMHG | HEART RATE: 83 BPM | RESPIRATION RATE: 15 BRPM | OXYGEN SATURATION: 95 % | HEIGHT: 65 IN | WEIGHT: 145.06 LBS

## 2023-06-08 DIAGNOSIS — S72.011A UNSPECIFIED INTRACAPSULAR FRACTURE OF RIGHT FEMUR, INITIAL ENCOUNTER FOR CLOSED FRACTURE: ICD-10-CM

## 2023-06-08 DIAGNOSIS — Z98.2 PRESENCE OF CEREBROSPINAL FLUID DRAINAGE DEVICE: Chronic | ICD-10-CM

## 2023-06-08 LAB
ALBUMIN SERPL ELPH-MCNC: 3.6 G/DL — SIGNIFICANT CHANGE UP (ref 3.3–5)
ALP SERPL-CCNC: 84 U/L — SIGNIFICANT CHANGE UP (ref 40–120)
ALT FLD-CCNC: 29 U/L — SIGNIFICANT CHANGE UP (ref 10–45)
ANION GAP SERPL CALC-SCNC: 8 MMOL/L — SIGNIFICANT CHANGE UP (ref 5–17)
APTT BLD: 28.9 SEC — SIGNIFICANT CHANGE UP (ref 27.5–35.5)
AST SERPL-CCNC: 50 U/L — HIGH (ref 10–40)
BASOPHILS # BLD AUTO: 0.03 K/UL — SIGNIFICANT CHANGE UP (ref 0–0.2)
BASOPHILS NFR BLD AUTO: 0.2 % — SIGNIFICANT CHANGE UP (ref 0–2)
BILIRUB SERPL-MCNC: 0.6 MG/DL — SIGNIFICANT CHANGE UP (ref 0.2–1.2)
BLD GP AB SCN SERPL QL: SIGNIFICANT CHANGE UP
BUN SERPL-MCNC: 20 MG/DL — SIGNIFICANT CHANGE UP (ref 7–23)
CALCIUM SERPL-MCNC: 9.3 MG/DL — SIGNIFICANT CHANGE UP (ref 8.4–10.5)
CHLORIDE SERPL-SCNC: 107 MMOL/L — SIGNIFICANT CHANGE UP (ref 96–108)
CO2 SERPL-SCNC: 26 MMOL/L — SIGNIFICANT CHANGE UP (ref 22–31)
CREAT SERPL-MCNC: 0.74 MG/DL — SIGNIFICANT CHANGE UP (ref 0.5–1.3)
EGFR: 84 ML/MIN/1.73M2 — SIGNIFICANT CHANGE UP
EOSINOPHIL # BLD AUTO: 0.07 K/UL — SIGNIFICANT CHANGE UP (ref 0–0.5)
EOSINOPHIL NFR BLD AUTO: 0.6 % — SIGNIFICANT CHANGE UP (ref 0–6)
GLUCOSE SERPL-MCNC: 132 MG/DL — HIGH (ref 70–99)
HCT VFR BLD CALC: 43.4 % — SIGNIFICANT CHANGE UP (ref 34.5–45)
HGB BLD-MCNC: 14.2 G/DL — SIGNIFICANT CHANGE UP (ref 11.5–15.5)
IMM GRANULOCYTES NFR BLD AUTO: 0.5 % — SIGNIFICANT CHANGE UP (ref 0–0.9)
INR BLD: 1.13 RATIO — SIGNIFICANT CHANGE UP (ref 0.88–1.16)
LYMPHOCYTES # BLD AUTO: 1.06 K/UL — SIGNIFICANT CHANGE UP (ref 1–3.3)
LYMPHOCYTES # BLD AUTO: 8.6 % — LOW (ref 13–44)
MCHC RBC-ENTMCNC: 31.3 PG — SIGNIFICANT CHANGE UP (ref 27–34)
MCHC RBC-ENTMCNC: 32.7 GM/DL — SIGNIFICANT CHANGE UP (ref 32–36)
MCV RBC AUTO: 95.8 FL — SIGNIFICANT CHANGE UP (ref 80–100)
MONOCYTES # BLD AUTO: 0.73 K/UL — SIGNIFICANT CHANGE UP (ref 0–0.9)
MONOCYTES NFR BLD AUTO: 5.9 % — SIGNIFICANT CHANGE UP (ref 2–14)
NEUTROPHILS # BLD AUTO: 10.34 K/UL — HIGH (ref 1.8–7.4)
NEUTROPHILS NFR BLD AUTO: 84.2 % — HIGH (ref 43–77)
NRBC # BLD: 0 /100 WBCS — SIGNIFICANT CHANGE UP (ref 0–0)
PLATELET # BLD AUTO: 183 K/UL — SIGNIFICANT CHANGE UP (ref 150–400)
POTASSIUM SERPL-MCNC: 4.6 MMOL/L — SIGNIFICANT CHANGE UP (ref 3.5–5.3)
POTASSIUM SERPL-SCNC: 4.6 MMOL/L — SIGNIFICANT CHANGE UP (ref 3.5–5.3)
PROT SERPL-MCNC: 8.3 G/DL — SIGNIFICANT CHANGE UP (ref 6–8.3)
PROTHROM AB SERPL-ACNC: 13.1 SEC — SIGNIFICANT CHANGE UP (ref 10.5–13.4)
RBC # BLD: 4.53 M/UL — SIGNIFICANT CHANGE UP (ref 3.8–5.2)
RBC # FLD: 13.5 % — SIGNIFICANT CHANGE UP (ref 10.3–14.5)
SODIUM SERPL-SCNC: 141 MMOL/L — SIGNIFICANT CHANGE UP (ref 135–145)
WBC # BLD: 12.29 K/UL — HIGH (ref 3.8–10.5)
WBC # FLD AUTO: 12.29 K/UL — HIGH (ref 3.8–10.5)

## 2023-06-08 PROCEDURE — 99285 EMERGENCY DEPT VISIT HI MDM: CPT

## 2023-06-08 PROCEDURE — 70450 CT HEAD/BRAIN W/O DYE: CPT | Mod: 26,MA

## 2023-06-08 PROCEDURE — 76376 3D RENDER W/INTRP POSTPROCES: CPT | Mod: 26

## 2023-06-08 PROCEDURE — 72125 CT NECK SPINE W/O DYE: CPT | Mod: 26,MA

## 2023-06-08 PROCEDURE — 72192 CT PELVIS W/O DYE: CPT | Mod: 26,MA

## 2023-06-08 PROCEDURE — 71045 X-RAY EXAM CHEST 1 VIEW: CPT | Mod: 26

## 2023-06-08 PROCEDURE — 99223 1ST HOSP IP/OBS HIGH 75: CPT

## 2023-06-08 PROCEDURE — 93010 ELECTROCARDIOGRAM REPORT: CPT

## 2023-06-08 RX ORDER — ACETAMINOPHEN 500 MG
650 TABLET ORAL EVERY 6 HOURS
Refills: 0 | Status: DISCONTINUED | OUTPATIENT
Start: 2023-06-08 | End: 2023-06-12

## 2023-06-08 RX ORDER — FUROSEMIDE 40 MG
20 TABLET ORAL DAILY
Refills: 0 | Status: DISCONTINUED | OUTPATIENT
Start: 2023-06-08 | End: 2023-06-08

## 2023-06-08 RX ORDER — SENNA PLUS 8.6 MG/1
2 TABLET ORAL AT BEDTIME
Refills: 0 | Status: DISCONTINUED | OUTPATIENT
Start: 2023-06-08 | End: 2023-06-12

## 2023-06-08 RX ORDER — CHOLECALCIFEROL (VITAMIN D3) 125 MCG
2000 CAPSULE ORAL DAILY
Refills: 0 | Status: DISCONTINUED | OUTPATIENT
Start: 2023-06-08 | End: 2023-06-12

## 2023-06-08 RX ORDER — LEVETIRACETAM 250 MG/1
500 TABLET, FILM COATED ORAL
Refills: 0 | Status: DISCONTINUED | OUTPATIENT
Start: 2023-06-08 | End: 2023-06-12

## 2023-06-08 RX ORDER — LANOLIN ALCOHOL/MO/W.PET/CERES
3 CREAM (GRAM) TOPICAL AT BEDTIME
Refills: 0 | Status: DISCONTINUED | OUTPATIENT
Start: 2023-06-08 | End: 2023-06-12

## 2023-06-08 RX ORDER — PANTOPRAZOLE SODIUM 20 MG/1
40 TABLET, DELAYED RELEASE ORAL
Refills: 0 | Status: DISCONTINUED | OUTPATIENT
Start: 2023-06-08 | End: 2023-06-12

## 2023-06-08 RX ORDER — HEPARIN SODIUM 5000 [USP'U]/ML
5000 INJECTION INTRAVENOUS; SUBCUTANEOUS EVERY 8 HOURS
Refills: 0 | Status: DISCONTINUED | OUTPATIENT
Start: 2023-06-08 | End: 2023-06-12

## 2023-06-08 RX ORDER — MORPHINE SULFATE 50 MG/1
2 CAPSULE, EXTENDED RELEASE ORAL EVERY 8 HOURS
Refills: 0 | Status: DISCONTINUED | OUTPATIENT
Start: 2023-06-08 | End: 2023-06-12

## 2023-06-08 RX ORDER — LEVOTHYROXINE SODIUM 125 MCG
50 TABLET ORAL DAILY
Refills: 0 | Status: DISCONTINUED | OUTPATIENT
Start: 2023-06-08 | End: 2023-06-12

## 2023-06-08 RX ORDER — METOPROLOL TARTRATE 50 MG
25 TABLET ORAL DAILY
Refills: 0 | Status: DISCONTINUED | OUTPATIENT
Start: 2023-06-08 | End: 2023-06-09

## 2023-06-08 RX ORDER — ESCITALOPRAM OXALATE 10 MG/1
20 TABLET, FILM COATED ORAL DAILY
Refills: 0 | Status: DISCONTINUED | OUTPATIENT
Start: 2023-06-08 | End: 2023-06-12

## 2023-06-08 RX ORDER — ATORVASTATIN CALCIUM 80 MG/1
20 TABLET, FILM COATED ORAL AT BEDTIME
Refills: 0 | Status: DISCONTINUED | OUTPATIENT
Start: 2023-06-08 | End: 2023-06-12

## 2023-06-08 NOTE — ED ADULT TRIAGE NOTE - CHIEF COMPLAINT QUOTE
Patient BIBEMS from [facility] c/o right femur fx as per xray done today. As per EMS and [facility] patient fell. Patient A&Ox0 unable to provide hx. Patient with distress at this time. Patient BIBEMS from [facility] c/o right femur fx as per xray done today. As per EMS and [facility] patient fell. Patient A&Ox0 unable to provide hx. Patient with distress at this time. Patient BIBEMS from [facility] c/o right femur fx as per xray done today. As per EMS and [facility] patient fell. Patient A&Ox0 unable to provide hx. Patient with distress at this time.

## 2023-06-08 NOTE — H&P ADULT - ASSESSMENT
73F [facility] resident hx of severe dementia, HTN, HLD, depression, NPH with  shunt, hx of dermatomyositis with ILD was on IVIG off tmt for about a year pw with R hip fx.     # R hip fx.   appears to have poor functional status at baseline.   Family wants to pursue surgical intervention if pt is medically cleared.   Check ECHO r/o valvular dz. ?hx of CHF  Cardiology consult for pre op clearance.  Ortho already consulted by ED and aware. Dr Mendiola.     # HTN  cont Toprol    # Hypothyroid  cont Synthroid.    #Sz d/o   cont keppra    #DVT/GI proph.     Per son Josh [phone] at bedside updated. No advanced directives. Pt is FULL CODE.  73F [facility] resident hx of severe dementia, HTN, HLD, depression, NPH with  shunt, hx of dermatomyositis with ILD was on IVIG off tmt for about a year pw with R hip fx.     # R hip fx.   appears to have poor functional status at baseline.   Family wants to pursue surgical intervention if pt is medically cleared.   Check ECHO r/o valvular dz. ?hx of CHF  Cardiology consult for pre op clearance.  Ortho already consulted by ED and aware. Dr Mendiola.     # HTN  cont Toprol    # Hypothyroid  cont Synthroid.    #Sz d/o   cont keppra    #DVT/GI proph.     Per son Josh [phone] at bedside updated. No advanced directives. Pt is FULL CODE.  73F [facility] resident hx of severe dementia, HTN, HLD, depression, NPH with  shunt, hx of dermatomyositis with ILD was on IVIG off tmt for about a year pw with R hip fx.     # R hip fx.   appears to have poor functional status at baseline.   Family wants to pursue surgical intervention if pt is medically cleared.   Check ECHO r/o valvular dz. ?hx of CHF  Cardiology consult for pre op clearance.  Ortho already consulted by ED and aware. Dr Mendiola.     # HTN  cont Toprol    # Hypothyroid  cont Synthroid.    #Sz d/o   cont keppra    #DVT/GI proph.     Per son Josh [phone] at bedside updated. No advanced directives. Pt is FULL CODE.  73F [facility] resident hx of severe dementia, HTN, HLD, depression, NPH with  shunt, hx of dermatomyositis with ILD was on IVIG off tmt for about a year pw with R hip fx.     # R hip fx.   appears to have poor functional status at baseline.   Family wants to pursue surgical intervention if pt is medically cleared.   Check ECHO r/o valvular dz. ?hx of CHF  Cardiology consult for pre op clearance.  Ortho already consulted by ED and aware. Dr Mendiola.     # HTN  cont Toprol  hold am lasix in anticipation for possible sx.     # Hypothyroid  cont Synthroid.    #Sz d/o   cont keppra    #DVT/GI proph.     Per son Josh [phone] at bedside updated. No advanced directives. Pt is FULL CODE.  73F [facility] resident hx of severe dementia, HTN, HLD, depression, NPH with  shunt, hx of dermatomyositis with ILD was on IVIG off tmt for about a year pw with R hip fx.     # R hip fx.   appears to have poor functional status at baseline.   Family wants to pursue surgical intervention if pt is medically cleared.   Check ECHO r/o valvular dz. ?hx of CHF  Cardiology consult for pre op clearance.  Ortho already consulted by ED and aware. Dr Mendiola.     # HTN  cont Toprol  hold am lasix in anticipation for possible sx.     # Hypothyroid  cont Synthroid.    #Sz d/o   cont keppra    #DVT/GI proph.     Per son Josh [phone] at bedside updated. No advanced directives. Pt is FULL CODE.  73F [facility] resident hx of severe dementia, HTN, HLD, depression, NPH with  shunt, hx of dermatomyositis with ILD was on IVIG off tmt for about a year pw with R hip fx.     # R hip fx.   appears to have poor functional status at baseline.   Family wants to pursue surgical intervention if pt is medically cleared.   Check ECHO r/o valvular dz. ?hx of CHF  Cardiology consult for pre op clearance.  Ortho already consulted by ED and aware. Dr Mendiola.     # HTN  cont Toprol  hold am lasix in anticipation for possible sx.     # Hypothyroid  cont Synthroid.    #Sz d/o   cont keppra    #DVT/GI proph.     Per son Josh [phone] at bedside updated. No advanced directives. Pt is FULL CODE.

## 2023-06-08 NOTE — ED PROVIDER NOTE - CLINICAL SUMMARY MEDICAL DECISION MAKING FREE TEXT BOX
73-year-old female with history of vascular dementia, hypertension, hyperlipidemia, depression, NPH with  shunt, dermatomyositis and seizures sent in by nursing home for right hip fracture that was incidentally found due to patient's grimaces in pain while being changed.  No fall reported.  Patient ANO x0 and nonverbal.  Family is at bedside.  At baseline, it sounds that patient has been mostly wheelchair-bound or bedbound.  Last time she ambulate was 3 months ago.  Son is hopeful that patient with physical therapy would be able to ambulate again.    Contacted facility and discussed with Earlene beltran.-States that she was cleaning her and noticed that she was grimacing in pain.  She brought to staff's attention and x-ray was ordered.  No fall reported.  She was not told by any other staff of any trauma.  Patient is not ambulatory or independent.  She requires assistance.  She was never found on the floor.    CT reviewed.  Positive subcapital femoral fracture noted.  Impacted.  Discussed with Dr. Estrada of orthopedics.  Plan for admission.  Discussed with family.  If patient is a surgical candidate, they will discuss plans.

## 2023-06-08 NOTE — ED ADULT NURSE NOTE - CAS TRG GEN SKIN COLOR
Keep wound clean and dry  Apply bacitracin/Neosporin to the wound for the next 2 to 3 days  Children's Tylenol/ibuprofen for pain  Watch for infection  Staples out in 7 to 10 days
Normal for race

## 2023-06-08 NOTE — H&P ADULT - NSHPPHYSICALEXAM_GEN_ALL_CORE
Vital Signs Last 24 Hrs  T(C): 36.6 (08 Jun 2023 19:07), Max: 36.6 (08 Jun 2023 19:07)  T(F): 97.8 (08 Jun 2023 19:07), Max: 97.8 (08 Jun 2023 19:07)  HR: 83 (08 Jun 2023 19:07) (83 - 83)  BP: 148/20 (08 Jun 2023 19:07) (148/20 - 148/20)  BP(mean): --  RR: 15 (08 Jun 2023 19:07) (15 - 15)  SpO2: 95% (08 Jun 2023 19:07) (95% - 95%)    Parameters below as of 08 Jun 2023 19:07  Patient On (Oxygen Delivery Method): room air      Daily Height in cm: 165.1 (08 Jun 2023 19:07)    Daily   CAPILLARY BLOOD GLUCOSE        I&O's Summary      GENERAL: NAD, alert. nonverbal. not answering questions  HEAD:  Normocephalic  EYES: EOMI, PERRLA, conjunctiva and sclera clear  ENMT: No tonsillar erythema, exudates, or enlargement; Moist mucous membranes, No lesions  NECK: Supple, No JVD, no bruit, normal thyroid  NERVOUS SYSTEM:  Alert grossly moves bl UE and L LE spontaneously. very stiff upper extremities on attempt at movement.   CHEST/LUNG: Clear to auscultation bilaterally; No rales, rhonchi, wheezing, or rubs  HEART: Regular rate and rhythm; +systolic murmur, no rubs, or gallops  ABDOMEN: Soft, Nontender, Nondistended; Bowel sounds present  EXTREMITIES:  2+ Peripheral Pulses, No clubbing, cyanosis, +1+edema bl  LYMPH: No lymphadenopathy noted  SKIN: No rashes or lesions

## 2023-06-08 NOTE — ED ADULT NURSE REASSESSMENT NOTE - NSFALLHARMRISKINTERV_ED_ALL_ED
Assistance OOB with selected safe patient handling equipment if applicable/Communicate risk of Fall with Harm to all staff, patient, and family/Monitor gait and stability/Monitor for mental status changes and reorient to person, place, and time, as needed/Move patient closer to nursing station/within visual sight of ED staff/Provide patient with walking aids/Provide visual cue: red socks, yellow wristband, yellow gown, etc/Reinforce activity limits and safety measures with patient and family/Toileting schedule using arm’s reach rule for commode and bathroom/Use of alarms - bed, stretcher, chair and/or video monitoring/Bed in lowest position, wheels locked, appropriate side rails in place/Call bell, personal items and telephone in reach/Instruct patient to call for assistance before getting out of bed/chair/stretcher/Non-slip footwear applied when patient is off stretcher/[place] to call system/Physically safe environment - no spills, clutter or unnecessary equipment/Purposeful Proactive Rounding/Room/bathroom lighting operational, light cord in reach Assistance OOB with selected safe patient handling equipment if applicable/Communicate risk of Fall with Harm to all staff, patient, and family/Monitor gait and stability/Monitor for mental status changes and reorient to person, place, and time, as needed/Move patient closer to nursing station/within visual sight of ED staff/Provide patient with walking aids/Provide visual cue: red socks, yellow wristband, yellow gown, etc/Reinforce activity limits and safety measures with patient and family/Toileting schedule using arm’s reach rule for commode and bathroom/Use of alarms - bed, stretcher, chair and/or video monitoring/Bed in lowest position, wheels locked, appropriate side rails in place/Call bell, personal items and telephone in reach/Instruct patient to call for assistance before getting out of bed/chair/stretcher/Non-slip footwear applied when patient is off stretcher/[place] to call system/Physically safe environment - no spills, clutter or unnecessary equipment/Purposeful Proactive Rounding/Room/bathroom lighting operational, light cord in reach Assistance OOB with selected safe patient handling equipment if applicable/Communicate risk of Fall with Harm to all staff, patient, and family/Monitor gait and stability/Monitor for mental status changes and reorient to person, place, and time, as needed/Move patient closer to nursing station/within visual sight of ED staff/Provide patient with walking aids/Provide visual cue: red socks, yellow wristband, yellow gown, etc/Reinforce activity limits and safety measures with patient and family/Toileting schedule using arm’s reach rule for commode and bathroom/Use of alarms - bed, stretcher, chair and/or video monitoring/Bed in lowest position, wheels locked, appropriate side rails in place/Call bell, personal items and telephone in reach/Instruct patient to call for assistance before getting out of bed/chair/stretcher/Non-slip footwear applied when patient is off stretcher/[place] to call system/Physically safe environment - no spills, clutter or unnecessary equipment/Purposeful Proactive Rounding/Room/bathroom lighting operational, light cord in reach

## 2023-06-08 NOTE — H&P ADULT - HISTORY OF PRESENT ILLNESS
73F [facility] resident hx of severe dementia, HTN, HLD, depression, NPH with  shunt, hx of dermatomyositis with ILD was on IVIG off tmt for about a year pw with R hip fx. Pt had Xray performed of the R hip after it was noticed she had pain in the hips which demonstrated R hip fx and sent to ED. No reported hx of falls. Pt unable to provide any hx. Nonverbal at this point. Son and daughter at bedside. At baseline ambulates 50 ft with assistance with walker. Needs help with tx from bed to chair/wheelchair/commode. No hx of CAD. No reported hx of CHF but noted on Lasix for 'years' for LE edema. In ED afebrile P: 83 BP: 148/70 sat 95% on RA. WBC: 12.3 84% N   CT head and neck: no acute changes.   < from: CT Pelvis Bony Only No Cont (06.08.23 @ 20:28) >  IMPRESSION:    1.  Acute impacted subcapital fracture of the right proximal femur as   described.    2. Localized subcutaneous edema overlying the left ischium measuring   approximately 3.9 cm.    3.  L4 superior endplate mild compression deformities versus prominent   Schmorl's node which appears chronic.      < end of copied text >   73F [facility] resident hx of severe dementia, HTN, HLD, depression, NPH with  shunt, hx of dermatomyositis with ILD was on IVIG off tmt for about a year pw with R hip fx. Pt had Xray performed of the R hip after it was noticed she had pain in the hips which demonstrated R hip fx and sent to ED. No reported hx of falls. Pt unable to provide any hx. Nonverbal at this point. Son and daughter at bedside. At baseline ambulates 50 ft with assistance with walker. Needs help with tx from bed to chair/wheelchair/commode. No hx of CAD. No reported hx of CHF but noted on Lasix for 'years' for LE edema. In ED afebrile P: 83 BP: 148/70 sat 95% on RA. WBC: 12.3 84% N   CT head and neck: no acute changes.   < from: CT Pelvis Bony Only No Cont (06.08.23 @ 20:28) >  IMPRESSION:    1.  Acute impacted subcapital fracture of the right proximal femur as   described.    2. Localized subcutaneous edema overlying the left ischium measuring   approximately 3.9 cm.    3.  L4 superior endplate mild compression deformities versus prominent   Schmorl's node which appears chronic.      < end of copied text >   73F [facility] resident hx of severe dementia, HTN, HLD, depression, NPH with  shunt, hx of dermatomyositis with ILD was on IVIG off tmt for about a year pw with R hip fx. Pt had Xray performed of the R hip after it was noticed she had pain in the hips which demonstrated R hip fx and sent to ED. No reported hx of falls. Pt unable to provide any hx. Nonverbal at this point. Son and daughter at bedside. At baseline ambulates 50 ft with assistance with walker. Needs help with tx from bed to chair/wheelchair/commode. No hx of CAD. No reported hx of CHF but noted on Lasix for 'years' for LE edema. In ED afebrile P: 83 BP: 148/70 sat 95% on RA. WBC: 12.3 84% N   CT head and neck: no acute changes.   < from: CT Pelvis Bony Only No Cont (06.08.23 @ 20:28) >  IMPRESSION:    1.  Acute impacted subcapital fracture of the right proximal femur as   described.    2. Localized subcutaneous edema overlying the left ischium measuring   approximately 3.9 cm.    3.  L4 superior endplate mild compression deformities versus prominent   Schmorl's node which appears chronic.      < end of copied text >

## 2023-06-08 NOTE — ED ADULT NURSE NOTE - CHIEF COMPLAINT QUOTE
Patient BIBEMS from [facility] c/o right femur fx as per xray done today. As per EMS and [facility] patient fell. Patient A&Ox0 unable to provide hx. Patient with distress at this time. Patient BIBEMS from [facility] c/o right femur fx as per xray done today. As per EMS and [facility] patient fell. Patient A&Ox0 unable to provide hx. Patient with distress at this time. Patient BIBEMS from [facility] c/o right femur fx as per xray done today. As per EMS and [facility] patient fell. Patient A&Ox0 unable to provide hx. Patient with distress at this time.

## 2023-06-08 NOTE — ED ADULT NURSE REASSESSMENT NOTE - NSFALLRISKFACTORS_ED_ALL_ED
Bone Condition: Including osteoporosis, prolonged steroid use or metastatic bone disease/cancer
dementia/Other
Health Care Proxy (HCP)

## 2023-06-08 NOTE — ED ADULT NURSE REASSESSMENT NOTE - NSFALLASSISTNEEDED_ED_ALL_ED
Standing/Walking/Toileting/Moving from bed to stretcher
Standing/Walking/Toileting/Moving from bed to stretcher

## 2023-06-08 NOTE — H&P ADULT - NSHPLABSRESULTS_GEN_ALL_CORE
14.2   12.29 )-----------( 183      ( 08 Jun 2023 20:00 )             43.4       06-08    141  |  107  |  20  ----------------------------<  132<H>  4.6   |  26  |  0.74    Ca    9.3      08 Jun 2023 20:00    TPro  8.3  /  Alb  3.6  /  TBili  0.6  /  DBili  x   /  AST  50<H>  /  ALT  29  /  AlkPhos  84  06-08         LIVER FUNCTIONS - ( 08 Jun 2023 20:00 )  Alb: 3.6 g/dL / Pro: 8.3 g/dL / ALK PHOS: 84 U/L / ALT: 29 U/L / AST: 50 U/L / GGT: x               PT/INR - ( 08 Jun 2023 20:00 )   PT: 13.1 sec;   INR: 1.13 ratio         PTT - ( 08 Jun 2023 20:00 )  PTT:28.9 sec        EKG: personally rev. NSR at 86bpm, no acute ST changes. poor R wave progression.       CXR: wet read. elevated L HD. no acute infiltrate.     ra< from: CT Head No Cont (06.08.23 @ 20:29) >    BRAIN  IMPRESSION:    1)  chronic ischemic changes with severe atrophy. Prominent ventricles   with ventriculostomy in situ. No change when compared with prior CT.  2) no intracerebral hemorrhage, contusion, or extracerebral hemorrhagic   collections identified.    CERVICAL IMPRESSION:    Severe kyphosis deformity with multilevel degenerative changes. No acute   fracture identified.    < end of copied text >    < from: CT Pelvis Bony Only No Cont (06.08.23 @ 20:28) >    IMPRESSION:    1.  Acute impacted subcapital fracture of the right proximal femur as   described.    2. Localized subcutaneous edema overlying the left ischium measuring   approximately 3.9 cm.    3.  L4 superior endplate mild compression deformities versus prominent   Schmorl's node which appears chronic.    4.  Other chronic findings as above.    < end of copied text >

## 2023-06-08 NOTE — ED ADULT NURSE REASSESSMENT NOTE - NSFALLHARMRISKINTERV_ED_ALL_ED
Assistance OOB with selected safe patient handling equipment if applicable/Assistance with ambulation/Communicate risk of Fall with Harm to all staff, patient, and family/Monitor gait and stability/Monitor for mental status changes and reorient to person, place, and time, as needed/Move patient closer to nursing station/within visual sight of ED staff/Provide patient with walking aids/Provide visual cue: red socks, yellow wristband, yellow gown, etc/Reinforce activity limits and safety measures with patient and family/Toileting schedule using arm’s reach rule for commode and bathroom/Use of alarms - bed, stretcher, chair and/or video monitoring/Bed in lowest position, wheels locked, appropriate side rails in place/Call bell, personal items and telephone in reach/Instruct patient to call for assistance before getting out of bed/chair/stretcher/Non-slip footwear applied when patient is off stretcher/[place] to call system/Physically safe environment - no spills, clutter or unnecessary equipment/Purposeful Proactive Rounding/Room/bathroom lighting operational, light cord in reach Assistance OOB with selected safe patient handling equipment if applicable/Assistance with ambulation/Communicate risk of Fall with Harm to all staff, patient, and family/Monitor gait and stability/Monitor for mental status changes and reorient to person, place, and time, as needed/Move patient closer to nursing station/within visual sight of ED staff/Provide patient with walking aids/Provide visual cue: red socks, yellow wristband, yellow gown, etc/Reinforce activity limits and safety measures with patient and family/Toileting schedule using arm’s reach rule for commode and bathroom/Use of alarms - bed, stretcher, chair and/or video monitoring/Bed in lowest position, wheels locked, appropriate side rails in place/Call bell, personal items and telephone in reach/Instruct patient to call for assistance before getting out of bed/chair/stretcher/Non-slip footwear applied when patient is off stretcher/[place] to call system/Physically safe environment - no spills, clutter or unnecessary equipment/Purposeful Proactive Rounding/Room/bathroom lighting operational, light cord in reach Assistance OOB with selected safe patient handling equipment if applicable/Assistance with ambulation/Communicate risk of Fall with Harm to all staff, patient, and family/Monitor gait and stability/Monitor for mental status changes and reorient to person, place, and time, as needed/Move patient closer to nursing station/within visual sight of ED staff/Provide patient with walking aids/Provide visual cue: red socks, yellow wristband, yellow gown, etc/Reinforce activity limits and safety measures with patient and family/Toileting schedule using arm’s reach rule for commode and bathroom/Use of alarms - bed, stretcher, chair and/or video monitoring/Bed in lowest position, wheels locked, appropriate side rails in place/Call bell, personal items and telephone in reach/Instruct patient to call for assistance before getting out of bed/chair/stretcher/Non-slip footwear applied when patient is off stretcher/[place] to call system/Physically safe environment - no spills, clutter or unnecessary equipment/Purposeful Proactive Rounding/Room/bathroom lighting operational, light cord in reach

## 2023-06-08 NOTE — ED PROVIDER NOTE - CONSTITUTIONAL MENTATION
Che ([employer]) calling in regards to patient. Since US Breast Lt Complete was ordered, needing a Diagnostic Mammo ordered, instead of the Mammo Screening. Please advise.   
sleeping ; eyes closed. arousable. not interactive.

## 2023-06-08 NOTE — ED ADULT NURSE NOTE - NSFALLHARMRISKINTERV_ED_ALL_ED
Assistance OOB with selected safe patient handling equipment if applicable/Assistance with ambulation/Communicate risk of Fall with Harm to all staff, patient, and family/Monitor gait and stability/Provide patient with walking aids/Provide visual cue: red socks, yellow wristband, yellow gown, etc/Reinforce activity limits and safety measures with patient and family/Bed in lowest position, wheels locked, appropriate side rails in place/Call bell, personal items and telephone in reach/Instruct patient to call for assistance before getting out of bed/chair/stretcher/Non-slip footwear applied when patient is off stretcher/[place] to call system/Physically safe environment - no spills, clutter or unnecessary equipment/Purposeful Proactive Rounding/Room/bathroom lighting operational, light cord in reach Assistance OOB with selected safe patient handling equipment if applicable/Assistance with ambulation/Communicate risk of Fall with Harm to all staff, patient, and family/Monitor gait and stability/Provide patient with walking aids/Provide visual cue: red socks, yellow wristband, yellow gown, etc/Reinforce activity limits and safety measures with patient and family/Bed in lowest position, wheels locked, appropriate side rails in place/Call bell, personal items and telephone in reach/Instruct patient to call for assistance before getting out of bed/chair/stretcher/Non-slip footwear applied when patient is off stretcher/[place] to call system/Physically safe environment - no spills, clutter or unnecessary equipment/Purposeful Proactive Rounding/Room/bathroom lighting operational, light cord in reach Assistance OOB with selected safe patient handling equipment if applicable/Assistance with ambulation/Communicate risk of Fall with Harm to all staff, patient, and family/Monitor gait and stability/Provide patient with walking aids/Provide visual cue: red socks, yellow wristband, yellow gown, etc/Reinforce activity limits and safety measures with patient and family/Bed in lowest position, wheels locked, appropriate side rails in place/Call bell, personal items and telephone in reach/Instruct patient to call for assistance before getting out of bed/chair/stretcher/Non-slip footwear applied when patient is off stretcher/[place] to call system/Physically safe environment - no spills, clutter or unnecessary equipment/Purposeful Proactive Rounding/Room/bathroom lighting operational, light cord in reach

## 2023-06-08 NOTE — ED PROVIDER NOTE - OBJECTIVE STATEMENT
73-year-old female with history of vascular dementia, hypertension, hyperlipidemia, depression, NPH with  shunt, dermatomyositis and seizures sent in by nursing home for right hip fracture that was incidentally found due to patient's grimaces in pain while being changed.  No fall reported.  Patient ANO x0 and nonverbal.  Family is at bedside.  At baseline, it sounds that patient has been mostly wheelchair-bound or bedbound.  Last time she ambulate was 3 months ago.  Son is hopeful that patient with physical therapy would be able to ambulate again.

## 2023-06-08 NOTE — ED ADULT NURSE NOTE - NSICDXPASTMEDICALHX_GEN_ALL_CORE_FT
PAST MEDICAL HISTORY:  Dermatomyositis     Falls     HTN (hypertension)     NPH (normal pressure hydrocephalus)     Seizures     Vascular dementia

## 2023-06-08 NOTE — ED ADULT NURSE NOTE - OBJECTIVE STATEMENT
Niece stated pt with right hip fracture, stated pt at the dementia unit [facility], staff did not tell her if pt fell, pt non verbal Niece stated pt with right hip fracture, stated pt at the dementia unit [facility], staff did not tell her if pt fell, pt non verbal Niece stated pt with right hip fracture, stated pt at the dementia unit [facility], staff did not tell her if pt fell, pt non verbal

## 2023-06-09 DIAGNOSIS — S72.001A FRACTURE OF UNSPECIFIED PART OF NECK OF RIGHT FEMUR, INITIAL ENCOUNTER FOR CLOSED FRACTURE: ICD-10-CM

## 2023-06-09 LAB
ALBUMIN SERPL ELPH-MCNC: 3.1 G/DL — LOW (ref 3.3–5)
ALP SERPL-CCNC: 68 U/L — SIGNIFICANT CHANGE UP (ref 40–120)
ALT FLD-CCNC: 26 U/L — SIGNIFICANT CHANGE UP (ref 10–45)
ANION GAP SERPL CALC-SCNC: 8 MMOL/L — SIGNIFICANT CHANGE UP (ref 5–17)
AST SERPL-CCNC: 23 U/L — SIGNIFICANT CHANGE UP (ref 10–40)
BASOPHILS # BLD AUTO: 0.03 K/UL — SIGNIFICANT CHANGE UP (ref 0–0.2)
BASOPHILS NFR BLD AUTO: 0.3 % — SIGNIFICANT CHANGE UP (ref 0–2)
BILIRUB SERPL-MCNC: 0.6 MG/DL — SIGNIFICANT CHANGE UP (ref 0.2–1.2)
BUN SERPL-MCNC: 19 MG/DL — SIGNIFICANT CHANGE UP (ref 7–23)
CALCIUM SERPL-MCNC: 8.7 MG/DL — SIGNIFICANT CHANGE UP (ref 8.4–10.5)
CHLORIDE SERPL-SCNC: 108 MMOL/L — SIGNIFICANT CHANGE UP (ref 96–108)
CO2 SERPL-SCNC: 27 MMOL/L — SIGNIFICANT CHANGE UP (ref 22–31)
CREAT SERPL-MCNC: 0.75 MG/DL — SIGNIFICANT CHANGE UP (ref 0.5–1.3)
EGFR: 83 ML/MIN/1.73M2 — SIGNIFICANT CHANGE UP
EOSINOPHIL # BLD AUTO: 0.2 K/UL — SIGNIFICANT CHANGE UP (ref 0–0.5)
EOSINOPHIL NFR BLD AUTO: 2 % — SIGNIFICANT CHANGE UP (ref 0–6)
GLUCOSE BLDC GLUCOMTR-MCNC: 96 MG/DL — SIGNIFICANT CHANGE UP (ref 70–99)
GLUCOSE SERPL-MCNC: 105 MG/DL — HIGH (ref 70–99)
HCT VFR BLD CALC: 36.7 % — SIGNIFICANT CHANGE UP (ref 34.5–45)
HGB BLD-MCNC: 11.9 G/DL — SIGNIFICANT CHANGE UP (ref 11.5–15.5)
IMM GRANULOCYTES NFR BLD AUTO: 0.4 % — SIGNIFICANT CHANGE UP (ref 0–0.9)
LYMPHOCYTES # BLD AUTO: 1.39 K/UL — SIGNIFICANT CHANGE UP (ref 1–3.3)
LYMPHOCYTES # BLD AUTO: 14.1 % — SIGNIFICANT CHANGE UP (ref 13–44)
MAGNESIUM SERPL-MCNC: 2.1 MG/DL — SIGNIFICANT CHANGE UP (ref 1.6–2.6)
MCHC RBC-ENTMCNC: 31.4 PG — SIGNIFICANT CHANGE UP (ref 27–34)
MCHC RBC-ENTMCNC: 32.4 GM/DL — SIGNIFICANT CHANGE UP (ref 32–36)
MCV RBC AUTO: 96.8 FL — SIGNIFICANT CHANGE UP (ref 80–100)
MONOCYTES # BLD AUTO: 0.76 K/UL — SIGNIFICANT CHANGE UP (ref 0–0.9)
MONOCYTES NFR BLD AUTO: 7.7 % — SIGNIFICANT CHANGE UP (ref 2–14)
NEUTROPHILS # BLD AUTO: 7.47 K/UL — HIGH (ref 1.8–7.4)
NEUTROPHILS NFR BLD AUTO: 75.5 % — SIGNIFICANT CHANGE UP (ref 43–77)
NRBC # BLD: 0 /100 WBCS — SIGNIFICANT CHANGE UP (ref 0–0)
PLATELET # BLD AUTO: 132 K/UL — LOW (ref 150–400)
POTASSIUM SERPL-MCNC: 3.3 MMOL/L — LOW (ref 3.5–5.3)
POTASSIUM SERPL-SCNC: 3.3 MMOL/L — LOW (ref 3.5–5.3)
PROT SERPL-MCNC: 7.1 G/DL — SIGNIFICANT CHANGE UP (ref 6–8.3)
RBC # BLD: 3.79 M/UL — LOW (ref 3.8–5.2)
RBC # FLD: 13.6 % — SIGNIFICANT CHANGE UP (ref 10.3–14.5)
SODIUM SERPL-SCNC: 143 MMOL/L — SIGNIFICANT CHANGE UP (ref 135–145)
WBC # BLD: 9.89 K/UL — SIGNIFICANT CHANGE UP (ref 3.8–10.5)
WBC # FLD AUTO: 9.89 K/UL — SIGNIFICANT CHANGE UP (ref 3.8–10.5)

## 2023-06-09 PROCEDURE — 93306 TTE W/DOPPLER COMPLETE: CPT | Mod: 26

## 2023-06-09 PROCEDURE — 99222 1ST HOSP IP/OBS MODERATE 55: CPT

## 2023-06-09 PROCEDURE — 93010 ELECTROCARDIOGRAM REPORT: CPT

## 2023-06-09 PROCEDURE — 99233 SBSQ HOSP IP/OBS HIGH 50: CPT

## 2023-06-09 PROCEDURE — 99223 1ST HOSP IP/OBS HIGH 75: CPT

## 2023-06-09 RX ORDER — METOPROLOL TARTRATE 50 MG
25 TABLET ORAL DAILY
Refills: 0 | Status: DISCONTINUED | OUTPATIENT
Start: 2023-06-09 | End: 2023-06-12

## 2023-06-09 RX ADMIN — Medication 3 MILLIGRAM(S): at 21:26

## 2023-06-09 RX ADMIN — ATORVASTATIN CALCIUM 20 MILLIGRAM(S): 80 TABLET, FILM COATED ORAL at 21:26

## 2023-06-09 RX ADMIN — SENNA PLUS 2 TABLET(S): 8.6 TABLET ORAL at 21:26

## 2023-06-09 RX ADMIN — HEPARIN SODIUM 5000 UNIT(S): 5000 INJECTION INTRAVENOUS; SUBCUTANEOUS at 21:26

## 2023-06-09 NOTE — PROGRESS NOTE ADULT - SUBJECTIVE AND OBJECTIVE BOX
Medicine Progress Note    Patient is a 73y old  Female who presents with a chief complaint of R  hip fx (08 Jun 2023 22:16)    NO events overnight    SUBJECTIVE / OVERNIGHT EVENTS:    ADDITIONAL REVIEW OF SYSTEMS:    MEDICATIONS  (STANDING):  atorvastatin 20 milliGRAM(s) Oral at bedtime  cholecalciferol 2000 Unit(s) Oral daily  escitalopram 20 milliGRAM(s) Oral daily  heparin   Injectable 5000 Unit(s) SubCutaneous every 8 hours  levETIRAcetam 500 milliGRAM(s) Oral two times a day  levothyroxine 50 MICROGram(s) Oral daily  melatonin 3 milliGRAM(s) Oral at bedtime  metoprolol succinate ER 25 milliGRAM(s) Oral daily  pantoprazole    Tablet 40 milliGRAM(s) Oral before breakfast  senna 2 Tablet(s) Oral at bedtime    MEDICATIONS  (PRN):  acetaminophen     Tablet .. 650 milliGRAM(s) Oral every 6 hours PRN Temp greater or equal to 38C (100.4F), Mild Pain (1 - 3)  morphine  - Injectable 2 milliGRAM(s) IV Push every 8 hours PRN Severe Pain (7 - 10)    CAPILLARY BLOOD GLUCOSE    &O's Summary    PHYSICAL EXAM:  Vital Signs Last 24 Hrs  T(C): 36.5 (09 Jun 2023 11:57), Max: 36.8 (09 Jun 2023 05:49)  T(F): 97.7 (09 Jun 2023 11:57), Max: 98.3 (09 Jun 2023 05:49)  HR: 78 (09 Jun 2023 11:57) (75 - 89)  BP: 129/63 (09 Jun 2023 11:57) (106/51 - 148/20)  BP(mean): --  RR: 17 (09 Jun 2023 11:57) (15 - 19)  SpO2: 95% (09 Jun 2023 11:57) (95% - 96%)    Parameters below as of 09 Jun 2023 11:57  Patient On (Oxygen Delivery Method): room air      CONSTITUTIONAL: NAD, poor historian, weak appearing  ENMT: Moist oral mucosa, no pharyngeal injection or exudates; normal dentition  RESPIRATORY: Normal respiratory effort; lungs are clear to auscultation bilaterally  CARDIOVASCULAR: Regular rate and rhythm, normal S1 and S2, no murmur/rub/gallop; No lower extremity edema; Peripheral pulses are 2+ bilaterally  ABDOMEN: Nontender to palpation, normoactive bowel sounds, no rebound/guarding; No hepatosplenomegaly  PSYCH: flat affect  NEUROLOGY: Alert , minimally verbal  SKIN: No rashes; no palpable lesions    LABS:                        11.9   9.89  )-----------( 132      ( 09 Jun 2023 09:00 )             36.7     06-09    143  |  108  |  19  ----------------------------<  105<H>  3.3<L>   |  27  |  0.75    Ca    8.7      09 Jun 2023 09:00  Mg     2.1     06-09    TPro  7.1  /  Alb  3.1<L>  /  TBili  0.6  /  DBili  x   /  AST  23  /  ALT  26  /  AlkPhos  68  06-09    PT/INR - ( 08 Jun 2023 20:00 )   PT: 13.1 sec;   INR: 1.13 ratio    PTT - ( 08 Jun 2023 20:00 )  PTT:28.9 sec      RADIOLOGY & ADDITIONAL TESTS:  Imaging from Last 24 Hours:  Xray Chest 1 View- PORTABLE-Urgent (06.08.23 @ 20:50) >  Impression:    Limited study. Visualized lung fields grossly clear    Repeat if warranted clinically.    Electrocardiogram/QTc Interval:    COORDINATION OF CARE:  Care Discussed with Consultants/Other Providers:  Ortho, cardiology

## 2023-06-09 NOTE — CONSULT NOTE ADULT - SUBJECTIVE AND OBJECTIVE BOX
73 yoF [facility] resident hx of severe dementia, HTN, HLD, depression, NPH with  shunt, hx of dermatomyositis with ILD was on IVIG off tmt for about a year pw with R hip fx. Pt had Xray performed of the R hip after it was noticed she had pain in the hips which demonstrated R hip fx and sent to ED. Pt unable to provide any hx. Nonverbal at this point. Son and daughter at bedside. At baseline ambulates 50 ft with assistance with walker. Needs help with tx from bed to chair/wheelchair/commode. No hx of CAD. No reported hx of CHF but noted on Lasix for 'years' for LE edema. orthopedic surgery consulted for Acute impacted subcapital fracture of the right proximal femur. pt in bed appears comfortable.     PAST MEDICAL & SURGICAL HISTORY:  NPH (normal pressure hydrocephalus)      Vascular dementia      HTN (hypertension)      Seizures      Dermatomyositis      Falls      S/P  shunt      MEDICATIONS  (STANDING):  atorvastatin 20 milliGRAM(s) Oral at bedtime  cholecalciferol 2000 Unit(s) Oral daily  escitalopram 20 milliGRAM(s) Oral daily  heparin   Injectable 5000 Unit(s) SubCutaneous every 8 hours  levETIRAcetam 500 milliGRAM(s) Oral two times a day  levothyroxine 50 MICROGram(s) Oral daily  melatonin 3 milliGRAM(s) Oral at bedtime  metoprolol succinate ER 25 milliGRAM(s) Oral daily  pantoprazole    Tablet 40 milliGRAM(s) Oral before breakfast  senna 2 Tablet(s) Oral at bedtime    MEDICATIONS  (PRN):  acetaminophen     Tablet .. 650 milliGRAM(s) Oral every 6 hours PRN Temp greater or equal to 38C (100.4F), Mild Pain (1 - 3)  morphine  - Injectable 2 milliGRAM(s) IV Push every 8 hours PRN Severe Pain (7 - 10)                            11.9   9.89  )-----------( 132      ( 09 Jun 2023 09:00 )             36.7   06-09    143  |  108  |  19  ----------------------------<  105<H>  3.3<L>   |  27  |  0.75    Ca    8.7      09 Jun 2023 09:00  Mg     2.1     06-09    TPro  7.1  /  Alb  3.1<L>  /  TBili  0.6  /  DBili  x   /  AST  23  /  ALT  26  /  AlkPhos  68  06-09                           73 yoF [facility] resident hx of severe dementia, HTN, HLD, depression, NPH with  shunt, hx of dermatomyositis with ILD was on IVIG off tmt for about a year pw with R hip fx. Pt had Xray performed of the R hip after it was noticed she had pain in the hips which demonstrated R hip fx and sent to ED. Pt unable to provide any hx. Nonverbal at this point. Son and daughter at bedside. At baseline ambulates 50 ft with assistance with walker. Needs help with tx from bed to chair/wheelchair/commode. No hx of CAD. No reported hx of CHF but noted on Lasix for 'years' for LE edema. orthopedic surgery consulted for Acute impacted subcapital fracture of the right proximal femur. pt in bed appears comfortable.     PAST MEDICAL & SURGICAL HISTORY:  NPH (normal pressure hydrocephalus)      Vascular dementia      HTN (hypertension)      Seizures      Dermatomyositis      Falls      S/P  shunt      MEDICATIONS  (STANDING):  atorvastatin 20 milliGRAM(s) Oral at bedtime  cholecalciferol 2000 Unit(s) Oral daily  escitalopram 20 milliGRAM(s) Oral daily  heparin   Injectable 5000 Unit(s) SubCutaneous every 8 hours  levETIRAcetam 500 milliGRAM(s) Oral two times a day  levothyroxine 50 MICROGram(s) Oral daily  melatonin 3 milliGRAM(s) Oral at bedtime  metoprolol succinate ER 25 milliGRAM(s) Oral daily  pantoprazole    Tablet 40 milliGRAM(s) Oral before breakfast  senna 2 Tablet(s) Oral at bedtime    MEDICATIONS  (PRN):  acetaminophen     Tablet .. 650 milliGRAM(s) Oral every 6 hours PRN Temp greater or equal to 38C (100.4F), Mild Pain (1 - 3)  morphine  - Injectable 2 milliGRAM(s) IV Push every 8 hours PRN Severe Pain (7 - 10)                            11.9   9.89  )-----------( 132      ( 09 Jun 2023 09:00 )             36.7   06-09    143  |  108  |  19  ----------------------------<  105<H>  3.3<L>   |  27  |  0.75    Ca    8.7      09 Jun 2023 09:00  Mg     2.1     06-09    TPro  7.1  /  Alb  3.1<L>  /  TBili  0.6  /  DBili  x   /  AST  23  /  ALT  26  /  AlkPhos  68  06-09                           73 yoF [facility] resident hx of severe dementia, HTN, HLD, depression, NPH with  shunt, hx of dermatomyositis with ILD was on IVIG off tmt for about a year pw with R hip fx. Pt had Xray performed of the R hip after it was noticed she had pain in the hips which demonstrated R hip fx and sent to ED. Pt unable to provide any hx. Nonverbal at this point. Son and daughter at bedside. At baseline ambulates 50 ft with assistance with walker. Needs help with tx from bed to chair/wheelchair/commode. No hx of CAD. No reported hx of CHF but noted on Lasix for 'years' for LE edema. orthopedic surgery consulted for Acute impacted subcapital fracture of the right proximal femur. pt in bed appears comfortable.     PAST MEDICAL & SURGICAL HISTORY:  NPH (normal pressure hydrocephalus)      Vascular dementia      HTN (hypertension)      Seizures      Dermatomyositis      Falls      S/P  shunt      MEDICATIONS  (STANDING):  atorvastatin 20 milliGRAM(s) Oral at bedtime  cholecalciferol 2000 Unit(s) Oral daily  escitalopram 20 milliGRAM(s) Oral daily  heparin   Injectable 5000 Unit(s) SubCutaneous every 8 hours  levETIRAcetam 500 milliGRAM(s) Oral two times a day  levothyroxine 50 MICROGram(s) Oral daily  melatonin 3 milliGRAM(s) Oral at bedtime  metoprolol succinate ER 25 milliGRAM(s) Oral daily  pantoprazole    Tablet 40 milliGRAM(s) Oral before breakfast  senna 2 Tablet(s) Oral at bedtime    MEDICATIONS  (PRN):  acetaminophen     Tablet .. 650 milliGRAM(s) Oral every 6 hours PRN Temp greater or equal to 38C (100.4F), Mild Pain (1 - 3)  morphine  - Injectable 2 milliGRAM(s) IV Push every 8 hours PRN Severe Pain (7 - 10)                            11.9   9.89  )-----------( 132      ( 09 Jun 2023 09:00 )             36.7   06-09    143  |  108  |  19  ----------------------------<  105<H>  3.3<L>   |  27  |  0.75    Ca    8.7      09 Jun 2023 09:00  Mg     2.1     06-09    TPro  7.1  /  Alb  3.1<L>  /  TBili  0.6  /  DBili  x   /  AST  23  /  ALT  26  /  AlkPhos  68  06-09

## 2023-06-09 NOTE — CONSULT NOTE ADULT - ASSESSMENT
73 yoF [facility] resident hx of severe dementia, HTN, HLD, depression, NPH with  shunt, hx of dermatomyositis with ILD was on IVIG off tmt for about a year pw with R hip fx. Pt had Xray performed of the R hip after it was noticed she had pain in the hips which demonstrated R hip fx and sent to ED. Pt unable to provide any hx. Nonverbal at this point. Son and daughter at bedside. At baseline ambulates 50 ft with assistance with walker. Needs help with tx from bed to chair/wheelchair/commode. No hx of CAD. No reported hx of CHF but noted on Lasix for 'years' for LE edema. orthopedic surgery consulted for Acute impacted subcapital fracture of the right proximal femur. pt in bed appears comfortable 73 yoF [facility] resident hx of severe dementia, HTN, HLD, depression, NPH with  shunt, hx of dermatomyositis with ILD was on IVIG off tmt for about a year pw with R hip fx. Pt had Xray performed of the R hip after it was noticed she had pain in the hips which demonstrated R hip fx and sent to ED. Pt unable to provide any hx. Nonverbal at this point. Son and daughter at bedside. At baseline ambulates 50 ft with assistance with walker. Needs help with tx from bed to chair/wheelchair/commode. No hx of CAD. No reported hx of CHF but noted on Lasix for 'years' for LE edema. orthopedic surgery consulted for Acute impacted subcapital fracture of the right proximal femur. pt in bed appears comfortable 73 yoF [facility] resident hx of severe dementia, HTN, HLD, depression, NPH with  shunt, hx of dermatomyositis with ILD was on IVIG off tmt for about a year pw with R hip fx. Pt had Xray performed of the R hip after it was noticed she had pain in the hips which demonstrated R hip fx and sent to ED. Pt unable to provide any hx. Nonverbal at this point. Son and daughter at bedside. At baseline ambulates 50 ft with assistance with walker. Needs help with tx from bed to chair/wheelchair/commode. No hx of CAD. No reported hx of CHF but noted on Lasix for 'years' for LE edema. orthopedic surgery consulted for Acute impacted subcapital fracture of the right proximal femur. pt in bed appears comfortable

## 2023-06-09 NOTE — PROGRESS NOTE ADULT - ASSESSMENT
Barbara Doyle needs to be seen for an appointment before further refills are given.   73F [facility] resident hx of severe dementia, HTN, HLD, depression, NPH with  shunt, hx of dermatomyositis with ILD was on IVIG off tmt for about a year pw with R hip fx.     # R hip fx.   Spoke to son, patient walk with walker at baseline  Waiting on recommendations from ortho  Cardiology consulted, TTE done  Patient is at high risk for OR    # HTN  cont Toprol  hold am lasix in anticipation for possible sx.     # Hypothyroid  cont Synthroid.    #Sz d/o   cont keppra    #DVT PPX: Heparin  AM labs  DISP: From [facility], pending course    Per son Josh [phone] at bedside updated. No advanced directives. Pt is FULL CODE.    73F [facility] resident hx of severe dementia, HTN, HLD, depression, NPH with  shunt, hx of dermatomyositis with ILD was on IVIG off tmt for about a year pw with R hip fx.     # R hip fx.   Spoke to son, patient walk with walker at baseline  Waiting on recommendations from ortho  Cardiology consulted, TTE done  Patient is at high risk for OR    # HTN  cont Toprol  hold am lasix in anticipation for possible sx.     # Hypothyroid  cont Synthroid.    #Sz d/o   cont keppra    #DVT PPX: Heparin  AM labs  DISP: From [facility], pending course    Per son Josh [phone] at bedside updated. No advanced directives. Pt is FULL CODE.    73F [facility] resident hx of severe dementia, HTN, HLD, depression, NPH with  shunt, hx of dermatomyositis with ILD was on IVIG off tmt for about a year pw with R hip fx.     # R hip fx.   Spoke to son, patient walk with walker at baseline  Waiting on recommendations from ortho  Cardiology consulted, TTE done  Patient is at high risk for OR    # HTN  cont Toprol  hold am lasix in anticipation for possible sx.     # Hypothyroid  cont Synthroid.    #Sz d/o   cont keppra    #DVT PPX: Heparin  AM labs  DISP: From [facility], pending course    Per son Josh [phone] at bedside updated. No advanced directives. Pt is FULL CODE.

## 2023-06-09 NOTE — CONSULT NOTE ADULT - SUBJECTIVE AND OBJECTIVE BOX
HPI:tHIS IS A 73 YR OLD WOMAN ADMITTED FOR A HIP FRACTURE. SHE IS NON VERBAL DUE TO PROFOUND DEMENTIA AND IS NON VERBAL    PMH:   NPH (normal pressure hydrocephalus)    Vascular dementia    HTN (hypertension)    Seizures    Dermatomyositis    Falls      PSH:   S/P  shunt      Family History:  FAMILY HISTORY:  FH: CAD (coronary artery disease)        Social History:  Smoking:unknown  Alcohol:unknown  Drugs:unknown    Allergies:  No Known Allergies      Medications:  acetaminophen     Tablet .. 650 milliGRAM(s) Oral every 6 hours PRN  atorvastatin 20 milliGRAM(s) Oral at bedtime  cholecalciferol 2000 Unit(s) Oral daily  escitalopram 20 milliGRAM(s) Oral daily  heparin   Injectable 5000 Unit(s) SubCutaneous every 8 hours  levETIRAcetam 500 milliGRAM(s) Oral two times a day  levothyroxine 50 MICROGram(s) Oral daily  melatonin 3 milliGRAM(s) Oral at bedtime  metoprolol succinate ER 25 milliGRAM(s) Oral daily  morphine  - Injectable 2 milliGRAM(s) IV Push every 8 hours PRN  pantoprazole    Tablet 40 milliGRAM(s) Oral before breakfast  senna 2 Tablet(s) Oral at bedtime      REVIEW OF SYSTEMS:  unaBLE TO OBTAIN    Physical Exam:  T(C): 36.5 (06-09-23 @ 11:57), Max: 36.8 (06-09-23 @ 05:49)  HR: 78 (06-09-23 @ 11:57) (75 - 89)  BP: 129/63 (06-09-23 @ 11:57) (106/51 - 148/20)  RR: 17 (06-09-23 @ 11:57) (15 - 19)  SpO2: 95% (06-09-23 @ 11:57) (95% - 96%)  Wt(kg): --    GENERAL: ELDERLY WOMAN APPEARING OLDER THAN HER STATED AGE/STARING STRAIGHT AHEAD   HEAD:  Atraumatic, Normocephalic  EYES: EOMI, conjunctiva and sclera clear  ENT:DRY MUCOUS MEMBRANES,  NECK: Supple, No JVD, no bruits  CHEST/LUNG: Clear to percussion bilaterally; No rales, rhonchi, wheezing, or rubs  HEART: Regular rate and rhythm; No murmurs, rubs, or gallops PMI non displaced.  ABDOMEN: Soft, Nontender, Nondistended; Bowel sounds present  EXTREMITIES:  2+ Peripheral Pulses, No clubbing, cyanosis, or edema  SKIN: No rashes or lesions  NERVOUS SYSTEM:  Alert & Oriented X3, Good concentration; Motor Strength 5/5 B/L upper and lower extremities; DTRs 2+ intact and symmetric    Cardiovascular Diagnostic Testing:  ECG:sinus rhythm with t wave inversions(since at least 6/2022) possibly consistent with ischmia    Labs:                        11.9   9.89  )-----------( 132      ( 09 Jun 2023 09:00 )             36.7     06-09    143  |  108  |  19  ----------------------------<  105<H>  3.3<L>   |  27  |  0.75    Ca    8.7      09 Jun 2023 09:00  Mg     2.1     06-09    TPro  7.1  /  Alb  3.1<L>  /  TBili  0.6  /  DBili  x   /  AST  23  /  ALT  26  /  AlkPhos  68  06-09    PT/INR - ( 08 Jun 2023 20:00 )   PT: 13.1 sec;   INR: 1.13 ratio         PTT - ( 08 Jun 2023 20:00 )  PTT:28.9 sec                Imaging:

## 2023-06-09 NOTE — PATIENT PROFILE ADULT - FALL HARM RISK - HARM RISK INTERVENTIONS
Assistance with ambulation/Assistance OOB with selected safe patient handling equipment/Communicate Risk of Fall with Harm to all staff/Discuss with provider need for PT consult/Monitor gait and stability/Provide patient with walking aids - walker, cane, crutches/Reinforce activity limits and safety measures with patient and family/Tailored Fall Risk Interventions/Visual Cue: Yellow wristband and red socks/Bed in lowest position, wheels locked, appropriate side rails in place/Call bell, personal items and telephone in reach/Instruct patient to call for assistance before getting out of bed or chair/Non-slip footwear when patient is out of bed/[place] to call system/Physically safe environment - no spills, clutter or unnecessary equipment/Purposeful Proactive Rounding/Room/bathroom lighting operational, light cord in reach Assistance with ambulation/Assistance OOB with selected safe patient handling equipment/Communicate Risk of Fall with Harm to all staff/Discuss with provider need for PT consult/Monitor gait and stability/Provide patient with walking aids - walker, cane, crutches/Reinforce activity limits and safety measures with patient and family/Tailored Fall Risk Interventions/Visual Cue: Yellow wristband and red socks/Bed in lowest position, wheels locked, appropriate side rails in place/Call bell, personal items and telephone in reach/Instruct patient to call for assistance before getting out of bed or chair/Non-slip footwear when patient is out of bed/[place] to call system/Physically safe environment - no spills, clutter or unnecessary equipment/Purposeful Proactive Rounding/Room/bathroom lighting operational, light cord in reach Assistance with ambulation/Assistance OOB with selected safe patient handling equipment/Communicate Risk of Fall with Harm to all staff/Discuss with provider need for PT consult/Monitor gait and stability/Provide patient with walking aids - walker, cane, crutches/Reinforce activity limits and safety measures with patient and family/Tailored Fall Risk Interventions/Visual Cue: Yellow wristband and red socks/Bed in lowest position, wheels locked, appropriate side rails in place/Call bell, personal items and telephone in reach/Instruct patient to call for assistance before getting out of bed or chair/Non-slip footwear when patient is out of bed/[place] to call system/Physically safe environment - no spills, clutter or unnecessary equipment/Purposeful Proactive Rounding/Room/bathroom lighting operational, light cord in reach

## 2023-06-09 NOTE — CONSULT NOTE ADULT - ASSESSMENT
IN SUMMARY THE PATIENT IS AN ELDERLY WOMAN WITH HTN HLD AN ABNORMAL EKG,  AN ECHO SHOWING DIASTOLIC DYSFUNCTION AND EVIDENCE OF SIGNIFICANT CEREBROVASCULAR DISEASE. IN ADDITION SHE HAS MODIFIABLE ISSUES OF DEHYDRATION AND HYPOKALEMIA.    FROM A CARDIOVASCULAR PERSPECTIVE SHE IS AT HIGH RISK OF MORBIDITY AND POSSIBLY  MORTALITY FROM SURGERY. UNFORTUNATELY OTHER THAN CORRECTING HER HYPOKALEIMA AND DEHYDRATION HER OTHER ISSUES ARE UNMODIFIABLE

## 2023-06-09 NOTE — CONSULT NOTE ADULT - MUSCULOSKELETAL
details… decreased ROM/decreased ROM due to pain/joint swelling/no strength/decreased strength/abnormal gait

## 2023-06-09 NOTE — CONSULT NOTE ADULT - PROBLEM SELECTOR RECOMMENDATION 9
NWB on RLE  discussed options and risks to patients son in regards to possible surgical intervention.   As per son mother ambulates with assistance but has not seen mother ambulate in over a month.   advised of Cardiology high risk clearance,   Son would like to speak to cardiology and medicine in regards to clearance.   discussed with medicine and Orthopedic surgeon   will follow

## 2023-06-09 NOTE — CONSULT NOTE ADULT - NS ATTEND AMEND GEN_ALL_CORE FT
Patient seen and examined at the bedside. Agree with above.  Patient appears comfortable in bed.  X-rays show GIII displaced right femoral neck fracture.  I had a lengthy discussion with patient's son regarding treatment options.    If she were an ambulator or had a reasonable prognosis of ambulating again I would advise a hemiarthroplasty.  If, however, she is not likely to walk and is at a high risk for morbidity and mortality with surgery I would advise palliative care.  I explained that with her type of fracture the pain should subside after approximately 6 weeks and she would able to be mobilized more easily.  Patient's son would like to further discuss her medical risk factors with the cardiologist prior to making a decision.

## 2023-06-09 NOTE — PATIENT PROFILE ADULT - DEAF OR HARD OF HEARING?
----- Message from Za Jacob DO sent at 1/23/2018 10:35 PM CST -----  Please have patient schedule a follow up with me to discuss her blood sugars and liver enzymes. Both have gotten worse.
Left message for patient to return call.  Austen Trevizo
Scheduled an appt to be seen on Feb 5
no

## 2023-06-09 NOTE — PATIENT PROFILE ADULT - ARRIVAL FROM
[facility]/Assisted living facility [facility]/Assisted living facility [facility]/Assisted living facility

## 2023-06-10 LAB
ANION GAP SERPL CALC-SCNC: 11 MMOL/L — SIGNIFICANT CHANGE UP (ref 5–17)
BUN SERPL-MCNC: 20 MG/DL — SIGNIFICANT CHANGE UP (ref 7–23)
CALCIUM SERPL-MCNC: 8.8 MG/DL — SIGNIFICANT CHANGE UP (ref 8.4–10.5)
CHLORIDE SERPL-SCNC: 109 MMOL/L — HIGH (ref 96–108)
CO2 SERPL-SCNC: 24 MMOL/L — SIGNIFICANT CHANGE UP (ref 22–31)
CREAT SERPL-MCNC: 0.66 MG/DL — SIGNIFICANT CHANGE UP (ref 0.5–1.3)
EGFR: 92 ML/MIN/1.73M2 — SIGNIFICANT CHANGE UP
GLUCOSE BLDC GLUCOMTR-MCNC: 103 MG/DL — HIGH (ref 70–99)
GLUCOSE SERPL-MCNC: 111 MG/DL — HIGH (ref 70–99)
HCT VFR BLD CALC: 37.2 % — SIGNIFICANT CHANGE UP (ref 34.5–45)
HGB BLD-MCNC: 11.9 G/DL — SIGNIFICANT CHANGE UP (ref 11.5–15.5)
MAGNESIUM SERPL-MCNC: 2 MG/DL — SIGNIFICANT CHANGE UP (ref 1.6–2.6)
MCHC RBC-ENTMCNC: 31.1 PG — SIGNIFICANT CHANGE UP (ref 27–34)
MCHC RBC-ENTMCNC: 32 GM/DL — SIGNIFICANT CHANGE UP (ref 32–36)
MCV RBC AUTO: 97.1 FL — SIGNIFICANT CHANGE UP (ref 80–100)
NRBC # BLD: 0 /100 WBCS — SIGNIFICANT CHANGE UP (ref 0–0)
PLATELET # BLD AUTO: 128 K/UL — LOW (ref 150–400)
POTASSIUM SERPL-MCNC: 3.3 MMOL/L — LOW (ref 3.5–5.3)
POTASSIUM SERPL-SCNC: 3.3 MMOL/L — LOW (ref 3.5–5.3)
PROCALCITONIN SERPL-MCNC: 0.04 NG/ML — SIGNIFICANT CHANGE UP
RBC # BLD: 3.83 M/UL — SIGNIFICANT CHANGE UP (ref 3.8–5.2)
RBC # FLD: 13.5 % — SIGNIFICANT CHANGE UP (ref 10.3–14.5)
SODIUM SERPL-SCNC: 144 MMOL/L — SIGNIFICANT CHANGE UP (ref 135–145)
WBC # BLD: 8.78 K/UL — SIGNIFICANT CHANGE UP (ref 3.8–10.5)
WBC # FLD AUTO: 8.78 K/UL — SIGNIFICANT CHANGE UP (ref 3.8–10.5)

## 2023-06-10 PROCEDURE — 99232 SBSQ HOSP IP/OBS MODERATE 35: CPT

## 2023-06-10 RX ORDER — POTASSIUM CHLORIDE 20 MEQ
40 PACKET (EA) ORAL ONCE
Refills: 0 | Status: COMPLETED | OUTPATIENT
Start: 2023-06-10 | End: 2023-06-10

## 2023-06-10 RX ADMIN — ATORVASTATIN CALCIUM 20 MILLIGRAM(S): 80 TABLET, FILM COATED ORAL at 22:48

## 2023-06-10 RX ADMIN — HEPARIN SODIUM 5000 UNIT(S): 5000 INJECTION INTRAVENOUS; SUBCUTANEOUS at 14:28

## 2023-06-10 RX ADMIN — HEPARIN SODIUM 5000 UNIT(S): 5000 INJECTION INTRAVENOUS; SUBCUTANEOUS at 05:06

## 2023-06-10 RX ADMIN — Medication 3 MILLIGRAM(S): at 22:48

## 2023-06-10 RX ADMIN — LEVETIRACETAM 500 MILLIGRAM(S): 250 TABLET, FILM COATED ORAL at 17:24

## 2023-06-10 RX ADMIN — Medication 50 MICROGRAM(S): at 05:06

## 2023-06-10 RX ADMIN — Medication 2000 UNIT(S): at 11:40

## 2023-06-10 RX ADMIN — Medication 40 MILLIEQUIVALENT(S): at 11:40

## 2023-06-10 RX ADMIN — PANTOPRAZOLE SODIUM 40 MILLIGRAM(S): 20 TABLET, DELAYED RELEASE ORAL at 05:07

## 2023-06-10 RX ADMIN — Medication 650 MILLIGRAM(S): at 11:48

## 2023-06-10 RX ADMIN — HEPARIN SODIUM 5000 UNIT(S): 5000 INJECTION INTRAVENOUS; SUBCUTANEOUS at 22:48

## 2023-06-10 RX ADMIN — ESCITALOPRAM OXALATE 20 MILLIGRAM(S): 10 TABLET, FILM COATED ORAL at 11:40

## 2023-06-10 RX ADMIN — SENNA PLUS 2 TABLET(S): 8.6 TABLET ORAL at 22:48

## 2023-06-10 RX ADMIN — Medication 650 MILLIGRAM(S): at 12:48

## 2023-06-10 RX ADMIN — Medication 25 MILLIGRAM(S): at 05:06

## 2023-06-10 RX ADMIN — LEVETIRACETAM 500 MILLIGRAM(S): 250 TABLET, FILM COATED ORAL at 05:06

## 2023-06-10 NOTE — PROGRESS NOTE ADULT - ASSESSMENT
73F [facility] resident hx of severe dementia, HTN, HLD, depression, NPH with  shunt, hx of dermatomyositis with ILD was on IVIG off tmt for about a year pw with R hip fx.     # R hip fx.   Walks w walker at baseline but has not walked in a month   Ortho consult appreciated- [facility]--plan for OR Monday--NPO @ [place] Sunday night  Cardiology consulted--patient is high risk for OR_-Son would like to speak with cardio prior to making a decision for OR- Dr Cosby to call today   TTE reviewed     # HTN  cont Toprol  Holding lasix for now    # Hypothyroid  cont Synthroid.    #Sz d/o   cont keppra    #DVT PPX: Heparin  AM labs  DISP:from [facility]. Suspect OR Monday pending son consent    6/10: Myles Moreno [phone] updated. No advanced directives. Pt is FULL CODE.     *Case dw DR Cosby    73F [facility] resident hx of severe dementia, HTN, HLD, depression, NPH with  shunt, hx of dermatomyositis with ILD was on IVIG off tmt for about a year pw with R hip fx.     # R hip fx.   Walks w walker at baseline but has not walked in a month   Ortho consult appreciated- [facility]--plan for OR Monday--NPO @ [place] Sunday night  Cardiology consulted--patient is high risk for OR_-Son would like to speak with cardio prior to making a decision for OR- Dr Cosby to call today   TTE reviewed     # HTN  cont Toprol  Holding lasix for now    # Hypothyroid  cont Synthroid.    #Sz d/o   cont keppra    #DVT PPX: Heparin  AM labs  [facility] Monday pending son consent    6/10: Myles Moreno [phone] updated. No advanced directives. Pt is FULL CODE.     *Case dw DR Cosby    73F [facility] resident hx of severe dementia, HTN, HLD, depression, NPH with  shunt, hx of dermatomyositis with ILD was on IVIG off tmt for about a year pw with R hip fx.     # R hip fx.   Walks w walker at baseline but has not walked in a month   Ortho consult appreciated- [facility]--plan for OR Monday--NPO @ [place] Sunday night  Cardiology consulted--patient is high risk for OR_-Son would like to speak with cardio prior to making a decision for OR- Dr Cosby to call today   TTE reviewed     # HTN  cont Toprol  Holding lasix for now    # Hypothyroid  cont Synthroid.    #Sz d/o   cont keppra    #DVT PPX: Heparin  AM labs  DISP:from [facility]. Suspect OR Monday pending son consent    6/10: Myles Moreno [phone] updated. No advanced directives. Pt is FULL CODE.     *Case dw DR Cosby    73F [facility] resident hx of severe dementia, HTN, HLD, depression, NPH with  shunt, hx of dermatomyositis with ILD was on IVIG off tmt for about a year pw with R hip fx.     # R hip fx.   Walks w walker at baseline but has not walked in a month   Ortho consult appreciated- [facility]--plan for OR Monday--NPO @ [place] Sunday night  Cardiology consulted--patient is high risk for OR_-Son spoke with DR Cosby and understands risks     # HTN  cont Toprol  Holding lasix for now    # Hypothyroid  cont Synthroid.    #Sz d/o   cont keppra    #DVT PPX: Heparin  AM labs  DISP:from [facility]. Suspect OR Monday pending son consent    6/10: Son Josh [phone] updated. No advanced directives. Pt is FULL CODE.     *Case dw DR Cosby    73F [facility] resident hx of severe dementia, HTN, HLD, depression, NPH with  shunt, hx of dermatomyositis with ILD was on IVIG off tmt for about a year pw with R hip fx.     # R hip fx.   Walks w walker at baseline but has not walked in a month   Ortho consult appreciated- [facility]--plan for OR Monday--NPO @ [place] Sunday night  Cardiology consulted--patient is high risk for OR_-Son spoke with DR Cosby and understands risks     # HTN  cont Toprol  Holding lasix for now    # Hypothyroid  cont Synthroid.    #Sz d/o   cont keppra    #DVT PPX: Heparin  AM labs  DISP:from [facility]. Suspect OR Monday pending son consent    6/10: Son Josh [phone] updated. No advanced directives. Pt is FULL CODE.     *Case dw DR Cosby    73F [facility] resident hx of severe dementia, HTN, HLD, depression, NPH with  shunt, hx of dermatomyositis with ILD was on IVIG off tmt for about a year pw with R hip fx.     # R hip fx.   Walks w walker at baseline but has not walked in a month   Ortho consult appreciated- [facility]--plan for OR Monday--NPO @ [place] Sunday night  Cardiology consulted--patient is high risk for OR_-Son spoke with DR Cosby and understands risks     # HTN  cont Toprol  Holding lasix for now    # Hypothyroid  cont Synthroid.    #Sz d/o   cont keppra    #DVT PPX: Heparin  AM labs  DISP:from [facility]. Suspect OR Monday pending son consent    6/10: Son Josh [phone] updated. No advanced directives. Pt is FULL CODE.     *Case dw DR Cosby

## 2023-06-10 NOTE — CHART NOTE - NSCHARTNOTEFT_GEN_A_CORE
Patient admitted on 6/08 pm with right femorAL neck fx.   Patient with multiple medical issues including dementia, hld, mypsitis,  shunt, high caardiac risk,.  Patient required to have right hip hemiarthroplasty for this fx.  Dr Estrada spoke to HCP son Josh Braxton.   Risks and benefits of surgery explained and son was to make a decision after speaking to cardiology.   Patient had been scheduled as an  emergency for Saturday am but son had not decided about having the surgery,   Yesterday after speaking to cardiology , Son is going to give permission for surgery.    I spoke to son today and explained that we would put his mother on the OR schedule for Monday as per Dr Estrada .

## 2023-06-10 NOTE — PROGRESS NOTE ADULT - SUBJECTIVE AND OBJECTIVE BOX
SUBJ:  Patient is a 73y old  Female who presents with a chief complaint of R  hip fx (09 Jun 2023 18:22)  patient seen and examined  chart is reviewed  case discussed with Dr. Martino   patient in bed .. appears confused, no distress     PAST MEDICAL & SURGICAL HISTORY:  NPH (normal pressure hydrocephalus)      Vascular dementia      HTN (hypertension)      Seizures      Dermatomyositis      Falls      S/P  shunt      Home Medications:  atorvastatin 20 mg oral tablet: 1 orally once a day (at bedtime) (08 Jun 2023 21:37)  escitalopram 20 mg oral tablet: 1 tab(s) orally once a day (08 Jun 2023 21:37)  furosemide 20 mg oral tablet: 1 tab(s) orally once a day (08 Jun 2023 21:37)  levETIRAcetam 500 mg oral tablet: 1 tab(s) orally 2 times a day (08 Jun 2023 21:37)  levothyroxine 50 mcg (0.05 mg) oral tablet: 1 tab(s) orally once a day (06 Jun 2022 14:05)  melatonin 5 mg oral tablet: 1 tab(s) orally once a day (at bedtime) (08 Jun 2022 15:20)  Metoprolol Succinate ER 25 mg oral tablet, extended release: 1 tab(s) orally once a day (06 Jun 2022 14:05)  Senna 8.6 mg oral tablet: 1 tab(s) orally once a day (at bedtime) (06 Jun 2022 14:05)  Vitamin D3 50 mcg (2000 intl units) oral tablet: 1 orally once a day (08 Jun 2023 21:37)      MEDICATIONS  (STANDING):  atorvastatin 20 milliGRAM(s) Oral at bedtime  cholecalciferol 2000 Unit(s) Oral daily  escitalopram 20 milliGRAM(s) Oral daily  heparin   Injectable 5000 Unit(s) SubCutaneous every 8 hours  levETIRAcetam 500 milliGRAM(s) Oral two times a day  levothyroxine 50 MICROGram(s) Oral daily  melatonin 3 milliGRAM(s) Oral at bedtime  metoprolol succinate ER 25 milliGRAM(s) Oral daily  pantoprazole    Tablet 40 milliGRAM(s) Oral before breakfast  senna 2 Tablet(s) Oral at bedtime    MEDICATIONS  (PRN):  acetaminophen     Tablet .. 650 milliGRAM(s) Oral every 6 hours PRN Temp greater or equal to 38C (100.4F), Mild Pain (1 - 3)  morphine  - Injectable 2 milliGRAM(s) IV Push every 8 hours PRN Severe Pain (7 - 10)          Vital Signs Last 24 Hrs  T(C): 37.2 (10 James 2023 12:30), Max: 37.3 (09 Jun 2023 21:22)  T(F): 99 (10 James 2023 12:30), Max: 99.1 (09 Jun 2023 21:22)  HR: 75 (10 James 2023 12:30) (74 - 75)  BP: 137/72 (10 James 2023 05:14) (137/72 - 144/57)  BP(mean): --  RR: 14 (10 James 2023 12:30) (14 - 16)  SpO2: 98% (10 James 2023 12:30) (94% - 98%)    Parameters below as of 10 James 2023 12:30  Patient On (Oxygen Delivery Method): room air        REVIEW OF SYSTEMS: not able to obtain       PHYSICAL EXAM  Constitutional:  elderly woman in NAD   HEENT: normocephalic, atraumatic.  PERRLA. EOMI  Neck : No JVD. no carotid bruits  Lungs:  decreased breath sounds bilaterally   Heart:  S1 and S2. No S3, S4. I/VI systolic murmur.  Abdomen:  soft, non tender.  Extremities: No clubbing, cyanoisis or edema  Skin:  no rashes        LABS:                        11.9   8.78  )-----------( 128      ( 10 James 2023 06:00 )             37.2     06-10    144  |  109<H>  |  20  ----------------------------<  111<H>  3.3<L>   |  24  |  0.66    Ca    8.8      10 James 2023 06:00  Mg     2.0     06-10    TPro  7.1  /  Alb  3.1<L>  /  TBili  0.6  /  DBili  x   /  AST  23  /  ALT  26  /  AlkPhos  68  06-09    I&O's Summary    09 Jun 2023 07:01  -  10 Jun 2023 07:00  --------------------------------------------------------  IN: 0 mL / OUT: 910 mL / NET: -910 mL    < from: 12 Lead ECG (06.09.23 @ 11:18) >  Diagnosis Line Normal sinus rhythm  Voltage criteria for left ventricular hypertrophy  Twave inversions in the inferior leads cannot rule out inferior ischemia    < end of copied text >  < from: TTE Echo Complete w/o Contrast w/ Doppler (06.09.23 @ 08:22) >   1. Sclerodegenerative disease of the aortic valve   2. Left ventricular ejection fraction, by visual estimation, is 65 to   70%.   3. Normal global left ventricular systolic function.   4. Normal left ventricular internal cavity size.   5. Spectral Doppler shows impaired relaxation pattern of left   ventricular myocardial filling (Grade I diastolic dysfunction).   6. Trace mitral valve regurgitation.   7. Mild tricuspid regurgitation.   8. Mild to moderate aortic regurgitation.   9. Estimated pulmonary artery systolic pressure is 44.2 mmHg assuming a   right atrial pressure of 10 mmHg, which is consistent with mild pulmonary   hypertension.    < end of copied text >  < from: CT Chest No Cont (06.06.22 @ 11:04) >  Left lower lobe pulmonary infiltrate for which clinical correlation with   pneumonia is recommended.    Nonspecific 8 mm right lower lobe lung nodule; follow-up chest CT may be   pursued in 3 months to ensure stability or resolution.    Mildly enlarged 1.1 cm precarinal lymph node    Small densities in the trachea and right bronchus intermedius may   represent mucous secretion. Attention should be directed to this finding   on the follow-up chest CT to ensure resolution.    Dilated common bile measuring 11 mm in caliber, probably due to post   cholecystectomy status. If there is a clinical suspicion for biliary   obstruction, MRCP may be pursued for further evaluation.    < end of copied text >

## 2023-06-10 NOTE — PROGRESS NOTE ADULT - SUBJECTIVE AND OBJECTIVE BOX
Patient is a 73y old  Female who presents with a chief complaint of R  hip fx (09 Jun 2023 18:22)    No acute issues overnight   Patient seen and examined at bedside.    ALLERGIES:  No Known Allergies    MEDICATIONS  (STANDING):  atorvastatin 20 milliGRAM(s) Oral at bedtime  cholecalciferol 2000 Unit(s) Oral daily  escitalopram 20 milliGRAM(s) Oral daily  heparin   Injectable 5000 Unit(s) SubCutaneous every 8 hours  levETIRAcetam 500 milliGRAM(s) Oral two times a day  levothyroxine 50 MICROGram(s) Oral daily  melatonin 3 milliGRAM(s) Oral at bedtime  metoprolol succinate ER 25 milliGRAM(s) Oral daily  pantoprazole    Tablet 40 milliGRAM(s) Oral before breakfast  senna 2 Tablet(s) Oral at bedtime    MEDICATIONS  (PRN):  acetaminophen     Tablet .. 650 milliGRAM(s) Oral every 6 hours PRN Temp greater or equal to 38C (100.4F), Mild Pain (1 - 3)  morphine  - Injectable 2 milliGRAM(s) IV Push every 8 hours PRN Severe Pain (7 - 10)    Vital Signs Last 24 Hrs  T(F): 98.2 (10 James 2023 05:14), Max: 99.1 (09 Jun 2023 21:22)  HR: 75 (10 James 2023 05:14) (74 - 75)  BP: 137/72 (10 James 2023 05:14) (137/72 - 144/57)  RR: 16 (10 James 2023 05:14) (15 - 16)  SpO2: 94% (10 James 2023 05:14) (94% - 94%)  I&O's Summary    09 Jun 2023 07:01  -  10 James 2023 07:00  --------------------------------------------------------  IN: 0 mL / OUT: 910 mL / NET: -910 mL      BMI (kg/m2): 24.1 (06-08-23 @ 19:07)  PHYSICAL EXAM:  General: , weak, poor historian   ENT: MM dry, no thrush  Neck: Supple, No JVD  Lungs: Non labored breathing,  Clear to auscultation bilaterally,   Cardio: RRR, S1/S2, + systolic murmur  no pitting edema bilaterally  Abdomen: Soft, Nontender, Nondistended; Bowel sounds present  Extremities: No calf tenderness, not moving RLE due to pain    LABS:                        11.9   8.78  )-----------( 128      ( 10 James 2023 06:00 )             37.2       06-10    144  |  109  |  20  ----------------------------<  111  3.3   |  24  |  0.66    Ca    8.8      10 James 2023 06:00  Mg     2.0     06-10    TPro  7.1  /  Alb  3.1  /  TBili  0.6  /  DBili  x   /  AST  23  /  ALT  26  /  AlkPhos  68  06-09       PT/INR - ( 08 Jun 2023 20:00 )   PT: 13.1 sec;   INR: 1.13 ratio         PTT - ( 08 Jun 2023 20:00 )  PTT:28.9 sec                       POCT Blood Glucose.: 103 mg/dL (10 James 2023 00:07)  POCT Blood Glucose.: 96 mg/dL (09 Jun 2023 21:02)              RADIOLOGY & ADDITIONAL TESTS:    Care Discussed with Consultants/Other Providers:    Patient is a 73y old  Female who presents with a chief complaint of R  hip fx (09 Jun 2023 18:22)    No acute issues overnight   Patient seen and examined at bedside.    ALLERGIES:  No Known Allergies    MEDICATIONS  (STANDING):  atorvastatin 20 milliGRAM(s) Oral at bedtime  cholecalciferol 2000 Unit(s) Oral daily  escitalopram 20 milliGRAM(s) Oral daily  heparin   Injectable 5000 Unit(s) SubCutaneous every 8 hours  levETIRAcetam 500 milliGRAM(s) Oral two times a day  levothyroxine 50 MICROGram(s) Oral daily  melatonin 3 milliGRAM(s) Oral at bedtime  metoprolol succinate ER 25 milliGRAM(s) Oral daily  pantoprazole    Tablet 40 milliGRAM(s) Oral before breakfast  senna 2 Tablet(s) Oral at bedtime    MEDICATIONS  (PRN):  acetaminophen     Tablet .. 650 milliGRAM(s) Oral every 6 hours PRN Temp greater or equal to 38C (100.4F), Mild Pain (1 - 3)  morphine  - Injectable 2 milliGRAM(s) IV Push every 8 hours PRN Severe Pain (7 - 10)    Vital Signs Last 24 Hrs  T(F): 98.2 (10 James 2023 05:14), Max: 99.1 (09 Jun 2023 21:22)  HR: 75 (10 James 2023 05:14) (74 - 75)  BP: 137/72 (10 James 2023 05:14) (137/72 - 144/57)  RR: 16 (10 James 2023 05:14) (15 - 16)  SpO2: 94% (10 James 2023 05:14) (94% - 94%)  I&O's Summary    09 Jun 2023 07:01  -  10 James 2023 07:00  --------------------------------------------------------  IN: 0 mL / OUT: 910 mL / NET: -910 mL      BMI (kg/m2): 24.1 (06-08-23 @ 19:07)  PHYSICAL EXAM:  General: , weak, poor historian   ENT: MM dry, no thrush  Neck: Supple, No JVD  Lungs: Non labored breathing,  Clear to auscultation bilaterally,   Cardio: RRR, S1/S2, + systolic murmur  no pitting edema bilaterally  Abdomen: Soft, Nontender, Nondistended; Bowel sounds present  Extremities: No calf tenderness, not moving RLE due to pain    LABS:                        11.9   8.78  )-----------( 128      ( 10 James 2023 06:00 )             37.2       06-10    144  |  109  |  20  ----------------------------<  111  3.3   |  24  |  0.66    Ca    8.8      10 James 2023 06:00  Mg     2.0     06-10    TPro  7.1  /  Alb  3.1  /  TBili  0.6  /  DBili  x   /  AST  23  /  ALT  26  /  AlkPhos  68  06-09       PT/INR - ( 08 Jun 2023 20:00 )   PT: 13.1 sec;   INR: 1.13 ratio    PTT - ( 08 Jun 2023 20:00 )  PTT:28.9 sec     POCT Blood Glucose.: 103 mg/dL (10 James 2023 00:07)  POCT Blood Glucose.: 96 mg/dL (09 Jun 2023 21:02)

## 2023-06-10 NOTE — PROGRESS NOTE ADULT - ASSESSMENT
73 year old woman presented with hip fracture.   No prior cardiac history.   Medical issues include vascular dementia, NPH with  shunt, dermatomyositis  CT with left lower lobe pulmonary infiltrate, dilated common bile duct and coronary artery calcifications.   Echo with normal LV function, mild MR, diastolic dysfunction and mild pulmonary HTN   Patient previously was ambulating with a walker.   Ortho consult appreciated .. patient would be non weight bearing in absence of surgical intervention     Plan   - as noted earlier, patient would be at high risk for perioperative cardiac events including CHF and MI   - however, in absence of surgery, she would be non weight bearing and be at risk for future medical problems  - planned surgery can proceed at an increased cardiac risk   - hold Lasix for now   - continue statin   - defer to Medicine regarding antibiotics   - post op telemetry monitoring is indicated    discussed with Medicine team   discussed with son Josh ... all of his questions answered

## 2023-06-11 ENCOUNTER — TRANSCRIPTION ENCOUNTER (OUTPATIENT)
Age: 73
End: 2023-06-11

## 2023-06-11 LAB
ANION GAP SERPL CALC-SCNC: 8 MMOL/L — SIGNIFICANT CHANGE UP (ref 5–17)
BUN SERPL-MCNC: 24 MG/DL — HIGH (ref 7–23)
CALCIUM SERPL-MCNC: 8.8 MG/DL — SIGNIFICANT CHANGE UP (ref 8.4–10.5)
CHLORIDE SERPL-SCNC: 110 MMOL/L — HIGH (ref 96–108)
CO2 SERPL-SCNC: 25 MMOL/L — SIGNIFICANT CHANGE UP (ref 22–31)
CREAT SERPL-MCNC: 0.6 MG/DL — SIGNIFICANT CHANGE UP (ref 0.5–1.3)
EGFR: 94 ML/MIN/1.73M2 — SIGNIFICANT CHANGE UP
GLUCOSE SERPL-MCNC: 114 MG/DL — HIGH (ref 70–99)
HCT VFR BLD CALC: 35.3 % — SIGNIFICANT CHANGE UP (ref 34.5–45)
HGB BLD-MCNC: 11.4 G/DL — LOW (ref 11.5–15.5)
MCHC RBC-ENTMCNC: 31.1 PG — SIGNIFICANT CHANGE UP (ref 27–34)
MCHC RBC-ENTMCNC: 32.3 GM/DL — SIGNIFICANT CHANGE UP (ref 32–36)
MCV RBC AUTO: 96.4 FL — SIGNIFICANT CHANGE UP (ref 80–100)
NRBC # BLD: 0 /100 WBCS — SIGNIFICANT CHANGE UP (ref 0–0)
PLATELET # BLD AUTO: 132 K/UL — LOW (ref 150–400)
POTASSIUM SERPL-MCNC: 3.6 MMOL/L — SIGNIFICANT CHANGE UP (ref 3.5–5.3)
POTASSIUM SERPL-SCNC: 3.6 MMOL/L — SIGNIFICANT CHANGE UP (ref 3.5–5.3)
RBC # BLD: 3.66 M/UL — LOW (ref 3.8–5.2)
RBC # FLD: 13.4 % — SIGNIFICANT CHANGE UP (ref 10.3–14.5)
SODIUM SERPL-SCNC: 143 MMOL/L — SIGNIFICANT CHANGE UP (ref 135–145)
WBC # BLD: 8.4 K/UL — SIGNIFICANT CHANGE UP (ref 3.8–10.5)
WBC # FLD AUTO: 8.4 K/UL — SIGNIFICANT CHANGE UP (ref 3.8–10.5)

## 2023-06-11 PROCEDURE — 99233 SBSQ HOSP IP/OBS HIGH 50: CPT

## 2023-06-11 RX ADMIN — ESCITALOPRAM OXALATE 20 MILLIGRAM(S): 10 TABLET, FILM COATED ORAL at 11:33

## 2023-06-11 RX ADMIN — Medication 2000 UNIT(S): at 11:32

## 2023-06-11 RX ADMIN — Medication 3 MILLIGRAM(S): at 22:43

## 2023-06-11 RX ADMIN — HEPARIN SODIUM 5000 UNIT(S): 5000 INJECTION INTRAVENOUS; SUBCUTANEOUS at 05:29

## 2023-06-11 RX ADMIN — ATORVASTATIN CALCIUM 20 MILLIGRAM(S): 80 TABLET, FILM COATED ORAL at 22:39

## 2023-06-11 RX ADMIN — PANTOPRAZOLE SODIUM 40 MILLIGRAM(S): 20 TABLET, DELAYED RELEASE ORAL at 05:29

## 2023-06-11 RX ADMIN — HEPARIN SODIUM 5000 UNIT(S): 5000 INJECTION INTRAVENOUS; SUBCUTANEOUS at 22:40

## 2023-06-11 RX ADMIN — LEVETIRACETAM 500 MILLIGRAM(S): 250 TABLET, FILM COATED ORAL at 05:29

## 2023-06-11 RX ADMIN — SENNA PLUS 2 TABLET(S): 8.6 TABLET ORAL at 22:39

## 2023-06-11 RX ADMIN — Medication 50 MICROGRAM(S): at 05:29

## 2023-06-11 RX ADMIN — LEVETIRACETAM 500 MILLIGRAM(S): 250 TABLET, FILM COATED ORAL at 17:48

## 2023-06-11 RX ADMIN — HEPARIN SODIUM 5000 UNIT(S): 5000 INJECTION INTRAVENOUS; SUBCUTANEOUS at 14:50

## 2023-06-11 NOTE — PROGRESS NOTE ADULT - ASSESSMENT
73F [facility] resident hx of severe dementia, HTN, HLD, depression, NPH with  shunt, hx of dermatomyositis with ILD was on IVIG off tmt for about a year pw with R hip fx.     # R hip fx.   Walks w walker at baseline but has not walked in a month   Ortho consult appreciated- NWB--plan for OR Monday--NPO @ [facility] tonight - pre op labs ordered   Cardiology consulted--patient is high risk for OR_-Son spoke with DR Cosby and understands risks   - PT/OT after OR     # HTN  cont Toprol  Holding lasix for now    # Hypothyroid  cont Synthroid.    #Sz d/o   cont keppra    #DVT PPX: Heparin  AM labs  DISP:from [facility]. OR tomorrow pending son consent. Awaiting ortho to consent     6/11: Son Josh [phone] updated. No advanced directives. Pt is FULL CODE.     *Case dw DR Cosby    73F [facility] resident hx of severe dementia, HTN, HLD, depression, NPH with  shunt, hx of dermatomyositis with ILD was on IVIG off tmt for about a year pw with R hip fx.     # R hip fx.   Walks w walker at baseline but has not walked in a month   Ortho consult appreciated- NWB--plan for OR Monday--NPO @ [place] tonight - pre op labs ordered   Cardiology consulted--patient is high risk for OR_-Son spoke with DR Cosby and understands risks   - PT/OT after OR     # HTN  cont Toprol  Holding lasix for now    # Hypothyroid  cont Synthroid.    #Sz d/o   cont keppra    #DVT PPX: Heparin  AM labs  DISP:from [facility]. OR tomorrow pending son consent. Awaiting ortho to consent     6/11: Son Josh [phone] updated. No advanced directives. Pt is FULL CODE.     *Case dw DR Cosby    73F [facility] resident hx of severe dementia, HTN, HLD, depression, NPH with  shunt, hx of dermatomyositis with ILD was on IVIG off tmt for about a year pw with R hip fx.     # R hip fx.   Walks w walker at baseline but has not walked in a month   Ortho consult appreciated- NWB--plan for OR Monday--NPO @ [place] tonight - pre op labs ordered   Cardiology consulted--patient is high risk for OR_-Son spoke with DR Cosby and understands risks   - PT/OT after OR     # HTN  cont Toprol  Holding lasix for now    # Hypothyroid  cont Synthroid.    #Sz d/o   cont keppra    #DVT PPX: Heparin  AM labs  DISP:from [facility]. OR tomorrow pending son consent. Awaiting ortho to consent     6/11: Son Josh [phone] updated. No advanced directives. Pt is FULL CODE.     *Case dw DR Cosby

## 2023-06-11 NOTE — PROGRESS NOTE ADULT - SUBJECTIVE AND OBJECTIVE BOX
Patient is a 73y old  Female who presents with a chief complaint of R  hip fx (10 James 2023 12:32)    No acute issues  SOn in agreement for OR tomorrow   Patient seen and examined at bedside.    ALLERGIES:  No Known Allergies    MEDICATIONS  (STANDING):  atorvastatin 20 milliGRAM(s) Oral at bedtime  cholecalciferol 2000 Unit(s) Oral daily  escitalopram 20 milliGRAM(s) Oral daily  heparin   Injectable 5000 Unit(s) SubCutaneous every 8 hours  levETIRAcetam 500 milliGRAM(s) Oral two times a day  levothyroxine 50 MICROGram(s) Oral daily  melatonin 3 milliGRAM(s) Oral at bedtime  metoprolol succinate ER 25 milliGRAM(s) Oral daily  pantoprazole    Tablet 40 milliGRAM(s) Oral before breakfast  senna 2 Tablet(s) Oral at bedtime    MEDICATIONS  (PRN):  acetaminophen     Tablet .. 650 milliGRAM(s) Oral every 6 hours PRN Temp greater or equal to 38C (100.4F), Mild Pain (1 - 3)  morphine  - Injectable 2 milliGRAM(s) IV Push every 8 hours PRN Severe Pain (7 - 10)    Vital Signs Last 24 Hrs  T(F): 98.2 (11 Jun 2023 05:16), Max: 99 (10 James 2023 12:30)  HR: 69 (11 Jun 2023 05:16) (69 - 77)  BP: 136/49 (11 Jun 2023 05:16) (94/71 - 136/49)  RR: 13 (11 Jun 2023 05:16) (13 - 17)  SpO2: 96% (10 James 2023 21:35) (96% - 98%)  I&O's Summary    10 James 2023 07:01  -  11 Jun 2023 07:00  --------------------------------------------------------  IN: 0 mL / OUT: 800 mL / NET: -800 mL      BMI (kg/m2): 24.1 (06-08-23 @ 19:07)  PHYSICAL EXAM:  General: weak, confused  ENT: MMM, no thrush  Neck: Supple, No JVD  Lungs: Non labored breathing,  Clear to auscultation bilaterally,   Cardio: RRR, S1/S2, + systolic murmur, no pitting edema bilaterally  Abdomen: Soft, Nontender, Nondistended; Bowel sounds present  Extremities: No calf tenderness, not moving RLE due to pain     LABS:                        11.4   8.40  )-----------( 132      ( 11 Jun 2023 06:00 )             35.3       06-11    143  |  110  |  24  ----------------------------<  114  3.6   |  25  |  0.60    Ca    8.8      11 Jun 2023 06:00  Mg     2.0     06-10    TPro  7.1  /  Alb  3.1  /  TBili  0.6  /  DBili  x   /  AST  23  /  ALT  26  /  AlkPhos  68  06-09       PT/INR - ( 08 Jun 2023 20:00 )   PT: 13.1 sec;   INR: 1.13 ratio         PTT - ( 08 Jun 2023 20:00 )  PTT:28.9 sec                                   RADIOLOGY & ADDITIONAL TESTS:    Care Discussed with Consultants/Other Providers:

## 2023-06-12 ENCOUNTER — RESULT REVIEW (OUTPATIENT)
Age: 73
End: 2023-06-12

## 2023-06-12 LAB
ALBUMIN SERPL ELPH-MCNC: 2.8 G/DL — LOW (ref 3.3–5)
ALP SERPL-CCNC: 65 U/L — SIGNIFICANT CHANGE UP (ref 40–120)
ALT FLD-CCNC: 32 U/L — SIGNIFICANT CHANGE UP (ref 10–45)
ANION GAP SERPL CALC-SCNC: 9 MMOL/L — SIGNIFICANT CHANGE UP (ref 5–17)
APTT BLD: 28.1 SEC — SIGNIFICANT CHANGE UP (ref 27.5–35.5)
AST SERPL-CCNC: 20 U/L — SIGNIFICANT CHANGE UP (ref 10–40)
BILIRUB SERPL-MCNC: 0.4 MG/DL — SIGNIFICANT CHANGE UP (ref 0.2–1.2)
BLD GP AB SCN SERPL QL: SIGNIFICANT CHANGE UP
BUN SERPL-MCNC: 21 MG/DL — SIGNIFICANT CHANGE UP (ref 7–23)
CALCIUM SERPL-MCNC: 8.7 MG/DL — SIGNIFICANT CHANGE UP (ref 8.4–10.5)
CHLORIDE SERPL-SCNC: 106 MMOL/L — SIGNIFICANT CHANGE UP (ref 96–108)
CO2 SERPL-SCNC: 25 MMOL/L — SIGNIFICANT CHANGE UP (ref 22–31)
CREAT SERPL-MCNC: 0.66 MG/DL — SIGNIFICANT CHANGE UP (ref 0.5–1.3)
EGFR: 92 ML/MIN/1.73M2 — SIGNIFICANT CHANGE UP
GLUCOSE SERPL-MCNC: 92 MG/DL — SIGNIFICANT CHANGE UP (ref 70–99)
HCT VFR BLD CALC: 35.7 % — SIGNIFICANT CHANGE UP (ref 34.5–45)
HGB BLD-MCNC: 11.6 G/DL — SIGNIFICANT CHANGE UP (ref 11.5–15.5)
INR BLD: 1.12 RATIO — SIGNIFICANT CHANGE UP (ref 0.88–1.16)
MCHC RBC-ENTMCNC: 31 PG — SIGNIFICANT CHANGE UP (ref 27–34)
MCHC RBC-ENTMCNC: 32.5 GM/DL — SIGNIFICANT CHANGE UP (ref 32–36)
MCV RBC AUTO: 95.5 FL — SIGNIFICANT CHANGE UP (ref 80–100)
NRBC # BLD: 0 /100 WBCS — SIGNIFICANT CHANGE UP (ref 0–0)
PLATELET # BLD AUTO: 152 K/UL — SIGNIFICANT CHANGE UP (ref 150–400)
POTASSIUM SERPL-MCNC: 3.7 MMOL/L — SIGNIFICANT CHANGE UP (ref 3.5–5.3)
POTASSIUM SERPL-SCNC: 3.7 MMOL/L — SIGNIFICANT CHANGE UP (ref 3.5–5.3)
PROT SERPL-MCNC: 7 G/DL — SIGNIFICANT CHANGE UP (ref 6–8.3)
PROTHROM AB SERPL-ACNC: 13 SEC — SIGNIFICANT CHANGE UP (ref 10.5–13.4)
RBC # BLD: 3.74 M/UL — LOW (ref 3.8–5.2)
RBC # FLD: 13.2 % — SIGNIFICANT CHANGE UP (ref 10.3–14.5)
SODIUM SERPL-SCNC: 140 MMOL/L — SIGNIFICANT CHANGE UP (ref 135–145)
WBC # BLD: 7.52 K/UL — SIGNIFICANT CHANGE UP (ref 3.8–10.5)
WBC # FLD AUTO: 7.52 K/UL — SIGNIFICANT CHANGE UP (ref 3.8–10.5)

## 2023-06-12 PROCEDURE — 99233 SBSQ HOSP IP/OBS HIGH 50: CPT

## 2023-06-12 PROCEDURE — 27125 PARTIAL HIP REPLACEMENT: CPT | Mod: AS,RT

## 2023-06-12 PROCEDURE — 88311 DECALCIFY TISSUE: CPT | Mod: 26

## 2023-06-12 PROCEDURE — 27236 TREAT THIGH FRACTURE: CPT | Mod: RT

## 2023-06-12 PROCEDURE — 88305 TISSUE EXAM BY PATHOLOGIST: CPT | Mod: 26

## 2023-06-12 PROCEDURE — 73502 X-RAY EXAM HIP UNI 2-3 VIEWS: CPT | Mod: 26

## 2023-06-12 DEVICE — CUP ACETABULAR BI-P 28MM 44MM: Type: IMPLANTABLE DEVICE | Site: RIGHT | Status: FUNCTIONAL

## 2023-06-12 DEVICE — CNTRLZR STEM DIST PMMA 9MM: Type: IMPLANTABLE DEVICE | Site: RIGHT | Status: FUNCTIONAL

## 2023-06-12 DEVICE — IMPLANTABLE DEVICE: Type: IMPLANTABLE DEVICE | Site: RIGHT | Status: FUNCTIONAL

## 2023-06-12 DEVICE — PRO HIP FEM HD COCR 28MM STD: Type: IMPLANTABLE DEVICE | Site: RIGHT | Status: FUNCTIONAL

## 2023-06-12 DEVICE — STEM FEM ECHO FX SZ 7 140MM: Type: IMPLANTABLE DEVICE | Site: RIGHT | Status: FUNCTIONAL

## 2023-06-12 DEVICE — RESTRIC CEM 24MM: Type: IMPLANTABLE DEVICE | Site: RIGHT | Status: FUNCTIONAL

## 2023-06-12 RX ORDER — SODIUM CHLORIDE 9 MG/ML
1000 INJECTION, SOLUTION INTRAVENOUS
Refills: 0 | Status: DISCONTINUED | OUTPATIENT
Start: 2023-06-12 | End: 2023-06-12

## 2023-06-12 RX ORDER — CHOLECALCIFEROL (VITAMIN D3) 125 MCG
2000 CAPSULE ORAL DAILY
Refills: 0 | Status: DISCONTINUED | OUTPATIENT
Start: 2023-06-12 | End: 2023-06-15

## 2023-06-12 RX ORDER — CEFAZOLIN SODIUM 1 G
2000 VIAL (EA) INJECTION EVERY 8 HOURS
Refills: 0 | Status: COMPLETED | OUTPATIENT
Start: 2023-06-12 | End: 2023-06-12

## 2023-06-12 RX ORDER — ENOXAPARIN SODIUM 100 MG/ML
40 INJECTION SUBCUTANEOUS EVERY 24 HOURS
Refills: 0 | Status: DISCONTINUED | OUTPATIENT
Start: 2023-06-12 | End: 2023-06-15

## 2023-06-12 RX ORDER — SENNA PLUS 8.6 MG/1
2 TABLET ORAL AT BEDTIME
Refills: 0 | Status: DISCONTINUED | OUTPATIENT
Start: 2023-06-13 | End: 2023-06-15

## 2023-06-12 RX ORDER — ONDANSETRON 8 MG/1
4 TABLET, FILM COATED ORAL EVERY 6 HOURS
Refills: 0 | Status: DISCONTINUED | OUTPATIENT
Start: 2023-06-12 | End: 2023-06-15

## 2023-06-12 RX ORDER — HYDROMORPHONE HYDROCHLORIDE 2 MG/ML
0.5 INJECTION INTRAMUSCULAR; INTRAVENOUS; SUBCUTANEOUS
Refills: 0 | Status: DISCONTINUED | OUTPATIENT
Start: 2023-06-12 | End: 2023-06-12

## 2023-06-12 RX ORDER — ACETAMINOPHEN 500 MG
1000 TABLET ORAL ONCE
Refills: 0 | Status: COMPLETED | OUTPATIENT
Start: 2023-06-13 | End: 2023-06-13

## 2023-06-12 RX ORDER — ATORVASTATIN CALCIUM 80 MG/1
20 TABLET, FILM COATED ORAL AT BEDTIME
Refills: 0 | Status: DISCONTINUED | OUTPATIENT
Start: 2023-06-12 | End: 2023-06-15

## 2023-06-12 RX ORDER — OXYCODONE HYDROCHLORIDE 5 MG/1
5 TABLET ORAL EVERY 6 HOURS
Refills: 0 | Status: DISCONTINUED | OUTPATIENT
Start: 2023-06-12 | End: 2023-06-14

## 2023-06-12 RX ORDER — LEVETIRACETAM 250 MG/1
500 TABLET, FILM COATED ORAL
Refills: 0 | Status: DISCONTINUED | OUTPATIENT
Start: 2023-06-12 | End: 2023-06-15

## 2023-06-12 RX ORDER — PANTOPRAZOLE SODIUM 20 MG/1
40 TABLET, DELAYED RELEASE ORAL
Refills: 0 | Status: DISCONTINUED | OUTPATIENT
Start: 2023-06-12 | End: 2023-06-15

## 2023-06-12 RX ORDER — MORPHINE SULFATE 50 MG/1
2 CAPSULE, EXTENDED RELEASE ORAL EVERY 4 HOURS
Refills: 0 | Status: DISCONTINUED | OUTPATIENT
Start: 2023-06-12 | End: 2023-06-14

## 2023-06-12 RX ORDER — METOPROLOL TARTRATE 50 MG
25 TABLET ORAL DAILY
Refills: 0 | Status: DISCONTINUED | OUTPATIENT
Start: 2023-06-12 | End: 2023-06-15

## 2023-06-12 RX ORDER — CHLORHEXIDINE GLUCONATE 213 G/1000ML
1 SOLUTION TOPICAL ONCE
Refills: 0 | Status: COMPLETED | OUTPATIENT
Start: 2023-06-12 | End: 2023-06-12

## 2023-06-12 RX ORDER — ONDANSETRON 8 MG/1
4 TABLET, FILM COATED ORAL ONCE
Refills: 0 | Status: DISCONTINUED | OUTPATIENT
Start: 2023-06-12 | End: 2023-06-12

## 2023-06-12 RX ORDER — ACETAMINOPHEN 500 MG
650 TABLET ORAL EVERY 6 HOURS
Refills: 0 | Status: DISCONTINUED | OUTPATIENT
Start: 2023-06-13 | End: 2023-06-15

## 2023-06-12 RX ORDER — ESCITALOPRAM OXALATE 10 MG/1
20 TABLET, FILM COATED ORAL DAILY
Refills: 0 | Status: DISCONTINUED | OUTPATIENT
Start: 2023-06-12 | End: 2023-06-15

## 2023-06-12 RX ORDER — LANOLIN ALCOHOL/MO/W.PET/CERES
3 CREAM (GRAM) TOPICAL AT BEDTIME
Refills: 0 | Status: DISCONTINUED | OUTPATIENT
Start: 2023-06-12 | End: 2023-06-15

## 2023-06-12 RX ORDER — LEVOTHYROXINE SODIUM 125 MCG
50 TABLET ORAL DAILY
Refills: 0 | Status: DISCONTINUED | OUTPATIENT
Start: 2023-06-12 | End: 2023-06-15

## 2023-06-12 RX ORDER — SODIUM CHLORIDE 9 MG/ML
1000 INJECTION, SOLUTION INTRAVENOUS
Refills: 0 | Status: DISCONTINUED | OUTPATIENT
Start: 2023-06-12 | End: 2023-06-14

## 2023-06-12 RX ADMIN — SODIUM CHLORIDE 70 MILLILITER(S): 9 INJECTION, SOLUTION INTRAVENOUS at 17:14

## 2023-06-12 RX ADMIN — Medication 25 MILLIGRAM(S): at 05:46

## 2023-06-12 RX ADMIN — HEPARIN SODIUM 5000 UNIT(S): 5000 INJECTION INTRAVENOUS; SUBCUTANEOUS at 05:45

## 2023-06-12 RX ADMIN — PANTOPRAZOLE SODIUM 40 MILLIGRAM(S): 20 TABLET, DELAYED RELEASE ORAL at 05:46

## 2023-06-12 RX ADMIN — Medication 50 MICROGRAM(S): at 05:46

## 2023-06-12 RX ADMIN — LEVETIRACETAM 500 MILLIGRAM(S): 250 TABLET, FILM COATED ORAL at 05:46

## 2023-06-12 RX ADMIN — CHLORHEXIDINE GLUCONATE 1 APPLICATION(S): 213 SOLUTION TOPICAL at 14:34

## 2023-06-12 NOTE — PROGRESS NOTE ADULT - SUBJECTIVE AND OBJECTIVE BOX
Patient is a 73y old  Female who presents with a chief complaint of R  hip fx (11 Jun 2023 11:31)      Patient seen and examined at bedside. No overnight events reported.  Pt reports no complaints.    ALLERGIES:  No Known Allergies    MEDICATIONS  (STANDING):  atorvastatin 20 milliGRAM(s) Oral at bedtime  cholecalciferol 2000 Unit(s) Oral daily  escitalopram 20 milliGRAM(s) Oral daily  heparin   Injectable 5000 Unit(s) SubCutaneous every 8 hours  levETIRAcetam 500 milliGRAM(s) Oral two times a day  levothyroxine 50 MICROGram(s) Oral daily  melatonin 3 milliGRAM(s) Oral at bedtime  metoprolol succinate ER 25 milliGRAM(s) Oral daily  pantoprazole    Tablet 40 milliGRAM(s) Oral before breakfast  senna 2 Tablet(s) Oral at bedtime    MEDICATIONS  (PRN):  acetaminophen     Tablet .. 650 milliGRAM(s) Oral every 6 hours PRN Temp greater or equal to 38C (100.4F), Mild Pain (1 - 3)  morphine  - Injectable 2 milliGRAM(s) IV Push every 8 hours PRN Severe Pain (7 - 10)    Vital Signs Last 24 Hrs  T(F): 98.3 (12 Jun 2023 04:41), Max: 99.1 (11 Jun 2023 20:57)  HR: 75 (12 Jun 2023 04:41) (75 - 83)  BP: 131/61 (12 Jun 2023 04:41) (131/61 - 144/71)  RR: 20 (12 Jun 2023 04:41) (16 - 21)  SpO2: 95% (12 Jun 2023 04:41) (95% - 99%)  I&O's Summary    11 Jun 2023 07:01  -  12 Jun 2023 07:00  --------------------------------------------------------  IN: 0 mL / OUT: 1190 mL / NET: -1190 mL      PHYSICAL EXAM:  General: NAD, A/O x 1.  ENT: No gross hearing impairment, Moist mucous membranes, no thrush  Neck: Supple, No JVD  Lungs: Clear to auscultation bilaterally, good air entry, non-labored breathing  Cardio: RRR, +systolic murmur  Abdomen: Soft, Nontender, Nondistended; Bowel sounds present  Extremities: No calf tenderness, No cyanosis, No pitting edema  Neuro: alert x 1, confused    LABS:                        11.6   7.52  )-----------( 152      ( 12 Jun 2023 06:55 )             35.7     06-12    140  |  106  |  21  ----------------------------<  92  3.7   |  25  |  0.66    Ca    8.7      12 Jun 2023 06:55  Mg     2.0     06-10    TPro  7.0  /  Alb  2.8  /  TBili  0.4  /  DBili  x   /  AST  20  /  ALT  32  /  AlkPhos  65  06-12          PT/INR - ( 12 Jun 2023 06:55 )   PT: 13.0 sec;   INR: 1.12 ratio         PTT - ( 12 Jun 2023 06:55 )  PTT:28.1 sec    Procalcitonin, Serum: 0.04 ng/mL (06-10-23 @ 06:00)                                RADIOLOGY & ADDITIONAL TESTS:  < from: CT Pelvis Bony Only No Cont (06.08.23 @ 20:28) >    IMPRESSION:    1.  Acute impacted subcapital fracture of the right proximal femur as   described.    2. Localized subcutaneous edema overlying the left ischium measuring   approximately 3.9 cm.    3.  L4 superior endplate mild compression deformities versus prominent   Schmorl's node which appears chronic.    4.  Other chronic findings as above.    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 Left ventricular ejection fraction, by visual estimation, is 65 to   70%.   3. Normal global left ventricular systolic function.   4. Normal left ventricular internal cavity size.   5. Spectral Doppler shows impaired relaxation pattern of left   ventricular myocardial filling (Grade I diastolic dysfunction).   6. Trace mitral valve regurgitation.   7. Mild tricuspid regurgitation.   8. Mild to moderate aortic regurgitation.   9. Estimated pulmonary artery systolic pressure is 44.2 mmHg assuming a   right atrial pressure of 10 mmHg, which is consistent with mild pulmonary   hypertension.    < end of copied text >    Care Discussed with Consultants/Other Providers:    Patient is a 73y old  Female who presents with a chief complaint of R  hip fx (11 Jun 2023 11:31)      Patient seen and examined at bedside. No overnight events reported.  Pt reports no complaints.    ALLERGIES:  No Known Allergies    MEDICATIONS  (STANDING):  atorvastatin 20 milliGRAM(s) Oral at bedtime  cholecalciferol 2000 Unit(s) Oral daily  escitalopram 20 milliGRAM(s) Oral daily  heparin   Injectable 5000 Unit(s) SubCutaneous every 8 hours  levETIRAcetam 500 milliGRAM(s) Oral two times a day  levothyroxine 50 MICROGram(s) Oral daily  melatonin 3 milliGRAM(s) Oral at bedtime  metoprolol succinate ER 25 milliGRAM(s) Oral daily  pantoprazole    Tablet 40 milliGRAM(s) Oral before breakfast  senna 2 Tablet(s) Oral at bedtime    MEDICATIONS  (PRN):  acetaminophen     Tablet .. 650 milliGRAM(s) Oral every 6 hours PRN Temp greater or equal to 38C (100.4F), Mild Pain (1 - 3)  morphine  - Injectable 2 milliGRAM(s) IV Push every 8 hours PRN Severe Pain (7 - 10)    Vital Signs Last 24 Hrs  T(F): 98.3 (12 Jun 2023 04:41), Max: 99.1 (11 Jun 2023 20:57)  HR: 75 (12 Jun 2023 04:41) (75 - 83)  BP: 131/61 (12 Jun 2023 04:41) (131/61 - 144/71)  RR: 20 (12 Jun 2023 04:41) (16 - 21)  SpO2: 95% (12 Jun 2023 04:41) (95% - 99%)  I&O's Summary    11 Jun 2023 07:01  -  12 Jun 2023 07:00  --------------------------------------------------------  IN: 0 mL / OUT: 1190 mL / NET: -1190 mL      PHYSICAL EXAM:  General: NAD, A/O x 1.  ENT: No gross hearing impairment, Moist mucous membranes, no thrush  Neck: Supple, No JVD  Lungs: Clear to auscultation bilaterally, good air entry, non-labored breathing  Cardio: RRR, +systolic murmur  Abdomen: Soft, Nontender, Nondistended; Bowel sounds present  Extremities: No calf tenderness, No cyanosis, No pitting edema  Neuro: alert x 1, confused    LABS:                        11.6   7.52  )-----------( 152      ( 12 Jun 2023 06:55 )             35.7     06-12    140  |  106  |  21  ----------------------------<  92  3.7   |  25  |  0.66    Ca    8.7      12 Jun 2023 06:55  Mg     2.0     06-10    TPro  7.0  /  Alb  2.8  /  TBili  0.4  /  DBili  x   /  AST  20  /  ALT  32  /  AlkPhos  65  06-12    PT/INR - ( 12 Jun 2023 06:55 )   PT: 13.0 sec;   INR: 1.12 ratio    PTT - ( 12 Jun 2023 06:55 )  PTT:28.1 sec    Procalcitonin, Serum: 0.04 ng/mL (06-10-23 @ 06:00)    RADIOLOGY & ADDITIONAL TESTS:  CT Pelvis Bony Only No Cont (06.08.23 @ 20:28) >    IMPRESSION:    1.  Acute impacted subcapital fracture of the right proximal femur as   described.    2. Localized subcutaneous edema overlying the left ischium measuring   approximately 3.9 cm.    3.  L4 superior endplate mild compression deformities versus prominent   Schmorl's node which appears chronic.    4.  Other chronic findings as above.    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 Left ventricular ejection fraction, by visual estimation, is 65 to   70%.   3. Normal global left ventricular systolic function.   4. Normal left ventricular internal cavity size.   5. Spectral Doppler shows impaired relaxation pattern of left   ventricular myocardial filling (Grade I diastolic dysfunction).   6. Trace mitral valve regurgitation.   7. Mild tricuspid regurgitation.   8. Mild to moderate aortic regurgitation.   9. Estimated pulmonary artery systolic pressure is 44.2 mmHg assuming a   right atrial pressure of 10 mmHg, which is consistent with mild pulmonary   hypertension.    Care Discussed with Consultants/Other Providers:   Cardio, ortho

## 2023-06-12 NOTE — PROGRESS NOTE ADULT - ASSESSMENT
73F [facility] resident hx of severe dementia, HTN, HLD, depression, NPH with  shunt, hx of dermatomyositis with ILD was on IVIG (off treatment) admitted with R hip fx.     # R hip fx.   -at baseline pt uses a walker, per Family has not walked in a month   -Ortho consult appreciated- NWB--plan for OR today, for ORIF  -Cardiology consulted--patient is high risk for OR_-Son spoke with DR Cosby and understands risks   - PT/OT after OR     #Severe Dementia  -cont supportive care    #hx of Depression  -cont Lexapro    # HTN  -cont Toprol  -Holding lasix for now  -monitor vitals, BP stable    # Hypothyroid  -cont Synthroid.    #Sz d/o   -cont keppra    #DVT PPX: Heparin    DISPO: pt lives at [facility].  Son Josh Braxton called today and updated on plan [phone].  Pt is FULL CODE.        73F [facility] resident hx of severe dementia, HTN, HLD, depression, NPH with  shunt, hx of dermatomyositis with ILD was on IVIG (off treatment) admitted with R hip fx.     # R hip fx.   -at baseline pt uses a walker, per Family has not walked in a month   -Ortho consult appreciated- NWB--plan for OR today, for ORIF  -Cardiology consulted--patient is high risk for OR_-Son spoke with DR Cosby and understands risks   - PT/OT after OR     #Severe Dementia  -cont supportive care    #hx of Depression  -cont Lexapro    # HTN  -cont Toprol  -Holding lasix for now  -monitor vitals, BP stable    # Hypothyroid  -cont Synthroid.    #Sz d/o   -cont keppra    #DVT PPX: Heparin    DISPO: pt lives at [facility].  Son Josh Braxton called today and updated on plan [phone].  Pt is FULL CODE.        73F [facility] resident hx of severe dementia, HTN, HLD, depression, NPH with  shunt, hx of dermatomyositis with ILD was on IVIG (off treatment) admitted with R hip fx.     # R hip fx.   -at baseline pt uses a walker, per Family has not walked in a month   -Ortho consult appreciated- NWB--plan for OR today, for ORIF  -Cardiology consulted--patient is high risk for OR_-Son spoke with DR Cosby and understands risks   - PT/OT after OR     #Severe Dementia  -cont supportive care    #hx of Depression  -cont Lexapro    # HTN  -cont Toprol  -Holding lasix for now  -monitor vitals, BP stable    # Hypothyroid  -cont Synthroid.    #Sz d/o   -cont keppra    #DVT PPX: Heparin    DISPO: pt lives at [facility].  Son Josh Braxton called today and updated on plan [phone].  Pt is FULL CODE.

## 2023-06-13 ENCOUNTER — TRANSCRIPTION ENCOUNTER (OUTPATIENT)
Age: 73
End: 2023-06-13

## 2023-06-13 LAB
ANION GAP SERPL CALC-SCNC: 7 MMOL/L — SIGNIFICANT CHANGE UP (ref 5–17)
BUN SERPL-MCNC: 13 MG/DL — SIGNIFICANT CHANGE UP (ref 7–23)
CALCIUM SERPL-MCNC: 8.7 MG/DL — SIGNIFICANT CHANGE UP (ref 8.4–10.5)
CHLORIDE SERPL-SCNC: 107 MMOL/L — SIGNIFICANT CHANGE UP (ref 96–108)
CO2 SERPL-SCNC: 27 MMOL/L — SIGNIFICANT CHANGE UP (ref 22–31)
CREAT SERPL-MCNC: 0.81 MG/DL — SIGNIFICANT CHANGE UP (ref 0.5–1.3)
EGFR: 76 ML/MIN/1.73M2 — SIGNIFICANT CHANGE UP
GLUCOSE SERPL-MCNC: 159 MG/DL — HIGH (ref 70–99)
HCT VFR BLD CALC: 32.2 % — LOW (ref 34.5–45)
HGB BLD-MCNC: 10.6 G/DL — LOW (ref 11.5–15.5)
MCHC RBC-ENTMCNC: 31.3 PG — SIGNIFICANT CHANGE UP (ref 27–34)
MCHC RBC-ENTMCNC: 32.9 GM/DL — SIGNIFICANT CHANGE UP (ref 32–36)
MCV RBC AUTO: 95 FL — SIGNIFICANT CHANGE UP (ref 80–100)
NRBC # BLD: 0 /100 WBCS — SIGNIFICANT CHANGE UP (ref 0–0)
PLATELET # BLD AUTO: 156 K/UL — SIGNIFICANT CHANGE UP (ref 150–400)
POTASSIUM SERPL-MCNC: 4.3 MMOL/L — SIGNIFICANT CHANGE UP (ref 3.5–5.3)
POTASSIUM SERPL-SCNC: 4.3 MMOL/L — SIGNIFICANT CHANGE UP (ref 3.5–5.3)
RBC # BLD: 3.39 M/UL — LOW (ref 3.8–5.2)
RBC # FLD: 13 % — SIGNIFICANT CHANGE UP (ref 10.3–14.5)
SARS-COV-2 RNA SPEC QL NAA+PROBE: SIGNIFICANT CHANGE UP
SODIUM SERPL-SCNC: 141 MMOL/L — SIGNIFICANT CHANGE UP (ref 135–145)
WBC # BLD: 8.45 K/UL — SIGNIFICANT CHANGE UP (ref 3.8–10.5)
WBC # FLD AUTO: 8.45 K/UL — SIGNIFICANT CHANGE UP (ref 3.8–10.5)

## 2023-06-13 PROCEDURE — 99233 SBSQ HOSP IP/OBS HIGH 50: CPT

## 2023-06-13 RX ADMIN — PANTOPRAZOLE SODIUM 40 MILLIGRAM(S): 20 TABLET, DELAYED RELEASE ORAL at 06:02

## 2023-06-13 RX ADMIN — SENNA PLUS 2 TABLET(S): 8.6 TABLET ORAL at 22:37

## 2023-06-13 RX ADMIN — Medication 50 MICROGRAM(S): at 06:02

## 2023-06-13 RX ADMIN — Medication 650 MILLIGRAM(S): at 11:37

## 2023-06-13 RX ADMIN — LEVETIRACETAM 500 MILLIGRAM(S): 250 TABLET, FILM COATED ORAL at 05:53

## 2023-06-13 RX ADMIN — Medication 650 MILLIGRAM(S): at 18:10

## 2023-06-13 RX ADMIN — Medication 3 MILLIGRAM(S): at 22:36

## 2023-06-13 RX ADMIN — Medication 650 MILLIGRAM(S): at 23:07

## 2023-06-13 RX ADMIN — ESCITALOPRAM OXALATE 20 MILLIGRAM(S): 10 TABLET, FILM COATED ORAL at 11:37

## 2023-06-13 RX ADMIN — Medication 650 MILLIGRAM(S): at 22:37

## 2023-06-13 RX ADMIN — Medication 650 MILLIGRAM(S): at 12:15

## 2023-06-13 RX ADMIN — Medication 650 MILLIGRAM(S): at 17:31

## 2023-06-13 RX ADMIN — Medication 400 MILLIGRAM(S): at 02:47

## 2023-06-13 RX ADMIN — ATORVASTATIN CALCIUM 20 MILLIGRAM(S): 80 TABLET, FILM COATED ORAL at 22:36

## 2023-06-13 RX ADMIN — Medication 25 MILLIGRAM(S): at 05:54

## 2023-06-13 RX ADMIN — Medication 2000 UNIT(S): at 11:37

## 2023-06-13 RX ADMIN — ENOXAPARIN SODIUM 40 MILLIGRAM(S): 100 INJECTION SUBCUTANEOUS at 05:53

## 2023-06-13 RX ADMIN — LEVETIRACETAM 500 MILLIGRAM(S): 250 TABLET, FILM COATED ORAL at 17:31

## 2023-06-13 NOTE — DIETITIAN INITIAL EVALUATION ADULT - OTHER INFO
73F [facility] resident hx of severe dementia, HTN, HLD, depression, NPH with  shunt, hx of dermatomyositis with ILD was on IVIG off tmt for about a year presents  with R hip fx. Patient POD #1 , po intake noted good as per PCA patient consumed 100% of meal & noted to tolerate regular diet consistency , No edema , (+) IAD , fecal incontinence noted , LR infusing @ 125ml/hr , Patient oriented to self only .  73F [facility] resident hx of severe dementia, HTN, HLD, depression, NPH with  shunt, hx of dermatomyositis with ILD was on IVIG off tmt for about a year presents  with R hip fx. Patient POD #1 , po intake noted good as per PCA patient consumed 100% of meal & noted to tolerate regular diet consistency , No edema , (+) IAD , fecal incontinence noted , LR infusing @ 125ml/hr , Patient oriented to self only .  73F [facility] resident hx of severe dementia, HTN, HLD, depression, NPH with  shunt, hx of dermatomyositis with ILD was on IVIG off tmt for about a year presents  with R hip fx. Patient POD #1 , po intake noted good as per PCA patient consumed 100% of meal & noted to tolerate regular diet consistency , No edema , (+) IAD , fecal incontinence noted , LR infusing @ 125ml/hr , Patient oriented to self only .

## 2023-06-13 NOTE — PROGRESS NOTE ADULT - ASSESSMENT
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     # R hip fx  #POD #1, Hemiarthroplasty  -Cardiology consulted--patient is high risk for OR  -PT/OT to eval for possible HEAVENLY    #Severe Dementia  -cont supportive care    #hx of Depression  -cont Lexapro    # HTN  -cont Toprol  -Holding lasix for now  -monitor vitals, BP stable    # Hypothyroid  -cont Synthroid.    #Sz d/o   -cont keppra    #DVT PPX: Heparin    DISPO: pt lives at [facility].  Son Josh Braxton  updated on plan [phone].  Pt is FULL CODE.        73F 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     # R hip fx  #POD #1, Hemiarthroplasty  -Cardiology consulted--patient is high risk for OR  -PT/OT to eval for possible HEAVENLY    #Severe Dementia  -cont supportive care    #hx of Depression  -cont Lexapro    # HTN  -cont Toprol  -Holding lasix for now  -monitor vitals, BP stable    # Hypothyroid  -cont Synthroid.    #Sz d/o   -cont keppra    #DVT PPX: Heparin    DISPO: pt lives at [facility].  Son Josh Braxton  updated on plan [phone].  Pt is FULL CODE.        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     # R hip fx  #POD #1, Hemiarthroplasty  -Cardiology consulted--patient is high risk for OR  -PT/OT to eval for possible HEAVENLY    #Severe Dementia  -cont supportive care    #hx of Depression  -cont Lexapro    # HTN  -cont Toprol  -Holding lasix for now  -monitor vitals, BP stable    # Hypothyroid  -cont Synthroid.    #Sz d/o   -cont keppra    #DVT PPX: Heparin    DISPO: pt lives at [facility].  Son Josh Braxton  updated on plan [phone].  Pt is FULL CODE.        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     # R hip fx  #POD #1, Hemiarthroplasty  -Ortho following; Surgeon Dr Estrada  -PT/OT to eval for possible HEAVENLY  -cont pain mgmt  -H/H stable this am, 10.6/32    #Severe Dementia  -cont supportive care    #hx of Depression  -cont Lexapro    # HTN  -cont Toprol  -Holding lasix for now  -monitor vitals, BP stable    # Hypothyroid  -cont Synthroid.    #Sz d/o   -cont keppra    #DVT PPX: Heparin    DISPO: pt lives at [facility].  Son Josh Braxton updated on plan [phone].  Pt is FULL CODE.        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     # R hip fx  #POD #1, Hemiarthroplasty  -Ortho following; Surgeon Dr Estrada  -PT/OT to eval for possible HEAVENLY  -cont pain mgmt  -H/H stable this am, 10.6/32    #Severe Dementia  -cont supportive care    #hx of Depression  -cont Lexapro    # HTN  -cont Toprol  -Holding lasix for now  -monitor vitals, BP stable    # Hypothyroid  -cont Synthroid.    #Sz d/o   -cont keppra    #DVT PPX: Heparin    DISPO: pt lives at [facility].  Son Josh Braxton updated on plan [phone].  Pt is FULL CODE.        73F 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     # R hip fx  #POD #1, Hemiarthroplasty  -Ortho following; Surgeon Dr Estrada  -PT/OT to eval for possible HEAVENLY  -cont pain mgmt  -H/H stable this am, 10.6/32    #Severe Dementia  -cont supportive care    #hx of Depression  -cont Lexapro    # HTN  -cont Toprol  -Holding lasix for now  -monitor vitals, BP stable    # Hypothyroid  -cont Synthroid.    #Sz d/o   -cont keppra    #DVT PPX: Heparin    DISPO: pt lives at [facility].  Son Josh Braxton updated on plan [phone].  Pt is FULL CODE.        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     # R hip fx  #POD #1, Hemiarthroplasty  -Ortho following; Surgeon Dr Estrada  -PT/OT to eval for possible HEAVENLY  -cont pain mgmt    #Anemia, possibly post-operative blood loss  - Monitor Hg/hct, noted 10.6 today  - Will check CBC in AM, will check iron/B12/folate  - Transfuse if Hg <7    #Severe Dementia  -cont supportive care    #hx of Depression  -cont Lexapro    # HTN  -cont Toprol  -Holding lasix for now  -monitor vitals, BP stable    # Hypothyroid  -cont Synthroid.    #Sz d/o   -cont keppra    #DVT PPX: Heparin    DISPO: pt lives at [facility].  Son Josh Braxton updated on plan [phone].  Pt is FULL CODE.        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     # R hip fx  #POD #1, Hemiarthroplasty  -Ortho following; Surgeon Dr Estrada  -PT/OT to eval for possible HEAVENLY  -cont pain mgmt    #Anemia, possibly post-operative blood loss  - Monitor Hg/hct, noted 10.6 today  - Will check CBC in AM, will check iron/B12/folate  - Transfuse if Hg <7    #Severe Dementia  -cont supportive care    #hx of Depression  -cont Lexapro    # HTN  -cont Toprol  -Holding lasix for now  -monitor vitals, BP stable    # Hypothyroid  -cont Synthroid.    #Sz d/o   -cont keppra    #DVT PPX: Heparin    DISPO: pt lives at [facility].  Son Josh Braxton updated on plan [phone].  Pt is FULL CODE.        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     # R hip fx  #POD #1, Hemiarthroplasty  -Ortho following; Surgeon Dr Estrada  -PT/OT to eval for possible HEAVENLY  -cont pain mgmt    #Anemia, possibly post-operative blood loss  - Monitor Hg/hct, noted 10.6 today  - Will check CBC in AM, will check iron/B12/folate  - Transfuse if Hg <7    #Severe Dementia  -cont supportive care    #hx of Depression  -cont Lexapro    # HTN  -cont Toprol  -Holding lasix for now  -monitor vitals, BP stable    # Hypothyroid  -cont Synthroid.    #Sz d/o   -cont keppra    #DVT PPX: Heparin    DISPO: pt lives at [facility].  Son Josh Braxton updated on plan [phone].  Pt is FULL CODE.

## 2023-06-13 NOTE — PROGRESS NOTE ADULT - SUBJECTIVE AND OBJECTIVE BOX
Patient is a 73y old  Female who presents with a chief complaint of R  hip fx (12 Jun 2023 07:57)      Patient seen and examined at bedside. No overnight events reported.     ALLERGIES:  No Known Allergies    MEDICATIONS  (STANDING):  acetaminophen     Tablet .. 650 milliGRAM(s) Oral every 6 hours  atorvastatin 20 milliGRAM(s) Oral at bedtime  cholecalciferol 2000 Unit(s) Oral daily  enoxaparin Injectable 40 milliGRAM(s) SubCutaneous every 24 hours  escitalopram 20 milliGRAM(s) Oral daily  lactated ringers. 1000 milliLiter(s) (125 mL/Hr) IV Continuous <Continuous>  levETIRAcetam 500 milliGRAM(s) Oral two times a day  levothyroxine 50 MICROGram(s) Oral daily  melatonin 3 milliGRAM(s) Oral at bedtime  metoprolol succinate ER 25 milliGRAM(s) Oral daily  pantoprazole    Tablet 40 milliGRAM(s) Oral before breakfast  senna 2 Tablet(s) Oral at bedtime    MEDICATIONS  (PRN):  morphine  - Injectable 2 milliGRAM(s) IV Push every 4 hours PRN Severe Pain (7 - 10)  ondansetron Injectable 4 milliGRAM(s) IV Push every 6 hours PRN Nausea and/or Vomiting  oxyCODONE    IR 5 milliGRAM(s) Oral every 6 hours PRN Moderate Pain (4 - 6)    Vital Signs Last 24 Hrs  T(F): 97.6 (13 Jun 2023 04:43), Max: 98.7 (12 Jun 2023 14:01)  HR: 73 (13 Jun 2023 05:52) (65 - 91)  BP: 119/85 (13 Jun 2023 05:52) (89/46 - 139/74)  RR: 25 (13 Jun 2023 04:43) (15 - 25)  SpO2: 100% (13 Jun 2023 04:43) (92% - 100%)  I&O's Summary    12 Jun 2023 07:01  -  13 Jun 2023 07:00  --------------------------------------------------------  IN: 500 mL / OUT: 2050 mL / NET: -1550 mL      PHYSICAL EXAM:  General: NAD, A/O x 3  ENT: No gross hearing impairment, Moist mucous membranes, no thrush  Neck: Supple, No JVD  Lungs: Clear to auscultation bilaterally, good air entry, non-labored breathing  Cardio: RRR, S1/S2, No murmur  Abdomen: Soft, Nontender, Nondistended; Bowel sounds present  Extremities: No calf tenderness, No cyanosis, No pitting edema  Psych: Appropriate mood and affect    LABS:                        10.6   8.45  )-----------( 156      ( 13 Jun 2023 06:53 )             32.2     06-13    141  |  107  |  13  ----------------------------<  159  4.3   |  27  |  0.81    Ca    8.7      13 Jun 2023 06:53    TPro  7.0  /  Alb  2.8  /  TBili  0.4  /  DBili  x   /  AST  20  /  ALT  32  /  AlkPhos  65  06-12          PT/INR - ( 12 Jun 2023 06:55 )   PT: 13.0 sec;   INR: 1.12 ratio         PTT - ( 12 Jun 2023 06:55 )  PTT:28.1 sec                                  RADIOLOGY & ADDITIONAL TESTS:    Care Discussed with Consultants/Other Providers:    Patient is a 73y old  Female who presents with a chief complaint of R  hip fx (12 Jun 2023 07:57)      Patient seen and examined at bedside. No overnight events reported.     ALLERGIES:  No Known Allergies    MEDICATIONS  (STANDING):  acetaminophen     Tablet .. 650 milliGRAM(s) Oral every 6 hours  atorvastatin 20 milliGRAM(s) Oral at bedtime  cholecalciferol 2000 Unit(s) Oral daily  enoxaparin Injectable 40 milliGRAM(s) SubCutaneous every 24 hours  escitalopram 20 milliGRAM(s) Oral daily  lactated ringers. 1000 milliLiter(s) (125 mL/Hr) IV Continuous <Continuous>  levETIRAcetam 500 milliGRAM(s) Oral two times a day  levothyroxine 50 MICROGram(s) Oral daily  melatonin 3 milliGRAM(s) Oral at bedtime  metoprolol succinate ER 25 milliGRAM(s) Oral daily  pantoprazole    Tablet 40 milliGRAM(s) Oral before breakfast  senna 2 Tablet(s) Oral at bedtime    MEDICATIONS  (PRN):  morphine  - Injectable 2 milliGRAM(s) IV Push every 4 hours PRN Severe Pain (7 - 10)  ondansetron Injectable 4 milliGRAM(s) IV Push every 6 hours PRN Nausea and/or Vomiting  oxyCODONE    IR 5 milliGRAM(s) Oral every 6 hours PRN Moderate Pain (4 - 6)    Vital Signs Last 24 Hrs  T(F): 97.6 (13 Jun 2023 04:43), Max: 98.7 (12 Jun 2023 14:01)  HR: 73 (13 Jun 2023 05:52) (65 - 91)  BP: 119/85 (13 Jun 2023 05:52) (89/46 - 139/74)  RR: 25 (13 Jun 2023 04:43) (15 - 25)  SpO2: 100% (13 Jun 2023 04:43) (92% - 100%)  I&O's Summary    12 Jun 2023 07:01  -  13 Jun 2023 07:00  --------------------------------------------------------  IN: 500 mL / OUT: 2050 mL / NET: -1550 mL      PHYSICAL EXAM:  General: NAD, minimally verbal.  ENT: No gross hearing impairment, Moist mucous membranes, no thrush  Neck: Supple, No JVD  Lungs: Clear to auscultation bilaterally, good air entry, non-labored breathing  Cardio: RRR, S1/S2, No murmur  Abdomen: Soft, Nontender, Nondistended; Bowel sounds present  Extremities: right hip wound dressed, No calf tenderness, No cyanosis, No pitting edema  Neuro: alert, confused    LABS:                        10.6   8.45  )-----------( 156      ( 13 Jun 2023 06:53 )             32.2     06-13    141  |  107  |  13  ----------------------------<  159  4.3   |  27  |  0.81    Ca    8.7      13 Jun 2023 06:53    TPro  7.0  /  Alb  2.8  /  TBili  0.4  /  DBili  x   /  AST  20  /  ALT  32  /  AlkPhos  65  06-12          PT/INR - ( 12 Jun 2023 06:55 )   PT: 13.0 sec;   INR: 1.12 ratio         PTT - ( 12 Jun 2023 06:55 )  PTT:28.1 sec                                  RADIOLOGY & ADDITIONAL TESTS:    Care Discussed with Consultants/Other Providers:    Patient is a 73y old  Female who presents with a chief complaint of R  hip fx (12 Jun 2023 07:57)      Patient seen and examined at bedside. No overnight events reported.     ALLERGIES:  No Known Allergies    MEDICATIONS  (STANDING):  acetaminophen     Tablet .. 650 milliGRAM(s) Oral every 6 hours  atorvastatin 20 milliGRAM(s) Oral at bedtime  cholecalciferol 2000 Unit(s) Oral daily  enoxaparin Injectable 40 milliGRAM(s) SubCutaneous every 24 hours  escitalopram 20 milliGRAM(s) Oral daily  lactated ringers. 1000 milliLiter(s) (125 mL/Hr) IV Continuous <Continuous>  levETIRAcetam 500 milliGRAM(s) Oral two times a day  levothyroxine 50 MICROGram(s) Oral daily  melatonin 3 milliGRAM(s) Oral at bedtime  metoprolol succinate ER 25 milliGRAM(s) Oral daily  pantoprazole    Tablet 40 milliGRAM(s) Oral before breakfast  senna 2 Tablet(s) Oral at bedtime    MEDICATIONS  (PRN):  morphine  - Injectable 2 milliGRAM(s) IV Push every 4 hours PRN Severe Pain (7 - 10)  ondansetron Injectable 4 milliGRAM(s) IV Push every 6 hours PRN Nausea and/or Vomiting  oxyCODONE    IR 5 milliGRAM(s) Oral every 6 hours PRN Moderate Pain (4 - 6)    Vital Signs Last 24 Hrs  T(F): 97.6 (13 Jun 2023 04:43), Max: 98.7 (12 Jun 2023 14:01)  HR: 73 (13 Jun 2023 05:52) (65 - 91)  BP: 119/85 (13 Jun 2023 05:52) (89/46 - 139/74)  RR: 25 (13 Jun 2023 04:43) (15 - 25)  SpO2: 100% (13 Jun 2023 04:43) (92% - 100%)  I&O's Summary    12 Jun 2023 07:01  -  13 Jun 2023 07:00  --------------------------------------------------------  IN: 500 mL / OUT: 2050 mL / NET: -1550 mL      PHYSICAL EXAM:  General: NAD, minimally verbal.  ENT: No gross hearing impairment, Moist mucous membranes, no thrush  Neck: Supple, No JVD  Lungs: Clear to auscultation bilaterally, good air entry, non-labored breathing  Cardio: RRR, S1/S2, No murmur  Abdomen: Soft, Nontender, Nondistended; Bowel sounds present  Extremities: right hip wound dressed, No calf tenderness, No cyanosis, No pitting edema  Neuro: alert, confused    LABS:                        10.6   8.45  )-----------( 156      ( 13 Jun 2023 06:53 )             32.2     06-13    141  |  107  |  13  ----------------------------<  159  4.3   |  27  |  0.81    Ca    8.7      13 Jun 2023 06:53    TPro  7.0  /  Alb  2.8  /  TBili  0.4  /  DBili  x   /  AST  20  /  ALT  32  /  AlkPhos  65  06-12    PT/INR - ( 12 Jun 2023 06:55 )   PT: 13.0 sec;   INR: 1.12 ratio    PTT - ( 12 Jun 2023 06:55 )  PTT:28.1 sec

## 2023-06-13 NOTE — PHYSICAL THERAPY INITIAL EVALUATION ADULT - ADDITIONAL COMMENTS
pt unable to provide hx 2/2 dementia, from OB [facility] correction, ambulates w/rw and assist, requires assist w/all ADL's pt unable to provide hx 2/2 dementia, from OB [facility], ambulates w/rw and assist, requires assist w/all ADL's pt unable to provide hx 2/2 dementia, from OB [facility] retirement, ambulates w/rw and assist, requires assist w/all ADL's

## 2023-06-13 NOTE — DISCHARGE NOTE PROVIDER - CARE PROVIDER_API CALL
Aleks rAmenta.  [facility]  [address] [place] [address]  Phone: [phone]  Fax: [phone]  Follow Up Time:    Aleks Armenta.  [facility]  [address] [place] [address]  Phone: [phone]  Fax: [phone]  Follow Up Time:    Aleks Armenta.  [facility]  [address] [place] [address]  Phone: [phone]  Fax: [phone]  Follow Up Time:

## 2023-06-13 NOTE — PHYSICAL THERAPY INITIAL EVALUATION ADULT - PERTINENT HX OF CURRENT PROBLEM, REHAB EVAL
73F [facility] resident hx of severe dementia, HTN, HLD, depression, NPH with  shunt, hx of dermatomyositis with ILD was on IVIG off tmt for about a year pw with R hip fx. Pt had Xray performed of the R hip after it was noticed she had pain in the hips which demonstrated R hip fx and sent to ED. No reported hx of falls. Pt unable to provide any hx. Nonverbal at this point. Son and daughter at bedside. At baseline ambulates 50 ft with assistance with walker. Needs help with tx from bed to chair/wheelchair/commode. No hx of CAD. No reported hx of CHF but noted on Lasix for 'years' for LE edema. In ED afebrile P: 83 BP: 148/70 sat 95% on RA. WBC: 12.3 84% N   CT head and neck: no acute changes. 73F [facility] resident hx of severe dementia, HTN, HLD, depression, NPH with  shunt, hx of dermatomyositis with ILD was on IVIG off tmt for about a year pw with R hip fx. Pt had Xray performed of the R hip after it was noticed she had pain in the hips which demonstrated R hip fx and sent to ED. No reported hx of falls. Pt unable to provide any hx. Nonverbal at this point. Son and daughter at bedside. At baseline ambulates 50 ft with assistance with walker. Needs help with tx from bed to chair/wheelchair/commode. No hx of CAD. No reported hx of CHF but noted on Lasix for 'years' for LE edema. In ED afebrile P: 83 BP: 148/70 sat 95% on RA. WBC: 12.3 84% N   CT head and neck: no acute changes. 73F [facility] resident hx of severe dementia, HTN, HLD, depression, NPH with  shunt, hx of dermatomyositis with ILD was on IVIG off tmt for about a year pw with R hip fx. Pt had Xray performed of the R hip after it was noticed she had pain in the hips which demonstrated R hip fx and sent to ED. No reported hx of falls. Pt unable to provide any hx. Nonverbal at this point. Son and daughter at bedside. At baseline ambulates 50 ft with assistance with walker. Needs help with tx from bed to chair/wheelchair/commode. No hx of CAD. No reported hx of CHF but noted on Lasix for 'years' for LE edema. In ED afebrile P: 83 BP: 148/70 sat 95% on RA. WBC: 12.3 84% N   CT head and neck: no acute changes.

## 2023-06-13 NOTE — DISCHARGE NOTE PROVIDER - NSDCMRMEDTOKEN_GEN_ALL_CORE_FT
atorvastatin 20 mg oral tablet: 1 orally once a day (at bedtime)  escitalopram 20 mg oral tablet: 1 tab(s) orally once a day  furosemide 20 mg oral tablet: 1 tab(s) orally once a day  levETIRAcetam 500 mg oral tablet: 1 tab(s) orally 2 times a day  levothyroxine 50 mcg (0.05 mg) oral tablet: 1 tab(s) orally once a day  melatonin 5 mg oral tablet: 1 tab(s) orally once a day (at bedtime)  Metoprolol Succinate ER 25 mg oral tablet, extended release: 1 tab(s) orally once a day  Senna 8.6 mg oral tablet: 1 tab(s) orally once a day (at bedtime)  Vitamin D3 50 mcg (2000 intl units) oral tablet: 1 orally once a day   aspirin 81 mg oral tablet: 1 tab(s) orally 2 times a day Post-operative DVT prophylaxis for 6 weeks  atorvastatin 20 mg oral tablet: 1 orally once a day (at bedtime)  escitalopram 20 mg oral tablet: 1 tab(s) orally once a day  furosemide 20 mg oral tablet: 1 tab(s) orally once a day  levETIRAcetam 500 mg oral tablet: 1 tab(s) orally 2 times a day  levothyroxine 50 mcg (0.05 mg) oral tablet: 1 tab(s) orally once a day  melatonin 5 mg oral tablet: 1 tab(s) orally once a day (at bedtime)  Metoprolol Succinate ER 25 mg oral tablet, extended release: 1 tab(s) orally once a day  Senna 8.6 mg oral tablet: 1 tab(s) orally once a day (at bedtime)  Vitamin D3 50 mcg (2000 intl units) oral tablet: 1 orally once a day

## 2023-06-13 NOTE — DISCHARGE NOTE PROVIDER - HOSPITAL COURSE
Hospital Course    73F [facility] resident hx of severe dementia, HTN, HLD, depression, NPH with  shunt, hx of Dermatomyositis with ILD was on IVIG (off treatment) admitted with R hip fx.     She was evaluated by Ortho, Dr Perez; had right hip hemiarthroplasty on 6/12, she did well post-op with possible post operative bleeding, H/H did drop from admission but remained stable.    Pt was evaluated by PT, rec is for [facility].    Palliative Care / Advanced Care Planning  Code Status:  FULL CODE  Patient/Family agreeable to Hospice/Palliative- NO    Discharging Provider:    Outpatient Provider:  Dr. Armenta    Signout given to  SNF Provider:       Hospital Course    73F [facility] resident hx of severe dementia, HTN, HLD, depression, NPH with  shunt, hx of Dermatomyositis with ILD was on IVIG (off treatment) admitted with R hip fx.     She was evaluated by Ortho, Dr Perez; had right hip hemiarthroplasty on 6/12, she did well post-op with possible post operative bleeding, H/H did drop from admission but remained stable.    Pt was evaluated by PT, rec is for [facility]AVENLY.    Palliative Care / Advanced Care Planning  Code Status:  FULL CODE  Patient/Family agreeable to Hospice/Palliative- NO    Discharging Provider:    Outpatient Provider:  Dr. Armenta    Signout given to  SNF Provider:       Hospital Course    73F [facility] resident hx of severe dementia, HTN, HLD, depression, NPH with  shunt, hx of Dermatomyositis with ILD was on IVIG (off treatment) admitted with R hip fx.     She was evaluated by Ortho, Dr Perez; had right hip hemiarthroplasty on 6/12, she did well post-op with possible post operative bleeding, H/H did drop from admission but remained stable.    Pt was evaluated by PT, rec is for [facility]AVENLY.    Palliative Care / Advanced Care Planning  Code Status:  FULL CODE  Patient/Family agreeable to Hospice/Palliative- NO    Discharging Provider:    Outpatient Provider:  Dr. Armenta    Signout given to  SNF Provider:       Hospital Course  HPI:  73F [facility] resident hx of severe dementia, HTN, HLD, depression, NPH with  shunt, hx of dermatomyositis with ILD was on IVIG off tmt for about a year pw with R hip fx. Pt had Xray performed of the R hip after it was noticed she had pain in the hips which demonstrated R hip fx and sent to ED. No reported hx of falls. Pt unable to provide any hx. Nonverbal at this point. Son and daughter at bedside. At baseline ambulates 50 ft with assistance with walker. Needs help with tx from bed to chair/wheelchair/commode. No hx of CAD. No reported hx of CHF but noted on Lasix for 'years' for LE edema. In ED afebrile P: 83 BP: 148/70 sat 95% on RA. WBC: 12.3 84% N   CT head and neck: no acute changes.   < from: CT Pelvis Bony Only No Cont (06.08.23 @ 20:28) >  IMPRESSION:    1.  Acute impacted subcapital fracture of the right proximal femur as   described.    2. Localized subcutaneous edema overlying the left ischium measuring   approximately 3.9 cm.    3.  L4 superior endplate mild compression deformities versus prominent   Schmorl's node which appears chronic.    < end of copied text >   (08 Jun 2023 22:16)    You were admitted for  right subcapital fracture.    You were evaluated by the orthopaedic surgeon who recommended surgery and you underwent a right hip hemiarthroplasty.  You had some post operative blood loss for which we watch your blood level and your blood level stabilized.      You will need to go to Subacute rehabilitation.      You will need to follow up with your primary care physician.    Discharging Provider:  Manjit Molina MD  Contact Info: Cell [phone] - Please call with any questions or concerns.    Outpatient Provider:  Dr. Armenta, notified       Hospital Course  HPI:  73F [facility] resident hx of severe dementia, HTN, HLD, depression, NPH with  shunt, hx of dermatomyositis with ILD was on IVIG off tmt for about a year pw with R hip fx. Pt had Xray performed of the R hip after it was noticed she had pain in the hips which demonstrated R hip fx and sent to ED. No reported hx of falls. Pt unable to provide any hx. Nonverbal at this point. Son and daughter at bedside. At baseline ambulates 50 ft with assistance with walker. Needs help with tx from bed to chair/wheelchair/commode. No hx of CAD. No reported hx of CHF but noted on Lasix for 'years' for LE edema. In ED afebrile P: 83 BP: 148/70 sat 95% on RA. WBC: 12.3 84% N   CT head and neck: no acute changes.   < from: CT Pelvis Bony Only No Cont (06.08.23 @ 20:28) >  IMPRESSION:    1.  Acute impacted subcapital fracture of the right proximal femur as   described.    2. Localized subcutaneous edema overlying the left ischium measuring   approximately 3.9 cm.    3.  L4 superior endplate mild compression deformities versus prominent   Schmorl's node which appears chronic.    < end of copied text >   (08 Jun 2023 22:16)    You were admitted for  right subcapital fracture.    You were evaluated by the orthopaedic surgeon who recommended surgery and you underwent a right hip hemiarthroplasty.  You had some post operative blood loss for which we watch your blood level and your blood level stabilized.      You will need to go to Subacute rehabilitation.      You will need to follow up with your primary care physician.    Discharging Provider:  Manjit Molina MD  Contact Info: Cell [phone] - Please call with any questions or concerns.    Outpatient Provider:  Dr. Armenta, notified       Hospital Course  HPI:  73F [facility] resident hx of severe dementia, HTN, HLD, depression, NPH with  shunt, hx of dermatomyositis with ILD was on IVIG off tmt for about a year pw with R hip fx. Pt had Xray performed of the R hip after it was noticed she had pain in the hips which demonstrated R hip fx and sent to ED. No reported hx of falls. Pt unable to provide any hx. Nonverbal at this point. Son and daughter at bedside. At baseline ambulates 50 ft with assistance with walker. Needs help with tx from bed to chair/wheelchair/commode. No hx of CAD. No reported hx of CHF but noted on Lasix for 'years' for LE edema. In ED afebrile P: 83 BP: 148/70 sat 95% on RA. WBC: 12.3 84% N   CT head and neck: no acute changes.   < from: CT Pelvis Bony Only No Cont (06.08.23 @ 20:28) >  IMPRESSION:    1.  Acute impacted subcapital fracture of the right proximal femur as   described.    2. Localized subcutaneous edema overlying the left ischium measuring   approximately 3.9 cm.    3.  L4 superior endplate mild compression deformities versus prominent   Schmorl's node which appears chronic.    < end of copied text >   (08 Jun 2023 22:16)    You were admitted for  right subcapital fracture.    You were evaluated by the orthopaedic surgeon who recommended surgery and you underwent a right hip hemiarthroplasty.  You had some post operative blood loss for which we watch your blood level and your blood level stabilized.      You will need to go to Subacute rehabilitation.      You will need to follow up with your primary care physician.    Discharging Provider:  Manjit Molina MD  Contact Info: Cell [phone] - Please call with any questions or concerns.    Outpatient Provider:  Dr. Armenta, notified

## 2023-06-13 NOTE — DIETITIAN INITIAL EVALUATION ADULT - PERTINENT LABORATORY DATA
06-13    141  |  107  |  13  ----------------------------<  159<H>  4.3   |  27  |  0.81    Ca    8.7      13 Jun 2023 06:53    TPro  7.0  /  Alb  2.8<L>  /  TBili  0.4  /  DBili  x   /  AST  20  /  ALT  32  /  AlkPhos  65  06-12

## 2023-06-13 NOTE — DISCHARGE NOTE PROVIDER - NSDCCPCAREPLAN_GEN_ALL_CORE_FT
PRINCIPAL DISCHARGE DIAGNOSIS  Diagnosis: Subcapital fracture of right femur  Assessment and Plan of Treatment: -you required surgery, a right hemiarthroplasty was performed by Dr Estrada on 6/12/23  -cont pain mgmt  -cont DVT ppx  -follow up with Ortho in 2 weeks      SECONDARY DISCHARGE DIAGNOSES  Diagnosis: Dementia  Assessment and Plan of Treatment: -continue supportive care     PRINCIPAL DISCHARGE DIAGNOSIS  Diagnosis: Subcapital fracture of right femur  Assessment and Plan of Treatment: You were admitted for  right subcapital fracture.  You were evaluated by the orthopaedic surgeon who recommended surgery and you underwent a right hip hemiarthroplasty.  You had some post operative blood loss for which we watch your blood level and your blood level stabilized.    You will need to go to Subacute rehabilitation.    You will need to follow up with your primary care physician and follow up with Dr. Estrada in 2 weeks after discharge from the hospital.  Discharging Provider:  Manjit Molina MD  Contact Info: Cell [phone] - Please call with any questions or concerns.        SECONDARY DISCHARGE DIAGNOSES  Diagnosis: Dementia  Assessment and Plan of Treatment: -continue supportive care     PRINCIPAL DISCHARGE DIAGNOSIS  Diagnosis: Subcapital fracture of right femur  Assessment and Plan of Treatment: You were admitted for  right subcapital fracture.  You were evaluated by the orthopaedic surgeon who recommended surgery and you underwent a right hip hemiarthroplasty.  You had some post operative blood loss for which we watch your blood level and your blood level stabilized.    You will need to go to Subacute rehabilitation.    You will need to follow up with your primary care physician and follow up with Dr. Estrada in 2 weeks after discharge from the hospital.  Discharging Provider:  Manjit Molina MD  Contact Info: Cell [phone] - Please call with any questions or concerns.        SECONDARY DISCHARGE DIAGNOSES  Diagnosis: Dementia  Assessment and Plan of Treatment: -continue supportive care     PRINCIPAL DISCHARGE DIAGNOSIS  Diagnosis: Subcapital fracture of right femur  Assessment and Plan of Treatment: You were admitted for  right subcapital fracture.  You were evaluated by the orthopaedic surgeon who recommended surgery and you underwent a right hip hemiarthroplasty.  You had some post operative blood loss for which we watch your blood level and your blood level stabilized.    You will need to go to Subacute rehabilitation.    You will need to follow up with your primary care physician and follow up with Dr. Estrada in 2 weeks after discharge from the hospital.  Discharging Provider:  Manjit Molina MD  Contact Info: Cell [phone] - Please call with any questions or concerns.        SECONDARY DISCHARGE DIAGNOSES  Diagnosis: Dementia  Assessment and Plan of Treatment: -continue supportive care

## 2023-06-13 NOTE — PROGRESS NOTE ADULT - SUBJECTIVE AND OBJECTIVE BOX
INTERVAL HPI/OVERNIGHT EVENTS:  Pt seen and examined at bedside resting comfortably. No acute events reported overnight. Pt demented, offers no complaints. Awake, does not follow commands. Limited ROS.    Vital Signs Last 24 Hrs  T(C): 36.4 (13 Jun 2023 04:43), Max: 37.1 (12 Jun 2023 14:01)  T(F): 97.6 (13 Jun 2023 04:43), Max: 98.7 (12 Jun 2023 14:01)  HR: 73 (13 Jun 2023 05:52) (65 - 91)  BP: 119/85 (13 Jun 2023 05:52) (89/46 - 139/74)  BP(mean): 63 (13 Jun 2023 04:43) (63 - 63)  RR: 25 (13 Jun 2023 04:43) (15 - 25)  SpO2: 100% (13 Jun 2023 04:43) (92% - 100%)    Parameters below as of 13 Jun 2023 04:43  Patient On (Oxygen Delivery Method): nasal cannula  O2 Flow (L/min): 2      PHYSICAL EXAM:  GENERAL: awake, resting comfortably in bed, NAD  HEAD:  Atraumatic, Normocephalic  EYES: EOMI  CHEST/LUNG: non-labored respirations bilaterally  HEART: normal HR  EXTREMITIES: R hip surgical site C/D/I, soft, warm, compressible, no hematoma or ecchymosis noted, palpable DP/PT pulses, wiggles toes; no calf tenderness noted; abduction pillow in place    I&O's Detail    12 Jun 2023 07:01  -  13 Jun 2023 07:00  --------------------------------------------------------  IN:    Lactated Ringers: 500 mL  Total IN: 500 mL    OUT:    Voided (mL): 2050 mL  Total OUT: 2050 mL    Total NET: -1550 mL          LABS:                        10.6   8.45  )-----------( 156      ( 13 Jun 2023 06:53 )             32.2     06-13    141  |  107  |  13  ----------------------------<  159<H>  4.3   |  27  |  0.81    Ca    8.7      13 Jun 2023 06:53    TPro  7.0  /  Alb  2.8<L>  /  TBili  0.4  /  DBili  x   /  AST  20  /  ALT  32  /  AlkPhos  65  06-12    PT/INR - ( 12 Jun 2023 06:55 )   PT: 13.0 sec;   INR: 1.12 ratio         PTT - ( 12 Jun 2023 06:55 )  PTT:28.1 sec

## 2023-06-13 NOTE — DIETITIAN INITIAL EVALUATION ADULT - PERTINENT MEDS FT
MEDICATIONS  (STANDING):  acetaminophen     Tablet .. 650 milliGRAM(s) Oral every 6 hours  atorvastatin 20 milliGRAM(s) Oral at bedtime  cholecalciferol 2000 Unit(s) Oral daily  enoxaparin Injectable 40 milliGRAM(s) SubCutaneous every 24 hours  escitalopram 20 milliGRAM(s) Oral daily  lactated ringers. 1000 milliLiter(s) (125 mL/Hr) IV Continuous <Continuous>  levETIRAcetam 500 milliGRAM(s) Oral two times a day  levothyroxine 50 MICROGram(s) Oral daily  melatonin 3 milliGRAM(s) Oral at bedtime  metoprolol succinate ER 25 milliGRAM(s) Oral daily  pantoprazole    Tablet 40 milliGRAM(s) Oral before breakfast  senna 2 Tablet(s) Oral at bedtime    MEDICATIONS  (PRN):  morphine  - Injectable 2 milliGRAM(s) IV Push every 4 hours PRN Severe Pain (7 - 10)  ondansetron Injectable 4 milliGRAM(s) IV Push every 6 hours PRN Nausea and/or Vomiting  oxyCODONE    IR 5 milliGRAM(s) Oral every 6 hours PRN Moderate Pain (4 - 6)

## 2023-06-13 NOTE — PROGRESS NOTE ADULT - ASSESSMENT
73F now POD#1 s/p right hip hemiarthroplasty  - PT eval, WBAT  - DVT prophylaxis  - Trend H/H  - Hip precautions - limited adduction, abduction pillow  - Medical management and supportive care as per primary team

## 2023-06-14 LAB
ANION GAP SERPL CALC-SCNC: 6 MMOL/L — SIGNIFICANT CHANGE UP (ref 5–17)
BUN SERPL-MCNC: 16 MG/DL — SIGNIFICANT CHANGE UP (ref 7–23)
CALCIUM SERPL-MCNC: 8.2 MG/DL — LOW (ref 8.4–10.5)
CHLORIDE SERPL-SCNC: 110 MMOL/L — HIGH (ref 96–108)
CO2 SERPL-SCNC: 27 MMOL/L — SIGNIFICANT CHANGE UP (ref 22–31)
CREAT SERPL-MCNC: 0.64 MG/DL — SIGNIFICANT CHANGE UP (ref 0.5–1.3)
EGFR: 93 ML/MIN/1.73M2 — SIGNIFICANT CHANGE UP
GLUCOSE SERPL-MCNC: 103 MG/DL — HIGH (ref 70–99)
HCT VFR BLD CALC: 25.8 % — LOW (ref 34.5–45)
HCT VFR BLD CALC: 30.3 % — LOW (ref 34.5–45)
HGB BLD-MCNC: 8.4 G/DL — LOW (ref 11.5–15.5)
HGB BLD-MCNC: 9.7 G/DL — LOW (ref 11.5–15.5)
IRON SATN MFR SERPL: 41 % — SIGNIFICANT CHANGE UP (ref 14–50)
IRON SATN MFR SERPL: 69 UG/DL — SIGNIFICANT CHANGE UP (ref 30–160)
MCHC RBC-ENTMCNC: 29.9 PG — SIGNIFICANT CHANGE UP (ref 27–34)
MCHC RBC-ENTMCNC: 31.2 PG — SIGNIFICANT CHANGE UP (ref 27–34)
MCHC RBC-ENTMCNC: 32 GM/DL — SIGNIFICANT CHANGE UP (ref 32–36)
MCHC RBC-ENTMCNC: 32.6 GM/DL — SIGNIFICANT CHANGE UP (ref 32–36)
MCV RBC AUTO: 93.5 FL — SIGNIFICANT CHANGE UP (ref 80–100)
MCV RBC AUTO: 95.9 FL — SIGNIFICANT CHANGE UP (ref 80–100)
NRBC # BLD: 0 /100 WBCS — SIGNIFICANT CHANGE UP (ref 0–0)
PLATELET # BLD AUTO: 144 K/UL — LOW (ref 150–400)
PLATELET # BLD AUTO: 153 K/UL — SIGNIFICANT CHANGE UP (ref 150–400)
POTASSIUM SERPL-MCNC: 3.8 MMOL/L — SIGNIFICANT CHANGE UP (ref 3.5–5.3)
POTASSIUM SERPL-SCNC: 3.8 MMOL/L — SIGNIFICANT CHANGE UP (ref 3.5–5.3)
RBC # BLD: 2.69 M/UL — LOW (ref 3.8–5.2)
RBC # BLD: 3.24 M/UL — LOW (ref 3.8–5.2)
RBC # FLD: 13.3 % — SIGNIFICANT CHANGE UP (ref 10.3–14.5)
RBC # FLD: 16 % — HIGH (ref 10.3–14.5)
SODIUM SERPL-SCNC: 143 MMOL/L — SIGNIFICANT CHANGE UP (ref 135–145)
TIBC SERPL-MCNC: 168 UG/DL — LOW (ref 220–430)
UIBC SERPL-MCNC: 100 UG/DL — LOW (ref 110–370)
WBC # BLD: 10.22 K/UL — SIGNIFICANT CHANGE UP (ref 3.8–10.5)
WBC # BLD: 9.05 K/UL — SIGNIFICANT CHANGE UP (ref 3.8–10.5)
WBC # FLD AUTO: 10.22 K/UL — SIGNIFICANT CHANGE UP (ref 3.8–10.5)
WBC # FLD AUTO: 9.05 K/UL — SIGNIFICANT CHANGE UP (ref 3.8–10.5)

## 2023-06-14 PROCEDURE — 99233 SBSQ HOSP IP/OBS HIGH 50: CPT

## 2023-06-14 RX ORDER — SODIUM CHLORIDE 9 MG/ML
1000 INJECTION, SOLUTION INTRAVENOUS ONCE
Refills: 0 | Status: COMPLETED | OUTPATIENT
Start: 2023-06-14 | End: 2023-06-14

## 2023-06-14 RX ADMIN — ESCITALOPRAM OXALATE 20 MILLIGRAM(S): 10 TABLET, FILM COATED ORAL at 11:12

## 2023-06-14 RX ADMIN — Medication 650 MILLIGRAM(S): at 11:13

## 2023-06-14 RX ADMIN — Medication 2000 UNIT(S): at 11:13

## 2023-06-14 RX ADMIN — SENNA PLUS 2 TABLET(S): 8.6 TABLET ORAL at 22:41

## 2023-06-14 RX ADMIN — Medication 650 MILLIGRAM(S): at 11:51

## 2023-06-14 RX ADMIN — ATORVASTATIN CALCIUM 20 MILLIGRAM(S): 80 TABLET, FILM COATED ORAL at 22:41

## 2023-06-14 RX ADMIN — Medication 650 MILLIGRAM(S): at 17:30

## 2023-06-14 RX ADMIN — Medication 650 MILLIGRAM(S): at 05:47

## 2023-06-14 RX ADMIN — Medication 650 MILLIGRAM(S): at 17:08

## 2023-06-14 RX ADMIN — Medication 50 MICROGRAM(S): at 05:47

## 2023-06-14 RX ADMIN — PANTOPRAZOLE SODIUM 40 MILLIGRAM(S): 20 TABLET, DELAYED RELEASE ORAL at 05:47

## 2023-06-14 RX ADMIN — SODIUM CHLORIDE 1000 MILLILITER(S): 9 INJECTION, SOLUTION INTRAVENOUS at 04:45

## 2023-06-14 RX ADMIN — LEVETIRACETAM 500 MILLIGRAM(S): 250 TABLET, FILM COATED ORAL at 17:09

## 2023-06-14 RX ADMIN — Medication 3 MILLIGRAM(S): at 22:41

## 2023-06-14 RX ADMIN — LEVETIRACETAM 500 MILLIGRAM(S): 250 TABLET, FILM COATED ORAL at 05:47

## 2023-06-14 NOTE — PROGRESS NOTE ADULT - ASSESSMENT
72 y/o female with pmhx of vascular dementia, HTN, HLD, depression, NPH s/p VPS, dermatomyositis with ILD s/p IVIG admitted with right hip fracture now POD 2 from right hip arthroplasty.    1. ABLA  2. right hip fracture  3. hypotension    -given hypotension will transfuse 1 unit prbc and repeat cbc after. if further drop or inappropriate response consider CTA lower extremity  - not tachycardic but she is also on metoprolol which could be blunting response. hold am metoprolol  - stop IVF   - morning dose of lovenox was held but due to high risk of VTE in post operative elderly patient s/p ortho surgery with severe immobility will continue further doses unless further signs of bleeding   - if hypotension persists please consult ICU day team. on last eval bp improving to systolic in 90s prior to transfusion

## 2023-06-14 NOTE — PROGRESS NOTE ADULT - ASSESSMENT
POD#2 R hip hemiarthroplasty    -hypotensive this am H/H dropped received fluid bolus by med PA receiving 1 unit PRBC's now.  -WBAT RLE  -continue PT/OT when pt more alert  -monitor H/H  -pain control  -DVT prophylaxis

## 2023-06-14 NOTE — CHART NOTE - NSCHARTNOTEFT_GEN_A_CORE
F/U Note:    73F [facility] resident hx of severe dementia, HTN, HLD, depression, NPH with  shunt, hx of dermatomyositis with ILD was on IVIG (off treatment) admitted with R hip    Interval Hx;    Patient is currently POD #1 from a R ORIF. Notified by RN that patient's BP was systolics in the 87s, HR 79. Seen and examined patient at bedside. Unable to follow commands as patient is nonverbal and has severe dementia at baseline.     Vital Signs Last 24 Hrs  T(C): 36.7 (14 Jun 2023 00:57), Max: 36.8 (13 Jun 2023 20:08)  T(F): 98 (14 Jun 2023 00:57), Max: 98.2 (13 Jun 2023 20:08)  HR: 82 (14 Jun 2023 00:57) (73 - 87)  BP: 112/52 (14 Jun 2023 00:57) (104/54 - 119/85)  BP(mean): --  RR: 19 (14 Jun 2023 00:57) (19 - 21)  SpO2: 97% (14 Jun 2023 00:57) (97% - 98%)    Parameters below as of 14 Jun 2023 00:57  Patient On (Oxygen Delivery Method): room air                                10.6   8.45  )-----------( 156      ( 13 Jun 2023 06:53 )             32.2         06-13    141  |  107  |  13  ----------------------------<  159<H>  4.3   |  27  |  0.81    Ca    8.7      13 Jun 2023 06:53    TPro  7.0  /  Alb  2.8<L>  /  TBili  0.4  /  DBili  x   /  AST  20  /  ALT  32  /  AlkPhos  65  06-12      ROS: unable to tell due to mental status         T(C): 36.7 (06-14-23 @ 00:57), Max: 36.8 (06-13-23 @ 20:08)  HR: 82 (06-14-23 @ 00:57) (73 - 87)  BP: 112/52 (06-14-23 @ 00:57) (104/54 - 119/85)  RR: 19 (06-14-23 @ 00:57) (19 - 21)  SpO2: 97% (06-14-23 @ 00:57) (97% - 98%)    CONSTITUTIONAL: Well groomed, no apparent distress  EYES: PERRLA and symmetric, EOMI, No conjunctival or scleral injection, non-icteric  RESP: No respiratory distress, no use of accessory muscles; CTA b/l, no WRR  CV: RRR, +S1S2,   GI: Soft, NT, ND, no rebound, no guarding  SKIN: No rashes or ulcers noted; no subcutaneous nodules or induration palpable  NEURO: Unable to accurately perform due to mental status   PSYCH: A&Ox0    Plan:   -given 1L bolus  -cbc sent to check H&H, patient has active type and screen from 6/12  -will recheck BP in 30 mins    ICU PA aware  Hospitalist aware F/U Note:    73F [facility] resident hx of severe dementia, HTN, HLD, depression, NPH with  shunt, hx of dermatomyositis with ILD was on IVIG (off treatment) admitted with R hip    Interval Hx;    Patient is currently POD #1 from a R ORIF. Notified by RN that patient's BP was systolics in the 87s, HR 79. Seen and examined patient at bedside. Unable to follow commands as patient is nonverbal and has severe dementia at baseline.     Vital Signs Last 24 Hrs  T(C): 36.7 (14 Jun 2023 00:57), Max: 36.8 (13 Jun 2023 20:08)  T(F): 98 (14 Jun 2023 00:57), Max: 98.2 (13 Jun 2023 20:08)  HR: 82 (14 Jun 2023 00:57) (73 - 87)  BP: 112/52 (14 Jun 2023 00:57) (104/54 - 119/85)  BP(mean): --  RR: 19 (14 Jun 2023 00:57) (19 - 21)  SpO2: 97% (14 Jun 2023 00:57) (97% - 98%)    Parameters below as of 14 Jun 2023 00:57  Patient On (Oxygen Delivery Method): room air                                10.6   8.45  )-----------( 156      ( 13 Jun 2023 06:53 )             32.2         06-13    141  |  107  |  13  ----------------------------<  159<H>  4.3   |  27  |  0.81    Ca    8.7      13 Jun 2023 06:53    TPro  7.0  /  Alb  2.8<L>  /  TBili  0.4  /  DBili  x   /  AST  20  /  ALT  32  /  AlkPhos  65  06-12      ROS: unable to tell due to mental status         T(C): 36.7 (06-14-23 @ 00:57), Max: 36.8 (06-13-23 @ 20:08)  HR: 82 (06-14-23 @ 00:57) (73 - 87)  BP: 112/52 (06-14-23 @ 00:57) (104/54 - 119/85)  RR: 19 (06-14-23 @ 00:57) (19 - 21)  SpO2: 97% (06-14-23 @ 00:57) (97% - 98%)    CONSTITUTIONAL: Well groomed, no apparent distress  EYES: PERRLA and symmetric, EOMI, No conjunctival or scleral injection, non-icteric  RESP: No respiratory distress, no use of accessory muscles; CTA b/l, no WRR  CV: RRR, +S1S2,   GI: Soft, NT, ND, no rebound, no guarding  SKIN: No rashes or ulcers noted; no subcutaneous nodules or induration palpable  NEURO: Unable to accurately perform due to mental status   PSYCH: A&Ox0    Plan:   -given 1L bolus  -cbc sent to check H&H, patient has active type and screen from 6/12  -will recheck BP in 30 mins    ICU PA aware  Hospitalist aware F/U Note:    73F [facility] resident hx of severe dementia, HTN, HLD, depression, NPH with  shunt, hx of dermatomyositis with ILD was on IVIG (off treatment) admitted with R hip    Interval Hx;    Patient is currently POD #1 from a R ORIF. Notified by RN that patient's BP was systolics in the 87s, HR 79. Seen and examined patient at bedside. Unable to follow commands as patient is nonverbal and has severe dementia at baseline.     Vital Signs Last 24 Hrs  T(C): 36.7 (14 Jun 2023 00:57), Max: 36.8 (13 Jun 2023 20:08)  T(F): 98 (14 Jun 2023 00:57), Max: 98.2 (13 Jun 2023 20:08)  HR: 82 (14 Jun 2023 00:57) (73 - 87)  BP: 112/52 (14 Jun 2023 00:57) (104/54 - 119/85)  BP(mean): --  RR: 19 (14 Jun 2023 00:57) (19 - 21)  SpO2: 97% (14 Jun 2023 00:57) (97% - 98%)    Parameters below as of 14 Jun 2023 00:57  Patient On (Oxygen Delivery Method): room air                                10.6   8.45  )-----------( 156      ( 13 Jun 2023 06:53 )             32.2         06-13    141  |  107  |  13  ----------------------------<  159<H>  4.3   |  27  |  0.81    Ca    8.7      13 Jun 2023 06:53    TPro  7.0  /  Alb  2.8<L>  /  TBili  0.4  /  DBili  x   /  AST  20  /  ALT  32  /  AlkPhos  65  06-12      ROS: unable to tell due to mental status         T(C): 36.7 (06-14-23 @ 00:57), Max: 36.8 (06-13-23 @ 20:08)  HR: 82 (06-14-23 @ 00:57) (73 - 87)  BP: 112/52 (06-14-23 @ 00:57) (104/54 - 119/85)  RR: 19 (06-14-23 @ 00:57) (19 - 21)  SpO2: 97% (06-14-23 @ 00:57) (97% - 98%)    CONSTITUTIONAL: Well groomed, no apparent distress  EYES: PERRLA and symmetric, EOMI, No conjunctival or scleral injection, non-icteric  RESP: No respiratory distress, no use of accessory muscles; CTA b/l, no WRR  CV: RRR, +S1S2,   GI: Soft, NT, ND, no rebound, no guarding  SKIN: No rashes or ulcers noted; no subcutaneous nodules or induration palpable  NEURO: Unable to accurately perform due to mental status   PSYCH: A&Ox0    Plan:   -given 1L bolus  -cbc sent to check H&H, patient has active type and screen from 6/12  -will recheck BP in 30 mins    ICU PA aware  Hospitalist aware

## 2023-06-14 NOTE — PROGRESS NOTE ADULT - ASSESSMENT
73F [facility] resident hx of severe dementia, HTN, HLD, depression, NPH with  shunt, hx of dermatomyositis with ILD was on IVIG (off treatment) admitted with R hip fx.     # R hip fx  #POD #2, Hemiarthroplasty  -Ortho following; Surgeon Dr Estrada  -PT/OT recommend [facility]  -WBAT  -Discharge held due to anemia and OH    #Acute blood loss anemia, possibly post-operative blood loss  - Monitor Hg/hct, noted 8.4 this am  - C/w one unit PRBC ordered this AM  - Check CBC at 4pm, transfuse if Hg <8  - Labs pending for iron/B12/folate    #Severe Dementia  -cont supportive care    #hx of Depression  -cont Lexapro    # HTN  -cont Toprol  -Holding lasix for now  -monitor vitals, BP stable    # Hypothyroid  -cont Synthroid.    #Sz d/o   -cont keppra    #DVT PPX: Heparin  AM labs  DISPO: PT recommends [facility]; son agrees; [facility] once stable in next 24-48hrs as long as Hg stable  6/14: Myles Josh Romeroe updated on plan [phone].   73F [facility] resident hx of severe dementia, HTN, HLD, depression, NPH with  shunt, hx of dermatomyositis with ILD was on IVIG (off treatment) admitted with R hip fx.     # R hip fx  #POD #2, Hemiarthroplasty  -Ortho following; Surgeon Dr Estrada  -PT/OT recommend [facility]  -WBAT  -Discharge held due to anemia and OH    #Acute blood loss anemia, possibly post-operative blood loss  - Monitor Hg/hct, noted 8.4 this am  - C/w one unit PRBC ordered this AM  - Check CBC at 4pm, transfuse if Hg <8  - Labs pending for iron/B12/folate    #Severe Dementia  -cont supportive care    #hx of Depression  -cont Lexapro    # HTN  -cont Toprol  -Holding lasix for now  -monitor vitals, BP stable    # Hypothyroid  -cont Synthroid.    #Sz d/o   -cont keppra    #DVT PPX: Heparin  AM labs  DISPO: PT recommends [facility]; son agrees; [facility] once stable in next 24-48hrs as long as Hg stable  6/14: Myles Josh Romeroe updated on plan [phone].   73F [facility] resident hx of severe dementia, HTN, HLD, depression, NPH with  shunt, hx of dermatomyositis with ILD was on IVIG (off treatment) admitted with R hip fx.     # R hip fx  #POD #2, Hemiarthroplasty  -Ortho following; Surgeon Dr Estrada  -PT/OT recommend [facility]  -WBAT  -Discharge held due to anemia and OH    #Acute blood loss anemia, possibly post-operative blood loss  - Monitor Hg/hct, noted 8.4 this am  - C/w one unit PRBC ordered this AM  - Check CBC at 4pm, transfuse if Hg <8  - Labs pending for iron/B12/folate    #Severe Dementia  -cont supportive care    #hx of Depression  -cont Lexapro    # HTN  -cont Toprol  -Holding lasix for now  -monitor vitals, BP stable    # Hypothyroid  -cont Synthroid.    #Sz d/o   -cont keppra    #DVT PPX: Heparin  AM labs  DISPO: PT recommends [facility]; son agrees; [facility] once stable in next 24-48hrs as long as Hg stable  6/14: Myles Josh Romeroe updated on plan [phone].

## 2023-06-14 NOTE — PROGRESS NOTE ADULT - SUBJECTIVE AND OBJECTIVE BOX
POD#2     Pt asleep in bed. lethargic. BP dropped this am to 80's sys. was given NS bolus. currently receiving 1 unit PRBC's.  Vital Signs Last 24 Hrs  T(C): 36.7 (14 Jun 2023 06:29), Max: 36.8 (13 Jun 2023 20:08)  T(F): 98 (14 Jun 2023 06:29), Max: 98.2 (13 Jun 2023 20:08)  HR: 74 (14 Jun 2023 06:29) (74 - 87)  BP: 113/48 (14 Jun 2023 06:29) (85/59 - 138/84)  BP(mean): --  RR: 16 (14 Jun 2023 06:29) (15 - 21)  SpO2: 98% (14 Jun 2023 06:29) (97% - 100%)    Parameters below as of 14 Jun 2023 06:29  Patient On (Oxygen Delivery Method): room air                          8.4    9.05  )-----------( 144      ( 14 Jun 2023 05:00 )             25.8       06-13    141  |  107  |  13  ----------------------------<  159<H>  4.3   |  27  |  0.81    Ca    8.7      13 Jun 2023 06:53    I&O's Detail    13 Jun 2023 07:01  -  14 Jun 2023 07:00  --------------------------------------------------------  IN:    Lactated Ringers: 2375 mL    Lactated Ringers Bolus: 1000 mL    Oral Fluid: 200 mL  Total IN: 3575 mL    OUT:    Voided (mL): 1000 mL  Total OUT: 1000 mL    Total NET: 2575 mL      PE: R hip: aquacell in place no active drainage noted. thigh soft. NVI moves feet to stimuli +DP

## 2023-06-14 NOTE — PROGRESS NOTE ADULT - SUBJECTIVE AND OBJECTIVE BOX
Patient is a 73y old  Female who presents with a chief complaint of R  hip fx (13 Jun 2023 14:14)      BRIEF HOSPITAL COURSE: 72 y/o female with pmhx of vascular dementia, HTN, HLD, depression, NPH s/p VPS, dermatomyositis with ILD s/p IVIG admitted with right hip fracture now POD 2 from right hip arthroplasty.    Events last 24 hours: contacted by RN and medicine PA for hypotension. HR normal. unable to elicit adequate ROS due to AMS. recommended stat cbc be sent--returned with 2 g drop in hgb.     PAST MEDICAL & SURGICAL HISTORY:  NPH (normal pressure hydrocephalus)      Vascular dementia      HTN (hypertension)      Seizures      Dermatomyositis      Falls      S/P  shunt          Review of Systems:  unable to obtain due to AMS      Medications:    metoprolol succinate ER 25 milliGRAM(s) Oral daily      acetaminophen     Tablet .. 650 milliGRAM(s) Oral every 6 hours  escitalopram 20 milliGRAM(s) Oral daily  levETIRAcetam 500 milliGRAM(s) Oral two times a day  melatonin 3 milliGRAM(s) Oral at bedtime  morphine  - Injectable 2 milliGRAM(s) IV Push every 4 hours PRN  ondansetron Injectable 4 milliGRAM(s) IV Push every 6 hours PRN  oxyCODONE    IR 5 milliGRAM(s) Oral every 6 hours PRN      enoxaparin Injectable 40 milliGRAM(s) SubCutaneous every 24 hours    pantoprazole    Tablet 40 milliGRAM(s) Oral before breakfast  senna 2 Tablet(s) Oral at bedtime      atorvastatin 20 milliGRAM(s) Oral at bedtime  levothyroxine 50 MICROGram(s) Oral daily    cholecalciferol 2000 Unit(s) Oral daily                ICU Vital Signs Last 24 Hrs  T(C): 36.4 (14 Jun 2023 05:15), Max: 36.8 (13 Jun 2023 20:08)  T(F): 97.5 (14 Jun 2023 05:15), Max: 98.2 (13 Jun 2023 20:08)  HR: 79 (14 Jun 2023 05:15) (73 - 87)  BP: 85/59 (14 Jun 2023 05:15) (85/59 - 119/85)  BP(mean): --  ABP: --  ABP(mean): --  RR: 20 (14 Jun 2023 05:15) (19 - 21)  SpO2: 97% (14 Jun 2023 05:15) (97% - 98%)    O2 Parameters below as of 14 Jun 2023 05:15  Patient On (Oxygen Delivery Method): room air                I&O's Detail    12 Jun 2023 07:01  -  13 Jun 2023 07:00  --------------------------------------------------------  IN:    Lactated Ringers: 500 mL  Total IN: 500 mL    OUT:    Voided (mL): 2050 mL  Total OUT: 2050 mL    Total NET: -1550 mL      13 Jun 2023 07:01  -  14 Jun 2023 05:42  --------------------------------------------------------  IN:    Lactated Ringers: 2375 mL    Oral Fluid: 100 mL  Total IN: 2475 mL    OUT:    Voided (mL): 400 mL  Total OUT: 400 mL    Total NET: 2075 mL            LABS:                        8.4    9.05  )-----------( 144      ( 14 Jun 2023 05:00 )             25.8     06-13    141  |  107  |  13  ----------------------------<  159<H>  4.3   |  27  |  0.81    Ca    8.7      13 Jun 2023 06:53    TPro  7.0  /  Alb  2.8<L>  /  TBili  0.4  /  DBili  x   /  AST  20  /  ALT  32  /  AlkPhos  65  06-12          CAPILLARY BLOOD GLUCOSE        PT/INR - ( 12 Jun 2023 06:55 )   PT: 13.0 sec;   INR: 1.12 ratio         PTT - ( 12 Jun 2023 06:55 )  PTT:28.1 sec    CULTURES:      Physical Examination:    General: No acute distress.      HEENT: Pupils equal, reactive to light.  Symmetric.    PULM: Clear to auscultation bilaterally, no significant sputum production    NECK: Supple, no lymphadenopathy, trachea midline    CVS: Regular rate and rhythm, no murmurs, rubs, or gallops    ABD: Soft, nondistended, nontender, normoactive bowel sounds, no masses    EXT: No edema, nontender    SKIN: Warm and well perfused, no rashes noted.    NEURO: awake. confused. follows simple commands.    DEVICES: none    RADIOLOGY:     ACC: [id number] EXAM:  XR HIP 2-3VRT INTR OP   ORDERED BY:  WES BROTHERS     PROCEDURE DATE:  06/12/2023          INTERPRETATION:  HISTORY: Hemiarthroplasty hip replacement    TECHNIQUE: AP Pelvic view    FINDINGS: RIGHT hip femoral and acetabular components well-seated and in    anatomic alignment.  No fracture.  The visualized pelvis appears within normal limits.      IMPRESSION:  Status post ] hip hemiarthroplasty.    --- End of Report ---            ELIJAH BUSH MD; Attending Radiologist  This document has been electronically signed. Jun 13 2023  4:03PM      CRITICAL CARE TIME SPENT: 35 minutes of critical care time spent providing medical care for patient's acute illness/conditions that impairs at least one vital organ system and/or poses a high risk of imminent or life threatening deterioration in the patient's condition. It includes time spent evaluating and treating the patient's acute illness as well as time spent reviewing labs, radiology, discussing goals of care with patient and/or patient's family, and discussing the case with a multidisciplinary team in an effort to prevent further life threatening deterioration or end organ damage. This time is independent of any procedures performed. Patient is a 73y old  Female who presents with a chief complaint of R  hip fx (13 Jun 2023 14:14)      BRIEF HOSPITAL COURSE: 72 y/o female with pmhx of vascular dementia, HTN, HLD, depression, NPH s/p VPS, dermatomyositis with ILD s/p IVIG admitted with right hip fracture now POD 2 from right hip arthroplasty.    Events last 24 hours: contacted by RN and medicine PA for hypotension. HR normal. unable to elicit adequate ROS due to AMS. recommended stat cbc be sent--returned with 2 g drop in hgb.     PAST MEDICAL & SURGICAL HISTORY:  NPH (normal pressure hydrocephalus)      Vascular dementia      HTN (hypertension)      Seizures      Dermatomyositis      Falls      S/P  shunt          Review of Systems:  unable to obtain due to AMS      Medications:    metoprolol succinate ER 25 milliGRAM(s) Oral daily      acetaminophen     Tablet .. 650 milliGRAM(s) Oral every 6 hours  escitalopram 20 milliGRAM(s) Oral daily  levETIRAcetam 500 milliGRAM(s) Oral two times a day  melatonin 3 milliGRAM(s) Oral at bedtime  morphine  - Injectable 2 milliGRAM(s) IV Push every 4 hours PRN  ondansetron Injectable 4 milliGRAM(s) IV Push every 6 hours PRN  oxyCODONE    IR 5 milliGRAM(s) Oral every 6 hours PRN      enoxaparin Injectable 40 milliGRAM(s) SubCutaneous every 24 hours    pantoprazole    Tablet 40 milliGRAM(s) Oral before breakfast  senna 2 Tablet(s) Oral at bedtime      atorvastatin 20 milliGRAM(s) Oral at bedtime  levothyroxine 50 MICROGram(s) Oral daily    cholecalciferol 2000 Unit(s) Oral daily                ICU Vital Signs Last 24 Hrs  T(C): 36.4 (14 Jun 2023 05:15), Max: 36.8 (13 Jun 2023 20:08)  T(F): 97.5 (14 Jun 2023 05:15), Max: 98.2 (13 Jun 2023 20:08)  HR: 79 (14 Jun 2023 05:15) (73 - 87)  BP: 85/59 (14 Jun 2023 05:15) (85/59 - 119/85)  BP(mean): --  ABP: --  ABP(mean): --  RR: 20 (14 Jun 2023 05:15) (19 - 21)  SpO2: 97% (14 Jun 2023 05:15) (97% - 98%)    O2 Parameters below as of 14 Jun 2023 05:15  Patient On (Oxygen Delivery Method): room air                I&O's Detail    12 Jun 2023 07:01  -  13 Jun 2023 07:00  --------------------------------------------------------  IN:    Lactated Ringers: 500 mL  Total IN: 500 mL    OUT:    Voided (mL): 2050 mL  Total OUT: 2050 mL    Total NET: -1550 mL      13 Jun 2023 07:01  -  14 Jun 2023 05:42  --------------------------------------------------------  IN:    Lactated Ringers: 2375 mL    Oral Fluid: 100 mL  Total IN: 2475 mL    OUT:    Voided (mL): 400 mL  Total OUT: 400 mL    Total NET: 2075 mL            LABS:                        8.4    9.05  )-----------( 144      ( 14 Jun 2023 05:00 )             25.8     06-13    141  |  107  |  13  ----------------------------<  159<H>  4.3   |  27  |  0.81    Ca    8.7      13 Jun 2023 06:53    TPro  7.0  /  Alb  2.8<L>  /  TBili  0.4  /  DBili  x   /  AST  20  /  ALT  32  /  AlkPhos  65  06-12          CAPILLARY BLOOD GLUCOSE        PT/INR - ( 12 Jun 2023 06:55 )   PT: 13.0 sec;   INR: 1.12 ratio         PTT - ( 12 Jun 2023 06:55 )  PTT:28.1 sec    CULTURES:      Physical Examination:    General: No acute distress.      HEENT: Pupils equal, reactive to light.  Symmetric.    PULM: Clear to auscultation bilaterally, no significant sputum production    NECK: Supple, no lymphadenopathy, trachea midline    CVS: Regular rate and rhythm, no murmurs, rubs, or gallops    ABD: Soft, nondistended, nontender, normoactive bowel sounds, no masses    EXT: No edema, nontender    SKIN: Warm and well perfused, no rashes noted.    NEURO: awake. confused. follows simple commands.    DEVICES: none    RADIOLOGY:     ACC: [id number] EXAM:  XR HIP 2-3VRT INTR OP   ORDERED BY:  WES BROTHERS     PROCEDURE DATE:  06/12/2023          INTERPRETATION:  HISTORY: Hemiarthroplasty hip replacement    TECHNIQUE: AP Pelvic view    FINDINGS: RIGHT hip femoral and acetabular components well-seated and in    anatomic alignment.  No fracture.  The visualized pelvis appears within normal limits.      IMPRESSION:  Status post ] hip hemiarthroplasty.    --- End of Report ---            ELIJAH BUSH MD; Attending Radiologist  This document has been electronically signed. Jun 13 2023  4:03PM      CRITICAL CARE TIME SPENT: 35 minutes of critical care time spent providing medical care for patient's acute illness/conditions that impairs at least one vital organ system and/or poses a high risk of imminent or life threatening deterioration in the patient's condition. It includes time spent evaluating and treating the patient's acute illness as well as time spent reviewing labs, radiology, discussing goals of care with patient and/or patient's family, and discussing the case with a multidisciplinary team in an effort to prevent further life threatening deterioration or end organ damage. This time is independent of any procedures performed. Patient is a 73y old  Female who presents with a chief complaint of R  hip fx (13 Jun 2023 14:14)      BRIEF HOSPITAL COURSE: 72 y/o female with pmhx of vascular dementia, HTN, HLD, depression, NPH s/p VPS, dermatomyositis with ILD s/p IVIG admitted with right hip fracture now POD 2 from right hip arthroplasty.    Events last 24 hours: contacted by RN and medicine PA for hypotension. HR normal. unable to elicit adequate ROS due to AMS. recommended stat cbc be sent--returned with 2 g drop in hgb.     PAST MEDICAL & SURGICAL HISTORY:  NPH (normal pressure hydrocephalus)      Vascular dementia      HTN (hypertension)      Seizures      Dermatomyositis      Falls      S/P  shunt          Review of Systems:  unable to obtain due to AMS      Medications:    metoprolol succinate ER 25 milliGRAM(s) Oral daily      acetaminophen     Tablet .. 650 milliGRAM(s) Oral every 6 hours  escitalopram 20 milliGRAM(s) Oral daily  levETIRAcetam 500 milliGRAM(s) Oral two times a day  melatonin 3 milliGRAM(s) Oral at bedtime  morphine  - Injectable 2 milliGRAM(s) IV Push every 4 hours PRN  ondansetron Injectable 4 milliGRAM(s) IV Push every 6 hours PRN  oxyCODONE    IR 5 milliGRAM(s) Oral every 6 hours PRN      enoxaparin Injectable 40 milliGRAM(s) SubCutaneous every 24 hours    pantoprazole    Tablet 40 milliGRAM(s) Oral before breakfast  senna 2 Tablet(s) Oral at bedtime      atorvastatin 20 milliGRAM(s) Oral at bedtime  levothyroxine 50 MICROGram(s) Oral daily    cholecalciferol 2000 Unit(s) Oral daily                ICU Vital Signs Last 24 Hrs  T(C): 36.4 (14 Jun 2023 05:15), Max: 36.8 (13 Jun 2023 20:08)  T(F): 97.5 (14 Jun 2023 05:15), Max: 98.2 (13 Jun 2023 20:08)  HR: 79 (14 Jun 2023 05:15) (73 - 87)  BP: 85/59 (14 Jun 2023 05:15) (85/59 - 119/85)  BP(mean): --  ABP: --  ABP(mean): --  RR: 20 (14 Jun 2023 05:15) (19 - 21)  SpO2: 97% (14 Jun 2023 05:15) (97% - 98%)    O2 Parameters below as of 14 Jun 2023 05:15  Patient On (Oxygen Delivery Method): room air                I&O's Detail    12 Jun 2023 07:01  -  13 Jun 2023 07:00  --------------------------------------------------------  IN:    Lactated Ringers: 500 mL  Total IN: 500 mL    OUT:    Voided (mL): 2050 mL  Total OUT: 2050 mL    Total NET: -1550 mL      13 Jun 2023 07:01  -  14 Jun 2023 05:42  --------------------------------------------------------  IN:    Lactated Ringers: 2375 mL    Oral Fluid: 100 mL  Total IN: 2475 mL    OUT:    Voided (mL): 400 mL  Total OUT: 400 mL    Total NET: 2075 mL            LABS:                        8.4    9.05  )-----------( 144      ( 14 Jun 2023 05:00 )             25.8     06-13    141  |  107  |  13  ----------------------------<  159<H>  4.3   |  27  |  0.81    Ca    8.7      13 Jun 2023 06:53    TPro  7.0  /  Alb  2.8<L>  /  TBili  0.4  /  DBili  x   /  AST  20  /  ALT  32  /  AlkPhos  65  06-12          CAPILLARY BLOOD GLUCOSE        PT/INR - ( 12 Jun 2023 06:55 )   PT: 13.0 sec;   INR: 1.12 ratio         PTT - ( 12 Jun 2023 06:55 )  PTT:28.1 sec    CULTURES:      Physical Examination:    General: No acute distress.      HEENT: Pupils equal, reactive to light.  Symmetric.    PULM: Clear to auscultation bilaterally, no significant sputum production    NECK: Supple, no lymphadenopathy, trachea midline    CVS: Regular rate and rhythm, no murmurs, rubs, or gallops    ABD: Soft, nondistended, nontender, normoactive bowel sounds, no masses    EXT: No edema, nontender    SKIN: Warm and well perfused, no rashes noted.    NEURO: awake. confused. follows simple commands.    DEVICES: none    RADIOLOGY:     ACC: [id number] EXAM:  XR HIP 2-3VRT INTR OP   ORDERED BY:  WES BROTHERS     PROCEDURE DATE:  06/12/2023          INTERPRETATION:  HISTORY: Hemiarthroplasty hip replacement    TECHNIQUE: AP Pelvic view    FINDINGS: RIGHT hip femoral and acetabular components well-seated and in    anatomic alignment.  No fracture.  The visualized pelvis appears within normal limits.      IMPRESSION:  Status post ] hip hemiarthroplasty.    --- End of Report ---            ELIJAH BUSH MD; Attending Radiologist  This document has been electronically signed. Jun 13 2023  4:03PM      CRITICAL CARE TIME SPENT: 35 minutes of critical care time spent providing medical care for patient's acute illness/conditions that impairs at least one vital organ system and/or poses a high risk of imminent or life threatening deterioration in the patient's condition. It includes time spent evaluating and treating the patient's acute illness as well as time spent reviewing labs, radiology, discussing goals of care with patient and/or patient's family, and discussing the case with a multidisciplinary team in an effort to prevent further life threatening deterioration or end organ damage. This time is independent of any procedures performed.

## 2023-06-14 NOTE — PROGRESS NOTE ADULT - SUBJECTIVE AND OBJECTIVE BOX
Patient is a 73y old  Female who presents with a chief complaint of R  hip fx (14 Jun 2023 10:05)    Overnight, patient noted with SBP 80's  Hg noted to have dropped to 8.4; one unit PRBC ordered  patient does not offer concerns at this time  Eating minimally with encouragement according to nursing  Last BM unknown    Patient seen and examined at bedside.    ALLERGIES:  No Known Allergies    MEDICATIONS  (STANDING):  acetaminophen     Tablet .. 650 milliGRAM(s) Oral every 6 hours  atorvastatin 20 milliGRAM(s) Oral at bedtime  cholecalciferol 2000 Unit(s) Oral daily  enoxaparin Injectable 40 milliGRAM(s) SubCutaneous every 24 hours  escitalopram 20 milliGRAM(s) Oral daily  levETIRAcetam 500 milliGRAM(s) Oral two times a day  levothyroxine 50 MICROGram(s) Oral daily  melatonin 3 milliGRAM(s) Oral at bedtime  metoprolol succinate ER 25 milliGRAM(s) Oral daily  pantoprazole    Tablet 40 milliGRAM(s) Oral before breakfast  senna 2 Tablet(s) Oral at bedtime    MEDICATIONS  (PRN):  ondansetron Injectable 4 milliGRAM(s) IV Push every 6 hours PRN Nausea and/or Vomiting    Vital Signs Last 24 Hrs  T(F): 98 (14 Jun 2023 06:29), Max: 98.2 (13 Jun 2023 20:08)  HR: 74 (14 Jun 2023 06:29) (74 - 87)  BP: 113/48 (14 Jun 2023 06:29) (85/59 - 138/84)  RR: 16 (14 Jun 2023 06:29) (15 - 21)  SpO2: 98% (14 Jun 2023 06:29) (97% - 100%)  I&O's Summary    13 Jun 2023 07:01  -  14 Jun 2023 07:00  --------------------------------------------------------  IN: 3575 mL / OUT: 1000 mL / NET: 2575 mL      BMI (kg/m2): 24.1 (06-12-23 @ 14:33)  PHYSICAL EXAM:  General: NAD, A/O, does not follow commands, does not speak  ENT: Dry MM, no scleral icterus  Neck: Supple, No JVD, no thyroidomegaly  Lungs: Clear to auscultation bilaterally, no wheezes, no rales, no rhonchi, good inspiratory effort  Cardio: RRR, S1/S2, No murmurs  Abdomen: Soft, Nontender, Nondistended; Bowel sounds present  Extremities: No calf tenderness, No pitting edema, no skin changes    LABS:                        8.4    9.05  )-----------( 144      ( 14 Jun 2023 05:00 )             25.8       06-14    143  |  110  |  16  ----------------------------<  103  3.8   |  27  |  0.64    Ca    8.2      14 Jun 2023 12:30    TPro  7.0  /  Alb  2.8  /  TBili  0.4  /  DBili  x   /  AST  20  /  ALT  32  /  AlkPhos  65  06-12       PT/INR - ( 12 Jun 2023 06:55 )   PT: 13.0 sec;   INR: 1.12 ratio    PTT - ( 12 Jun 2023 06:55 )  PTT:28.1 sec     COVID-19 PCR: NotDetec (06-13-23 @ 19:25)  COVID-19 PCR: NotDetec (06-13-23 @ 19:25)      Care Discussed with Consultants/Other Providers:   Cardiology, ortho

## 2023-06-15 ENCOUNTER — TRANSCRIPTION ENCOUNTER (OUTPATIENT)
Age: 73
End: 2023-06-15

## 2023-06-15 VITALS
SYSTOLIC BLOOD PRESSURE: 113 MMHG | DIASTOLIC BLOOD PRESSURE: 60 MMHG | TEMPERATURE: 99 F | RESPIRATION RATE: 16 BRPM | OXYGEN SATURATION: 95 % | HEART RATE: 69 BPM

## 2023-06-15 LAB
ANION GAP SERPL CALC-SCNC: 7 MMOL/L — SIGNIFICANT CHANGE UP (ref 5–17)
BUN SERPL-MCNC: 14 MG/DL — SIGNIFICANT CHANGE UP (ref 7–23)
CALCIUM SERPL-MCNC: 8.4 MG/DL — SIGNIFICANT CHANGE UP (ref 8.4–10.5)
CHLORIDE SERPL-SCNC: 108 MMOL/L — SIGNIFICANT CHANGE UP (ref 96–108)
CO2 SERPL-SCNC: 26 MMOL/L — SIGNIFICANT CHANGE UP (ref 22–31)
CREAT SERPL-MCNC: 0.81 MG/DL — SIGNIFICANT CHANGE UP (ref 0.5–1.3)
EGFR: 76 ML/MIN/1.73M2 — SIGNIFICANT CHANGE UP
FOLATE SERPL-MCNC: 5.1 NG/ML — SIGNIFICANT CHANGE UP
GLUCOSE SERPL-MCNC: 102 MG/DL — HIGH (ref 70–99)
HCT VFR BLD CALC: 28.4 % — LOW (ref 34.5–45)
HGB BLD-MCNC: 9.2 G/DL — LOW (ref 11.5–15.5)
MAGNESIUM SERPL-MCNC: 1.7 MG/DL — SIGNIFICANT CHANGE UP (ref 1.6–2.6)
MCHC RBC-ENTMCNC: 30.1 PG — SIGNIFICANT CHANGE UP (ref 27–34)
MCHC RBC-ENTMCNC: 32.4 GM/DL — SIGNIFICANT CHANGE UP (ref 32–36)
MCV RBC AUTO: 92.8 FL — SIGNIFICANT CHANGE UP (ref 80–100)
NRBC # BLD: 0 /100 WBCS — SIGNIFICANT CHANGE UP (ref 0–0)
PLATELET # BLD AUTO: 160 K/UL — SIGNIFICANT CHANGE UP (ref 150–400)
POTASSIUM SERPL-MCNC: 3.6 MMOL/L — SIGNIFICANT CHANGE UP (ref 3.5–5.3)
POTASSIUM SERPL-SCNC: 3.6 MMOL/L — SIGNIFICANT CHANGE UP (ref 3.5–5.3)
RBC # BLD: 3.06 M/UL — LOW (ref 3.8–5.2)
RBC # FLD: 16.3 % — HIGH (ref 10.3–14.5)
SODIUM SERPL-SCNC: 141 MMOL/L — SIGNIFICANT CHANGE UP (ref 135–145)
VIT B12 SERPL-MCNC: 262 PG/ML — SIGNIFICANT CHANGE UP (ref 232–1245)
WBC # BLD: 9.22 K/UL — SIGNIFICANT CHANGE UP (ref 3.8–10.5)
WBC # FLD AUTO: 9.22 K/UL — SIGNIFICANT CHANGE UP (ref 3.8–10.5)

## 2023-06-15 PROCEDURE — 87635 SARS-COV-2 COVID-19 AMP PRB: CPT

## 2023-06-15 PROCEDURE — 83550 IRON BINDING TEST: CPT

## 2023-06-15 PROCEDURE — 88311 DECALCIFY TISSUE: CPT

## 2023-06-15 PROCEDURE — 86850 RBC ANTIBODY SCREEN: CPT

## 2023-06-15 PROCEDURE — 73502 X-RAY EXAM HIP UNI 2-3 VIEWS: CPT

## 2023-06-15 PROCEDURE — 72125 CT NECK SPINE W/O DYE: CPT | Mod: MA

## 2023-06-15 PROCEDURE — P9016: CPT

## 2023-06-15 PROCEDURE — 76376 3D RENDER W/INTRP POSTPROCES: CPT

## 2023-06-15 PROCEDURE — 93005 ELECTROCARDIOGRAM TRACING: CPT

## 2023-06-15 PROCEDURE — C9399: CPT

## 2023-06-15 PROCEDURE — 86923 COMPATIBILITY TEST ELECTRIC: CPT

## 2023-06-15 PROCEDURE — C1776: CPT

## 2023-06-15 PROCEDURE — 82746 ASSAY OF FOLIC ACID SERUM: CPT

## 2023-06-15 PROCEDURE — 84145 PROCALCITONIN (PCT): CPT

## 2023-06-15 PROCEDURE — 99239 HOSP IP/OBS DSCHRG MGMT >30: CPT

## 2023-06-15 PROCEDURE — 93306 TTE W/DOPPLER COMPLETE: CPT

## 2023-06-15 PROCEDURE — 36430 TRANSFUSION BLD/BLD COMPNT: CPT

## 2023-06-15 PROCEDURE — 36415 COLL VENOUS BLD VENIPUNCTURE: CPT

## 2023-06-15 PROCEDURE — C1889: CPT

## 2023-06-15 PROCEDURE — 85027 COMPLETE CBC AUTOMATED: CPT

## 2023-06-15 PROCEDURE — 85610 PROTHROMBIN TIME: CPT

## 2023-06-15 PROCEDURE — 83540 ASSAY OF IRON: CPT

## 2023-06-15 PROCEDURE — 85730 THROMBOPLASTIN TIME PARTIAL: CPT

## 2023-06-15 PROCEDURE — 82607 VITAMIN B-12: CPT

## 2023-06-15 PROCEDURE — 85025 COMPLETE CBC W/AUTO DIFF WBC: CPT

## 2023-06-15 PROCEDURE — 80053 COMPREHEN METABOLIC PANEL: CPT

## 2023-06-15 PROCEDURE — 97162 PT EVAL MOD COMPLEX 30 MIN: CPT

## 2023-06-15 PROCEDURE — C1713: CPT

## 2023-06-15 PROCEDURE — 80048 BASIC METABOLIC PNL TOTAL CA: CPT

## 2023-06-15 PROCEDURE — 82962 GLUCOSE BLOOD TEST: CPT

## 2023-06-15 PROCEDURE — 83735 ASSAY OF MAGNESIUM: CPT

## 2023-06-15 PROCEDURE — 88305 TISSUE EXAM BY PATHOLOGIST: CPT

## 2023-06-15 PROCEDURE — 86901 BLOOD TYPING SEROLOGIC RH(D): CPT

## 2023-06-15 PROCEDURE — 70450 CT HEAD/BRAIN W/O DYE: CPT | Mod: MA

## 2023-06-15 PROCEDURE — 86900 BLOOD TYPING SEROLOGIC ABO: CPT

## 2023-06-15 PROCEDURE — 71045 X-RAY EXAM CHEST 1 VIEW: CPT

## 2023-06-15 PROCEDURE — 72192 CT PELVIS W/O DYE: CPT | Mod: MA

## 2023-06-15 PROCEDURE — 99285 EMERGENCY DEPT VISIT HI MDM: CPT

## 2023-06-15 RX ORDER — ASPIRIN/CALCIUM CARB/MAGNESIUM 324 MG
1 TABLET ORAL
Qty: 84 | Refills: 0
Start: 2023-06-15 | End: 2023-07-26

## 2023-06-15 RX ADMIN — PANTOPRAZOLE SODIUM 40 MILLIGRAM(S): 20 TABLET, DELAYED RELEASE ORAL at 05:48

## 2023-06-15 RX ADMIN — Medication 25 MILLIGRAM(S): at 05:48

## 2023-06-15 RX ADMIN — Medication 650 MILLIGRAM(S): at 05:48

## 2023-06-15 RX ADMIN — ENOXAPARIN SODIUM 40 MILLIGRAM(S): 100 INJECTION SUBCUTANEOUS at 10:59

## 2023-06-15 RX ADMIN — Medication 50 MICROGRAM(S): at 05:48

## 2023-06-15 RX ADMIN — Medication 650 MILLIGRAM(S): at 11:00

## 2023-06-15 RX ADMIN — LEVETIRACETAM 500 MILLIGRAM(S): 250 TABLET, FILM COATED ORAL at 05:48

## 2023-06-15 RX ADMIN — Medication 2000 UNIT(S): at 11:00

## 2023-06-15 RX ADMIN — ESCITALOPRAM OXALATE 20 MILLIGRAM(S): 10 TABLET, FILM COATED ORAL at 11:00

## 2023-06-15 NOTE — DISCHARGE NOTE NURSING/CASE MANAGEMENT/SOCIAL WORK - NSDCPEFALRISK_GEN_ALL_CORE
For information on Fall & Injury Prevention, visit: [email or web address] OR  [email or web address] OR  https://www.cdc.gov/steadi/patient.html For information on Fall & Injury Prevention, visit: [email or web address]://[email or web address] OR  [email or web address]://[email or web address]-[email or web address]-[email or web address]-[email or web address]-avoid-injury OR  https://www.cdc.gov/steadi/patient.html For information on Fall & Injury Prevention, visit: [email or web address]://[email or web address]/[email or web address]-[email or web address]-[email or web address]-mobility OR  [email or web address]-[email or web address]-injury OR  https://www.cdc.gov/steadi/patient.html

## 2023-06-15 NOTE — PROGRESS NOTE ADULT - ASSESSMENT
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     # R hip fx  #POD #3, Hemiarthroplasty  -Ortho following; Surgeon Dr Estrada  -PT/OT recommend HEAVENLY  -WBAT  -Given 1 iu PRBC post op--Hgb stable  - DC on ASA 81 mg BID x 6 weeks     #Acute blood loss anemia, possibly post-operative blood loss  - Monitor Hg/hct,--given 1 iu PRBC   - Hgb now 9.2   - stable     #Severe Dementia  -cont supportive care    #hx of Depression  -cont Lexapro    # HTN  -cont Toprol  -Holding lasix for now  -monitor vitals, BP stable    # Hypothyroid  -cont Synthroid.    #Sz d/o   -cont keppra    #DVT PPX: Heparin  AM labs  DISPO: PT recommends HEAVENLY; son agrees; Vinny BURDICK . Stable for dc today   6/15: Called son Josh Braxton at [phone]- no answer -  Left with called back number  73F [facility] resident hx of severe dementia, HTN, HLD, depression, NPH with  shunt, hx of dermatomyositis with ILD was on IVIG (off treatment) admitted with R hip fx.     # R hip fx  #POD #3, Hemiarthroplasty  -Ortho following; Surgeon Dr Estrada  -PT/OT recommend HEAVENLY  -WBAT  -Given 1 iu PRBC post op--Hgb stable  - DC on ASA 81 mg BID x 6 weeks     #Acute blood loss anemia, possibly post-operative blood loss  - Monitor Hg/hct,--given 1 iu PRBC   - Hgb now 9.2   - stable     #Severe Dementia  -cont supportive care    #hx of Depression  -cont Lexapro    # HTN  -cont Toprol  -Holding lasix for now  -monitor vitals, BP stable    # Hypothyroid  -cont Synthroid.    #Sz d/o   -cont keppra    #DVT PPX: Heparin  AM labs  DISPO: PT recommends HEAVENLY; son agrees; Vinny BURDICK . Stable for dc today   6/15: Called son Josh Braxton at [phone]- no answer -  Left with called back number  73F [facility] resident hx of severe dementia, HTN, HLD, depression, NPH with  shunt, hx of dermatomyositis with ILD was on IVIG (off treatment) admitted with R hip fx.     # R hip fx  #POD #3, Hemiarthroplasty  -Ortho following; Surgeon Dr Estrada  -PT/OT recommend HEAVENLY  -WBAT  -Given 1 iu PRBC post op--Hgb stable  - DC on ASA 81 mg BID x 6 weeks     #Acute blood loss anemia, possibly post-operative blood loss  - Monitor Hg/hct,--given 1 iu PRBC   - Hgb now 9.2   - stable     #Severe Dementia  -cont supportive care    #hx of Depression  -cont Lexapro    # HTN  -cont Toprol  -Holding lasix for now  -monitor vitals, BP stable    # Hypothyroid  -cont Synthroid.    #Sz d/o   -cont keppra    #DVT PPX: Heparin  AM labs  DISPO: PT recommends HEAVENLY; son agrees; Vinny BURDICK . Stable for dc today   6/15: Called son Josh Braxton at [phone]- no answer -  Left with called back number  73F [facility] resident hx of severe dementia, HTN, HLD, depression, NPH with  shunt, hx of dermatomyositis with ILD was on IVIG (off treatment) admitted with R hip fx.     Right hip fracture  POD #3, Hemiarthroplasty  -Ortho following; Surgeon Dr Estrada  -PT/OT recommend HEAVENLY  -WBAT  -Given 1 iu PRBC post op--Hgb stable  - DC on ASA 81 mg BID x 6 weeks     Acute blood loss anemia, possibly post-operative blood loss  - Monitor Hg/hct,--given 1 iu PRBC   - Hgb now 9.2   - stable     Severe Dementia  -cont supportive care    #hx of Depression  -cont Lexapro    # HTN  -cont Toprol  -Holding lasix for now  -monitor vitals, BP stable    # Hypothyroid  -cont Synthroid.    #Sz d/o   -cont keppra    #DVT PPX: Heparin  AM labs  DISPO: PT recommends HEAVENLY; son agrees; Vinny HEAVENLY . Stable for dc today   6/15: Called son Josh Braxton at [phone]- no answer -  Left with called back number  73F [facility] resident hx of severe dementia, HTN, HLD, depression, NPH with  shunt, hx of dermatomyositis with ILD was on IVIG (off treatment) admitted with R hip fx.     Right hip fracture  POD #3, Hemiarthroplasty  -Ortho following; Surgeon Dr Estrada  -PT/OT recommend HEAVENLY  -WBAT  -Given 1 iu PRBC post op--Hgb stable  - DC on ASA 81 mg BID x 6 weeks     Acute blood loss anemia, possibly post-operative blood loss  - Monitor Hg/hct,--given 1 iu PRBC   - Hgb now 9.2   - stable     Severe Dementia  -cont supportive care    #hx of Depression  -cont Lexapro    # HTN  -cont Toprol  -Holding lasix for now  -monitor vitals, BP stable    # Hypothyroid  -cont Synthroid.    #Sz d/o   -cont keppra    #DVT PPX: Heparin  AM labs  DISPO: PT recommends HEAVENLY; son agrees; Vinny HEAVENLY . Stable for dc today   6/15: Called son Josh Braxton at [phone]- no answer -  Left with called back number  73F [facility] resident hx of severe dementia, HTN, HLD, depression, NPH with  shunt, hx of dermatomyositis with ILD was on IVIG (off treatment) admitted with R hip fx.     Right hip fracture  POD #3, Hemiarthroplasty  -Ortho following; Surgeon Dr Estrada  -PT/OT recommend HEAVENLY  -WBAT  -Given 1 iu PRBC post op--Hgb stable  - DC on ASA 81 mg BID x 6 weeks     Acute blood loss anemia, possibly post-operative blood loss  - Monitor Hg/hct,--given 1 iu PRBC   - Hgb now 9.2   - stable     Severe Dementia  -cont supportive care    #hx of Depression  -cont Lexapro    # HTN  -cont Toprol  -Holding lasix for now  -monitor vitals, BP stable    # Hypothyroid  -cont Synthroid.    #Sz d/o   -cont keppra    #DVT PPX: Heparin  AM labs  DISPO: PT recommends HEAVENLY; son agrees; Vinny HEAVENLY . Stable for dc today   6/15: Called son Josh Braxton at [phone]- no answer -  Left with called back number

## 2023-06-15 NOTE — PROGRESS NOTE ADULT - NS ATTEND AMEND GEN_ALL_CORE FT
At high risk for surgery  Family in agreement with OR  NPO for procedure  C/w current medications
Right hip fracture  OR on Monday  Appreciate cardiology consult  Ortho notified  c/w current medications    DISP: Pending course.
Right hip fracture  OR on Monday  Appreciate cardiology consult  Ortho notified  c/w current medications    DISP: Pending course
Right hip fracture  - for HEAVENLY today
POD # 1  Post op anemia    Will monitor Hg/hct, transfuse If Hg <7  Will check iron, b12/folate  PT eval pending  DVT PPX  Am labs

## 2023-06-15 NOTE — PROGRESS NOTE ADULT - REASON FOR ADMISSION
R  hip fx

## 2023-06-15 NOTE — PROGRESS NOTE ADULT - SUBJECTIVE AND OBJECTIVE BOX
Patient is a 73y old  Female who presents with a chief complaint of R  hip fx (14 Jun 2023 13:01)  No reported issues overnight     Patient seen and examined at bedside.    ALLERGIES:  No Known Allergies    MEDICATIONS  (STANDING):  acetaminophen     Tablet .. 650 milliGRAM(s) Oral every 6 hours  atorvastatin 20 milliGRAM(s) Oral at bedtime  cholecalciferol 2000 Unit(s) Oral daily  enoxaparin Injectable 40 milliGRAM(s) SubCutaneous every 24 hours  escitalopram 20 milliGRAM(s) Oral daily  levETIRAcetam 500 milliGRAM(s) Oral two times a day  levothyroxine 50 MICROGram(s) Oral daily  melatonin 3 milliGRAM(s) Oral at bedtime  metoprolol succinate ER 25 milliGRAM(s) Oral daily  pantoprazole    Tablet 40 milliGRAM(s) Oral before breakfast  senna 2 Tablet(s) Oral at bedtime    MEDICATIONS  (PRN):  ondansetron Injectable 4 milliGRAM(s) IV Push every 6 hours PRN Nausea and/or Vomiting    Vital Signs Last 24 Hrs  T(F): 98.4 (15 James 2023 05:59), Max: 98.4 (15 James 2023 05:59)  HR: 79 (15 James 2023 05:59) (79 - 79)  BP: 130/65 (15 James 2023 05:59) (130/65 - 130/65)  RR: 18 (15 James 2023 05:59) (18 - 18)  SpO2: 96% (15 James 2023 05:59) (96% - 96%)  I&O's Summary    14 Jun 2023 07:01  -  15 James 2023 07:00  --------------------------------------------------------  IN: 0 mL / OUT: 1650 mL / NET: -1650 mL      BMI (kg/m2): 24.1 (06-12-23 @ 14:33)  PHYSICAL EXAM:  General: NAD, elderly, does not speak   ENT: MMM, no thrush  Neck: Supple, No JVD  Lungs: Non labored breathing,  Clear to auscultation bilaterally,   Cardio: RRR, S1/S2, , no pitting edema bilaterally  Abdomen: Soft, Nontender, Nondistended; Bowel sounds present  Extremities: No calf tenderness,  R hip dressing intact     LABS:                        9.2    9.22  )-----------( 160      ( 15 James 2023 07:29 )             28.4       06-15    141  |  108  |  14  ----------------------------<  102  3.6   |  26  |  0.81    Ca    8.4      15 James 2023 07:29  Mg     1.7     06-15                                        COVID-19 PCR: NotDetec (06-13-23 @ 19:25)      RADIOLOGY & ADDITIONAL TESTS:    Care Discussed with Consultants/Other Providers:    Patient is a 73y old  Female who presents with a chief complaint of R  hip fx (14 Jun 2023 13:01)  No reported issues overnight     Patient seen and examined at bedside.    ALLERGIES:  No Known Allergies    MEDICATIONS  (STANDING):  acetaminophen     Tablet .. 650 milliGRAM(s) Oral every 6 hours  atorvastatin 20 milliGRAM(s) Oral at bedtime  cholecalciferol 2000 Unit(s) Oral daily  enoxaparin Injectable 40 milliGRAM(s) SubCutaneous every 24 hours  escitalopram 20 milliGRAM(s) Oral daily  levETIRAcetam 500 milliGRAM(s) Oral two times a day  levothyroxine 50 MICROGram(s) Oral daily  melatonin 3 milliGRAM(s) Oral at bedtime  metoprolol succinate ER 25 milliGRAM(s) Oral daily  pantoprazole    Tablet 40 milliGRAM(s) Oral before breakfast  senna 2 Tablet(s) Oral at bedtime    MEDICATIONS  (PRN):  ondansetron Injectable 4 milliGRAM(s) IV Push every 6 hours PRN Nausea and/or Vomiting    Vital Signs Last 24 Hrs  T(F): 98.4 (15 James 2023 05:59), Max: 98.4 (15 James 2023 05:59)  HR: 79 (15 James 2023 05:59) (79 - 79)  BP: 130/65 (15 James 2023 05:59) (130/65 - 130/65)  RR: 18 (15 James 2023 05:59) (18 - 18)  SpO2: 96% (15 James 2023 05:59) (96% - 96%)  I&O's Summary    14 Jun 2023 07:01  -  15 James 2023 07:00  --------------------------------------------------------  IN: 0 mL / OUT: 1650 mL / NET: -1650 mL    BMI (kg/m2): 24.1 (06-12-23 @ 14:33)  PHYSICAL EXAM:  General: NAD, elderly, does not speak   ENT: MMM, no thrush  Neck: Supple, No JVD  Lungs: Non labored breathing,  Clear to auscultation bilaterally,   Cardio: RRR, S1/S2, , no pitting edema bilaterally  Abdomen: Soft, Nontender, Nondistended; Bowel sounds present  Extremities: No calf tenderness,  R hip dressing intact     LABS:              9.2    9.22  )-----------( 160      ( 15 James 2023 07:29 )             28.4       06-15  141  |  108  |  14  ----------------------------<  102  3.6   |  26  |  0.81    Ca    8.4      15 James 2023 07:29  Mg     1.7     06-15    COVID-19 PCR: NotDetec (06-13-23 @ 19:25)    RADIOLOGY & ADDITIONAL TESTS:    Care Discussed with Consultants/Other Providers:

## 2023-06-15 NOTE — PROGRESS NOTE ADULT - PROVIDER SPECIALTY LIST ADULT
Critical Care
Critical Care
Hospitalist
Orthopedics
Orthopedics
Hospitalist
Cardiology
Hospitalist
Hospitalist

## 2023-06-15 NOTE — DISCHARGE NOTE NURSING/CASE MANAGEMENT/SOCIAL WORK - PATIENT PORTAL LINK FT
You can access the FollowMyHealth Patient Portal offered by [facility] by registering at the following website: [email or web address]. By joining [facility]’s FollowMyHealth portal, you will also be able to view your health information using other applications (apps) compatible with our system. You can access the FollowMyHealth Patient Portal offered by [facility] by registering at the following website: [email or web address]. By joining [employer]’s FollowMyHealth portal, you will also be able to view your health information using other applications (apps) compatible with our system. You can access the FollowMyHealth Patient Portal offered by [facility] by registering at the following website: [email or web address]. By joining [employer]’s FollowMyHealth portal, you will also be able to view your health information using other applications (apps) compatible with our system.

## 2023-06-26 LAB
SURGICAL PATHOLOGY STUDY: SIGNIFICANT CHANGE UP

## 2023-06-29 ENCOUNTER — APPOINTMENT (OUTPATIENT)
Dept: ORTHOPEDIC SURGERY | Facility: CLINIC | Age: 73
End: 2023-06-29
Payer: MEDICARE

## 2023-06-29 DIAGNOSIS — S72.001D FRACTURE OF UNSPECIFIED PART OF NECK OF RIGHT FEMUR, SUBSEQUENT ENCOUNTER FOR CLOSED FRACTURE WITH ROUTINE HEALING: ICD-10-CM

## 2023-06-29 DIAGNOSIS — Z96.641 PRESENCE OF RIGHT ARTIFICIAL HIP JOINT: ICD-10-CM

## 2023-06-29 PROCEDURE — 99024 POSTOP FOLLOW-UP VISIT: CPT

## 2023-06-29 PROCEDURE — 73502 X-RAY EXAM HIP UNI 2-3 VIEWS: CPT

## 2023-07-03 ENCOUNTER — TRANSCRIPTION ENCOUNTER (OUTPATIENT)
Age: 73
End: 2023-07-03

## 2023-07-24 ENCOUNTER — TRANSCRIPTION ENCOUNTER (OUTPATIENT)
Age: 73
End: 2023-07-24

## 2023-08-18 ENCOUNTER — TRANSCRIPTION ENCOUNTER (OUTPATIENT)
Age: 73
End: 2023-08-18

## 2023-09-08 ENCOUNTER — TRANSCRIPTION ENCOUNTER (OUTPATIENT)
Age: 73
End: 2023-09-08

## 2023-09-14 ENCOUNTER — EMERGENCY (EMERGENCY)
Facility: HOSPITAL | Age: 73
LOS: 1 days | Discharge: HOME | End: 2023-09-14
Attending: INTERNAL MEDICINE | Admitting: INTERNAL MEDICINE
Payer: MEDICARE

## 2023-09-14 VITALS
OXYGEN SATURATION: 98 % | SYSTOLIC BLOOD PRESSURE: 138 MMHG | RESPIRATION RATE: 20 BRPM | HEART RATE: 77 BPM | TEMPERATURE: 98 F | DIASTOLIC BLOOD PRESSURE: 60 MMHG

## 2023-09-14 VITALS
HEIGHT: 61 IN | RESPIRATION RATE: 16 BRPM | DIASTOLIC BLOOD PRESSURE: 91 MMHG | TEMPERATURE: 97 F | HEART RATE: 85 BPM | OXYGEN SATURATION: 95 % | SYSTOLIC BLOOD PRESSURE: 123 MMHG | WEIGHT: 147.71 LBS

## 2023-09-14 DIAGNOSIS — Z98.2 PRESENCE OF CEREBROSPINAL FLUID DRAINAGE DEVICE: Chronic | ICD-10-CM

## 2023-09-14 LAB
ALBUMIN SERPL ELPH-MCNC: 3.2 G/DL — LOW (ref 3.3–5)
ALP SERPL-CCNC: 85 U/L — SIGNIFICANT CHANGE UP (ref 40–120)
ALT FLD-CCNC: 22 U/L — SIGNIFICANT CHANGE UP (ref 10–45)
ANION GAP SERPL CALC-SCNC: 10 MMOL/L — SIGNIFICANT CHANGE UP (ref 5–17)
APPEARANCE UR: ABNORMAL
AST SERPL-CCNC: 21 U/L — SIGNIFICANT CHANGE UP (ref 10–40)
BACTERIA # UR AUTO: ABNORMAL /HPF
BASOPHILS # BLD AUTO: 0.03 K/UL — SIGNIFICANT CHANGE UP (ref 0–0.2)
BASOPHILS NFR BLD AUTO: 0.3 % — SIGNIFICANT CHANGE UP (ref 0–2)
BILIRUB SERPL-MCNC: 0.3 MG/DL — SIGNIFICANT CHANGE UP (ref 0.2–1.2)
BILIRUB UR-MCNC: NEGATIVE — SIGNIFICANT CHANGE UP
BUN SERPL-MCNC: 12 MG/DL — SIGNIFICANT CHANGE UP (ref 7–23)
CALCIUM SERPL-MCNC: 9 MG/DL — SIGNIFICANT CHANGE UP (ref 8.4–10.5)
CHLORIDE SERPL-SCNC: 106 MMOL/L — SIGNIFICANT CHANGE UP (ref 96–108)
CO2 SERPL-SCNC: 26 MMOL/L — SIGNIFICANT CHANGE UP (ref 22–31)
COLOR SPEC: YELLOW — SIGNIFICANT CHANGE UP
CREAT SERPL-MCNC: 0.73 MG/DL — SIGNIFICANT CHANGE UP (ref 0.5–1.3)
DIFF PNL FLD: NEGATIVE — SIGNIFICANT CHANGE UP
EGFR: 86 ML/MIN/1.73M2 — SIGNIFICANT CHANGE UP
EOSINOPHIL # BLD AUTO: 0.25 K/UL — SIGNIFICANT CHANGE UP (ref 0–0.5)
EOSINOPHIL NFR BLD AUTO: 2.9 % — SIGNIFICANT CHANGE UP (ref 0–6)
EPI CELLS # UR: 2 — SIGNIFICANT CHANGE UP
GLUCOSE SERPL-MCNC: 116 MG/DL — HIGH (ref 70–99)
GLUCOSE UR QL: NEGATIVE MG/DL — SIGNIFICANT CHANGE UP
HCT VFR BLD CALC: 38 % — SIGNIFICANT CHANGE UP (ref 34.5–45)
HGB BLD-MCNC: 12.2 G/DL — SIGNIFICANT CHANGE UP (ref 11.5–15.5)
IMM GRANULOCYTES NFR BLD AUTO: 0.5 % — SIGNIFICANT CHANGE UP (ref 0–0.9)
KETONES UR-MCNC: NEGATIVE MG/DL — SIGNIFICANT CHANGE UP
LEUKOCYTE ESTERASE UR-ACNC: NEGATIVE — SIGNIFICANT CHANGE UP
LYMPHOCYTES # BLD AUTO: 1.24 K/UL — SIGNIFICANT CHANGE UP (ref 1–3.3)
LYMPHOCYTES # BLD AUTO: 14.4 % — SIGNIFICANT CHANGE UP (ref 13–44)
MCHC RBC-ENTMCNC: 29.6 PG — SIGNIFICANT CHANGE UP (ref 27–34)
MCHC RBC-ENTMCNC: 32.1 GM/DL — SIGNIFICANT CHANGE UP (ref 32–36)
MCV RBC AUTO: 92.2 FL — SIGNIFICANT CHANGE UP (ref 80–100)
MONOCYTES # BLD AUTO: 0.4 K/UL — SIGNIFICANT CHANGE UP (ref 0–0.9)
MONOCYTES NFR BLD AUTO: 4.7 % — SIGNIFICANT CHANGE UP (ref 2–14)
NEUTROPHILS # BLD AUTO: 6.64 K/UL — SIGNIFICANT CHANGE UP (ref 1.8–7.4)
NEUTROPHILS NFR BLD AUTO: 77.2 % — HIGH (ref 43–77)
NITRITE UR-MCNC: POSITIVE
NRBC # BLD: 0 /100 WBCS — SIGNIFICANT CHANGE UP (ref 0–0)
PH UR: 7.5 — SIGNIFICANT CHANGE UP (ref 5–8)
PLATELET # BLD AUTO: 196 K/UL — SIGNIFICANT CHANGE UP (ref 150–400)
POTASSIUM SERPL-MCNC: 3.4 MMOL/L — LOW (ref 3.5–5.3)
POTASSIUM SERPL-SCNC: 3.4 MMOL/L — LOW (ref 3.5–5.3)
PROT SERPL-MCNC: 7.8 G/DL — SIGNIFICANT CHANGE UP (ref 6–8.3)
PROT UR-MCNC: NEGATIVE MG/DL — SIGNIFICANT CHANGE UP
RBC # BLD: 4.12 M/UL — SIGNIFICANT CHANGE UP (ref 3.8–5.2)
RBC # FLD: 14.3 % — SIGNIFICANT CHANGE UP (ref 10.3–14.5)
RBC CASTS # UR COMP ASSIST: 0 /HPF — SIGNIFICANT CHANGE UP (ref 0–4)
SODIUM SERPL-SCNC: 142 MMOL/L — SIGNIFICANT CHANGE UP (ref 135–145)
SP GR SPEC: 1.01 — SIGNIFICANT CHANGE UP (ref 1–1.03)
TROPONIN I, HIGH SENSITIVITY RESULT: 8.9 NG/L — SIGNIFICANT CHANGE UP
UROBILINOGEN FLD QL: 1 MG/DL — SIGNIFICANT CHANGE UP (ref 0.2–1)
WBC # BLD: 8.6 K/UL — SIGNIFICANT CHANGE UP (ref 3.8–10.5)
WBC # FLD AUTO: 8.6 K/UL — SIGNIFICANT CHANGE UP (ref 3.8–10.5)
WBC UR QL: 0 /HPF — SIGNIFICANT CHANGE UP (ref 0–5)

## 2023-09-14 PROCEDURE — 99285 EMERGENCY DEPT VISIT HI MDM: CPT | Mod: 25

## 2023-09-14 PROCEDURE — 85025 COMPLETE CBC W/AUTO DIFF WBC: CPT

## 2023-09-14 PROCEDURE — 96366 THER/PROPH/DIAG IV INF ADDON: CPT

## 2023-09-14 PROCEDURE — 81001 URINALYSIS AUTO W/SCOPE: CPT

## 2023-09-14 PROCEDURE — 80053 COMPREHEN METABOLIC PANEL: CPT

## 2023-09-14 PROCEDURE — 93010 ELECTROCARDIOGRAM REPORT: CPT

## 2023-09-14 PROCEDURE — 71045 X-RAY EXAM CHEST 1 VIEW: CPT | Mod: 26

## 2023-09-14 PROCEDURE — 71045 X-RAY EXAM CHEST 1 VIEW: CPT

## 2023-09-14 PROCEDURE — 84484 ASSAY OF TROPONIN QUANT: CPT

## 2023-09-14 PROCEDURE — 96375 TX/PRO/DX INJ NEW DRUG ADDON: CPT

## 2023-09-14 PROCEDURE — 93005 ELECTROCARDIOGRAM TRACING: CPT

## 2023-09-14 PROCEDURE — 99285 EMERGENCY DEPT VISIT HI MDM: CPT

## 2023-09-14 PROCEDURE — 36415 COLL VENOUS BLD VENIPUNCTURE: CPT

## 2023-09-14 PROCEDURE — 96365 THER/PROPH/DIAG IV INF INIT: CPT

## 2023-09-14 RX ORDER — CEFUROXIME AXETIL 250 MG
1 TABLET ORAL
Qty: 20 | Refills: 0
Start: 2023-09-14 | End: 2023-09-23

## 2023-09-14 RX ORDER — LEVETIRACETAM 250 MG/1
1000 TABLET, FILM COATED ORAL ONCE
Refills: 0 | Status: COMPLETED | OUTPATIENT
Start: 2023-09-14 | End: 2023-09-14

## 2023-09-14 RX ORDER — CEFTRIAXONE 500 MG/1
1000 INJECTION, POWDER, FOR SOLUTION INTRAMUSCULAR; INTRAVENOUS ONCE
Refills: 0 | Status: COMPLETED | OUTPATIENT
Start: 2023-09-14 | End: 2023-09-14

## 2023-09-14 RX ADMIN — LEVETIRACETAM 1000 MILLIGRAM(S): 250 TABLET, FILM COATED ORAL at 15:23

## 2023-09-14 RX ADMIN — LEVETIRACETAM 400 MILLIGRAM(S): 250 TABLET, FILM COATED ORAL at 10:57

## 2023-09-14 RX ADMIN — CEFTRIAXONE 100 MILLIGRAM(S): 500 INJECTION, POWDER, FOR SOLUTION INTRAMUSCULAR; INTRAVENOUS at 15:20

## 2023-09-14 RX ADMIN — Medication 1 MILLIGRAM(S): at 11:42

## 2023-09-14 NOTE — EEG REPORT - NS EEG TEXT BOX
Patient Name:  RAFAEL FALL  Medical ID:  [id number]  YOB: 1950  Age: 73    Recording Duration:  Sep 14, 2023 11:29 AM - Sep 14, 2023 1:49 PM  Recording Total Time:  02:19:57    Ordering Physician: ZOE  Primary Indication: Prior Seizure  [facility]    [facility]  Technical Description:    EEG performed with limited number of EEG electrodes/channels for expedited completion and evaluation for possible seizures.  The [employer] EEG recording device consists of a 10-electrode headband, an EEG recorder with the Brain Stethoscope and Clarity (auto seizure detection) features, and a cloud portal for continuous seizure monitoring, EEG data storing, and remote EEG reviewing. With the Brain Stethoscope and Clarity features, spot-checking and continuous seizure detection were available.    Interpretation:  PDR (Hz): not discerned  Slowing: generalized irregular delta>theta activity  IEDs:  Frontal max periodic delta with triphasic morphology noted near 1hz at times  Seizures: none  Sleep transients:  not discerned  Artifacts: Frontal maximal blink artifacts with stimulation.  Prominent myogenic and motion artifacts noted, sharply contoured.    Impression:  Within the technical limitations of the reduced electrode recording and excessive artifacts:  Moderate to severe generalized slowing and triphasic delta near 1hz indicative of diffuse/multifocal cerebral dysfunction.   No seizures.    Consider correlation with CEEG monitoring for further verification of findings if clinically warranted.    MD MAHNAZ De La Torre  Director, Continuous EEG Monitoring Program, [facility] and [facility]   and Epilepsy Fellowship ,   Department of Neurology, [facility]    [facility] EEG Reading Room Ph#: [phone]  Epilepsy Answering Service after 5PM and before 8:30AM: Ph#: [phone]   Patient Name:  RAFAEL FALL  Medical ID:  [id number]  YOB: 1950  Age: 73    Recording Duration:  Sep 14, 2023 11:29 AM - Sep 14, 2023 1:49 PM  Recording Total Time:  02:19:57    Ordering Physician: ZOE  Primary Indication: Prior Seizure  [facility]    [facility]  Technical Description:    EEG performed with limited number of EEG electrodes/channels for expedited completion and evaluation for possible seizures.  The [employer] EEG recording device consists of a 10-electrode headband, an EEG recorder with the Brain Stethoscope and Clarity (auto seizure detection) features, and a cloud portal for continuous seizure monitoring, EEG data storing, and remote EEG reviewing. With the Brain Stethoscope and Clarity features, spot-checking and continuous seizure detection were available.    Interpretation:  PDR (Hz): not discerned  Slowing: generalized irregular delta>theta activity  IEDs:  Frontal max periodic delta with triphasic morphology noted near 1hz at times  Seizures: none  Sleep transients:  not discerned  Artifacts: Frontal maximal blink artifacts with stimulation.  Prominent myogenic and motion artifacts noted, sharply contoured.    Impression:  Within the technical limitations of the reduced electrode recording and excessive artifacts:  Moderate to severe generalized slowing and triphasic delta near 1hz indicative of diffuse/multifocal cerebral dysfunction.   No seizures.    Consider correlation with CEEG monitoring for further verification of findings if clinically warranted.    MD MAHNAZ De La Torre  Director, Continuous EEG Monitoring Program, [facility]   and Epilepsy Fellowship ,   Department of Neurology, [facility]    [facility] EEG Reading Room Ph#: [phone]  Epilepsy Answering Service after 5PM and before 8:30AM: Ph#: [phone]   Patient Name:  RAFAEL FALL  Medical ID:  [id number]  YOB: 1950  Age: 73    Recording Duration:  Sep 14, 2023 11:29 AM - Sep 14, 2023 1:49 PM  Recording Total Time:  02:19:57    Ordering Physician: ZOE  Primary Indication: Prior Seizure  Location: ED    [facility]  Technical Description:    EEG performed with limited number of EEG electrodes/channels for expedited completion and evaluation for possible seizures.  The [employer] EEG recording device consists of a 10-electrode headband, an EEG recorder with the Brain Stethoscope and Clarity (auto seizure detection) features, and a cloud portal for continuous seizure monitoring, EEG data storing, and remote EEG reviewing. With the Brain Stethoscope and Clarity features, spot-checking and continuous seizure detection were available.    Interpretation:  PDR (Hz): not discerned  Slowing: generalized irregular delta>theta activity  IEDs:  Frontal max periodic delta with triphasic morphology noted near 1hz at times  Seizures: none  Sleep transients:  not discerned  Artifacts: Frontal maximal blink artifacts with stimulation.  Prominent myogenic and motion artifacts noted, sharply contoured.    Impression:  Within the technical limitations of the reduced electrode recording and excessive artifacts:  Moderate to severe generalized slowing and triphasic delta near 1hz indicative of diffuse/multifocal cerebral dysfunction.   No seizures.    Consider correlation with CEEG monitoring for further verification of findings if clinically warranted.    MD MAHNAZ De La Torre  Director, Continuous EEG Monitoring Program, [facility] and [facility]   and Epilepsy Fellowship ,   Department of Neurology, [facility]    [facility] EEG Reading Room Ph#: [phone]  Epilepsy Answering Service after 5PM and before 8:30AM: Ph#: [phone]   Patient Name:  RAFAEL FALL  Medical ID:  [id number]  YOB: 1950  Age: 73    Recording Duration:  Sep 14, 2023 11:29 AM - Sep 14, 2023 1:49 PM  Recording Total Time:  02:19:57    Ordering Physician: ZOE  Primary Indication: Prior Seizure  [facility] ED    [facility]  Technical Description:    EEG performed with limited number of EEG electrodes/channels for expedited completion and evaluation for possible seizures.  The [employer] EEG recording device consists of a 10-electrode headband, an EEG recorder with the Brain Stethoscope and Clarity (auto seizure detection) features, and a cloud portal for continuous seizure monitoring, EEG data storing, and remote EEG reviewing. With the Brain Stethoscope and Clarity features, spot-checking and continuous seizure detection were available.    Interpretation:  PDR (Hz): not discerned  Slowing: generalized irregular delta>theta activity  IEDs:  Frontal max delta with triphasic morphology noted sporadically  Seizures: none  Sleep transients:  not discerned  Artifacts: Frontal maximal blink artifacts with stimulation.  Prominent myogenic and motion artifacts noted, sharply contoured.    Impression:  Within the technical limitations of the reduced electrode recording and excessive artifacts:  Moderate  generalized slowing indicative of diffuse/multifocal cerebral dysfunction.   No seizures.    Consider correlation with CEEG monitoring for further verification of findings if clinically warranted.    Kobi Moura MD FAES  Director, Continuous EEG Monitoring Program, [facility]   and Epilepsy Fellowship ,   Department of Neurology, [facility] EEG Reading Room Ph#: [phone]  Epilepsy Answering Service after 5PM and before 8:30AM: Ph#: [phone]   Patient Name:  RAFAEL FALL  Medical ID:  [id number]  YOB: 1950  Age: 73    Recording Duration:  Sep 14, 2023 11:29 AM - Sep 14, 2023 1:49 PM  Recording Total Time:  02:19:57    Ordering Physician: ZOE  Primary Indication: Prior Seizure  Location: ED    [facility]  Technical Description:    EEG performed with limited number of EEG electrodes/channels for expedited completion and evaluation for possible seizures.  The [employer] EEG recording device consists of a 10-electrode headband, an EEG recorder with the Brain Stethoscope and Clarity (auto seizure detection) features, and a cloud portal for continuous seizure monitoring, EEG data storing, and remote EEG reviewing. With the Brain Stethoscope and Clarity features, spot-checking and continuous seizure detection were available.    Interpretation:  PDR (Hz): not discerned  Slowing: generalized irregular delta>theta activity  IEDs:  Frontal max delta with triphasic morphology noted sporadically  Seizures: none  Sleep transients:  not discerned  Artifacts: Frontal maximal blink artifacts with stimulation.  Prominent myogenic and motion artifacts noted, sharply contoured.    Impression:  Within the technical limitations of the reduced electrode recording and excessive artifacts:  Moderate  generalized slowing indicative of diffuse/multifocal cerebral dysfunction.   No seizures.    Consider correlation with CEEG monitoring for further verification of findings if clinically warranted.    Kobi Moura MD FAES  Director, Continuous EEG Monitoring Program, [facility]   and Epilepsy Fellowship ,   Department of Neurology, [facility] EEG Reading Room Ph#: [phone]  Epilepsy Answering Service after 5PM and before 8:30AM: Ph#: [phone]   Patient Name:  RAFAEL FALL  Medical ID:  [id number]  YOB: 1950  Age: 73    Recording Duration:  Sep 14, 2023 11:29 AM - Sep 14, 2023 1:49 PM  Recording Total Time:  02:19:57    Ordering Physician: ZOE  Primary Indication: Prior Seizure  [facility] ED    [facility]  Technical Description:    EEG performed with limited number of EEG electrodes/channels for expedited completion and evaluation for possible seizures.  The [employer] EEG recording device consists of a 10-electrode headband, an EEG recorder with the Brain Stethoscope and Clarity (auto seizure detection) features, and a cloud portal for continuous seizure monitoring, EEG data storing, and remote EEG reviewing. With the Brain Stethoscope and Clarity features, spot-checking and continuous seizure detection were available.    Interpretation:  PDR (Hz): not discerned  Slowing: generalized irregular delta>theta activity  IEDs:  Frontal max delta with triphasic morphology noted sporadically  Seizures: none  Sleep transients:  not discerned  Artifacts: Frontal maximal blink artifacts with stimulation.  Prominent myogenic and motion artifacts noted, sharply contoured.    Impression:  Within the technical limitations of the reduced electrode recording and excessive artifacts:  Moderate  generalized slowing indicative of diffuse/multifocal cerebral dysfunction.   No seizures.    Consider correlation with CEEG monitoring for further verification of findings if clinically warranted.    Kobi Moura MD FAES  Director, Continuous EEG Monitoring Program, [facility]   and Epilepsy Fellowship ,   Department of Neurology, [facility] EEG Reading Room Ph#: [phone]  Epilepsy Answering Service after 5PM and before 8:30AM: Ph#: [phone]   Patient Name:  RAFAEL FALL  Medical ID:  [id number]  YOB: 1950  Age: 73    Recording Duration:  Sep 14, 2023 11:29 AM - Sep 14, 2023 1:49 PM  Recording Total Time:  02:19:57    Ordering Physician: ZOE  Primary Indication: Prior Seizure  [facility] ED    [facility]  Technical Description:    EEG performed with limited number of EEG electrodes/channels for expedited completion and evaluation for possible seizures.  The [employer] EEG recording device consists of a 10-electrode headband, an EEG recorder with the Brain Stethoscope and Clarity (auto seizure detection) features, and a cloud portal for continuous seizure monitoring, EEG data storing, and remote EEG reviewing. With the Brain Stethoscope and Clarity features, spot-checking and continuous seizure detection were available.    Interpretation:  PDR (Hz): not discerned  Slowing: generalized irregular delta>theta activity  Left temopral max slowing  IEDs:  Frontal max delta with triphasic morphology noted sporadically  Seizures: none  Sleep transients:  not discerned  Artifacts: Frontal maximal blink artifacts with stimulation.    Prominent myogenic and motion artifacts noted, sharply contoured.    Impression:  Within the technical limitations of the reduced electrode recording and excessive artifacts:  Left temopral max slowing  Moderate  generalized slowing indicative of diffuse/multifocal cerebral dysfunction.   No seizures.    Consider correlation with CEEG monitoring for further verification of findings if clinically warranted.    MD MAHNAZ De La Torre  Director, Continuous EEG Monitoring Program, [facility]   and Epilepsy Fellowship ,   Department of Neurology, [facility]    [facility] EEG Reading Room Ph#: [phone]  Epilepsy Answering Service after 5PM and before 8:30AM: Ph#: [phone]   Patient Name:  RAFAEL FALL  Medical ID:  [id number]  YOB: 1950  Age: 73    Recording Duration:  Sep 14, 2023 11:29 AM - Sep 14, 2023 1:49 PM  Recording Total Time:  02:19:57    Ordering Physician: ZOE  Primary Indication: Prior Seizure  Location: ED    [facility]  Technical Description:    EEG performed with limited number of EEG electrodes/channels for expedited completion and evaluation for possible seizures.  The [employer] EEG recording device consists of a 10-electrode headband, an EEG recorder with the Brain Stethoscope and Clarity (auto seizure detection) features, and a cloud portal for continuous seizure monitoring, EEG data storing, and remote EEG reviewing. With the Brain Stethoscope and Clarity features, spot-checking and continuous seizure detection were available.    Interpretation:  PDR (Hz): not discerned  Slowing: generalized irregular delta>theta activity  Left temopral max slowing  IEDs:  Frontal max delta with triphasic morphology noted sporadically  Seizures: none  Sleep transients:  not discerned  Artifacts: Frontal maximal blink artifacts with stimulation.    Prominent myogenic and motion artifacts noted, sharply contoured.    Impression:  Within the technical limitations of the reduced electrode recording and excessive artifacts:  Left temopral max slowing  Moderate  generalized slowing indicative of diffuse/multifocal cerebral dysfunction.   No seizures.    Consider correlation with CEEG monitoring for further verification of findings if clinically warranted.    MD MAHNAZ De La Torre  Director, Continuous EEG Monitoring Program, [facility]   and Epilepsy Fellowship ,   Department of Neurology, [facility]    [facility] EEG Reading Room Ph#: [phone]  Epilepsy Answering Service after 5PM and before 8:30AM: Ph#: [phone]   Patient Name:  RAFAEL FALL  Medical ID:  [id number]  YOB: 1950  Age: 73    Recording Duration:  Sep 14, 2023 11:29 AM - Sep 14, 2023 1:49 PM  Recording Total Time:  02:19:57    Ordering Physician: ZOE  Primary Indication: Prior Seizure  Location: ED    [facility]  Technical Description:    EEG performed with limited number of EEG electrodes/channels for expedited completion and evaluation for possible seizures.  The [employer] EEG recording device consists of a 10-electrode headband, an EEG recorder with the Brain Stethoscope and Clarity (auto seizure detection) features, and a cloud portal for continuous seizure monitoring, EEG data storing, and remote EEG reviewing. With the Brain Stethoscope and Clarity features, spot-checking and continuous seizure detection were available.    Interpretation:  PDR (Hz): not discerned  Slowing: generalized irregular delta>theta activity  Left temopral max slowing  IEDs:  Frontal max delta with triphasic morphology noted sporadically  Seizures: none  Sleep transients:  not discerned  Artifacts: Frontal maximal blink artifacts with stimulation.    Prominent myogenic and motion artifacts noted, sharply contoured.    Impression:  Within the technical limitations of the reduced electrode recording and excessive artifacts:  Left temopral max slowing  Moderate  generalized slowing indicative of diffuse/multifocal cerebral dysfunction.   No seizures.    Consider correlation with CEEG monitoring for further verification of findings if clinically warranted.    MD MAHNAZ De La Torre  Director, Continuous EEG Monitoring Program, [facility]   and Epilepsy Fellowship ,   Department of Neurology, [facility]    [facility] EEG Reading Room Ph#: [phone]  Epilepsy Answering Service after 5PM and before 8:30AM: Ph#: [phone]

## 2023-09-14 NOTE — ED ADULT NURSE NOTE - CAS TRG GENERAL NORM CIRC DET
[FreeTextEntry1] : I have independently reviewed the reports and the images. \par \par B/l mammogram and US 1/11/22\par - heterogenously dense\par - 2 cm mass at area of concern in L UOQ; correlates to a 1.8 x 1.3 x 2.1 cm nodule at 1:00 N10; US bx\par - benign R intramammary node\par - R breast cyst\par - no findings in L axilla\par - BIRADS 4\par \par US needle bx 1/13/22\par - L breast 1:00, q clip = infiltrating ductal carcinoma , 6/9, ER 90%, VA 73%, Her2 0\par \par Breast MRI 2/1/22\par - 3 cm nonmass enhancement in R breast 9:00 N9; MR bx\par - 0.6 cm enhancing mass in R breast 7:00 N6; MR bx\par - 2 x 2 x 3 cm enhancement in L breast 1:00 N9 at biopsied cancer\par - 0.3 cm enhancement in L breast skin at 3:00 N9; clinical exam\par - questionable L axillary nodes; L axillary  US
Strong peripheral pulses

## 2023-09-14 NOTE — ED ADULT TRIAGE NOTE - NS ED NURSE AMBULANCES
[facility]

## 2023-09-14 NOTE — ED PROVIDER NOTE - OBJECTIVE STATEMENT
73-year-old female came to the emergency room chief complaint of seizure activity noticed at the nursing home was a brief episode of tonic-clonic seizures that resolved patient has a baseline dementia patient was confused in the emergency room patient had some scratches on the face and patient bit the tongue and some blood in the mouth the EEG monitor was attached in the emergency room and the seizure load was 0  Patient is on Keppra for seizure control patient was also agitated given Keppra 1 g IV and Ativan 1 mg IV

## 2023-09-14 NOTE — ED PROVIDER NOTE - CARE PLAN
1 Principal Discharge DX:	Clonic seizure  Secondary Diagnosis:	Acute UTI (urinary tract infection)

## 2023-09-14 NOTE — ED ADULT NURSE NOTE - CHIEF COMPLAINT QUOTE
Pt BIB [facility] after witnessed seizure lasting several seconds according to staff  at [facility].  Pt has hx of seizures, on Keppra and dementia. Pt BIB [facility] after witnessed seizure lasting several seconds according to staff  at [facility].  Pt has hx of seizures, on Keppra and dementia. Pt BIB [facility] after witnessed seizure lasting several seconds according to staff  at [facility].  Pt has hx of seizures, on Keppra and dementia.

## 2023-09-14 NOTE — ED CLERICAL - NS ED CLERK NOTE PRE-ARRIVAL INFOMATION; PCP NAME
Mildred Hall
PAST SURGICAL HISTORY:  H/O stem cell transplant     Other age-related cataract of both eyes

## 2023-09-14 NOTE — ED ADULT NURSE NOTE - OBJECTIVE STATEMENT
73 yr old female to ED for complaints of seizures. Patient BIB EMS for witnessed seizure this morning. Patient from [facility].  Patient has hx of seizures. PMHX: Dementia. 73 yr old female to ED for complaints of seizures. Patient BIB EMS for witnessed seizure this morning. Patient from [facility].  Patient has hx of seizures. PMHX: Dementia. 73 yr old female to ED for complaints of seizures. Patient BIB EMS for witnessed seizure this morning. Patient from [facility].  Patient has hx of seizures. PMHX: Dementia.

## 2023-09-14 NOTE — ED ADULT TRIAGE NOTE - CHIEF COMPLAINT QUOTE
Pt BIB [facility] after witnessed seizure lasting several seconds according to staff  at [facility].  Pt has hx of seizures, on Keppra and dementia. Pt BIB [facility] after witnessed seizure lasting several seconds according to staff  at [facility].  Pt has hx of seizures, on Keppra and dementia. Pt BIB [facility] after witnessed seizure lasting several seconds according to staff  at [facility].  Pt has hx of seizures, on Keppra and dementia.

## 2023-09-14 NOTE — ED PROVIDER NOTE - NSFOLLOWUPINSTRUCTIONS_ED_ALL_ED_FT
Follow up with your PMD within 1-2 days.  Rest, increase your fluids, advance your activity as tolerated.   Take all of your other medications as previously prescribed.   Worsening, continued or ANY new concerning symptoms return to the emergency department.  Recommend Ceftin 500 mg twice daily for 7 days

## 2023-09-14 NOTE — ED PROVIDER NOTE - PATIENT PORTAL LINK FT
You can access the FollowMyHealth Patient Portal offered by [facility] by registering at the following website: [email or web address]. By joining [employer]’s FollowMyHealth portal, you will also be able to view your health information using other applications (apps) compatible with our system. You can access the FollowMyHealth Patient Portal offered by [facility] by registering at the following website: [email or web address]. By joining [employer]’s FollowMyHealth portal, you will also be able to view your health information using other applications (apps) compatible with our system. You can access the FollowMyHealth Patient Portal offered by [facility] by registering at the following website: [email or web address]. By joining [employer]’s FollowMyHealth portal, you will also be able to view your health information using other applications (apps) compatible with our system.

## 2023-09-14 NOTE — ED ADULT NURSE NOTE - NSFALLUNIVINTERV_ED_ALL_ED
Bed/Stretcher in lowest position, wheels locked, appropriate side rails in place/Call bell, personal items and telephone in reach/Instruct patient to call for assistance before getting out of bed/chair/stretcher/Non-slip footwear applied when patient is off stretcher/[place] to call system/Physically safe environment - no spills, clutter or unnecessary equipment/Purposeful proactive rounding/Room/bathroom lighting operational, light cord in reach Bed/Stretcher in lowest position, wheels locked, appropriate side rails in place/Call bell, personal items and telephone in reach/Instruct patient to call for assistance before getting out of bed/chair/stretcher/Non-slip footwear applied when patient is off stretcher/[place] to call system/Physically safe environment - no spills, clutter or unnecessary equipment/Purposeful proactive rounding/Room/bathroom lighting operational, light cord in reach Bed/Stretcher in lowest position, wheels locked, appropriate side rails in place/Call bell, personal items and telephone in reach/Instruct patient to call for assistance before getting out of bed/chair/stretcher/Non-slip footwear applied when patient is off stretcher/[place] to call system/Physically safe environment - no spills, clutter or unnecessary equipment/Purposeful proactive rounding/Room/bathroom lighting operational, light cord in reach

## 2023-09-14 NOTE — ED PROVIDER NOTE - CLINICAL SUMMARY MEDICAL DECISION MAKING FREE TEXT BOX
73-year-old female came to the emergency room chief complaint of seizure activity noticed at the nursing home was a brief episode of tonic-clonic seizures that resolved patient has a baseline dementia patient was confused in the emergency room patient had some scratches on the face and patient bit the tongue and some blood in the mouth the EEG monitor was attached in the emergency room and the seizure load was 0  Patient is on Keppra for seizure control patient was also agitated given Keppra 1 g IV and Ativan 1 mg IV  2 pm Seizure load was 0 CBC CMP within normal limits UA consistent with UTI patient given a dose of ceftriaxone IV plan to discharge the patient with p.o. antibiotics

## 2023-09-14 NOTE — ED PROVIDER NOTE - PHYSICAL EXAMINATION
General:     NAD, Elderly frail confused  Head:     NC/AT, EOMI, oral mucosa dry  Neck:     trachea midline  Lungs:     CTA b/l, no w/r/r  CVS:     S1S2, RRR, no m/g/r  Abd:     +BS, s/nt/nd, no organomegaly  Ext:    2+ radial and pedal pulses, no c/c/e  Neuro: awake disoriented ,  sensory/motor grossly intact

## 2023-10-05 NOTE — ED ADULT NURSE NOTE - NS ED NURSE LEVEL OF CONSCIOUSNESS MENTAL STATUS
Awake/Confused Paramedian Forehead Flap Division And Inset Text: Division and inset of the paramedian forehead flap was performed to achieve optimal aesthetic result, restore normal anatomic appearance and avoid distortion of normal anatomy, expedite and facilitate wound healing, achieve optimal functional result and because linear closure either not possible or would produce suboptimal result. The patient was prepped and draped in the usual manner. The pedicle was infiltrated with local anesthesia. The pedicle was sectioned with a #15 blade. The pedicle was de-bulked and trimmed to match the shape of the defect. Hemostasis was achieved. The flap donor site and free margin of the flap were secured with deep buried sutures and the wound edges were re-approximated.

## 2023-12-10 NOTE — ED PROCEDURE NOTE - NS_EDPROVIDERDISPOUSERTYPE_ED_A_ED
Attending Attestation (For Attendings USE Only)...
Attending Attestation (For Attendings USE Only)...
None known

## 2023-12-15 RX ORDER — LANOLIN ALCOHOL/MO/W.PET/CERES
1 CREAM (GRAM) TOPICAL
Qty: 0 | Refills: 0 | DISCHARGE

## 2023-12-15 RX ORDER — LOSARTAN POTASSIUM 100 MG/1
1 TABLET, FILM COATED ORAL
Qty: 0 | Refills: 0 | DISCHARGE

## 2024-01-05 NOTE — ED ADULT NURSE NOTE - CAS TRG GENERAL NORM CIRC DET
Returned call to Marline. After discussing the products available she was able to find endoform AM that was available by the each instead of by a box of 10. No further questions or concerns.      Strong peripheral pulses

## 2024-04-01 NOTE — ED PROVIDER NOTE - NSTIMEPROVIDERCAREINITIATE_GEN_ER
28-Oct-2021 06:18 Bed/Stretcher in lowest position, wheels locked, appropriate side rails in place/Call bell, personal items and telephone in reach/Instruct patient to call for assistance before getting out of bed/chair/stretcher/Non-slip footwear applied when patient is off stretcher/[place] to call system/Physically safe environment - no spills, clutter or unnecessary equipment/Purposeful proactive rounding/Room/bathroom lighting operational, light cord in reach

## 2024-04-04 ENCOUNTER — EMERGENCY (EMERGENCY)
Facility: HOSPITAL | Age: 74
LOS: 1 days | Discharge: HOME | End: 2024-04-04
Attending: INTERNAL MEDICINE | Admitting: INTERNAL MEDICINE
Payer: MEDICARE

## 2024-04-04 VITALS
RESPIRATION RATE: 18 BRPM | OXYGEN SATURATION: 97 % | DIASTOLIC BLOOD PRESSURE: 91 MMHG | SYSTOLIC BLOOD PRESSURE: 160 MMHG | HEART RATE: 88 BPM

## 2024-04-04 VITALS
DIASTOLIC BLOOD PRESSURE: 57 MMHG | OXYGEN SATURATION: 97 % | SYSTOLIC BLOOD PRESSURE: 128 MMHG | RESPIRATION RATE: 18 BRPM | HEART RATE: 74 BPM | TEMPERATURE: 97 F | WEIGHT: 147.71 LBS | HEIGHT: 61 IN

## 2024-04-04 DIAGNOSIS — Z98.2 PRESENCE OF CEREBROSPINAL FLUID DRAINAGE DEVICE: Chronic | ICD-10-CM

## 2024-04-04 PROBLEM — F01.50 VASCULAR DEMENTIA, UNSPECIFIED SEVERITY, WITHOUT BEHAVIORAL DISTURBANCE, PSYCHOTIC DISTURBANCE, MOOD DISTURBANCE, AND ANXIETY: Chronic | Status: ACTIVE | Noted: 2022-06-06

## 2024-04-04 PROBLEM — G91.2 (IDIOPATHIC) NORMAL PRESSURE HYDROCEPHALUS: Chronic | Status: ACTIVE | Noted: 2022-06-06

## 2024-04-04 PROBLEM — R56.9 UNSPECIFIED CONVULSIONS: Chronic | Status: ACTIVE | Noted: 2022-06-06

## 2024-04-04 PROBLEM — I10 ESSENTIAL (PRIMARY) HYPERTENSION: Chronic | Status: ACTIVE | Noted: 2022-06-06

## 2024-04-04 PROBLEM — F01.50 VASCULAR DEMENTIA WITHOUT BEHAVIORAL DISTURBANCE: Chronic | Status: ACTIVE | Noted: 2022-06-06
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
## 2024-04-04 PROBLEM — M33.90 DERMATOPOLYMYOSITIS, UNSPECIFIED, ORGAN INVOLVEMENT UNSPECIFIED: Chronic | Status: ACTIVE | Noted: 2022-06-06

## 2024-04-04 LAB
ALBUMIN SERPL ELPH-MCNC: 3 G/DL — LOW (ref 3.3–5)
ALP SERPL-CCNC: 81 U/L — SIGNIFICANT CHANGE UP (ref 40–120)
ALT FLD-CCNC: 15 U/L — SIGNIFICANT CHANGE UP (ref 10–45)
ANION GAP SERPL CALC-SCNC: 8 MMOL/L — SIGNIFICANT CHANGE UP (ref 5–17)
AST SERPL-CCNC: 29 U/L — SIGNIFICANT CHANGE UP (ref 10–40)
BASOPHILS # BLD AUTO: 0.04 K/UL — SIGNIFICANT CHANGE UP (ref 0–0.2)
BASOPHILS NFR BLD AUTO: 0.5 % — SIGNIFICANT CHANGE UP (ref 0–2)
BILIRUB SERPL-MCNC: 0.5 MG/DL — SIGNIFICANT CHANGE UP (ref 0.2–1.2)
BUN SERPL-MCNC: 17 MG/DL — SIGNIFICANT CHANGE UP (ref 7–23)
CALCIUM SERPL-MCNC: 8.4 MG/DL — SIGNIFICANT CHANGE UP (ref 8.4–10.5)
CHLORIDE SERPL-SCNC: 109 MMOL/L — HIGH (ref 96–108)
CO2 SERPL-SCNC: 28 MMOL/L — SIGNIFICANT CHANGE UP (ref 22–31)
CREAT SERPL-MCNC: 0.6 MG/DL — SIGNIFICANT CHANGE UP (ref 0.5–1.3)
EGFR: 94 ML/MIN/1.73M2 — SIGNIFICANT CHANGE UP
EOSINOPHIL # BLD AUTO: 0.35 K/UL — SIGNIFICANT CHANGE UP (ref 0–0.5)
EOSINOPHIL NFR BLD AUTO: 4.2 % — SIGNIFICANT CHANGE UP (ref 0–6)
GLUCOSE SERPL-MCNC: 99 MG/DL — SIGNIFICANT CHANGE UP (ref 70–99)
HCT VFR BLD CALC: 41.1 % — SIGNIFICANT CHANGE UP (ref 34.5–45)
HGB BLD-MCNC: 13 G/DL — SIGNIFICANT CHANGE UP (ref 11.5–15.5)
IMM GRANULOCYTES NFR BLD AUTO: 0.2 % — SIGNIFICANT CHANGE UP (ref 0–0.9)
LYMPHOCYTES # BLD AUTO: 1.76 K/UL — SIGNIFICANT CHANGE UP (ref 1–3.3)
LYMPHOCYTES # BLD AUTO: 21.2 % — SIGNIFICANT CHANGE UP (ref 13–44)
MCHC RBC-ENTMCNC: 29.9 PG — SIGNIFICANT CHANGE UP (ref 27–34)
MCHC RBC-ENTMCNC: 31.6 GM/DL — LOW (ref 32–36)
MCV RBC AUTO: 94.5 FL — SIGNIFICANT CHANGE UP (ref 80–100)
MONOCYTES # BLD AUTO: 0.64 K/UL — SIGNIFICANT CHANGE UP (ref 0–0.9)
MONOCYTES NFR BLD AUTO: 7.7 % — SIGNIFICANT CHANGE UP (ref 2–14)
NEUTROPHILS # BLD AUTO: 5.5 K/UL — SIGNIFICANT CHANGE UP (ref 1.8–7.4)
NEUTROPHILS NFR BLD AUTO: 66.2 % — SIGNIFICANT CHANGE UP (ref 43–77)
NRBC # BLD: 0 /100 WBCS — SIGNIFICANT CHANGE UP (ref 0–0)
PLATELET # BLD AUTO: 181 K/UL — SIGNIFICANT CHANGE UP (ref 150–400)
POTASSIUM SERPL-MCNC: 3.9 MMOL/L — SIGNIFICANT CHANGE UP (ref 3.5–5.3)
POTASSIUM SERPL-SCNC: 3.9 MMOL/L — SIGNIFICANT CHANGE UP (ref 3.5–5.3)
PROT SERPL-MCNC: 7.3 G/DL — SIGNIFICANT CHANGE UP (ref 6–8.3)
RBC # BLD: 4.35 M/UL — SIGNIFICANT CHANGE UP (ref 3.8–5.2)
RBC # FLD: 14.2 % — SIGNIFICANT CHANGE UP (ref 10.3–14.5)
SODIUM SERPL-SCNC: 145 MMOL/L — SIGNIFICANT CHANGE UP (ref 135–145)
WBC # BLD: 8.31 K/UL — SIGNIFICANT CHANGE UP (ref 3.8–10.5)
WBC # FLD AUTO: 8.31 K/UL — SIGNIFICANT CHANGE UP (ref 3.8–10.5)

## 2024-04-04 PROCEDURE — 80053 COMPREHEN METABOLIC PANEL: CPT

## 2024-04-04 PROCEDURE — 99284 EMERGENCY DEPT VISIT MOD MDM: CPT

## 2024-04-04 PROCEDURE — 73090 X-RAY EXAM OF FOREARM: CPT | Mod: 26,LT

## 2024-04-04 PROCEDURE — 85025 COMPLETE CBC W/AUTO DIFF WBC: CPT

## 2024-04-04 PROCEDURE — 73090 X-RAY EXAM OF FOREARM: CPT

## 2024-04-04 PROCEDURE — 96374 THER/PROPH/DIAG INJ IV PUSH: CPT

## 2024-04-04 PROCEDURE — 73130 X-RAY EXAM OF HAND: CPT | Mod: 26,LT

## 2024-04-04 PROCEDURE — 36415 COLL VENOUS BLD VENIPUNCTURE: CPT

## 2024-04-04 PROCEDURE — 73130 X-RAY EXAM OF HAND: CPT

## 2024-04-04 PROCEDURE — 99284 EMERGENCY DEPT VISIT MOD MDM: CPT | Mod: 25

## 2024-04-04 RX ORDER — LEVETIRACETAM 250 MG/1
1 TABLET, FILM COATED ORAL
Qty: 0 | Refills: 0 | DISCHARGE

## 2024-04-04 RX ORDER — METOPROLOL TARTRATE 50 MG
1 TABLET ORAL
Qty: 0 | Refills: 0 | DISCHARGE

## 2024-04-04 RX ORDER — ATORVASTATIN CALCIUM 80 MG/1
1 TABLET, FILM COATED ORAL
Refills: 0 | DISCHARGE

## 2024-04-04 RX ORDER — SODIUM CHLORIDE 9 MG/ML
500 INJECTION INTRAMUSCULAR; INTRAVENOUS; SUBCUTANEOUS ONCE
Refills: 0 | Status: COMPLETED | OUTPATIENT
Start: 2024-04-04 | End: 2024-04-04

## 2024-04-04 RX ORDER — CEFUROXIME AXETIL 250 MG
1 TABLET ORAL
Qty: 20 | Refills: 0
Start: 2024-04-04 | End: 2024-04-13

## 2024-04-04 RX ORDER — CHOLECALCIFEROL (VITAMIN D3) 125 MCG
1 CAPSULE ORAL
Refills: 0 | DISCHARGE

## 2024-04-04 RX ORDER — CEFAZOLIN SODIUM 1 G
1000 VIAL (EA) INJECTION ONCE
Refills: 0 | Status: COMPLETED | OUTPATIENT
Start: 2024-04-04 | End: 2024-04-04

## 2024-04-04 RX ORDER — LANOLIN ALCOHOL/MO/W.PET/CERES
1 CREAM (GRAM) TOPICAL
Qty: 0 | Refills: 0 | DISCHARGE

## 2024-04-04 RX ORDER — SENNA PLUS 8.6 MG/1
1 TABLET ORAL
Qty: 0 | Refills: 0 | DISCHARGE

## 2024-04-04 RX ORDER — ESCITALOPRAM OXALATE 10 MG/1
1 TABLET, FILM COATED ORAL
Qty: 0 | Refills: 0 | DISCHARGE

## 2024-04-04 RX ORDER — FUROSEMIDE 40 MG
1 TABLET ORAL
Qty: 0 | Refills: 0 | DISCHARGE

## 2024-04-04 RX ORDER — LEVOTHYROXINE SODIUM 125 MCG
1 TABLET ORAL
Qty: 0 | Refills: 0 | DISCHARGE

## 2024-04-04 RX ADMIN — SODIUM CHLORIDE 500 MILLILITER(S): 9 INJECTION INTRAMUSCULAR; INTRAVENOUS; SUBCUTANEOUS at 15:14

## 2024-04-04 RX ADMIN — Medication 100 MILLIGRAM(S): at 15:30

## 2024-04-04 NOTE — ED PROVIDER NOTE - OBJECTIVE STATEMENT
73-year-old female brought in by the EMS from the nursing home because patient has some blood blisters and redness in the left hand and the wrist for evaluation patient also has some abrasions

## 2024-04-04 NOTE — ED PROVIDER NOTE - PHYSICAL EXAMINATION
General:     NAD, well-nourished, well-appearing, Elderly frail normal nonverbal baseline  Head:     NC/AT, EOMI, oral mucosa moist  Neck:     trachea midline  Lungs:     CTA b/l, no w/r/r  CVS:     S1S2, RRR, no m/g/r  Abd:     +BS, s/nt/nd, no organomegaly  Ext:    2+ radial and pedal pulses, no c/c/e  Derm–ecchymoses and blood blisters on the medial aspect of the left hand abrasions and mild erythema  Neuro: AA, no sensory/motor deficits

## 2024-04-04 NOTE — ED ADULT NURSE NOTE - OBJECTIVE STATEMENT
73 yr old female to ED for complaints of wound check to left arm. Redness noted to left forearm. Patient BIB EMS from [facility] for wound to left hand.  Blisters noted to left hand and wrist. Dr. Colón evaluating. Dressing placed to left forearm. Patient awake, alert, nonverbal (patient's baseline per paramedic). Placed patient closer to nurses' satation. Tiffany alarm place and all indicators. Labs obtained and sent. Continuous monitoring.

## 2024-04-04 NOTE — ED ADULT TRIAGE NOTE - CHIEF COMPLAINT QUOTE
Pt. BIBA from [facility] for wounds to her left hand.  Blood filled blisters noted to left palm and wrist.  Wound red and warm to left side top and bottom of arm.  Pt. nonverbal which is baseline as per EMS.  As per EMS no recent falls.

## 2024-04-04 NOTE — ED ADULT NURSE NOTE - NSICDXFAMILYHX_GEN_ALL_CORE_FT
FAMILY HISTORY:  FH: CAD (coronary artery disease)  No pertinent family history in first degree relatives

## 2024-04-04 NOTE — ED ADULT NURSE NOTE - CHIEF COMPLAINT QUOTE
Pt. BIBA from [facility] for wounds to her left hand.  Blood filled blisters noted to left palm and wrist.  Wound red and warm to left side top and bottom of arm.  Pt. nonverbal which is baseline as per EMS.  As per EMS no recent falls.

## 2024-04-04 NOTE — ED PROVIDER NOTE - CLINICAL SUMMARY MEDICAL DECISION MAKING FREE TEXT BOX
73-year-old female brought in by the EMS from the nursing home because patient has some blood blisters and redness in the left hand and the wrist for evaluation patient also has some abrasions  White count is normal CMP normal treated with 1 dose of Ancef plan to discharge the patient with the ceftin and antibiotic dressing

## 2024-04-04 NOTE — ED ADULT NURSE NOTE - NSFALLHARMRISKINTERV_ED_ALL_ED
Assistance OOB with selected safe patient handling equipment if applicable/Communicate risk of Fall with Harm to all staff, patient, and family/Monitor gait and stability/Provide patient with walking aids/Provide visual cue: red socks, yellow wristband, yellow gown, etc/Reinforce activity limits and safety measures with patient and family/Bed in lowest position, wheels locked, appropriate side rails in place/Call bell, personal items and telephone in reach/Instruct patient to call for assistance before getting out of bed/chair/stretcher/Non-slip footwear applied when patient is off stretcher/[place] to call system/Physically safe environment - no spills, clutter or unnecessary equipment/Purposeful Proactive Rounding/Room/bathroom lighting operational, light cord in reach

## 2024-04-04 NOTE — ED PROVIDER NOTE - NSFOLLOWUPINSTRUCTIONS_ED_ALL_ED_FT
Follow up with your PMD within 1-2 days.  Rest, increase your fluids, advance your activity as tolerated.   Take all of your other medications as previously prescribed.   Worsening, continued or ANY new concerning symptoms return to the emergency department.  Antibiotic dressing of the left hand and the wrist Ceftin twice a day for 7 days

## 2024-04-04 NOTE — ED PROVIDER NOTE - CARE PLAN
1 Principal Discharge DX:	Traumatic ecchymosis of left hand  Secondary Diagnosis:	Cellulitis of left hand

## 2024-04-04 NOTE — ED ADULT NURSE NOTE - NSICDXPASTMEDICALHX_GEN_ALL_CORE_FT
PAST MEDICAL HISTORY:  Benign essential HTN     Dementia     Depression     Dermatomyositis     Dermatomyositis     Dermatomyositis     Falls     HLD (hyperlipidemia)     HTN (hypertension)     HTN (hypertension)     Hyperlipidemia     Normal pressure hydrocephalus s/p VPS    NPH (normal pressure hydrocephalus)     Seizures     Seizures     Vascular dementia     Vascular dementia

## 2024-04-04 NOTE — ED PROVIDER NOTE - PATIENT PORTAL LINK FT
You can access the FollowMyHealth Patient Portal offered by [facility] [place] by registering at the following website: [email or web address]. By joining [facility]’s FollowMyHealth portal, you will also be able to view your health information using other applications (apps) compatible with our system.

## 2024-04-04 NOTE — ED ADULT NURSE NOTE - NSICDXPASTSURGICALHX_GEN_ALL_CORE_FT
PAST SURGICAL HISTORY:  No significant past surgical history     S/P ventriculoperitoneal shunt     S/P  shunt

## 2024-04-26 ENCOUNTER — EMERGENCY (EMERGENCY)
Facility: HOSPITAL | Age: 74
LOS: 0 days | Discharge: HOME | End: 2024-04-26
Attending: STUDENT IN AN ORGANIZED HEALTH CARE EDUCATION/TRAINING PROGRAM
Payer: MEDICARE

## 2024-04-26 VITALS
RESPIRATION RATE: 18 BRPM | SYSTOLIC BLOOD PRESSURE: 118 MMHG | OXYGEN SATURATION: 98 % | TEMPERATURE: 98 F | HEART RATE: 98 BPM | DIASTOLIC BLOOD PRESSURE: 86 MMHG

## 2024-04-26 VITALS
TEMPERATURE: 98 F | DIASTOLIC BLOOD PRESSURE: 54 MMHG | HEIGHT: 61 IN | HEART RATE: 82 BPM | WEIGHT: 149.91 LBS | OXYGEN SATURATION: 100 % | SYSTOLIC BLOOD PRESSURE: 107 MMHG | RESPIRATION RATE: 16 BRPM

## 2024-04-26 DIAGNOSIS — E78.5 HYPERLIPIDEMIA, UNSPECIFIED: ICD-10-CM
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
## 2024-04-26 DIAGNOSIS — F32.A DEPRESSION, UNSPECIFIED: ICD-10-CM

## 2024-04-26 DIAGNOSIS — Z79.82 LONG TERM (CURRENT) USE OF ASPIRIN: ICD-10-CM

## 2024-04-26 DIAGNOSIS — F01.50 VASCULAR DEMENTIA WITHOUT BEHAVIORAL DISTURBANCE: ICD-10-CM

## 2024-04-26 DIAGNOSIS — I10 ESSENTIAL (PRIMARY) HYPERTENSION: ICD-10-CM

## 2024-04-26 DIAGNOSIS — Z98.2 PRESENCE OF CEREBROSPINAL FLUID DRAINAGE DEVICE: Chronic | ICD-10-CM
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
## 2024-04-26 DIAGNOSIS — S00.212A ABRASION OF LEFT EYELID AND PERIOCULAR AREA, INITIAL ENCOUNTER: ICD-10-CM

## 2024-04-26 PROCEDURE — 72170 X-RAY EXAM OF PELVIS: CPT

## 2024-04-26 PROCEDURE — 71045 X-RAY EXAM CHEST 1 VIEW: CPT | Mod: 26

## 2024-04-26 PROCEDURE — 99284 EMERGENCY DEPT VISIT MOD MDM: CPT | Mod: 25

## 2024-04-26 PROCEDURE — 70486 CT MAXILLOFACIAL W/O DYE: CPT | Mod: MC

## 2024-04-26 PROCEDURE — 71045 X-RAY EXAM CHEST 1 VIEW: CPT

## 2024-04-26 PROCEDURE — 76376 3D RENDER W/INTRP POSTPROCES: CPT

## 2024-04-26 PROCEDURE — 70450 CT HEAD/BRAIN W/O DYE: CPT | Mod: MC

## 2024-04-26 PROCEDURE — 72170 X-RAY EXAM OF PELVIS: CPT | Mod: 26

## 2024-04-26 PROCEDURE — 70486 CT MAXILLOFACIAL W/O DYE: CPT | Mod: 26,MC

## 2024-04-26 PROCEDURE — 70450 CT HEAD/BRAIN W/O DYE: CPT | Mod: 26,MC

## 2024-04-26 PROCEDURE — 99285 EMERGENCY DEPT VISIT HI MDM: CPT

## 2024-04-26 PROCEDURE — 72125 CT NECK SPINE W/O DYE: CPT | Mod: MC

## 2024-04-26 PROCEDURE — 76376 3D RENDER W/INTRP POSTPROCES: CPT | Mod: 26

## 2024-04-26 PROCEDURE — 72125 CT NECK SPINE W/O DYE: CPT | Mod: 26,MC

## 2024-04-26 NOTE — ED ADULT NURSE NOTE - OBJECTIVE STATEMENT
Pt AO1 at baseline brought in by EMS s/p witnessed fall from wheelchair with +head strike and unknown LOC. Pt on ASA. Pt has hx of dementia and not responding to questions by MD or RN. Pt has visible hematoma with abrasion on L eyebrow with bleeding controlled. NA called and patient sent to CT with no issues.

## 2024-04-26 NOTE — ED ADULT NURSE NOTE - NSFALLRISKINTERV_ED_ALL_ED
Assistance OOB with selected safe patient handling equipment if applicable/Assistance with ambulation/Communicate fall risk and risk factors to all staff, patient, and family/Monitor gait and stability/Monitor for mental status changes and reorient to person, place, and time, as needed/Provide patient with walking aids/Provide visual cue: yellow wristband, yellow gown, etc/Reinforce activity limits and safety measures with patient and family/Toileting schedule using arm’s reach rule for commode and bathroom/Use of alarms - bed, stretcher, chair and/or video monitoring/Call bell, personal items and telephone in reach/Instruct patient to call for assistance before getting out of bed/chair/stretcher/Non-slip footwear applied when patient is off stretcher/[place] to call system/Physically safe environment - no spills, clutter or unnecessary equipment/Purposeful Proactive Rounding/Room/bathroom lighting operational, light cord in reach

## 2024-04-26 NOTE — ED PROVIDER NOTE - PHYSICAL EXAMINATION
Head: No steps offs, bruising or hematoma noted throughout the skull. No otorrhea, rhinorrhea. No raccoon's sign or tom's sign present.  Neck: Pt able to rotate neck 45 degrees to the left and right w/o difficulty or pain. No pain on palpation of the mid cervical spine. No step offs present.  Chest: No pain on palpation of the ribs. No pain on deep inspiration. No obvious deformities or bruising  Extremities: No obvious fractures or deformities. Sensation intact throughout. Full RoM.

## 2024-04-26 NOTE — ED PROVIDER NOTE - PATIENT PORTAL LINK FT
You can access the FollowMyHealth Patient Portal offered by [facility] by registering at the following website: [email or web address]. By joining [employer]’s FollowMyHealth portal, you will also be able to view your health information using other applications (apps) compatible with our system.

## 2024-04-26 NOTE — ED PROVIDER NOTE - OBJECTIVE STATEMENT
74 y/o female w/ PMHx of HTN, HLD, vascular dementia, depression, falls, dermatomyositis, seizures BIB EMS to the ED from [facility] assisted living s/p fall. Pt had witnessed fall out of wheelchair. Positive head strike on floor, unknown LOC. There is an abrasion noted on left side of forehead, above the eyebrow. Pt is on baby Aspirin. Pt is a poor historian due to dementia.

## 2024-04-26 NOTE — ED PROVIDER NOTE - SKIN, MLM
Skin normal color for race, warm, dry and intact. No evidence of rash. Abrasion above left eyebrow that is about 2cm in length

## 2024-04-26 NOTE — ED ADULT NURSE NOTE - CHIEF COMPLAINT QUOTE
patient brought in by EMS from [facility] assisted living s/p fall.  patient had witnessed fall out of wheelchair.  + head strike on floor.  contusion noted to left side of forehead.  unknown LOC.  on baby ASA.  hx dementia.  neuro alert initiated in triage, sent to CT.

## 2024-04-26 NOTE — ED PROVIDER NOTE - CONSTITUTIONAL, MLM
normal... Well appearing, awake, alert, oriented to person, place, time/situation and in no apparent distress. Pt is nonverbal but able to follow commands

## 2024-04-26 NOTE — ED ADULT TRIAGE NOTE - CHIEF COMPLAINT QUOTE
patient brought in by EMS from [facility] assisted living s/p fall.  patient had witnessed fall out of wheelchair.  + head strike on floor.  contusion noted to left side of forehead.  unknown LOC.  on baby ASA.  hx dementia.  neuro alert initiated in triage, sent to CT.

## 2024-04-26 NOTE — ED PROVIDER NOTE - NSFOLLOWUPINSTRUCTIONS_ED_ALL_ED_FT
Acute Headache    WHAT YOU NEED TO KNOW:    An acute headache is pain or discomfort that starts suddenly and gets worse quickly. You may have an acute headache only when you feel stress or eat certain foods. Other acute headache pain can happen every day, and sometimes several times a day.     DISCHARGE INSTRUCTIONS:    Seek care immediately if:   •You have severe pain.      •You have numbness or weakness on one side of your face or body.      •You have a headache that occurs after a blow to the head, a fall, or other trauma.       •You have a headache, are forgetful or confused, or have trouble speaking.      •You have a headache, stiff neck, and a fever.      Contact your healthcare provider if:   •You have a constant headache and are vomiting.      •You have a headache each day that does not get better, even after treatment.      •You have changes in your headaches, or new symptoms that occur when you have a headache.      •You have questions or concerns about your condition or care.      Medicines: You may need any of the following:   •Prescription pain medicine may be given. The medicine your healthcare provider recommends will depend on the kind of headaches you have. You will need to take prescription headache medicines as directed to prevent a problem called rebound headache. These headaches happen with regular use of pain relievers for headache disorders.      •NSAIDs, such as ibuprofen, help decrease swelling, pain, and fever. This medicine is available with or without a doctor's order. NSAIDs can cause stomach bleeding or kidney problems in certain people. If you take blood thinner medicine, always ask your healthcare provider if NSAIDs are safe for you. Always read the medicine label and follow directions.      •Acetaminophen decreases pain and fever. It is available without a doctor's order. Ask how much to take and how often to take it. Follow directions. Read the labels of all other medicines you are using to see if they also contain acetaminophen, or ask your doctor or pharmacist. Acetaminophen can cause liver damage if not taken correctly. Do not use more than 3 grams (3,000 milligrams) total of acetaminophen in one day.       •Antidepressants may be given for some kinds of headaches.       •Take your medicine as directed. Contact your healthcare provider if you think your medicine is not helping or if you have side effects. Tell him or her if you are allergic to any medicine. Keep a list of the medicines, vitamins, and herbs you take. Include the amounts, and when and why you take them. Bring the list or the pill bottles to follow-up visits. Carry your medicine list with you in case of an emergency.      Manage your symptoms:   •Apply heat or ice on the headache area. Use a heat or ice pack. For an ice pack, you can also put crushed ice in a plastic bag. Cover the pack or bag with a towel before you apply it to your skin. Ice and heat both help decrease pain, and heat also helps decrease muscle spasms. Apply heat for 20 to 30 minutes every 2 hours. Apply ice for 15 to 20 minutes every hour. Apply heat or ice for as long and for as many days as directed. You may alternate heat and ice.      •Relax your muscles. Lie down in a comfortable position and close your eyes. Relax your muscles slowly. Start at your toes and work your way up your body.      •Keep a record of your headaches. Write down when your headaches start and stop. Include your symptoms and what you were doing when the headache began. Record what you ate or drank for 24 hours before the headache started. Describe the pain and where it hurts. Keep track of what you did to treat your headache and if it worked.       Prevent an acute headache:   •Avoid anything that triggers an acute headache. Examples include exposure to chemicals, going to high altitude, or not getting enough sleep. Create a regular sleep routine. Go to sleep at the same time and wake up at the same time each day. Do not use electronic devices before bedtime. These may trigger a headache or prevent you from sleeping well.      •Do not smoke. Nicotine and other chemicals in cigarettes and cigars can trigger an acute headache or make it worse. Ask your healthcare provider for information if you currently smoke and need help to quit. E-cigarettes or smokeless tobacco still contain nicotine. Talk to your healthcare provider before you use these products.       •Limit alcohol as directed. Alcohol can trigger an acute headache or make it worse. If you have cluster headaches, do not drink alcohol during an episode. For other types of headaches, ask your healthcare provider if it is safe for you to drink alcohol. Ask how much is safe for you to drink, and how often.      •Exercise as directed. Exercise can reduce tension and help with headache pain. Aim for 30 minutes of physical activity on most days of the week. Your healthcare provider can help you create an exercise plan.      •Eat a variety of healthy foods. Healthy foods include fruits, vegetables, low-fat dairy products, lean meats, fish, whole grains, and cooked beans. Your healthcare provider or dietitian can help you create meals plans if you need to avoid foods that trigger headaches.      Follow up with your healthcare provider as directed: Bring your headache record with you when you see your healthcare provider. Write down your questions so you remember to ask them during your visits.      Dolor de evette mireya    LO QUE NECESITA SABER:    El dolor de evette mireya es un dolor o molestia que comienza de repente y empeora rápidamente. Usted puede tener un dolor de evette mireya sólo cuando siente estrés o come ciertos alimentos. Otro tipo dolor de evette mireya puede producirse todos los días y a veces varias veces al día.    INSTRUCCIONES SOBRE EL RANDY HOSPITALARIA:    Busque atención médica de inmediato si:  •Usted tiene dolor intenso.      •Usted tiene entumecimiento en un lado de olson emani o cuerpo.      •Usted tiene un dolor de evette que ocurre después de un golpe en la evette, jessica caída u otro trauma.      •Tiene dolor de evette, está olvidadizo o confundido o tiene dificultad para hablar.      •Tiene dolor de evette, rigidez en el dulce y fiebre.      Comuníquese con olson médico si:  •Usted tiene un dolor de evette jose j y está vomitando.      •Usted tiene dolor de evette todos los días y no se loyda aun después de tratarlo.      •Annika clifton de evette cambian u ocurren nuevos síntomas cuando tiene dolor de evette.      •Usted tiene preguntas o inquietudes acerca de olson condición o cuidado.      Medicamentos:Es posible que usted necesite alguno de los siguientes:   •Los analgésicos recetadospodrían administrarse. El medicamento que recomienda olson médico dependerá del tipo de dolor de evette que tenga. Usted necesitará heri medicamentos para el dolor de evette según las indicaciones para evitar un problema llamado dolor de evette de rebote. Estos clifton de evette ocurren con el uso regular de analgésicos para los trastornos de dolor de evette.      •Los GERARDO,mariela el ibuprofeno, ayudan a disminuir la inflamación, el dolor y la fiebre. Julia medicamento está disponible con o sin jessica receta médica. Los GERARDO pueden causar sangrado estomacal o problemas renales en ciertas personas. Si usted haylie un medicamento anticoagulante, siempre pregúntele a olson médico si los GERARDO son seguros para usted. Siempre ruben la etiqueta de julia medicamento y siga las instrucciones.      •Acetaminofénalivia el dolor y baja la fiebre. Está disponible sin receta médica. Pregunte la cantidad y la frecuencia con que debe tomarlos. Siga las indicaciones. Ruben las etiquetas de todos los demás medicamentos que esté usando para saber si también contienen acetaminofén, o pregunte a olson médico o farmacéutico. El acetaminofén puede causar daño en el hígado cuando no se haylie de forma correcta. No use más de 3 gramos (3,000 miligramos) en total de acetaminofeno en un día.      •Antidepresivosse pueden administrar para algunos tipos de clifton de evette.      •[place] annika medicamentos mariela se le haya indicado.Consulte con olson médico si usted ana maría que olson medicamento no le está ayudando o si presenta efectos secundarios. Infórmele si es alérgico a cualquier medicamento. Mantenga jessica lista actualizada de los medicamentos, las vitaminas y los productos herbales que haylie. Incluya los siguientes datos de los medicamentos: cantidad, frecuencia y motivo de administración. Traiga con usted la lista o los envases de las píldoras a annika citas de seguimiento. Lleve la lista de los medicamentos con usted en kerrie de jessica emergencia.      El manejo de annika síntomas:  •Aplique hielo o caloren la angelika donde olson hijo siente el dolor de evette. Utilice un paquete (compresa) de hielo o calor. Para un paquete de hielo, también puede colocar hielo molido en jessica bolsa plástica. Cubra el paquete de hielo o la bolsa con jessica toalla pequeña antes de aplicarla en la piel. Tanto el hielo mariela el calor ayudan a reducir el dolor, y el calor también contribuye a reducir los espasmos musculares. Aplique calor hernan 20 a 30 minutos cada 2 horas. Aplique hielo hernan 15 a 20 minutos cada hora. Aplique calor o hielo hernan el tiempo y la cantidad de días que se le indique. Usted puede alternar el calor y el hielo.      •Relaje annika músculos.Acuéstese en jessica posición cómoda y cierre annika ojos. Relaje annika músculos lentamente. Comience por los dedos de los pies y avance hacia arriba al chacha de olson cuerpo.      •Registre en un diario annika clifton de evette.Escriba cuándo comienzan y terminan annika migrañas. Incluya annika síntomas y qué estaba haciendo cuando comenzó la migraña. Registre lo que comió y lo que tomó las 24 horas previas al comienzo de olson migraña. Describa el dolor y dónde le duele: Lleve un registro de lo que hizo para tratar olson migraña y si obtuvo un resultado satisfactorio.      Cómo prevenir un dolor de evette mireya:  •Evite cualquier cosa que provoque un dolor de evette mireya.Los ejemplos incluyen la exposición a sustancias químicas, las grandes altitudes o no dormir lo suficiente. Ana María jessica rutina para dormir. Acuéstese y levántese todos los días a la misma hora. No utilice aparatos electrónicos antes de acostarse. Pueden provocarle un dolor de evette o impedirle dormir myles.      •No fume.La nicotina y otras sustancias químicas en los cigarrillos y puros pueden desencadenar un dolor de evette mireya o empeorarlo. Pida información a olson médico si usted actualmente fuma y necesita ayuda para dejar de fumar. Los cigarrillos electrónicos o el tabaco sin humo igualmente contienen nicotina. Consulte con olson médico antes de utilizar estos productos.      •Limite el consumo de alcohol según le indicaron.El alcohol puede provocar un dolor de evette mireya o empeorarlo. Si usted tiene clifton de evette de racimo, no abby alcohol hernan un episodio. Para otros tipos de clifton de evette, pregúntele a olson proveedor de atención médica si es seguro para usted beber alcohol. Pregunte cuál es la cantidad martin que puede beber y con qué frecuencia.      •Ejercítese según indicaciones.El ejercicio puede reducir la tensión y ayudarlo a aliviar el dolor de evette. Propóngase hacer 30 minutos de actividad física eugenio todos los días de la semana. Olson médico puede ayudarle a crear un plan de ejercicios.      •Consuma alimentos saludables y variados.Los alimentos saludables incluyen las frutas, verduras, productos lácteos bajos en grasa, henny magras, pescado y frijoles cocidos. Olson médico o dietista puede ayudarle a crear planes de comidas si desea evitar los alimentos que provocan clifton de evette.      Acuda a annika consultas de control con olson médico según le indicaron.Traiga olson registro de clifton de evette con usted cuando visite a olson médico. Anote annika preguntas para que se acuerde de hacerlas hernan annika visitas.

## 2024-04-26 NOTE — ED PROVIDER NOTE - CLINICAL SUMMARY MEDICAL DECISION MAKING FREE TEXT BOX
74 y/o female s/p witnessed fall. there is an abrasion above the left eyebrow. Vitals normal and baseline mental status. Plan for X-ray hip and chest, ct head and neck. If negative discharge

## 2024-08-19 ENCOUNTER — EMERGENCY (EMERGENCY)
Facility: HOSPITAL | Age: 74
LOS: 1 days | Discharge: HOME | End: 2024-08-19
Attending: STUDENT IN AN ORGANIZED HEALTH CARE EDUCATION/TRAINING PROGRAM | Admitting: STUDENT IN AN ORGANIZED HEALTH CARE EDUCATION/TRAINING PROGRAM
Payer: MEDICARE

## 2024-08-19 VITALS
HEIGHT: 61 IN | DIASTOLIC BLOOD PRESSURE: 70 MMHG | RESPIRATION RATE: 18 BRPM | WEIGHT: 147.71 LBS | SYSTOLIC BLOOD PRESSURE: 115 MMHG | HEART RATE: 69 BPM | TEMPERATURE: 98 F | OXYGEN SATURATION: 98 %

## 2024-08-19 VITALS
DIASTOLIC BLOOD PRESSURE: 58 MMHG | OXYGEN SATURATION: 95 % | RESPIRATION RATE: 18 BRPM | SYSTOLIC BLOOD PRESSURE: 145 MMHG | HEART RATE: 96 BPM

## 2024-08-19 DIAGNOSIS — Z98.2 PRESENCE OF CEREBROSPINAL FLUID DRAINAGE DEVICE: Chronic | ICD-10-CM

## 2024-08-19 PROCEDURE — 99282 EMERGENCY DEPT VISIT SF MDM: CPT

## 2024-08-19 PROCEDURE — 99283 EMERGENCY DEPT VISIT LOW MDM: CPT

## 2024-08-19 NOTE — ED ADULT TRIAGE NOTE - SOURCE OF INFORMATION
EMS
I personally evaluated the patient. I reviewed the Resident’s or Physician Assistant’s note (as assigned above), and agree with the findings and plan except as documented in my note. Patient signed out to me by Dr. Castillo. Pt evaluated for r/o thrombocytopenia and IVC or portal vein thrombosis. Labs, CT performed in the ED. Increased anasarca and ascites noted on exam. D/w heme/onc who recommends pt stay for platelet monitoring as well as paracentesis. Pt admitted to medicine for further evaluation and treatment.

## 2024-08-19 NOTE — ED PROVIDER NOTE - OBJECTIVE STATEMENT
74-year-old female with a history of dementia hypertension hyperlipidemia, dermatomyositis, seizure, hypothyroid presents with left hand wound.  Patient has been dealing with chronic left hand blood blister areas and this morning wound care nurse.  Living noticed that the blood blister burst and was having continuous mild oozing.  Was dressed with gauze wrapped and patient sent to the ED.  Patient AO x 0 and nonverbal at baseline per EMS

## 2024-08-19 NOTE — ED PROVIDER NOTE - PHYSICAL EXAMINATION
CONSTITUTIONAL: NAD, awake, alert  SKIN: Warm dry  +skin avulsion over LT dorsum of hand about 2cm across x 7cm down hand between 1st and 3rd digit, oozing blood,   +large blood blisters over palm and distal 2nd and 3rd digit, dorsum of LT lateral hand   HEAD: NCAT  EYES: NL inspection  NEURO: Grossly unremarkable, aox0  PSYCH: Cooperative, appropriate.

## 2024-08-19 NOTE — ED PROVIDER NOTE - PATIENT PORTAL LINK FT
You can access the FollowMyHealth Patient Portal offered by [facility] by registering at the following website: [email or web address]. By joining [employer]’s FollowMyHealth portal, you will also be able to view your health information using other applications (apps) compatible with our system.

## 2024-08-19 NOTE — ED ADULT NURSE NOTE - NSFALLHARMRISKINTERV_ED_ALL_ED
Oriented - self; Oriented - place; Oriented - time Assistance OOB with selected safe patient handling equipment if applicable/Assistance with ambulation/Communicate risk of Fall with Harm to all staff, patient, and family/Monitor gait and stability/Provide patient with walking aids/Provide visual cue: red socks, yellow wristband, yellow gown, etc/Reinforce activity limits and safety measures with patient and family/Bed in lowest position, wheels locked, appropriate side rails in place/Call bell, personal items and telephone in reach/Instruct patient to call for assistance before getting out of bed/chair/stretcher/Non-slip footwear applied when patient is off stretcher/[place] to call system/Physically safe environment - no spills, clutter or unnecessary equipment/Purposeful Proactive Rounding/Room/bathroom lighting operational, light cord in reach

## 2024-08-27 NOTE — DISCHARGE NOTE PROVIDER - CARE PROVIDERS DIRECT ADDRESSES
Conjunctivae and eyelids appear normal,  Sclerae : White without injection
,DirectAddress_Unknown,[email or web address],DirectAddress_Unknown

## 2024-09-02 ENCOUNTER — EMERGENCY (EMERGENCY)
Facility: HOSPITAL | Age: 74
LOS: 1 days | Discharge: HOME | End: 2024-09-02
Attending: STUDENT IN AN ORGANIZED HEALTH CARE EDUCATION/TRAINING PROGRAM | Admitting: STUDENT IN AN ORGANIZED HEALTH CARE EDUCATION/TRAINING PROGRAM
Payer: MEDICARE

## 2024-09-02 VITALS
RESPIRATION RATE: 18 BRPM | HEART RATE: 105 BPM | OXYGEN SATURATION: 98 % | SYSTOLIC BLOOD PRESSURE: 162 MMHG | DIASTOLIC BLOOD PRESSURE: 76 MMHG

## 2024-09-02 VITALS
WEIGHT: 130.07 LBS | HEIGHT: 61 IN | OXYGEN SATURATION: 97 % | HEART RATE: 85 BPM | TEMPERATURE: 97 F | RESPIRATION RATE: 16 BRPM | DIASTOLIC BLOOD PRESSURE: 61 MMHG | SYSTOLIC BLOOD PRESSURE: 127 MMHG

## 2024-09-02 DIAGNOSIS — Z98.2 PRESENCE OF CEREBROSPINAL FLUID DRAINAGE DEVICE: Chronic | ICD-10-CM

## 2024-09-02 PROCEDURE — 70450 CT HEAD/BRAIN W/O DYE: CPT | Mod: 26,MC

## 2024-09-02 PROCEDURE — 72170 X-RAY EXAM OF PELVIS: CPT

## 2024-09-02 PROCEDURE — 72170 X-RAY EXAM OF PELVIS: CPT | Mod: 26

## 2024-09-02 PROCEDURE — 72125 CT NECK SPINE W/O DYE: CPT | Mod: MC

## 2024-09-02 PROCEDURE — 99284 EMERGENCY DEPT VISIT MOD MDM: CPT | Mod: 25

## 2024-09-02 PROCEDURE — 70450 CT HEAD/BRAIN W/O DYE: CPT | Mod: MC

## 2024-09-02 PROCEDURE — 71045 X-RAY EXAM CHEST 1 VIEW: CPT | Mod: 26

## 2024-09-02 PROCEDURE — 72125 CT NECK SPINE W/O DYE: CPT | Mod: 26,MC

## 2024-09-02 PROCEDURE — 12011 RPR F/E/E/N/L/M 2.5 CM/<: CPT

## 2024-09-02 PROCEDURE — 71045 X-RAY EXAM CHEST 1 VIEW: CPT

## 2024-09-02 PROCEDURE — 99285 EMERGENCY DEPT VISIT HI MDM: CPT | Mod: 25

## 2024-09-02 RX ORDER — TETANUS TOXOID, REDUCED DIPHTHERIA TOXOID AND ACELLULAR PERTUSSIS VACCINE, ADSORBED 5; 2.5; 8; 8; 2.5 [IU]/.5ML; [IU]/.5ML; UG/.5ML; UG/.5ML; UG/.5ML
0.5 SUSPENSION INTRAMUSCULAR ONCE
Refills: 0 | Status: ACTIVE | OUTPATIENT
Start: 2024-09-02 | End: 2024-09-02

## 2024-09-02 RX ORDER — LIDOCAINE HCL 20 MG/ML
10 VIAL (ML) INJECTION ONCE
Refills: 0 | Status: COMPLETED | OUTPATIENT
Start: 2024-09-02 | End: 2024-09-02

## 2024-09-02 RX ADMIN — Medication 10 MILLILITER(S): at 18:10

## 2024-09-02 NOTE — ED PROCEDURE NOTE - NS ED PROC PERFORMED BY1 FT
Jacki Lazcano, PGY-1 Cibinqo Counseling: I discussed with the patient the risks of Cibinqo therapy including but not limited to common cold, nausea, headache, cold sores, increased blood CPK levels, dizziness, UTIs, fatigue, acne, and vomitting. Live vaccines should be avoided.  This medication has been linked to serious infections; higher rate of mortality; malignancy and lymphoproliferative disorders; major adverse cardiovascular events; thrombosis; thrombocytopenia and lymphopenia; lipid elevations; and retinal detachment.

## 2024-09-02 NOTE — ED ADULT NURSE NOTE - NSFALLHARMRISKINTERV_ED_ALL_ED

## 2024-09-02 NOTE — ED ADULT TRIAGE NOTE - HOW PATIENT ADDRESSED, PROFILE
[facility] UROLOGY  [address] [place] [id number]  [phone]          Donn Sweet  : Shira

## 2024-09-02 NOTE — ED ADULT NURSE NOTE - CHIEF COMPLAINT QUOTE
PT BIBA from [facility] s/p witnessed fall off wheelchair. PT has laceration above right eyebrow. Pt does not take blood thinners. Pt has severe dementia at baseline.

## 2024-09-02 NOTE — ED PROVIDER NOTE - PHYSICAL EXAMINATION
VITAL SIGNS: I have reviewed nursing notes and confirm.  CONSTITUTIONAL: appears stated age   SKIN: Warm dry, normal skin turgor left eyebrow laceration   HEAD: NC  EYES: no scleral icterus  ENT: Moist mucous membranes,  CARD: RRR, no murmurs, rubs or gallops  RESP: clear to ausculation b/l.  No rales, rhonchi, or wheezing.  ABD: soft, + BS, non-tender, non-distended,  NEURO: contracted

## 2024-09-02 NOTE — ED ADULT NURSE NOTE - NSSUHOSCREENINGYN_ED_ALL_ED
,[email or web address]
No - the patient is unable to be screened due to medical condition

## 2024-09-02 NOTE — ED ADULT TRIAGE NOTE - CHIEF COMPLAINT QUOTE
PT BIBA from [facility] s/p witnessed fall off wheelchair. PT has laceration above right eyebrow. Pt does not take blood thinners. Pt has severe dementia at baseline.

## 2024-09-02 NOTE — ED PROVIDER NOTE - OBJECTIVE STATEMENT
73-year-old female with history of dementia, hypertension hyperlipidemia, seizure, hypothyroid dermatomyositis presenting to the ED with left eyebrow laceration secondary to fall.  Witnessed fall from wheelchair.  Patient not on anticoagulation.  No other reported complaints patient nonverbal.

## 2024-09-02 NOTE — ED ADULT NURSE NOTE - OBJECTIVE STATEMENT
Patient BIB from [facility] for witnessed fall out of wheelchair. Patient struck head on floor and has laceration above right eyebrow. Patient not on blood thinners and EMS denies LOC. Patient has severe dementia and is non verbal at baseline. Patient is picking at wounds and has left hand wrapped in old gauze from an old injury. Hand wrap looks as though it has not been changed in a while. There is a strong odor coming from left hand bandage.

## 2024-09-02 NOTE — ED PROVIDER NOTE - PATIENT PORTAL LINK FT
You can access the FollowMyHealth Patient Portal offered by [facility] by registering at the following website: [email or web address]. By joining [employer]’s FollowMyHealth portal, you will also be able to view your health information using other applications (apps) compatible with our system.

## 2024-09-02 NOTE — ED PROVIDER NOTE - CLINICAL SUMMARY MEDICAL DECISION MAKING FREE TEXT BOX
73-year-old female with history of dementia, hypertension hyperlipidemia, seizure, hypothyroid dermatomyositis presenting to the ED with left eyebrow laceration secondary to fall.  Witnessed fall from wheelchair.  Patient not on anticoagulation.  No other reported complaints patient nonverbal.    Left eyebrow facial laceration repaired with absorbable sutures 5 sutures in place, CT head C-spine negative for acute traumatic pathology chest x-ray and x-ray without noted pathology.  Patient stable for discharge.

## 2024-09-02 NOTE — ED PROCEDURE NOTE - NS ED ATTENDING STATEMENT MOD
Attending with Otezla Pregnancy And Lactation Text: This medication is Pregnancy Category C and it isn't known if it is safe during pregnancy. It is unknown if it is excreted in breast milk.

## 2024-09-05 NOTE — PHYSICAL THERAPY INITIAL EVALUATION ADULT - REFERRAL TO ANOTHER SERVICE NEEDED, PT EVAL
Quality 130: Documentation Of Current Medications In The Medical Record: Current Medications Documented Quality 431: Preventive Care And Screening: Unhealthy Alcohol Use - Screening: Patient not identified as an unhealthy alcohol user when screened for unhealthy alcohol use using a systematic screening method Detail Level: Detailed Quality 226: Preventive Care And Screening: Tobacco Use: Screening And Cessation Intervention: Patient screened for tobacco use and is an ex/non-smoker neurology/social work

## 2024-10-02 ENCOUNTER — EMERGENCY (EMERGENCY)
Facility: HOSPITAL | Age: 74
LOS: 1 days | Discharge: HOME | End: 2024-10-02
Attending: INTERNAL MEDICINE | Admitting: INTERNAL MEDICINE
Payer: MEDICARE

## 2024-10-02 VITALS
DIASTOLIC BLOOD PRESSURE: 55 MMHG | OXYGEN SATURATION: 98 % | HEART RATE: 82 BPM | RESPIRATION RATE: 18 BRPM | SYSTOLIC BLOOD PRESSURE: 135 MMHG

## 2024-10-02 VITALS
HEIGHT: 61 IN | TEMPERATURE: 98 F | DIASTOLIC BLOOD PRESSURE: 67 MMHG | OXYGEN SATURATION: 95 % | WEIGHT: 139.99 LBS | HEART RATE: 83 BPM | RESPIRATION RATE: 17 BRPM | SYSTOLIC BLOOD PRESSURE: 113 MMHG

## 2024-10-02 DIAGNOSIS — Z98.2 PRESENCE OF CEREBROSPINAL FLUID DRAINAGE DEVICE: Chronic | ICD-10-CM

## 2024-10-02 PROCEDURE — 70450 CT HEAD/BRAIN W/O DYE: CPT | Mod: 26,MC

## 2024-10-02 PROCEDURE — 99284 EMERGENCY DEPT VISIT MOD MDM: CPT

## 2024-11-20 PROCEDURE — 99284 EMERGENCY DEPT VISIT MOD MDM: CPT | Mod: 25

## 2024-11-20 PROCEDURE — 70450 CT HEAD/BRAIN W/O DYE: CPT | Mod: MC

## 2024-11-24 NOTE — PATIENT PROFILE ADULT - FUNCTIONAL ASSESSMENT - BASIC MOBILITY 6.
used 1-calculated by average/Not able to assess (calculate score using [facility] averaging method) 1-calculated by average/Not able to assess (calculate score using [facility] averaging method) 1-calculated by average/Not able to assess (calculate score using [facility] averaging method)

## 2024-12-24 NOTE — DISCHARGE NOTE NURSING/CASE MANAGEMENT/SOCIAL WORK - NSDPACMPNY_GEN_ALL_CORE
Specialty Care Management Heart Failure Program  The Heart Failure program is a nursing care model with a team of telephonic, registered nurses that focuses on the whole person needs.       Situation:    Patient is enrolled in Specialty Care Heart Failure services to optimize self-management of heart failure.      Outreach for:  scheduled follow up, no answer message left.      Program Plan:   Reinforce teaching on next outreach:  supporting follow through of the plan of care (next appointment, medications, labs, procedures) , reviewing positive steps towards self-management , and reassessing patient need for ongoing HF support/education    Next Follow up: Two weeks    See hyperlinks within encounter for full documentation.   
Other (Specify)

## 2024-12-29 ENCOUNTER — EMERGENCY (EMERGENCY)
Facility: HOSPITAL | Age: 74
LOS: 1 days | Discharge: HOME | End: 2024-12-29
Attending: EMERGENCY MEDICINE | Admitting: STUDENT IN AN ORGANIZED HEALTH CARE EDUCATION/TRAINING PROGRAM
Payer: MEDICARE

## 2024-12-29 VITALS
RESPIRATION RATE: 18 BRPM | DIASTOLIC BLOOD PRESSURE: 58 MMHG | OXYGEN SATURATION: 96 % | HEART RATE: 84 BPM | SYSTOLIC BLOOD PRESSURE: 129 MMHG

## 2024-12-29 VITALS
RESPIRATION RATE: 17 BRPM | HEART RATE: 89 BPM | TEMPERATURE: 98 F | WEIGHT: 130.07 LBS | SYSTOLIC BLOOD PRESSURE: 130 MMHG | DIASTOLIC BLOOD PRESSURE: 83 MMHG | OXYGEN SATURATION: 99 % | HEIGHT: 65 IN

## 2024-12-29 DIAGNOSIS — Z98.2 PRESENCE OF CEREBROSPINAL FLUID DRAINAGE DEVICE: Chronic | ICD-10-CM

## 2024-12-29 PROCEDURE — 72125 CT NECK SPINE W/O DYE: CPT | Mod: MC

## 2024-12-29 PROCEDURE — 99285 EMERGENCY DEPT VISIT HI MDM: CPT

## 2024-12-29 PROCEDURE — 70450 CT HEAD/BRAIN W/O DYE: CPT | Mod: 26,MC

## 2024-12-29 PROCEDURE — 99284 EMERGENCY DEPT VISIT MOD MDM: CPT | Mod: 25

## 2024-12-29 PROCEDURE — 71045 X-RAY EXAM CHEST 1 VIEW: CPT | Mod: 26

## 2024-12-29 PROCEDURE — 72192 CT PELVIS W/O DYE: CPT | Mod: 26,MC

## 2024-12-29 PROCEDURE — 71045 X-RAY EXAM CHEST 1 VIEW: CPT

## 2024-12-29 PROCEDURE — 72125 CT NECK SPINE W/O DYE: CPT | Mod: 26,MC

## 2024-12-29 PROCEDURE — 72192 CT PELVIS W/O DYE: CPT | Mod: MC

## 2024-12-29 PROCEDURE — 72170 X-RAY EXAM OF PELVIS: CPT

## 2024-12-29 PROCEDURE — 72170 X-RAY EXAM OF PELVIS: CPT | Mod: 26

## 2024-12-29 PROCEDURE — 70450 CT HEAD/BRAIN W/O DYE: CPT | Mod: MC

## 2024-12-29 RX ORDER — BACITRACIN ZINC 500 UNIT/G
1 OINTMENT (GRAM) TOPICAL ONCE
Refills: 0 | Status: DISCONTINUED | OUTPATIENT
Start: 2024-12-29 | End: 2025-01-01

## 2024-12-29 NOTE — ED PROVIDER NOTE - PHYSICAL EXAMINATION
Gen: Well appearing in NAD  Head: Chin with superficial abrasion, missing tooth #9, left frontal hematoma. No laceration.   Neck: trachea midline, no midline spinal tenderness.   Resp:  No distress, clear b/l.  Ext: RLE in flexion and internal rotation. Unable to straighten. Distal neurovasc intact. No hip deformity on palpation.   Neuro:  awake, nonverbal. unable to elicit communication.   Skin:   Chin abrasion as mentioned.

## 2024-12-29 NOTE — ED PROVIDER NOTE - CARE PLAN
1 Principal Discharge DX:	Abrasion of chin  Secondary Diagnosis:	Closed head injury, initial encounter

## 2024-12-29 NOTE — ED PROVIDER NOTE - CLINICAL SUMMARY MEDICAL DECISION MAKING FREE TEXT BOX
74-year-old female nonverbal presents to the emergency department for unwitnessed fall from facility.  Patient with bleeding on chin.  Patient on aspirin.  Fall was unwitnessed according to EMS.  Facility is [facility].  Nonverbal at baseline.  Patient is full code.  At baseline, two-person assist and wheelchair-bound.  Exam as stated. Plan for CT brain /Cspine. Will xray pelvis/femur/chest.   Xray pelvis and femur unremarkable. Will CT to ensure ;     If neg, plan for DC to facility. 74-year-old female nonverbal presents to the emergency department for unwitnessed fall from facility.  Patient with bleeding on chin.  Patient on aspirin.  Fall was unwitnessed according to EMS.  Facility is [facility].  Nonverbal at baseline.  Patient is full code.  At baseline, two-person assist and wheelchair-bound.  Exam as stated. Plan for CT brain /Cspine. Will xray pelvis/femur/chest.   Xray pelvis and femur unremarkable. Will CT to ensure ;     If neg, plan for DC to facility.    CT without acute findings. + fecal / rectal impaction. Disimpacted. Brown stool. No blood.   1) Follow up with your doctor in 1-2 days  2) Return to the ER for worsening or concerning symptoms

## 2024-12-29 NOTE — ED PROVIDER NOTE - PATIENT PORTAL LINK FT
You can access the FollowMyHealth Patient Portal offered by [facility] by registering at the following website: [email or web address]. By joining [employer]’s FollowMyHealth portal, you will also be able to view your health information using other applications (apps) compatible with our system.

## 2024-12-29 NOTE — ED PROVIDER NOTE - OBJECTIVE STATEMENT
74-year-old female nonverbal presents to the emergency department for unwitnessed fall from facility.  Patient with bleeding on chin.  Patient on aspirin.  Fall was unwitnessed according to EMS.  Facility is [facility].  Nonverbal at baseline.  Patient is full code.  At baseline, two-person assist and wheelchair-bound.

## 2024-12-29 NOTE — ED ADULT NURSE NOTE - NSFALLHARMRISKINTERV_ED_ALL_ED

## 2024-12-29 NOTE — ED PROVIDER NOTE - NSFOLLOWUPINSTRUCTIONS_ED_ALL_ED_FT
Closed Head Injury    Abrasion on chin : apply bacitracin twice per day for 7 days.     A closed head injury is an injury to your head that may or may not involve a traumatic brain injury (TBI). Symptoms of TBI can be short or long lasting and include headache, dizziness, interference with memory or speech, fatigue, confusion, changes in sleep, mood changes, nausea, depression/anxiety, and dulling of senses. Make sure to obtain proper rest which includes getting plenty of sleep, avoiding excessive visual stimulation, and avoiding activities that may cause physical or mental stress. Avoid any situation where there is potential for another head injury, including sports.    SEEK IMMEDIATE MEDICAL CARE IF YOU HAVE ANY OF THE FOLLOWING SYMPTOMS: unusual drowsiness, vomiting, severe dizziness, seizures, lightheadedness, muscular weakness, different pupil sizes, visual changes, or clear or bloody discharge from your ears or nose. Closed Head Injury    Abrasion on chin : apply bacitracin twice per day for 7 days.     We disimpacted the rectum. Patient had large bolus of stool removed. Tolerated well.    A closed head injury is an injury to your head that may or may not involve a traumatic brain injury (TBI). Symptoms of TBI can be short or long lasting and include headache, dizziness, interference with memory or speech, fatigue, confusion, changes in sleep, mood changes, nausea, depression/anxiety, and dulling of senses. Make sure to obtain proper rest which includes getting plenty of sleep, avoiding excessive visual stimulation, and avoiding activities that may cause physical or mental stress. Avoid any situation where there is potential for another head injury, including sports.    SEEK IMMEDIATE MEDICAL CARE IF YOU HAVE ANY OF THE FOLLOWING SYMPTOMS: unusual drowsiness, vomiting, severe dizziness, seizures, lightheadedness, muscular weakness, different pupil sizes, visual changes, or clear or bloody discharge from your ears or nose.

## 2024-12-29 NOTE — ED PROVIDER NOTE - NSICDXPASTMEDICALHX_GEN_ALL_CORE_FT
Biopsy Type: H and E PAST MEDICAL HISTORY:  Benign essential HTN     Dementia     Depression     Dermatomyositis     Dermatomyositis     Dermatomyositis     Falls     HLD (hyperlipidemia)     HTN (hypertension)     HTN (hypertension)     Hyperlipidemia     Normal pressure hydrocephalus s/p VPS    NPH (normal pressure hydrocephalus)     Seizures     Seizures     Vascular dementia     Vascular dementia

## 2024-12-29 NOTE — ED ADULT NURSE NOTE - OBJECTIVE STATEMENT
Patient presents to ED after unwitnessed fall this morning at [facility]. Patient on aspirin. Patient non verbal at baseline. Patient has abrasion on chin, bleeding controlled, and bump above left eye. Patient presents to ED after unwitnessed fall this morning at [facility]. Patient on aspirin. Patient non verbal at baseline. Patient has abrasion on chin, bleeding controlled, and bump above left eye. Patient has several pressure ulcers.

## 2024-12-29 NOTE — ED ADULT TRIAGE NOTE - CHIEF COMPLAINT QUOTE
PAtient presents to ED via EMS for complaint of fall this morning, unwitnessed, on aspirin. PAtient nonverbal at baseline.

## 2025-02-12 ENCOUNTER — INPATIENT (INPATIENT)
Facility: HOSPITAL | Age: 75
LOS: 5 days | Discharge: NURSING, REHAB OR LONG-TERM CARE | End: 2025-02-18
Attending: STUDENT IN AN ORGANIZED HEALTH CARE EDUCATION/TRAINING PROGRAM | Admitting: STUDENT IN AN ORGANIZED HEALTH CARE EDUCATION/TRAINING PROGRAM
Payer: MEDICARE

## 2025-02-12 ENCOUNTER — EMERGENCY (EMERGENCY)
Facility: HOSPITAL | Age: 75
LOS: 1 days | Discharge: ANOTHER HOSPITAL | End: 2025-02-12
Attending: EMERGENCY MEDICINE | Admitting: EMERGENCY MEDICINE
Payer: MEDICARE

## 2025-02-12 VITALS
WEIGHT: 147.05 LBS | RESPIRATION RATE: 18 BRPM | SYSTOLIC BLOOD PRESSURE: 135 MMHG | TEMPERATURE: 98 F | OXYGEN SATURATION: 99 % | HEART RATE: 76 BPM | HEIGHT: 61 IN | DIASTOLIC BLOOD PRESSURE: 78 MMHG

## 2025-02-12 VITALS
DIASTOLIC BLOOD PRESSURE: 60 MMHG | HEIGHT: 61 IN | OXYGEN SATURATION: 96 % | SYSTOLIC BLOOD PRESSURE: 102 MMHG | HEART RATE: 67 BPM | TEMPERATURE: 98 F | RESPIRATION RATE: 19 BRPM

## 2025-02-12 VITALS
OXYGEN SATURATION: 98 % | RESPIRATION RATE: 20 BRPM | TEMPERATURE: 97 F | HEART RATE: 96 BPM | DIASTOLIC BLOOD PRESSURE: 69 MMHG | SYSTOLIC BLOOD PRESSURE: 127 MMHG

## 2025-02-12 DIAGNOSIS — Z98.2 PRESENCE OF CEREBROSPINAL FLUID DRAINAGE DEVICE: Chronic | ICD-10-CM

## 2025-02-12 LAB
ALBUMIN SERPL ELPH-MCNC: 2.6 G/DL — LOW (ref 3.3–5)
ALP SERPL-CCNC: 70 U/L — SIGNIFICANT CHANGE UP (ref 40–120)
ALT FLD-CCNC: 18 U/L — SIGNIFICANT CHANGE UP (ref 10–45)
ANION GAP SERPL CALC-SCNC: 8 MMOL/L — SIGNIFICANT CHANGE UP (ref 5–17)
APTT BLD: 29.8 SEC — SIGNIFICANT CHANGE UP (ref 24.5–35.6)
AST SERPL-CCNC: 25 U/L — SIGNIFICANT CHANGE UP (ref 10–40)
BASOPHILS # BLD AUTO: 0.02 K/UL — SIGNIFICANT CHANGE UP (ref 0–0.2)
BASOPHILS NFR BLD AUTO: 0.2 % — SIGNIFICANT CHANGE UP (ref 0–2)
BILIRUB SERPL-MCNC: 0.4 MG/DL — SIGNIFICANT CHANGE UP (ref 0.2–1.2)
BUN SERPL-MCNC: 16 MG/DL — SIGNIFICANT CHANGE UP (ref 7–23)
CALCIUM SERPL-MCNC: 8.8 MG/DL — SIGNIFICANT CHANGE UP (ref 8.4–10.5)
CHLORIDE SERPL-SCNC: 111 MMOL/L — HIGH (ref 96–108)
CO2 SERPL-SCNC: 29 MMOL/L — SIGNIFICANT CHANGE UP (ref 22–31)
CREAT SERPL-MCNC: 0.7 MG/DL — SIGNIFICANT CHANGE UP (ref 0.5–1.3)
EGFR: 90 ML/MIN/1.73M2 — SIGNIFICANT CHANGE UP
EGFR: 90 ML/MIN/1.73M2 — SIGNIFICANT CHANGE UP
EOSINOPHIL # BLD AUTO: 0.32 K/UL — SIGNIFICANT CHANGE UP (ref 0–0.5)
EOSINOPHIL NFR BLD AUTO: 3.4 % — SIGNIFICANT CHANGE UP (ref 0–6)
FLUAV AG NPH QL: SIGNIFICANT CHANGE UP
FLUBV AG NPH QL: SIGNIFICANT CHANGE UP
GLUCOSE SERPL-MCNC: 105 MG/DL — HIGH (ref 70–99)
HCT VFR BLD CALC: 36.1 % — SIGNIFICANT CHANGE UP (ref 34.5–45)
HGB BLD-MCNC: 11.6 G/DL — SIGNIFICANT CHANGE UP (ref 11.5–15.5)
IMM GRANULOCYTES NFR BLD AUTO: 0.4 % — SIGNIFICANT CHANGE UP (ref 0–0.9)
INR BLD: 1.06 RATIO — SIGNIFICANT CHANGE UP (ref 0.85–1.16)
LACTATE SERPL-SCNC: 1.5 MMOL/L — SIGNIFICANT CHANGE UP (ref 0.7–2)
LYMPHOCYTES # BLD AUTO: 1.36 K/UL — SIGNIFICANT CHANGE UP (ref 1–3.3)
LYMPHOCYTES # BLD AUTO: 14.3 % — SIGNIFICANT CHANGE UP (ref 13–44)
MCHC RBC-ENTMCNC: 29.7 PG — SIGNIFICANT CHANGE UP (ref 27–34)
MCHC RBC-ENTMCNC: 32.1 G/DL — SIGNIFICANT CHANGE UP (ref 32–36)
MCV RBC AUTO: 92.6 FL — SIGNIFICANT CHANGE UP (ref 80–100)
MONOCYTES # BLD AUTO: 0.6 K/UL — SIGNIFICANT CHANGE UP (ref 0–0.9)
MONOCYTES NFR BLD AUTO: 6.3 % — SIGNIFICANT CHANGE UP (ref 2–14)
NEUTROPHILS # BLD AUTO: 7.18 K/UL — SIGNIFICANT CHANGE UP (ref 1.8–7.4)
NEUTROPHILS NFR BLD AUTO: 75.4 % — SIGNIFICANT CHANGE UP (ref 43–77)
NRBC BLD AUTO-RTO: 0 /100 WBCS — SIGNIFICANT CHANGE UP (ref 0–0)
PLATELET # BLD AUTO: 246 K/UL — SIGNIFICANT CHANGE UP (ref 150–400)
POTASSIUM SERPL-MCNC: 3.7 MMOL/L — SIGNIFICANT CHANGE UP (ref 3.5–5.3)
POTASSIUM SERPL-SCNC: 3.7 MMOL/L — SIGNIFICANT CHANGE UP (ref 3.5–5.3)
PROCALCITONIN SERPL-MCNC: 0.05 NG/ML — SIGNIFICANT CHANGE UP
PROT SERPL-MCNC: 7.3 G/DL — SIGNIFICANT CHANGE UP (ref 6–8.3)
PROTHROM AB SERPL-ACNC: 12.5 SEC — SIGNIFICANT CHANGE UP (ref 9.9–13.4)
RBC # BLD: 3.9 M/UL — SIGNIFICANT CHANGE UP (ref 3.8–5.2)
RBC # FLD: 14.1 % — SIGNIFICANT CHANGE UP (ref 10.3–14.5)
RSV RNA NPH QL NAA+NON-PROBE: SIGNIFICANT CHANGE UP
SARS-COV-2 RNA SPEC QL NAA+PROBE: SIGNIFICANT CHANGE UP
SODIUM SERPL-SCNC: 148 MMOL/L — HIGH (ref 135–145)
WBC # BLD: 9.52 K/UL — SIGNIFICANT CHANGE UP (ref 3.8–10.5)
WBC # FLD AUTO: 9.52 K/UL — SIGNIFICANT CHANGE UP (ref 3.8–10.5)

## 2025-02-12 PROCEDURE — 99285 EMERGENCY DEPT VISIT HI MDM: CPT

## 2025-02-12 PROCEDURE — 93010 ELECTROCARDIOGRAM REPORT: CPT

## 2025-02-12 PROCEDURE — 71045 X-RAY EXAM CHEST 1 VIEW: CPT | Mod: 26

## 2025-02-12 PROCEDURE — 99285 EMERGENCY DEPT VISIT HI MDM: CPT | Mod: GC

## 2025-02-12 RX ORDER — METOPROLOL SUCCINATE 50 MG/1
25 TABLET, EXTENDED RELEASE ORAL ONCE
Refills: 0 | Status: COMPLETED | OUTPATIENT
Start: 2025-02-12 | End: 2025-02-12

## 2025-02-12 RX ORDER — ATORVASTATIN CALCIUM 80 MG/1
20 TABLET, FILM COATED ORAL AT BEDTIME
Refills: 0 | Status: DISCONTINUED | OUTPATIENT
Start: 2025-02-12 | End: 2025-02-16

## 2025-02-12 RX ORDER — LEVETIRACETAM 10 MG/ML
500 INJECTION, SOLUTION INTRAVENOUS ONCE
Refills: 0 | Status: COMPLETED | OUTPATIENT
Start: 2025-02-12 | End: 2025-02-12

## 2025-02-12 RX ORDER — PIPERACILLIN-TAZO-DEXTROSE,ISO 3.375G/5
3.38 IV SOLUTION, PIGGYBACK PREMIX FROZEN(ML) INTRAVENOUS ONCE
Refills: 0 | Status: COMPLETED | OUTPATIENT
Start: 2025-02-12 | End: 2025-02-12

## 2025-02-12 RX ORDER — FUROSEMIDE 10 MG/ML
20 INJECTION INTRAMUSCULAR; INTRAVENOUS ONCE
Refills: 0 | Status: COMPLETED | OUTPATIENT
Start: 2025-02-12 | End: 2025-02-12

## 2025-02-12 RX ORDER — ASPIRIN 325 MG
81 TABLET ORAL ONCE
Refills: 0 | Status: COMPLETED | OUTPATIENT
Start: 2025-02-12 | End: 2025-02-12

## 2025-02-12 RX ORDER — VANCOMYCIN HCL IN 5 % DEXTROSE 1.5G/250ML
1000 PLASTIC BAG, INJECTION (ML) INTRAVENOUS ONCE
Refills: 0 | Status: COMPLETED | OUTPATIENT
Start: 2025-02-12 | End: 2025-02-12

## 2025-02-12 RX ORDER — MELATONIN 5 MG
3 TABLET ORAL AT BEDTIME
Refills: 0 | Status: DISCONTINUED | OUTPATIENT
Start: 2025-02-12 | End: 2025-02-16

## 2025-02-12 RX ORDER — PREDNISONE 20 MG/1
60 TABLET ORAL ONCE
Refills: 0 | Status: COMPLETED | OUTPATIENT
Start: 2025-02-12 | End: 2025-02-12

## 2025-02-12 RX ORDER — ACETAMINOPHEN 500 MG/5ML
1000 LIQUID (ML) ORAL ONCE
Refills: 0 | Status: COMPLETED | OUTPATIENT
Start: 2025-02-12 | End: 2025-02-12

## 2025-02-12 RX ADMIN — Medication 400 MILLIGRAM(S): at 16:42

## 2025-02-12 RX ADMIN — PREDNISONE 60 MILLIGRAM(S): 20 TABLET ORAL at 20:12

## 2025-02-12 RX ADMIN — LEVETIRACETAM 500 MILLIGRAM(S): 10 INJECTION, SOLUTION INTRAVENOUS at 20:12

## 2025-02-12 RX ADMIN — Medication 250 MILLIGRAM(S): at 16:54

## 2025-02-12 RX ADMIN — Medication 3 MILLIGRAM(S): at 20:20

## 2025-02-12 RX ADMIN — FUROSEMIDE 20 MILLIGRAM(S): 10 INJECTION INTRAMUSCULAR; INTRAVENOUS at 20:12

## 2025-02-12 RX ADMIN — Medication 200 GRAM(S): at 18:21

## 2025-02-12 RX ADMIN — ATORVASTATIN CALCIUM 20 MILLIGRAM(S): 80 TABLET, FILM COATED ORAL at 20:20

## 2025-02-12 RX ADMIN — Medication 81 MILLIGRAM(S): at 20:12

## 2025-02-12 NOTE — ED ADULT TRIAGE NOTE - CHIEF COMPLAINT QUOTE
Pt transferred from [facility] for dermatology consult. Pt has pemphigoid blisters all over body, some are open with clear drainage. Aox0 @ baseline, responds to verbal and physical stimulation. pt breathing is equal and even B/L. Phx of HTN, HLD, seizure, dementia. (pt was originally sent to [facility] for seizure like activity and wound check).

## 2025-02-13 ENCOUNTER — RESULT REVIEW (OUTPATIENT)
Age: 75
End: 2025-02-13

## 2025-02-13 DIAGNOSIS — I10 ESSENTIAL (PRIMARY) HYPERTENSION: ICD-10-CM

## 2025-02-13 DIAGNOSIS — E03.9 HYPOTHYROIDISM, UNSPECIFIED: ICD-10-CM

## 2025-02-13 DIAGNOSIS — L12.0 BULLOUS PEMPHIGOID: ICD-10-CM

## 2025-02-13 DIAGNOSIS — F03.90 UNSPECIFIED DEMENTIA, UNSPECIFIED SEVERITY, WITHOUT BEHAVIORAL DISTURBANCE, PSYCHOTIC DISTURBANCE, MOOD DISTURBANCE, AND ANXIETY: ICD-10-CM

## 2025-02-13 DIAGNOSIS — E78.5 HYPERLIPIDEMIA, UNSPECIFIED: ICD-10-CM

## 2025-02-13 DIAGNOSIS — Z29.9 ENCOUNTER FOR PROPHYLACTIC MEASURES, UNSPECIFIED: ICD-10-CM

## 2025-02-13 DIAGNOSIS — M33.91 DERMATOPOLYMYOSITIS, UNSPECIFIED WITH RESPIRATORY INVOLVEMENT: ICD-10-CM

## 2025-02-13 DIAGNOSIS — G40.909 EPILEPSY, UNSPECIFIED, NOT INTRACTABLE, WITHOUT STATUS EPILEPTICUS: ICD-10-CM

## 2025-02-13 DIAGNOSIS — Z79.899 OTHER LONG TERM (CURRENT) DRUG THERAPY: ICD-10-CM

## 2025-02-13 DIAGNOSIS — R21 RASH AND OTHER NONSPECIFIC SKIN ERUPTION: ICD-10-CM

## 2025-02-13 LAB
ALBUMIN SERPL ELPH-MCNC: 2.7 G/DL — LOW (ref 3.3–5)
ALP SERPL-CCNC: 57 U/L — SIGNIFICANT CHANGE UP (ref 40–120)
ALT FLD-CCNC: 11 U/L — SIGNIFICANT CHANGE UP (ref 4–33)
ANION GAP SERPL CALC-SCNC: 12 MMOL/L — SIGNIFICANT CHANGE UP (ref 7–14)
AST SERPL-CCNC: 12 U/L — SIGNIFICANT CHANGE UP (ref 4–32)
BASOPHILS # BLD AUTO: 0.01 K/UL — SIGNIFICANT CHANGE UP (ref 0–0.2)
BASOPHILS NFR BLD AUTO: 0.1 % — SIGNIFICANT CHANGE UP (ref 0–2)
BILIRUB SERPL-MCNC: 0.3 MG/DL — SIGNIFICANT CHANGE UP (ref 0.2–1.2)
BUN SERPL-MCNC: 14 MG/DL — SIGNIFICANT CHANGE UP (ref 7–23)
CALCIUM SERPL-MCNC: 7.8 MG/DL — LOW (ref 8.4–10.5)
CHLORIDE SERPL-SCNC: 110 MMOL/L — HIGH (ref 98–107)
CO2 SERPL-SCNC: 21 MMOL/L — LOW (ref 22–31)
CREAT SERPL-MCNC: 0.52 MG/DL — SIGNIFICANT CHANGE UP (ref 0.5–1.3)
CRP SERPL-MCNC: 56 MG/L — HIGH
EGFR: 97 ML/MIN/1.73M2 — SIGNIFICANT CHANGE UP
EOSINOPHIL # BLD AUTO: 0.01 K/UL — SIGNIFICANT CHANGE UP (ref 0–0.5)
EOSINOPHIL NFR BLD AUTO: 0.1 % — SIGNIFICANT CHANGE UP (ref 0–6)
ERYTHROCYTE [SEDIMENTATION RATE] IN BLOOD: 85 MM/HR — HIGH (ref 0–20)
GLUCOSE SERPL-MCNC: 153 MG/DL — HIGH (ref 70–99)
HCT VFR BLD CALC: 30.1 % — LOW (ref 34.5–45)
HGB BLD-MCNC: 9.5 G/DL — LOW (ref 11.5–15.5)
IANC: 6.22 K/UL — SIGNIFICANT CHANGE UP (ref 1.8–7.4)
IMM GRANULOCYTES NFR BLD AUTO: 0.4 % — SIGNIFICANT CHANGE UP (ref 0–0.9)
LYMPHOCYTES # BLD AUTO: 0.62 K/UL — LOW (ref 1–3.3)
LYMPHOCYTES # BLD AUTO: 8.8 % — LOW (ref 13–44)
MAGNESIUM SERPL-MCNC: 2 MG/DL — SIGNIFICANT CHANGE UP (ref 1.6–2.6)
MCHC RBC-ENTMCNC: 29.2 PG — SIGNIFICANT CHANGE UP (ref 27–34)
MCHC RBC-ENTMCNC: 31.6 G/DL — LOW (ref 32–36)
MCV RBC AUTO: 92.6 FL — SIGNIFICANT CHANGE UP (ref 80–100)
MONOCYTES # BLD AUTO: 0.15 K/UL — SIGNIFICANT CHANGE UP (ref 0–0.9)
MONOCYTES NFR BLD AUTO: 2.1 % — SIGNIFICANT CHANGE UP (ref 2–14)
MRSA PCR RESULT.: SIGNIFICANT CHANGE UP
NEUTROPHILS # BLD AUTO: 6.22 K/UL — SIGNIFICANT CHANGE UP (ref 1.8–7.4)
NEUTROPHILS NFR BLD AUTO: 88.5 % — HIGH (ref 43–77)
NRBC # BLD AUTO: 0 K/UL — SIGNIFICANT CHANGE UP (ref 0–0)
NRBC # FLD: 0 K/UL — SIGNIFICANT CHANGE UP (ref 0–0)
NRBC BLD AUTO-RTO: 0 /100 WBCS — SIGNIFICANT CHANGE UP (ref 0–0)
PHOSPHATE SERPL-MCNC: 3.2 MG/DL — SIGNIFICANT CHANGE UP (ref 2.5–4.5)
PLATELET # BLD AUTO: 189 K/UL — SIGNIFICANT CHANGE UP (ref 150–400)
POTASSIUM SERPL-MCNC: 3.6 MMOL/L — SIGNIFICANT CHANGE UP (ref 3.5–5.3)
POTASSIUM SERPL-SCNC: 3.6 MMOL/L — SIGNIFICANT CHANGE UP (ref 3.5–5.3)
PROT SERPL-MCNC: 5.4 G/DL — LOW (ref 6–8.3)
RBC # BLD: 3.25 M/UL — LOW (ref 3.8–5.2)
RBC # FLD: 13.7 % — SIGNIFICANT CHANGE UP (ref 10.3–14.5)
S AUREUS DNA NOSE QL NAA+PROBE: DETECTED
SODIUM SERPL-SCNC: 143 MMOL/L — SIGNIFICANT CHANGE UP (ref 135–145)
WBC # BLD: 7.04 K/UL — SIGNIFICANT CHANGE UP (ref 3.8–10.5)
WBC # FLD AUTO: 7.04 K/UL — SIGNIFICANT CHANGE UP (ref 3.8–10.5)

## 2025-02-13 PROCEDURE — 99223 1ST HOSP IP/OBS HIGH 75: CPT

## 2025-02-13 PROCEDURE — 88346 IMFLUOR 1ST 1ANTB STAIN PX: CPT | Mod: 26

## 2025-02-13 PROCEDURE — 88350 IMFLUOR EA ADDL 1ANTB STN PX: CPT | Mod: 26

## 2025-02-13 PROCEDURE — 11104 PUNCH BX SKIN SINGLE LESION: CPT

## 2025-02-13 PROCEDURE — 88305 TISSUE EXAM BY PATHOLOGIST: CPT | Mod: 26

## 2025-02-13 PROCEDURE — 11105 PUNCH BX SKIN EA SEP/ADDL: CPT

## 2025-02-13 PROCEDURE — 99223 1ST HOSP IP/OBS HIGH 75: CPT | Mod: GC,25

## 2025-02-13 PROCEDURE — 12345: CPT | Mod: NC

## 2025-02-13 RX ORDER — LEVOTHYROXINE SODIUM 300 MCG
50 TABLET ORAL DAILY
Refills: 0 | Status: DISCONTINUED | OUTPATIENT
Start: 2025-02-14 | End: 2025-02-18

## 2025-02-13 RX ORDER — PREDNISONE 20 MG/1
20 TABLET ORAL DAILY
Refills: 0 | Status: DISCONTINUED | OUTPATIENT
Start: 2025-02-19 | End: 2025-02-18

## 2025-02-13 RX ORDER — ASPIRIN 325 MG
81 TABLET ORAL DAILY
Refills: 0 | Status: DISCONTINUED | OUTPATIENT
Start: 2025-02-13 | End: 2025-02-18

## 2025-02-13 RX ORDER — LEVOTHYROXINE SODIUM 300 MCG
50 TABLET ORAL DAILY
Refills: 0 | Status: DISCONTINUED | OUTPATIENT
Start: 2025-02-13 | End: 2025-02-13

## 2025-02-13 RX ORDER — ENOXAPARIN SODIUM 100 MG/ML
40 INJECTION SUBCUTANEOUS EVERY 24 HOURS
Refills: 0 | Status: DISCONTINUED | OUTPATIENT
Start: 2025-02-13 | End: 2025-02-18

## 2025-02-13 RX ORDER — TRIAMCINOLONE ACETONIDE 1 MG/G
1 CREAM TOPICAL EVERY 12 HOURS
Refills: 0 | Status: DISCONTINUED | OUTPATIENT
Start: 2025-02-13 | End: 2025-02-18

## 2025-02-13 RX ORDER — ESCITALOPRAM OXALATE 20 MG/1
20 TABLET ORAL DAILY
Refills: 0 | Status: DISCONTINUED | OUTPATIENT
Start: 2025-02-13 | End: 2025-02-18

## 2025-02-13 RX ORDER — LEVETIRACETAM 10 MG/ML
500 INJECTION, SOLUTION INTRAVENOUS
Refills: 0 | Status: DISCONTINUED | OUTPATIENT
Start: 2025-02-13 | End: 2025-02-18

## 2025-02-13 RX ORDER — MELATONIN 5 MG
6 TABLET ORAL AT BEDTIME
Refills: 0 | Status: DISCONTINUED | OUTPATIENT
Start: 2025-02-13 | End: 2025-02-18

## 2025-02-13 RX ORDER — PREDNISONE 20 MG/1
TABLET ORAL
Refills: 0 | Status: DISCONTINUED | OUTPATIENT
Start: 2025-02-14 | End: 2025-02-18

## 2025-02-13 RX ORDER — MELATONIN 5 MG
5 TABLET ORAL AT BEDTIME
Refills: 0 | Status: DISCONTINUED | OUTPATIENT
Start: 2025-02-13 | End: 2025-02-13

## 2025-02-13 RX ORDER — MUPIROCIN CALCIUM 20 MG/G
1 CREAM TOPICAL EVERY 12 HOURS
Refills: 0 | Status: COMPLETED | OUTPATIENT
Start: 2025-02-13 | End: 2025-02-18

## 2025-02-13 RX ORDER — ATORVASTATIN CALCIUM 80 MG/1
20 TABLET, FILM COATED ORAL AT BEDTIME
Refills: 0 | Status: DISCONTINUED | OUTPATIENT
Start: 2025-02-13 | End: 2025-02-18

## 2025-02-13 RX ORDER — PREDNISONE 20 MG/1
30 TABLET ORAL DAILY
Refills: 0 | Status: COMPLETED | OUTPATIENT
Start: 2025-02-14 | End: 2025-02-18

## 2025-02-13 RX ORDER — LEVETIRACETAM 10 MG/ML
500 INJECTION, SOLUTION INTRAVENOUS
Refills: 0 | Status: DISCONTINUED | OUTPATIENT
Start: 2025-02-13 | End: 2025-02-13

## 2025-02-13 RX ORDER — SENNA 187 MG
1 TABLET ORAL DAILY
Refills: 0 | Status: DISCONTINUED | OUTPATIENT
Start: 2025-02-13 | End: 2025-02-18

## 2025-02-13 RX ORDER — ACETAMINOPHEN 500 MG/5ML
650 LIQUID (ML) ORAL EVERY 6 HOURS
Refills: 0 | Status: DISCONTINUED | OUTPATIENT
Start: 2025-02-13 | End: 2025-02-13

## 2025-02-13 RX ORDER — LEVOTHYROXINE SODIUM 300 MCG
40 TABLET ORAL AT BEDTIME
Refills: 0 | Status: COMPLETED | OUTPATIENT
Start: 2025-02-13 | End: 2025-02-13

## 2025-02-13 RX ORDER — PREDNISONE 20 MG/1
10 TABLET ORAL DAILY
Refills: 0 | Status: CANCELLED | OUTPATIENT
Start: 2025-02-24 | End: 2025-02-18

## 2025-02-13 RX ORDER — ACETAMINOPHEN 500 MG/5ML
650 LIQUID (ML) ORAL EVERY 8 HOURS
Refills: 0 | Status: COMPLETED | OUTPATIENT
Start: 2025-02-13 | End: 2025-02-13

## 2025-02-13 RX ORDER — LEVETIRACETAM 10 MG/ML
500 INJECTION, SOLUTION INTRAVENOUS EVERY 12 HOURS
Refills: 0 | Status: DISCONTINUED | OUTPATIENT
Start: 2025-02-13 | End: 2025-02-13

## 2025-02-13 RX ADMIN — Medication 40 MICROGRAM(S): at 22:09

## 2025-02-13 RX ADMIN — LEVETIRACETAM 500 MILLIGRAM(S): 10 INJECTION, SOLUTION INTRAVENOUS at 18:29

## 2025-02-13 RX ADMIN — MUPIROCIN CALCIUM 1 APPLICATION(S): 20 CREAM TOPICAL at 18:25

## 2025-02-13 RX ADMIN — Medication 100 MILLILITER(S): at 18:26

## 2025-02-13 RX ADMIN — Medication 75 MILLILITER(S): at 04:06

## 2025-02-13 RX ADMIN — ENOXAPARIN SODIUM 40 MILLIGRAM(S): 100 INJECTION SUBCUTANEOUS at 10:43

## 2025-02-13 RX ADMIN — Medication 650 MILLIGRAM(S): at 03:56

## 2025-02-13 RX ADMIN — TRIAMCINOLONE ACETONIDE 1 APPLICATION(S): 1 CREAM TOPICAL at 18:24

## 2025-02-13 RX ADMIN — Medication 75 MILLILITER(S): at 09:15

## 2025-02-13 RX ADMIN — Medication 6 MILLIGRAM(S): at 22:10

## 2025-02-13 RX ADMIN — Medication 1000 MILLILITER(S): at 03:04

## 2025-02-13 RX ADMIN — ATORVASTATIN CALCIUM 20 MILLIGRAM(S): 80 TABLET, FILM COATED ORAL at 22:10

## 2025-02-13 RX ADMIN — Medication 1 APPLICATION(S): at 10:43

## 2025-02-13 RX ADMIN — Medication 260 MILLIGRAM(S): at 03:26

## 2025-02-13 NOTE — SWALLOW BEDSIDE ASSESSMENT ADULT - ASR SWALLOW RECOMMEND DIAG
Objective testing not warranted at this time given no overt signs of impaired airway protection and chest imaging without concern for infection

## 2025-02-13 NOTE — PROGRESS NOTE ADULT - ASSESSMENT
74F with history of vascular dementia (AAOx0 at baseline), dermatomyositis with ILD, seizure disorder on keppra, HTN, HLD, hypothyroidism, recurrent falls, initially sent to [place] ED from [facility] for diffuse blisters concerning for bullous pemphigoid, transferred to [facility] for dermatology evaluation.

## 2025-02-13 NOTE — CONSULT NOTE ADULT - ASSESSMENT
cecile kent [phone] ASSESSMENT AND PLAN:  #dermatitis, favor bullous pemphigoid  - f/u bx    Dermatology Punch Biopsy Procedure Note    After risks and benefits of procedure including bleeding, infection and scar were reviewed (consents including photo consent reviewed, signed and in chart), allergies were reviewed and time out performed. Written consent given by the patient.    Area cleaned with rubbing alcohol and anesthetized with lidocaine and epinephrine.  x2 4mm punch biopsy was performed to R wrist, hemostasis achieved with a single dissolvable chromic gut suture with pressure dressing placed.   Wound care reviewed with patient and team. Biopsy site should remain covered with pressure bandage for 24-48 hours. Then apply Vaseline to biopsy site daily and cover with bandage until healed.    attempted to call son (HCP) several time for consent for biopsy  2 physician consent obtained over the phone from Niece (POA) cecile kent [phone]    Seen and discussed with the dermatology attending Dr. Phipps.  Recommendations were communicated with the primary team.    Please page [phone] with a 10-digit call-back number for further related questions.  Please re-consult Dermatology for any new or worsening developments.    Natalya Moura MD  Resident Physician, PGY-3  [facility] Dermatology  Pager: [phone]  Office: [phone] ASSESSMENT AND PLAN:  #dermatitis, favor bullous pemphigoid  - f/u bx  - start prednisone 30 mg x 5 days then 20 mg x 5 days then 10 mg x 5 days  - START triamcinolone 0.1% ointment BID to affected areas (never to the face/groin/skin folds) for up to 2 weeks on, then 1 week off. Please dispense multiple tubes to cover body surface area.    Patient can follow up with us in the [facility] located at [address] [place] [address]. [address], [place] [address] upon discharge. Our office will call to schedule an appointment but if patient does not hear from us within a few days of discharge, please instruct patient to call our office. Office phone number is [phone].    Dermatology Punch Biopsy Procedure Note    After risks and benefits of procedure including bleeding, infection and scar were reviewed (consents including photo consent reviewed, signed and in chart), allergies were reviewed and time out performed. Written consent given by the patient.    Area cleaned with rubbing alcohol and anesthetized with lidocaine and epinephrine.  x2 4mm punch biopsy was performed to R wrist, hemostasis achieved with a single dissolvable chromic gut suture with pressure dressing placed.   Wound care reviewed with patient and team. Biopsy site should remain covered with pressure bandage for 24-48 hours. Then apply Vaseline to biopsy site daily and cover with bandage until healed.    attempted to call son (HCP) several time for consent for biopsy  2 physician consent obtained over the phone from Niece (POA) cecile kent [phone]    Seen and discussed with the dermatology attending Dr. Phipps.  Recommendations were communicated with the primary team.    Please page [phone] with a 10-[phone] call-back number for further related questions.  Please re-consult Dermatology for any new or worsening developments.    Natalya Moura MD  Resident Physician, PGY-3  [facility] Dermatology  Pager: [phone]  Office: [phone]

## 2025-02-13 NOTE — SWALLOW BEDSIDE ASSESSMENT ADULT - SWALLOW EVAL: DIAGNOSIS
This is a historical note converted from [employer]. Formatting and pictures may have been removed.  Please reference [employer] for original formatting and attached multimedia. Assessment/Plan  Screening breast examination  Ordered:  MG Screening, Routine, 04/08/18 10:08:00 CDT, Screening, None, Stretcher, Rad Type, Screening breast examination, Schedule this test, [facility], 04/08/18 10:08:00 CDT  ?   Problem List/Past Medical History  Ongoing  Bradycardia  GERD (gastroesophageal reflux disease)  Obesity  SOB (shortness of breath)  Historical  Chronic back pain  Depression  Esophageal reflux (GERD)  Hypertension  Procedure/Surgical History  Catheter placement in coronary artery(s) for coronary angiography, including intraprocedural injection(s) for coronary angiography, imaging supervision and interpretation; with left heart catheterization including intraprocedural injection(s) for left galen (08/08/2016), Fluoroscopy of Left Heart using Low Osmolar Contrast (08/08/2016), Fluoroscopy of Multiple Coronary Arteries using Low Osmolar Contrast (08/08/2016), Fluoroscopy of Right Lower Extremity Arteries using Low Osmolar Contrast (08/08/2016), Iliac art angio,cardiac cath 01-JAN-2009 <?> (08/08/2016), Measurement of Cardiac Sampling and Pressure, Left Heart, Percutaneous Approach (08/08/2016), cataract, hysterectomy.  Medications  amLODIPine 5 mg oral tablet, 5 mg= 1 tab(s), Oral, Daily, 3 refills  aspirin 81 mg oral tablet, 81 mg= 1 tab(s), Oral, Daily  atenolol 25 mg oral tablet, 0.5 tab, Oral, Daily, 3 refills  Caltrate 600 with D, 1 tab(s), Oral, Daily  cyclobenzaprine 10 mg oral tablet, 10 mg= 1 tab(s), Oral, TID, PRN  Cymbalta 60 mg oral delayed release capsule, 60 mg= 1 cap(s), Oral, Daily, 3 refills  Flonase 50 mcg/inh nasal spray, 1 spray(s), Nasal, BID, 11 refills  FLUZONE HIGH-DOSE 2016-17 0.5ML SYR,? ?Not taking  furosemide 40 mg oral tablet, 40 mg= 1 tab(s), Oral, Daily, 3  refills  hydrochlorothiazide 12.5 mg oral tablet, 12.5 mg= 1 tab(s), Oral, Daily, 3 refills  lisinopril 40 mg oral tablet, See Instructions, 3 refills  MAGNESIUM CITRATE SOLN VASQUES 296ML,? ?Not Taking, Completed Rx  Mobic 7.5 mg oral tablet, 7.5 mg= 1 tab(s), Oral, q12hr, PRN, 3 refills,? ?Still taking, not as prescribed  NITROFURANTN CAP 100MG, 100 mg= 1 cap(s), Oral, BID,? ?Not Taking, Completed Rx  omeprazole 40 mg oral DR capsule, 80 mg= 2 cap(s), Oral, Daily, 11 refills  oxybutynin 5 mg oral tablet, See Instructions, 3 refills  Pantoprazole 40 mg ORAL EC-Tablet, 40 mg, Oral, Daily, 6 refills  polyethylene glycol 3350 oral powder for reconstitution, 17 gm, Oral, Daily, 1 refills,? ?Still taking, not as prescribed  Promethazine 25 mg ORAL Tab, 25 mg= 1 tab(s), Oral, BID, 1 refills  Senokot oral tablet, See Instructions  TRAMADOL 50MG TABLETS,? ?Not Taking, Completed Rx  traZODONE 150 mg oral tab ( Desyrel ), 150 mg= 1 tab(s), Oral, Once a day (at bedtime), 3 refills  Allergies  No Known Allergies  Social History  Alcohol - Low Risk, 06/22/2014  Current, Wine, 1-2 times per month, 06/22/2014  Employment/School  Work/School description: . Operates hazardous equipment: No., 08/31/2016  Exercise  Self assessment: Good condition., 08/31/2016  Home/Environment  Lives with Alone. Living situation: Home/Independent. Alcohol abuse in household: No. Substance abuse in household: No. Smoker in household: Yes. Injuries/Abuse/Neglect in household: No. Feels unsafe at home: No. Family/Friends available for support: Yes. Concern for family members at home: No. Major illness in household: No. Financial concerns: No. TV/Computer concerns: No., 08/31/2016  Nutrition/Health  Regular, 08/31/2016  Substance Abuse - Denies Substance Abuse, 06/22/2014  Never, 08/05/2016  Tobacco - Low Risk, 06/22/2014  Current some day smoker, Cigarettes, 1 per day., 06/22/2014  Family History  Hypertension.: Mother, Father, Sister and  Brother.  Immunizations  Vaccine Date Status   pneumococcal 23-polyvalent vaccine 11/25/2016 Recorded   influenza virus vaccine, inactivated 11/25/2016 Recorded       1- Mild oral stage for puree, soft/bite solids, and thin liquids marked by adequate oral containment, adequate bolus manipulation, slow mastication for soft/bite solids with slow anterior to posterior transfer, oral clearance noted. 2- Functional pharyngeal stage for aforementioned consistencies marked by initiation of the pharyngeal swallow with hyolaryngeal excursion upon palpation without evidence of impaired airway protection.

## 2025-02-13 NOTE — PROGRESS NOTE ADULT - SUBJECTIVE AND OBJECTIVE BOX
Patient is a 74y old  Female who presents with a chief complaint of derm evaluation for potential bullous pemphigoid (13 Feb 2025 01:08)      INTERVAL HPI/OVERNIGHT EVENTS: Patient admitted to medicine overnight. No acute events.    Patient examined at bedside this morning.      Vital Signs Last 24 Hrs  T(C): 36.3 (13 Feb 2025 05:21), Max: 36.5 (12 Feb 2025 13:45)  T(F): 97.4 (13 Feb 2025 05:21), Max: 97.7 (12 Feb 2025 13:45)  HR: 64 (13 Feb 2025 05:21) (61 - 96)  BP: 144/60 (13 Feb 2025 05:21) (88/43 - 144/60)  BP(mean): --  RR: 18 (13 Feb 2025 05:21) (18 - 20)  SpO2: 98% (13 Feb 2025 05:21) (95% - 99%)    Parameters below as of 13 Feb 2025 05:21  Patient On (Oxygen Delivery Method): room air    CAPILLARY BLOOD GLUCOSE      I&O's Detail      No Known Allergies      PHYSICAL EXAM:  GENERAL: NAD, well-groomed, well-developed  HEAD:  Atraumatic, Normocephalic  EYES: PERRLA, conjunctiva and sclera clear  ENMT: Moist mucous membranes  NECK: Supple, No JVD, Normal thyroid  CHEST/LUNG: Clear to percussion bilaterally; No rales, rhonchi, wheezing, or rubs  HEART: Regular rate and rhythm; No murmurs, rubs, or gallops  ABDOMEN: Soft, Nontender, Nondistended; Bowel sounds present  EXTREMITIES:  2+ Peripheral Pulses, No clubbing, cyanosis, or edema  NERVOUS SYSTEM:  Alert & Oriented X0, nonverbal, does not follow commands, grimaces to touch  SKIN: Diffuse fluid filled tense bullae and ruptured lesions over the trunk and extremities, few small ulcers in hard/soft palate, negative Nikolsky        LABS:                          9.5    7.04  )-----------( 189      ( 13 Feb 2025 07:39 )             30.1   02-12    148[H]  |  111[H]  |  16  ----------------------------<  105[H]  3.7   |  29  |  0.70    Ca    8.8      12 Feb 2025 16:00    TPro  7.3  /  Alb  2.6[L]  /  TBili  0.4  /  DBili  x   /  AST  25  /  ALT  18  /  AlkPhos  70  02-12        RADIOLOGY & ADDITIONAL TESTS:        chlorhexidine 2% Cloths 1 Application(s) Topical daily  enoxaparin Injectable 40 milliGRAM(s) SubCutaneous every 24 hours  sodium chloride 0.9%. 1000 milliLiter(s) IV Continuous <Continuous>       Patient is a 74y old  Female who presents with a chief complaint of derm evaluation for potential bullous pemphigoid (13 Feb 2025 01:08)      INTERVAL HPI/OVERNIGHT EVENTS: Patient admitted to medicine overnight. Hypotensive overnight to 88/43, improved with 1L NS. Failed dysphagia screening.    Patient examined at bedside this morning. Patient is nonverbal, smiles and tracks with eyes. Grimaces to pain.       Vital Signs Last 24 Hrs  T(C): 36.3 (13 Feb 2025 05:21), Max: 36.5 (12 Feb 2025 13:45)  T(F): 97.4 (13 Feb 2025 05:21), Max: 97.7 (12 Feb 2025 13:45)  HR: 64 (13 Feb 2025 05:21) (61 - 96)  BP: 144/60 (13 Feb 2025 05:21) (88/43 - 144/60)  BP(mean): --  RR: 18 (13 Feb 2025 05:21) (18 - 20)  SpO2: 98% (13 Feb 2025 05:21) (95% - 99%)    Parameters below as of 13 Feb 2025 05:21  Patient On (Oxygen Delivery Method): room air    CAPILLARY BLOOD GLUCOSE      I&O's Detail      No Known Allergies      PHYSICAL EXAM:  GENERAL: NAD, smiling, but grimaces when lesions are touched  HEAD:  Atraumatic, Normocephalic  EYES: PERRLA, conjunctiva and sclera clear  ENMT: Moist mucous membranes  NECK: Supple, No JVD, Normal thyroid  CHEST/LUNG: Clear to percussion bilaterally; No rales, rhonchi, wheezing, or rubs  HEART: Regular rate and rhythm; No murmurs, rubs, or gallops  ABDOMEN: Soft, Nontender, Nondistended; Bowel sounds present  EXTREMITIES:  2+ Peripheral Pulses, No clubbing, cyanosis, or edema  NERVOUS SYSTEM:  Alert & Oriented X0, nonverbal, does not follow commands, grimaces to touch and tracks with eyes  SKIN: Diffuse fluid filled soft bullae and ruptured flacid bullae over the trunk and extremities, few small ulcers in hard/soft palate, negative Nikolsky        LABS:                          9.5    7.04  )-----------( 189      ( 13 Feb 2025 07:39 )             30.1   02-12    148[H]  |  111[H]  |  16  ----------------------------<  105[H]  3.7   |  29  |  0.70    Ca    8.8      12 Feb 2025 16:00    TPro  7.3  /  Alb  2.6[L]  /  TBili  0.4  /  DBili  x   /  AST  25  /  ALT  18  /  AlkPhos  70  02-12        RADIOLOGY & ADDITIONAL TESTS:        chlorhexidine 2% Cloths 1 Application(s) Topical daily  enoxaparin Injectable 40 milliGRAM(s) SubCutaneous every 24 hours  sodium chloride 0.9%. 1000 milliLiter(s) IV Continuous <Continuous>

## 2025-02-13 NOTE — PATIENT PROFILE ADULT - FALL HARM RISK - HARM RISK INTERVENTIONS
Assistance with ambulation/Assistance OOB with selected safe patient handling equipment/Communicate Risk of Fall with Harm to all staff/Reinforce activity limits and safety measures with patient and family/Tailored Fall Risk Interventions/Use of alarms - bed, chair and/or voice tab/Visual Cue: Yellow wristband and red socks/Bed in lowest position, wheels locked, appropriate side rails in place/Call bell, personal items and telephone in reach/Instruct patient to call for assistance before getting out of bed or chair/Non-slip footwear when patient is out of bed/[place] to call system/Physically safe environment - no spills, clutter or unnecessary equipment/Purposeful Proactive Rounding/Room/bathroom lighting operational, light cord in reach

## 2025-02-13 NOTE — SWALLOW BEDSIDE ASSESSMENT ADULT - SWALLOW EVAL: RECOMMENDED FEEDING/EATING TECHNIQUES
Swallowing Guidelines: PO Intake When Alert State Only; Seated Upright during Mealtimes; Slow Pacing; One Bite/Sip at a Time; Maintain Adequate Oral Hygiene

## 2025-02-13 NOTE — ED PROVIDER NOTE - CLINICAL SUMMARY MEDICAL DECISION MAKING FREE TEXT BOX
Patient is a 74-year-old female, past medical history hypertension, hyperlipidemia, dementia A+O x 0 at baseline, resident of [facility], presents as transfer from [place] for dermatology evaluation concern for bullous pemphigoid.  There patient received Vanco and Zosyn due to reported oral temp 100.2 and 60 of prednisone  ED attending had spoken with Dr. Elisabet Coronel.  I spoke with dermatology resident Carmen who states she does not have any further recommendations at this time and dermatology will round on the patient tomorrow.  Vital signs nonactionable, afebrile orally  On exam patient is well-appearing ANO x 0 with diffuse blisters that do not involve the mucosa, we will unroofed blisters however the ones that are not unroofed exhibit negative Nikolsky sign.  Will admit to hospitalist for further dermatologic evaluation. Patient is a 74-year-old female, past medical history hypertension, hyperlipidemia, dementia A+O x 0 at baseline, resident of [facility], presents as transfer from [place] for dermatology evaluation concern for bullous pemphigoid.  There patient received Vanco and Zosyn due to reported oral temp 100.2 and 60 of prednisone  ED attending had spoken with Dr. Elisabet Coronel.  I spoke with dermatology resident Carmen who states she does not have any further recommendations at this time and dermatology will round on the patient tomorrow.  Vital signs nonactionable, afebrile orally  On exam patient is well-appearing ANO x 0 with diffuse blisters that do not involve the mucosa, there are some unroofed blisters however the ones that are not unroofed exhibit negative Nikolsky sign.  Will admit to hospitalist for further dermatologic evaluation.

## 2025-02-13 NOTE — ED PROVIDER NOTE - WET READ LAUNCH FT
----- Message from Bridget Urbano sent at 9/5/2018  2:24 PM CDT -----  Contact: self [phone]  Patient would like refill of oxyCODONE-acetaminophen (PERCOCET) 5-325 mg per tablet sent to [facility] [address]. Please call and advise.     There are no Wet Read(s) to document.

## 2025-02-13 NOTE — ED PROVIDER NOTE - PHYSICAL EXAMINATION
GENERAL: NAD  HEENT:  Atraumatic  CHEST/LUNG: Chest rise equal bilaterally cta b/l no w/r/r  HEART: Regular rate and rhythm normal s1/s2 no m/r/g  ABDOMEN: Soft, Nontender, Nondistended  EXTREMITIES:  Extremities warm  PSYCH: A&Ox0, calm and cooperative   SKIN:  Multiple blisters in different stages of healing, some tense blisters, some already unroofed with slight bleeding, No surrounding fluctuance or purulent drainage. Negative Nikolsky sign. No involvement inose oropharynx or eyes.

## 2025-02-13 NOTE — H&P ADULT - NSHPLABSRESULTS_GEN_ALL_CORE
.  LABS:                         11.6   9.52  )-----------( 246      ( 12 Feb 2025 16:00 )             36.1     02-12    148[H]  |  111[H]  |  16  ----------------------------<  105[H]  3.7   |  29  |  0.70    Ca    8.8      12 Feb 2025 16:00    TPro  7.3  /  Alb  2.6[L]  /  TBili  0.4  /  DBili  x   /  AST  25  /  ALT  18  /  AlkPhos  70  02-12    PT/INR - ( 12 Feb 2025 16:00 )   PT: 12.5 sec;   INR: 1.06 ratio         PTT - ( 12 Feb 2025 16:00 )  PTT:29.8 sec  Urinalysis Basic - ( 12 Feb 2025 16:00 )    Color: x / Appearance: x / SG: x / pH: x  Gluc: 105 mg/dL / Ketone: x  / Bili: x / Urobili: x   Blood: x / Protein: x / Nitrite: x   Leuk Esterase: x / RBC: x / WBC x   Sq Epi: x / Non Sq Epi: x / Bacteria: x    Lactate, Blood: 1.5 mmol/L (02-12 @ 16:00)    RADIOLOGY, EKG & ADDITIONAL TESTS:

## 2025-02-13 NOTE — PROGRESS NOTE ADULT - PROBLEM SELECTOR PLAN 3
According to son, patient was previously on IVIG a few years ago but has not received treatment recently.

## 2025-02-13 NOTE — SWALLOW BEDSIDE ASSESSMENT ADULT - COMMENTS
Progress Note- Internal Medicine 2/13: "74F with history of vascular dementia (AAOx0 at baseline), dermatomyositis with ILD, seizure disorder on keppra, HTN, HLD, hypothyroidism, recurrent falls, initially sent to [facility] from [facility] for diffuse blisters concerning for bullous pemphigoid, transferred to [facility] for dermatology evaluation."    CXR 2/12: "IMPRESSION: No radiographic evidence of active chest disease."    Patient seen awake/alert to verbal stimuli during evaluation this PM. Patient did not follow simple directives however is orally receptive to PO trials. Patient did not respond to SLP prompts however is noted with intermittent verbalizations that are unintelligible.

## 2025-02-13 NOTE — H&P ADULT - NSHPPHYSICALEXAM_GEN_ALL_CORE
VITALS:   Vital Signs Last 24 Hrs  T(C): 36.2 (13 Feb 2025 01:14), Max: 36.5 (12 Feb 2025 13:45)  T(F): 97.2 (13 Feb 2025 01:14), Max: 97.7 (12 Feb 2025 13:45)  HR: 64 (13 Feb 2025 01:14) (64 - 96)  BP: 101/63 (13 Feb 2025 01:14) (101/63 - 135/78)  BP(mean): --  RR: 18 (13 Feb 2025 01:14) (18 - 20)  SpO2: 95% (13 Feb 2025 01:14) (95% - 99%)    Parameters below as of 13 Feb 2025 01:14  Patient On (Oxygen Delivery Method): room air      I&O's Summary    CAPILLARY BLOOD GLUCOSE      Physical Exam:  General: NAD, non-toxic appearing   HEENT: PERRL, no scleral icterus  CV: RRR, normal S1 and S2  Lungs: normal respiratory effort on RA, CTAB  Abd: soft, nontender, nondistended  Ext: no edema, 2+ peripheral pulses   Pysch: AAOx0, calm affect   Neuro: nonverbal, occasionally grimaces to physical stimuli, does not respond to verbal stimuli, does not follow commands   Skin: diffuse blisters over trunk and extremities, with fluid filled tense bullae and ruptured lesions, couple of small ulcers in hard/soft palate, negative Nikolsky

## 2025-02-13 NOTE — CONSULT NOTE ADULT - ATTENDING COMMENTS
Goal Outcome Evaluation:  Plan of Care Reviewed With: patient        Progress: no change  Outcome Summary: Pt came up from the er complaining of pain in the LLE, Prn pain meds given see mar, will continue to monitor   Rash clinically consistent with bullous pemphigoid. Skin biopsies performed to help inform the diagnosis. Also recommend sending BPAG Abs as above. Recommend initiation of triamcinolone and prednisone. Additional long-term management may be considered in the outpatient setting.     Nick Phipps MD, PharmD, MPH  Co-Director, Inpatient Dermatology Consultation Service, [facility]

## 2025-02-13 NOTE — PATIENT PROFILE ADULT - FUNCTIONAL SCREEN CURRENT LEVEL: COMMUNICATION, MLM
unable to assess. PT is A&Ox0 and nonverbal./3 = unable to understand or speak (not related to language barrier)

## 2025-02-13 NOTE — PROGRESS NOTE ADULT - PROBLEM SELECTOR PLAN 6
On home metoprolol succinate 25mg daily and furosemide 20 mg daily.    Plan:  - Hypotensive to 88/43 overnight 2/12 - improved with 1L NS  - Hold home meds

## 2025-02-13 NOTE — PROGRESS NOTE ADULT - PROBLEM SELECTOR PLAN 8
Diet: pending dysphagia screen  DVT ppx: lovenox SQ   Dispo: pending clinical course; resident of [facility]  Code status: Full - next of kin is son, Josh Diet: pending SLP eval - if diet placed, add senna  DVT ppx: lovenox SQ   Dispo: pending clinical course; resident of [facility]  Code status: Full - son, Josh, is HCP Diet: Soft and Bite Size Solids with Thin Liquids (per SLP eval)  DVT ppx: lovenox SQ   Dispo: pending clinical course; resident of [facility]  Code status: Full - son, Josh, is HCP Diet: Soft and Bite Size Solids with Thin Liquids (per SLP eval)  DVT ppx: lovenox SQ   Dispo: pending clinical course; resident of [facility]  Code status: Full    Son, Josh, is HCP  Niece, cecile kent, is POA ([phone])

## 2025-02-13 NOTE — ED ADULT NURSE NOTE - OBJECTIVE STATEMENT
break coverage rn. pt received to room 16. A&Ox0 at baseline. hx HTN HLD seizures dementia. pt arrives as a transfer from [facility] for a derm consult. upon arrival pt noted to have pemphigoid blisted to body primarily to lower backside, bilateral legs, lower abdominal area and bilateral hips. some of the blisters are open with clear drainage. bandages placed over open areas. rectal temp performed and noted to be 97.2F. pt does not appear to be in pain. pt changed and cleaned. arrives with 2x 20G IVs to R AC and forearm. respirations even unlabored, abdomen soft, pedal pulses 2+ bilaterally. Admitting MD Lizzette Ambrose at [facility] and made aware of patient status. pt is full code per charting. safety maintained

## 2025-02-13 NOTE — H&P ADULT - PROBLEM SELECTOR PLAN 1
with diffuse tense bullae over trunk and extremities, mild and intermittent x1 year, now worsened and more extensive x 1 month, with oral ulcers, negative Nikolsky, concerning for bullous pemphigoid  - s/p prednisone 60mg PO x1 at OSH  - derm aware, no additional recommendations at this time, will assess pt in AM- f/u recs   - s/p empiric zosyn and vancomycin at OSH. Wounds do not appear overtly infected, not septic, will hold off on antibiotics at this time

## 2025-02-13 NOTE — H&P ADULT - HISTORY OF PRESENT ILLNESS
74F with history of dementia (AAOx0 at baseline), dermatomyositis, seizure disorder, HTN, and HLD initially sent to [facility] from [facility] for diffuse blisters, dermatology availability limited at ?, transferred to [facility] ED for dermatology evaluation of potential bullous pemphigoid. Labs at  largely unremarkable, WBC wnl, mildly hypernatremic to 148. Was given zosyn, vancomycin, and prednisone 60mg PO x1.   Patient unable to provide any history, no paperwork from facility, additional collateral obtained from son and NOK Josh Irais over the phone. Josh travels frequently for work and is currently in [place] so is not fully up to date on details of her current condition. Unable to reach facility overnight. Appears that patient has been having intermittent blistering wounds over the past year, never to this extent, was just being managed at the facility, have not sought medical attention for it. Worsened over the past month. No fevers as far as he is aware. He was inquiring whether it's related to her dermatomyositis, states that she has been somewhat lost to follow up, have not seen her rheumatologist or pulmonologist in at least a year or two, last received IVIG therapy a few years ago.   Of note, pt did come with a partially filled out MOLST form stating full code, that was filled out with cousin, Josh's cousin Rosi Mitchell. Josh confirmed that he is NOK (pt's  , no other children, no other designated HCP). For now, he agrees with full code status, offered to fill out MOLST, states he will do so when he returns from [facility].     On arrival to our ED, VS wnl, afebrile. Dermatology aware of patient, no additional recommendations at this time, will evaluate in morning. Admitted to medicine for further management.

## 2025-02-13 NOTE — ED ADULT NURSE NOTE - CHIEF COMPLAINT QUOTE
Pt transferred from [facility] for dermatology consult. Pt has pemphigoid blisters all over body, some are open with clear drainage. Aox0 @ baseline, responds to verbal and physical stimulation. pt breathing is equal and even B/L. Phx of HTN, HLD, seizure, dementia. (pt was originally sent to [place] for seizure like activity and wound check).

## 2025-02-13 NOTE — ED PROVIDER NOTE - ATTENDING CONTRIBUTION TO CARE
74-year-old female alert and oriented x 0 at baseline, dermatomyositis, seizure disorder, hypertension hyperlipidemia [facility]  Transferred to [facility] ED for dermatology consult.  Plan for dermatology consult and admission for potential biopsy, wound management   Patient nontoxic appearing with blistering to body most notably arms and legs negative Nikolsky no overt signs of infection does not appear to be eye involvement or mucosal involvement

## 2025-02-13 NOTE — H&P ADULT - PROBLEM SELECTOR PLAN 4
Diet: pending dysphagia screen  DVT ppx: lovenox SQ   Dispo: pending clinical course; resident of [facility]

## 2025-02-13 NOTE — CONSULT NOTE ADULT - SUBJECTIVE AND OBJECTIVE BOX
HPI:  74F with history of dementia (AAOx0 at baseline), dermatomyositis, seizure disorder, HTN, and HLD initially sent to [facility] from [facility] for diffuse blisters, dermatology availability limited at ?, transferred to [facility] ED for dermatology evaluation of potential bullous pemphigoid. Labs at  largely unremarkable, WBC wnl, mildly hypernatremic to 148. Was given zosyn, vancomycin, and prednisone 60mg PO x1.   Patient unable to provide any history, no paperwork from facility, additional collateral obtained from son and NOK Josh Irais over the phone. Josh travels frequently for work and is currently in [place] so is not fully up to date on details of her current condition. Unable to reach facility overnight. Appears that patient has been having intermittent blistering wounds over the past year, never to this extent, was just being managed at the facility, have not sought medical attention for it. Worsened over the past month. No fevers as far as he is aware. He was inquiring whether it's related to her dermatomyositis, states that she has been somewhat lost to follow up, have not seen her rheumatologist or pulmonologist in at least a year or two, last received IVIG therapy a few years ago.   Of note, pt did come with a partially filled out MOLST form stating full code, that was filled out with cousin, Josh's cousin Rosi Mitchell. Josh confirmed that he is NOK (pt's  , no other children, no other designated HCP). For now, he agrees with full code status, offered to fill out MOLST, states he will do so when he returns from [facility].     On arrival to our ED, VS wnl, afebrile. Dermatology aware of patient, no additional recommendations at this time, will evaluate in morning. Admitted to medicine for further management.  (2025 01:08)    derm consulted for BP r/o, sent over by Dr. Enriquez from [place].    PAST MEDICAL & SURGICAL HISTORY:  HTN (hypertension)      Dermatomyositis      Vascular dementia      Depression      Seizures      HLD (hyperlipidemia)      Dementia      Normal pressure hydrocephalus  s/p VPS      Benign essential HTN      Hyperlipidemia      Dermatomyositis      NPH (normal pressure hydrocephalus)      Vascular dementia      HTN (hypertension)      Seizures      Dermatomyositis      Falls      No significant past surgical history      S/P ventriculoperitoneal shunt      S/P  shunt          REVIEW OF SYSTEMS:  General: no fevers/chills; no lethargy  Skin/Breast: see HPI  Ophthalmologic: no eye pain or changes in vision  ENT: no dysphagia or changes in hearing  Respiratory: no SOB or cough  Cardiovascular: no palpitations or chest pain  Gastrointestinal: no abdominal pain or blood in stool  Genitourinary: no dysuria or frequency  Musculoskeletal: no joint pains or weakness  Neurological: no weakness, numbness, or tingling    MEDICATIONS  (STANDING):  aspirin enteric coated 81 milliGRAM(s) Oral daily  atorvastatin 20 milliGRAM(s) Oral at bedtime  chlorhexidine 2% Cloths 1 Application(s) Topical daily  enoxaparin Injectable 40 milliGRAM(s) SubCutaneous every 24 hours  escitalopram 20 milliGRAM(s) Oral daily  levETIRAcetam 500 milliGRAM(s) Oral two times a day  levothyroxine 50 MICROGram(s) Oral daily  melatonin 5 milliGRAM(s) Oral at bedtime  senna 1 Tablet(s) Oral daily  sodium chloride 0.9%. 1000 milliLiter(s) (75 mL/Hr) IV Continuous <Continuous>    MEDICATIONS  (PRN):      Allergies    No Known Allergies    Intolerances        SOCIAL HISTORY:     hx smoking  non-smoker    FAMILY HISTORY:  FH: CAD (coronary artery disease)      noncontributory    Vital Signs Last 24 Hrs  T(C): 36.6 (2025 13:29), Max: 36.6 (2025 13:29)  T(F): 97.9 (2025 13:29), Max: 97.9 (2025 13:29)  HR: 71 (2025 13:29) (61 - 96)  BP: 125/50 (2025 13:29) (88/43 - 144/60)  BP(mean): --  RR: 17 (2025 13:29) (17 - 20)  SpO2: 96% (2025 13:29) (95% - 99%)    Parameters below as of 2025 13:29  Patient On (Oxygen Delivery Method): room air        PHYSICAL EXAM:  The patient was in NAD.  OP showed no ulcerations.  There was no visible LAD.  Conjunctiva were non-injected.  There was no clubbing or edema of extremities.   The scalp, hair, face, eyebrows, lips, OP, neck, chest, back, extremities x4, and nails were examined.  There was no hyperhidrosis or bromhidrosis.     Of note on skin exam:     LABS:                        9.5    7.04  )-----------( 189      ( 2025 07:39 )             30.1     02-13    143  |  110[H]  |  14  ----------------------------<  153[H]  3.6   |  21[L]  |  0.52    Ca    7.8[L]      2025 07:39  Phos  3.2     02-13  Mg     2.00     02-13    TPro  5.4[L]  /  Alb  2.7[L]  /  TBili  0.3  /  DBili  x   /  AST  12  /  ALT  11  /  AlkPhos  57  02-13    PT/INR - ( 2025 16:00 )   PT: 12.5 sec;   INR: 1.06 ratio         PTT - ( 2025 16:00 )  PTT:29.8 sec  Urinalysis Basic - ( 2025 07:39 )    Color: x / Appearance: x / SG: x / pH: x  Gluc: 153 mg/dL / Ketone: x  / Bili: x / Urobili: x   Blood: x / Protein: x / Nitrite: x   Leuk Esterase: x / RBC: x / WBC x   Sq Epi: x / Non Sq Epi: x / Bacteria: x        RADIOLOGY & ADDITIONAL STUDIES: reviewed; no pertinent findings HPI:  74F with history of dementia (AAOx0 at baseline), dermatomyositis, seizure disorder, HTN, and HLD initially sent to [facility] from [facility] for diffuse blisters, dermatology availability limited at ?, transferred to [facility] ED for dermatology evaluation of potential bullous pemphigoid. Labs at  largely unremarkable, WBC wnl, mildly hypernatremic to 148. Was given zosyn, vancomycin, and prednisone 60mg PO x1.   Patient unable to provide any history, no paperwork from facility, additional collateral obtained from son and NOK Josh Irais over the phone. Josh travels frequently for work and is currently in [place] so is not fully up to date on details of her current condition. Unable to reach facility overnight. Appears that patient has been having intermittent blistering wounds over the past year, never to this extent, was just being managed at the facility, have not sought medical attention for it. Worsened over the past month. No fevers as far as he is aware. He was inquiring whether it's related to her dermatomyositis, states that she has been somewhat lost to follow up, have not seen her rheumatologist or pulmonologist in at least a year or two, last received IVIG therapy a few years ago.   Of note, pt did come with a partially filled out MOLST form stating full code, that was filled out with cousin, Josh's cousin Rosi Mitchell. Josh confirmed that he is NOK (pt's  , no other children, no other designated HCP). For now, he agrees with full code status, offered to fill out MOLST, states he will do so when he returns from [facility].     On arrival to our ED, VS wnl, afebrile. Dermatology aware of patient, no additional recommendations at this time, will evaluate in morning. Admitted to medicine for further management.  (2025 01:08)    derm consulted for BP r/o, sent over by Dr. Enriquez from [place].    PAST MEDICAL & SURGICAL HISTORY:  HTN (hypertension)      Dermatomyositis      Vascular dementia      Depression      Seizures      HLD (hyperlipidemia)      Dementia      Normal pressure hydrocephalus  s/p VPS      Benign essential HTN      Hyperlipidemia      Dermatomyositis      NPH (normal pressure hydrocephalus)      Vascular dementia      HTN (hypertension)      Seizures      Dermatomyositis      Falls      No significant past surgical history      S/P ventriculoperitoneal shunt      S/P  shunt          REVIEW OF SYSTEMS:  General: no fevers/chills; no lethargy  Skin/Breast: see HPI  Ophthalmologic: no eye pain or changes in vision  ENT: no dysphagia or changes in hearing  Respiratory: no SOB or cough  Cardiovascular: no palpitations or chest pain  Gastrointestinal: no abdominal pain or blood in stool  Genitourinary: no dysuria or frequency  Musculoskeletal: no joint pains or weakness  Neurological: no weakness, numbness, or tingling    MEDICATIONS  (STANDING):  aspirin enteric coated 81 milliGRAM(s) Oral daily  atorvastatin 20 milliGRAM(s) Oral at bedtime  chlorhexidine 2% Cloths 1 Application(s) Topical daily  enoxaparin Injectable 40 milliGRAM(s) SubCutaneous every 24 hours  escitalopram 20 milliGRAM(s) Oral daily  levETIRAcetam 500 milliGRAM(s) Oral two times a day  levothyroxine 50 MICROGram(s) Oral daily  melatonin 5 milliGRAM(s) Oral at bedtime  senna 1 Tablet(s) Oral daily  sodium chloride 0.9%. 1000 milliLiter(s) (75 mL/Hr) IV Continuous <Continuous>    MEDICATIONS  (PRN):      Allergies    No Known Allergies    Intolerances        SOCIAL HISTORY:     hx smoking  non-smoker    FAMILY HISTORY:  FH: CAD (coronary artery disease)      noncontributory    Vital Signs Last 24 Hrs  T(C): 36.6 (2025 13:29), Max: 36.6 (2025 13:29)  T(F): 97.9 (2025 13:29), Max: 97.9 (2025 13:29)  HR: 71 (2025 13:29) (61 - 96)  BP: 125/50 (2025 13:29) (88/43 - 144/60)  BP(mean): --  RR: 17 (2025 13:29) (17 - 20)  SpO2: 96% (2025 13:29) (95% - 99%)    Parameters below as of 2025 13:29  Patient On (Oxygen Delivery Method): room air        PHYSICAL EXAM:  The patient was in NAD.  OP showed no ulcerations.  There was no visible LAD.  Conjunctiva were non-injected.  There was no clubbing or edema of extremities.   The scalp, hair, face, eyebrows, lips, OP, neck, chest, back, extremities x4, and nails were examined.  There was no hyperhidrosis or bromhidrosis.     Of note on skin exam: scattered bullae and erosions with erythema on trunk and extremities    LABS:                        9.5    7.04  )-----------( 189      ( 2025 07:39 )             30.1     02-13    143  |  110[H]  |  14  ----------------------------<  153[H]  3.6   |  21[L]  |  0.52    Ca    7.8[L]      2025 07:39  Phos  3.2     02-  Mg     2.00     02-    TPro  5.4[L]  /  Alb  2.7[L]  /  TBili  0.3  /  DBili  x   /  AST  12  /  ALT  11  /  AlkPhos  57  02-13    PT/INR - ( 2025 16:00 )   PT: 12.5 sec;   INR: 1.06 ratio         PTT - ( 2025 16:00 )  PTT:29.8 sec  Urinalysis Basic - ( 2025 07:39 )    Color: x / Appearance: x / SG: x / pH: x  Gluc: 153 mg/dL / Ketone: x  / Bili: x / Urobili: x   Blood: x / Protein: x / Nitrite: x   Leuk Esterase: x / RBC: x / WBC x   Sq Epi: x / Non Sq Epi: x / Bacteria: x        RADIOLOGY & ADDITIONAL STUDIES: reviewed; no pertinent findings

## 2025-02-13 NOTE — H&P ADULT - ASSESSMENT
74F with history of dementia (AAOx0 at baseline), dermatomyositis, seizure disorder, HTN, and HLD initially sent to [facility] from [facility] for diffuse blisters concerning for bullous pemphigoid, transferred here for dermatology evaluation.

## 2025-02-13 NOTE — PROGRESS NOTE ADULT - PROBLEM SELECTOR PLAN 1
Patient presenting with diffuse tense bullae over trunk and extremities, mild and intermittent x1 year, now worsened and more extensive x 1 month, with oral ulcers, negative Nikolsky, concerning for bullous pemphigoid.    Plan:  - s/p prednisone 60mg PO x1 at OSH  - s/p empiric zosyn and vancomycin at OSH as patient was febrile to 100.2. Wounds do not appear overtly infected, not septic, will hold off on antibiotics at this time  - F/u Derm recs Patient presenting with diffuse tense bullae over trunk and extremities, mild and intermittent x1-2 years (originally assumed to be dermatomyositis flares), now worsened and more extensive x 1 month, with oral ulcers, negative Nikolsky, concerning for bullous pemphigoid.    Plan:  - s/p prednisone 60mg PO x1 at OSH  - s/p empiric zosyn and vancomycin at OSH as patient was febrile to 100.2, now afebrile. Wounds do not appear infected, not septic, will hold off on antibiotics at this time  - Derm recs appreciated - will obtain skin biopsy  - F/u /230 serology  - Wound care consult Patient presenting with diffuse tense bullae over trunk and extremities, mild and intermittent x1-2 years (originally assumed to be dermatomyositis flares), now worsened and more extensive x 1 month, with oral ulcers, negative Nikolsky, concerning for bullous pemphigoid.    Plan:  - s/p prednisone 60mg PO x1 at OSH  - s/p empiric zosyn and vancomycin at OSH as patient was febrile to 100.2, now afebrile. Wounds do not appear infected, not septic, will hold off on antibiotics at this time  - Derm recs appreciated - f/u skin biopsy  - F/u /230 serology  - Wound care consult

## 2025-02-14 ENCOUNTER — TRANSCRIPTION ENCOUNTER (OUTPATIENT)
Age: 75
End: 2025-02-14

## 2025-02-14 DIAGNOSIS — E87.20 ACIDOSIS, UNSPECIFIED: ICD-10-CM

## 2025-02-14 LAB
-  BLOOD PCR PANEL: SIGNIFICANT CHANGE UP
ADD ON TEST-SPECIMEN IN LAB: SIGNIFICANT CHANGE UP
ANION GAP SERPL CALC-SCNC: 13 MMOL/L — SIGNIFICANT CHANGE UP (ref 7–14)
BUN SERPL-MCNC: 10 MG/DL — SIGNIFICANT CHANGE UP (ref 7–23)
CALCIUM SERPL-MCNC: 8 MG/DL — LOW (ref 8.4–10.5)
CHLORIDE SERPL-SCNC: 113 MMOL/L — HIGH (ref 98–107)
CO2 SERPL-SCNC: 17 MMOL/L — LOW (ref 22–31)
CREAT SERPL-MCNC: 0.49 MG/DL — LOW (ref 0.5–1.3)
CYSTATIN C SERPL-MCNC: 0.97 MG/L — SIGNIFICANT CHANGE UP (ref 0.68–1.36)
EGFR: 98 ML/MIN/1.73M2 — SIGNIFICANT CHANGE UP
GFR/BSA.PRED SERPLBLD CYS-BASED-ARV: 71 ML/MIN/1.73M2 — SIGNIFICANT CHANGE UP
GLUCOSE BLDC GLUCOMTR-MCNC: 108 MG/DL — HIGH (ref 70–99)
GLUCOSE SERPL-MCNC: 65 MG/DL — LOW (ref 70–99)
GRAM STN FLD: ABNORMAL
MAGNESIUM SERPL-MCNC: 2 MG/DL — SIGNIFICANT CHANGE UP (ref 1.6–2.6)
METHOD TYPE: SIGNIFICANT CHANGE UP
PHOSPHATE SERPL-MCNC: 2.1 MG/DL — LOW (ref 2.5–4.5)
POTASSIUM SERPL-MCNC: 3.8 MMOL/L — SIGNIFICANT CHANGE UP (ref 3.5–5.3)
POTASSIUM SERPL-SCNC: 3.8 MMOL/L — SIGNIFICANT CHANGE UP (ref 3.5–5.3)
SODIUM SERPL-SCNC: 143 MMOL/L — SIGNIFICANT CHANGE UP (ref 135–145)

## 2025-02-14 PROCEDURE — 99232 SBSQ HOSP IP/OBS MODERATE 35: CPT

## 2025-02-14 RX ORDER — ACETAMINOPHEN 500 MG/5ML
650 LIQUID (ML) ORAL EVERY 6 HOURS
Refills: 0 | Status: DISCONTINUED | OUTPATIENT
Start: 2025-02-14 | End: 2025-02-18

## 2025-02-14 RX ORDER — SODIUM CHLORIDE 9 G/1000ML
1000 INJECTION, SOLUTION INTRAVENOUS
Refills: 0 | Status: DISCONTINUED | OUTPATIENT
Start: 2025-02-14 | End: 2025-02-14

## 2025-02-14 RX ORDER — SOD PHOS DI, MONO/K PHOS MONO 250 MG
1 TABLET ORAL EVERY 6 HOURS
Refills: 0 | Status: COMPLETED | OUTPATIENT
Start: 2025-02-14 | End: 2025-02-14

## 2025-02-14 RX ADMIN — Medication 1 PACKET(S): at 17:41

## 2025-02-14 RX ADMIN — PREDNISONE 30 MILLIGRAM(S): 20 TABLET ORAL at 05:44

## 2025-02-14 RX ADMIN — MUPIROCIN CALCIUM 1 APPLICATION(S): 20 CREAM TOPICAL at 17:38

## 2025-02-14 RX ADMIN — Medication 1 TABLET(S): at 12:57

## 2025-02-14 RX ADMIN — Medication 50 MICROGRAM(S): at 05:44

## 2025-02-14 RX ADMIN — TRIAMCINOLONE ACETONIDE 1 APPLICATION(S): 1 CREAM TOPICAL at 05:45

## 2025-02-14 RX ADMIN — Medication 1 APPLICATION(S): at 13:02

## 2025-02-14 RX ADMIN — ENOXAPARIN SODIUM 40 MILLIGRAM(S): 100 INJECTION SUBCUTANEOUS at 10:56

## 2025-02-14 RX ADMIN — Medication 6 MILLIGRAM(S): at 21:23

## 2025-02-14 RX ADMIN — MUPIROCIN CALCIUM 1 APPLICATION(S): 20 CREAM TOPICAL at 05:45

## 2025-02-14 RX ADMIN — SODIUM CHLORIDE 100 MILLILITER(S): 9 INJECTION, SOLUTION INTRAVENOUS at 12:55

## 2025-02-14 RX ADMIN — TRIAMCINOLONE ACETONIDE 1 APPLICATION(S): 1 CREAM TOPICAL at 17:39

## 2025-02-14 RX ADMIN — ATORVASTATIN CALCIUM 20 MILLIGRAM(S): 80 TABLET, FILM COATED ORAL at 21:23

## 2025-02-14 RX ADMIN — ESCITALOPRAM OXALATE 20 MILLIGRAM(S): 20 TABLET ORAL at 12:57

## 2025-02-14 RX ADMIN — Medication 1 PACKET(S): at 14:14

## 2025-02-14 RX ADMIN — Medication 81 MILLIGRAM(S): at 12:56

## 2025-02-14 RX ADMIN — LEVETIRACETAM 500 MILLIGRAM(S): 10 INJECTION, SOLUTION INTRAVENOUS at 17:38

## 2025-02-14 RX ADMIN — LEVETIRACETAM 500 MILLIGRAM(S): 10 INJECTION, SOLUTION INTRAVENOUS at 05:44

## 2025-02-14 NOTE — PROVIDER CONTACT NOTE (OTHER) - ACTION/TREATMENT ORDERED:
MD notified and aware. no new intervention at this time
MD notified and aware. Will order a bolus of sodium chloride.
No

## 2025-02-14 NOTE — PROGRESS NOTE ADULT - PROBLEM SELECTOR PLAN 4
Continue home keppra 500mg q12h According to son, patient was previously on IVIG a few years ago but has not received treatment recently.

## 2025-02-14 NOTE — PROGRESS NOTE ADULT - PROBLEM SELECTOR PLAN 7
Continue home atorvastatin 20mg daily. On home metoprolol succinate 25mg daily and furosemide 20 mg daily.    Plan:  - Hypotensive to 88/43 overnight 2/12 - improved with 1L NS  - Hold home meds

## 2025-02-14 NOTE — PROGRESS NOTE ADULT - PROBLEM SELECTOR PLAN 6
On home metoprolol succinate 25mg daily and furosemide 20 mg daily.    Plan:  - Hypotensive to 88/43 overnight 2/12 - improved with 1L NS  - Hold home meds Continue home levothyroxine 50mcg daily.

## 2025-02-14 NOTE — DISCHARGE NOTE PROVIDER - NSDCFUADDAPPT_GEN_ALL_CORE_FT
APPTS ARE READY TO BE MADE: [ ] YES    Best Family or Patient Contact (if needed):    Additional Information about above appointments (if needed):    1: Dermatology - [phone]  2:   3:     Other comments or requests:    APPTS ARE READY TO BE MADE: [X] YES    Best Family or Patient Contact (if needed):    Additional Information about above appointments (if needed):    1: Dermatology - [phone]  2:   3:     Other comments or requests:

## 2025-02-14 NOTE — DISCHARGE NOTE PROVIDER - HOSPITAL COURSE
HPI:  74F with history of dementia (AAOx0 at baseline), dermatomyositis, seizure disorder, HTN, and HLD initially sent to [facility] from [facility] for diffuse blisters, dermatology availability limited at ?, transferred to [facility] ED for dermatology evaluation of potential bullous pemphigoid. Labs at  largely unremarkable, WBC wnl, mildly hypernatremic to 148. Was given zosyn, vancomycin, and prednisone 60mg PO x1.   Patient unable to provide any history, no paperwork from facility, additional collateral obtained from son and NOK Josh Irais over the phone. Josh travels frequently for work and is currently in [place] so is not fully up to date on details of her current condition. Unable to reach facility overnight. Appears that patient has been having intermittent blistering wounds over the past year, never to this extent, was just being managed at the facility, have not sought medical attention for it. Worsened over the past month. No fevers as far as he is aware. He was inquiring whether it's related to her dermatomyositis, states that she has been somewhat lost to follow up, have not seen her rheumatologist or pulmonologist in at least a year or two, last received IVIG therapy a few years ago.   Of note, pt did come with a partially filled out MOLST form stating full code, that was filled out with cousin, Josh's cousin Rosi Mitchell. Josh confirmed that he is NOK (pt's  , no other children, no other designated HCP). For now, he agrees with full code status, offered to fill out MOLST, states he will do so when he returns from [facility].     On arrival to our ED, VS wnl, afebrile. Dermatology aware of patient, no additional recommendations at this time, will evaluate in morning. Admitted to medicine for further management.  (2025 01:08)    Hospital Course:  Patient was evaluated by dermatology and underwent skin biopsy. She was started on prednisone taper (30 mg for 5 days, 20 mg for 5 days, and 10 mg for 5 days) as well as triamcinolone 0.1% ointment to affected areas twice a day. She will follow up with dermatology outpatient. She was found to have metabolic acidosis, her creatinine and cystatin-c were tested and were within normal limits. One blood culture bottle was positive for gram-positive cocci in clusters. Repeat blood cultures showed....    Patient is stable for discharge back to [facility].    Important Medication Changes and Reason:  -start prednisone taper (30 mg for 5 days, 20 mg for 5 days, and 10 mg for 5 days)  -start triamcinolone 0.1% ointment to affected areas twice a day    Active or Pending Issues Requiring Follow-up:  Bullous pemphigoid - follow up with Dermatology    Advanced Directives:   [X] Full code  [ ] DNR  [ ] Hospice    Discharge Diagnoses:  Bullous pemphigoid       HPI:  74F with history of dementia (AAOx0 at baseline), dermatomyositis, seizure disorder, HTN, and HLD initially sent to [facility] from [facility] for diffuse blisters, dermatology availability limited at ?, transferred to [facility] ED for dermatology evaluation of potential bullous pemphigoid. Labs at  largely unremarkable, WBC wnl, mildly hypernatremic to 148. Was given zosyn, vancomycin, and prednisone 60mg PO x1.   Patient unable to provide any history, no paperwork from facility, additional collateral obtained from son and NOK Josh Irais over the phone. Josh travels frequently for work and is currently in [place] so is not fully up to date on details of her current condition. Unable to reach facility overnight. Appears that patient has been having intermittent blistering wounds over the past year, never to this extent, was just being managed at the facility, have not sought medical attention for it. Worsened over the past month. No fevers as far as he is aware. He was inquiring whether it's related to her dermatomyositis, states that she has been somewhat lost to follow up, have not seen her rheumatologist or pulmonologist in at least a year or two, last received IVIG therapy a few years ago.   Of note, pt did come with a partially filled out MOLST form stating full code, that was filled out with cousin, Josh's cousin Rosi Mitchell. Josh confirmed that he is NOK (pt's  , no other children, no other designated HCP). For now, he agrees with full code status, offered to fill out MOLST, states he will do so when he returns from [facility].     On arrival to our ED, VS wnl, afebrile. Dermatology aware of patient, no additional recommendations at this time, will evaluate in morning. Admitted to medicine for further management.  (2025 01:08)    Hospital Course:  Patient was evaluated by dermatology and underwent skin biopsy which confirmed the diagnosis of bullous pemphigoid. She was started on prednisone taper (30 mg for 5 days, 20 mg for 5 days, and 10 mg for 5 days) as well as triamcinolone 0.1% ointment to affected areas twice a day. She will follow up with dermatology outpatient. She was found to have metabolic acidosis, her creatinine and cystatin-c were within normal limits. She was treated with IV fluids and the blood work improved.    Patient is stable for discharge back to [facility].    Important Medication Changes and Reason:  -start prednisone taper (30 mg for 5 days, 20 mg for 5 days, and 10 mg for 5 days)  -start triamcinolone 0.1% ointment to affected areas twice a day    Active or Pending Issues Requiring Follow-up:  Bullous pemphigoid - follow up with Dermatology    Advanced Directives:   [X] Full code  [ ] DNR  [ ] Hospice    Discharge Diagnoses:  Bullous pemphigoid       HPI:  74F with history of dementia (AAOx0 at baseline), dermatomyositis, seizure disorder, HTN, and HLD initially sent to [facility] from [facility] for diffuse blisters, dermatology availability limited at ?, transferred to [facility] ED for dermatology evaluation of potential bullous pemphigoid. Labs at  largely unremarkable, WBC wnl, mildly hypernatremic to 148. Was given zosyn, vancomycin, and prednisone 60mg PO x1.   Patient unable to provide any history, no paperwork from facility, additional collateral obtained from son and NOK Josh Irais over the phone. Josh travels frequently for work and is currently in [place] so is not fully up to date on details of her current condition. Unable to reach facility overnight. Appears that patient has been having intermittent blistering wounds over the past year, never to this extent, was just being managed at the facility, have not sought medical attention for it. Worsened over the past month. No fevers as far as he is aware. He was inquiring whether it's related to her dermatomyositis, states that she has been somewhat lost to follow up, have not seen her rheumatologist or pulmonologist in at least a year or two, last received IVIG therapy a few years ago.   Of note, pt did come with a partially filled out MOLST form stating full code, that was filled out with cousin, Josh's cousin Rosi Mitchell. Josh confirmed that he is NOK (pt's  , no other children, no other designated HCP). For now, he agrees with full code status, offered to fill out MOLST, states he will do so when he returns from [facility].     On arrival to our ED, VS wnl, afebrile. Dermatology aware of patient, no additional recommendations at this time, will evaluate in morning. Admitted to medicine for further management.  (2025 01:08)    Hospital Course:  Patient was evaluated by dermatology and underwent skin biopsy which confirmed the diagnosis of bullous pemphigoid. She was started on prednisone taper (30 mg for 5 days, 20 mg for 5 days, and 10 mg for 5 days) as well as triamcinolone 0.1% ointment to affected areas twice a day. She will follow up with dermatology outpatient. She was found to have metabolic acidosis, her creatinine and cystatin-c were within normal limits. She was treated with IV fluids and the blood work improved.    Patient is stable for discharge to sub acute rehab for higher-level of wound care.    Important Medication Changes and Reason:  -start prednisone taper (30 mg for 5 days, 20 mg for 5 days, and 10 mg for 5 days)  -start triamcinolone 0.1% ointment to affected areas twice a day    Active or Pending Issues Requiring Follow-up:  Bullous pemphigoid - follow up with Dermatology    Advanced Directives:   [X] Full code  [ ] DNR  [ ] Hospice    Discharge Diagnoses:  Bullous pemphigoid      Topical Recommendations for wound care:    Abdominal pannus, BL under beasts: Cleanse with NS, pat dry. Apply Liquid barrier film to periwound skin (allow to dry). Apply Interdry textile sheeting, under intertriginous folds leaving 2 inches exposed at ends to wick, remove to wash & dry affected area, then replace. Individual sheeting may be used for up to 5 days unless soiled.     BL Knees, L elbow, anterior abdomen: Cleanse with NS, pat dry. Apply Liquid barrier film to periwound skin (allow to dry). Apply silicone contact layer (adaptic touch) to base of wound. Secure with silicone foam with borders. Change daily with topical ointment application and PRN if soiled.    R forearm: Cleanse with NS, pat dry. Apply Liquid barrier film to periwound skin (allow to dry). Apply silicone contact layer (adaptic touch) to base of wound. Cover with abdominal pad, secure with kerlix and paper tape. Change daily with topical ointment application and PRN if soiled.    Posterior back: Cleanse with NS, pat dry. Apply Liquid barrier film to periwound skin (allow to dry). Apply silicone contact layer (adaptic touch) to base of wound. Cover with mepilex transfer. Change daily with topical ointment application and PRN if soiled.     Sacrum to BL buttocks: Cleanse with skin cleanser, pat dry. Apply Willem moisture barrier cream twice a day and PRN with incontinent episodes.     Generalized stable scabs 2/2 de-roofed blisters: Cleanse with NS, pat dry. Apply topical ointments Bactroban & kenalog) as prescribed by dermatology.

## 2025-02-14 NOTE — PROGRESS NOTE ADULT - PROBLEM SELECTOR PLAN 1
Patient presenting with diffuse tense bullae over trunk and extremities, mild and intermittent x1-2 years (originally assumed to be dermatomyositis flares), now worsened and more extensive x 1 month, with oral ulcers, negative Nikolsky, concerning for bullous pemphigoid.    Plan:  - s/p prednisone 60mg PO x1 at OSH  - s/p empiric zosyn and vancomycin at OSH as patient was febrile to 100.2, now afebrile. Wounds do not appear infected, not septic, will hold off on antibiotics at this time  - Derm recs appreciated - f/u skin biopsy results  - Start prednisone 30-20-10 taper (5 days each dose)  - Start triamcinolone 0.1% ointment BID to affected areas  - Outpatient derm follow up  - F/u /230 serology  - Wound care consult  - Tylenol for pain prn

## 2025-02-14 NOTE — PHYSICAL THERAPY INITIAL EVALUATION ADULT - PERTINENT HX OF CURRENT PROBLEM, REHAB EVAL
Patient is a 74 year old  Female who presents with a chief complaint of derm evaluation for potential bullous pemphigoid

## 2025-02-14 NOTE — PHYSICAL THERAPY INITIAL EVALUATION ADULT - GENERAL OBSERVATIONS, REHAB EVAL
Consult received, chart reviewed. Pt OK for PT per STEFF Mix. Patient received in semi-supine, no apparent distress

## 2025-02-14 NOTE — PROVIDER CONTACT NOTE (OTHER) - BACKGROUND
PT admitted for skin rash. PMH includes NPH, HTN, seizures, etc.
PT admitted for skin rash. PMH includes NPH, HTN, seizures, etc.

## 2025-02-14 NOTE — PHYSICAL THERAPY INITIAL EVALUATION ADULT - RANGE OF MOTION EXAMINATION, REHAB EVAL
Pt appeared with spontaneous extremity movement but was unable to follow commands for assessment. Attempted passive ROM with Pt resisting at times. Bilateral Elbow and ankle within functional limits

## 2025-02-14 NOTE — PHYSICAL THERAPY INITIAL EVALUATION ADULT - NSPTDISCHREC_GEN_A_CORE
Patient pt. not placed on skilled PT services at this time secondary to pt. presenting with confusion and unable to actively participate in PT services. Please reconsult if appropriate/feasible./No skilled PT needs

## 2025-02-14 NOTE — DISCHARGE NOTE PROVIDER - NSDCCPTREATMENT_GEN_ALL_CORE_FT
PRINCIPAL PROCEDURE  Procedure: X-ray, chest, 1 view  Findings and Treatment:   < end of copied text >  FINDINGS:  CATHETERS AND TUBES: RIGHT  shunt catheter overlies hemithorax.  PULMONARY:  No airspace consolidations or radiographic evidence of pulmonary nodules..  No pleural effusion or pneumothorax.  HEART/VASCULAR: The heart size and mediastinum configuration are within   the limits of normal.  BONES: The visualized osseous thorax is intact.  IMPRESSION:   No radiographic evidence of active chest disease..  --- End of Report ---< from: Xray Chest 1 View AP/PA (02.12.25 @ 18:59) >

## 2025-02-14 NOTE — PROGRESS NOTE ADULT - ASSESSMENT
74F with history of vascular dementia (AAOx0 at baseline), dermatomyositis with ILD, seizure disorder on keppra, HTN, HLD, hypothyroidism, recurrent falls, initially sent to [facility] from [facility] for diffuse blisters concerning for bullous pemphigoid, transferred to [facility] for dermatology evaluation.  74F with history of vascular dementia (AAOx0 at baseline), dermatomyositis with ILD, seizure disorder on keppra, HTN, HLD, hypothyroidism, recurrent falls, initially sent [facility] from [facility] for diffuse blisters concerning for bullous pemphigoid, transferred to [facility] for dermatology evaluation, pending skin biopsy results. Started prednisone taper and triamcinolone ointment. Patient found to have metabolic acidosis.

## 2025-02-14 NOTE — PROVIDER CONTACT NOTE (OTHER) - ASSESSMENT
A&Ox0. Nonverbal. PT is grimacing upon touching opened blisters.
A&Ox0. Nonverbal. PT is grimacing upon touching opened blisters.

## 2025-02-14 NOTE — DISCHARGE NOTE PROVIDER - NSDCFUADDINST_GEN_ALL_CORE_FT
Topical Recommendations for wound care:    Abdominal pannus, BL under beasts: Cleanse with NS, pat dry. Apply Liquid barrier film to periwound skin (allow to dry). Apply Interdry textile sheeting, under intertriginous folds leaving 2 inches exposed at ends to wick, remove to wash & dry affected area, then replace. Individual sheeting may be used for up to 5 days unless soiled.     BL Knees, L elbow, anterior abdomen: Cleanse with NS, pat dry. Apply Liquid barrier film to periwound skin (allow to dry). Apply silicone contact layer (adaptic touch) to base of wound. Secure with silicone foam with borders. Change daily with topical ointment application and PRN if soiled.    R forearm: Cleanse with NS, pat dry. Apply Liquid barrier film to periwound skin (allow to dry). Apply silicone contact layer (adaptic touch) to base of wound. Cover with abdominal pad, secure with kerlix and paper tape. Change daily with topical ointment application and PRN if soiled.    Posterior back: Cleanse with NS, pat dry. Apply Liquid barrier film to periwound skin (allow to dry). Apply silicone contact layer (adaptic touch) to base of wound. Cover with mepilex transfer. Change daily with topical ointment application and PRN if soiled.     Sacrum to BL buttocks: Cleanse with skin cleanser, pat dry. Apply Willem moisture barrier cream twice a day and PRN with incontinent episodes.     Generalized stable scabs 2/2 de-roofed blisters: Cleanse with NS, pat dry. Apply topical ointments Bactroban & kenalog) as prescribed by dermatology.

## 2025-02-14 NOTE — PROGRESS NOTE ADULT - PROBLEM SELECTOR PLAN 8
Diet: Soft and Bite Size Solids with Thin Liquids (per SLP eval)  DVT ppx: lovenox SQ   Dispo: pending clinical course; resident of [facility]  Code status: Full    Son, Josh, is HCP  Niece, cecile kent, is POA ([phone]) Continue home atorvastatin 20mg daily.

## 2025-02-14 NOTE — PROGRESS NOTE ADULT - PROBLEM SELECTOR PLAN 2
AAOx0, nonverbal at baseline   - appears to be at baseline on admission Labs showing non-anion gap acidosis with bicarb 17 on 2/14.    Plan:  - start dextrose 5% for 10 hours  - f/u BMP and VBG  - f/u cystatin-C for kidney function  - replete lytes

## 2025-02-14 NOTE — PHYSICAL THERAPY INITIAL EVALUATION ADULT - ADDITIONAL COMMENTS
Pt. unable to provide an accurate history at this time due to confusion. As per chart review, Pt resides at [facility] and has a baseline of A&Ox0.     Pt. left laying in semisupine in bed post PT Evaluation, no apparent distress, call bell in reach, all lines intact, +bed alarm. STEFF Mix made aware of pt. status and participation in PT.

## 2025-02-14 NOTE — PROGRESS NOTE ADULT - PROBLEM SELECTOR PLAN 9
Diet: Soft and Bite Size Solids with Thin Liquids (per SLP eval)  DVT ppx: lovenox SQ   Dispo: pending clinical course; resident of [facility]  Code status: Full    Son, Josh, is HCP  Niece, cecile kent, is POA ([phone])

## 2025-02-14 NOTE — DISCHARGE NOTE PROVIDER - NSFOLLOWUPCLINICS_GEN_ALL_ED_FT
[facility]  [address], [address]  [place] [address]  Phone: [phone]  Fax: [phone]  Follow Up Time: 2 weeks

## 2025-02-14 NOTE — PROGRESS NOTE ADULT - PROBLEM SELECTOR PLAN 3
According to son, patient was previously on IVIG a few years ago but has not received treatment recently. AAOx0, nonverbal at baseline   - appears to be at baseline on admission

## 2025-02-14 NOTE — DISCHARGE NOTE PROVIDER - NSDCMRMEDTOKEN_GEN_ALL_CORE_FT
aspirin 81 mg oral tablet: 1 tab(s) orally 2 times a day Post-operative DVT prophylaxis for 6 weeks  atorvastatin 20 mg oral tablet: 1 orally once a day (at bedtime)  escitalopram 20 mg oral tablet: 1 tab(s) orally once a day  furosemide 20 mg oral tablet: 1 tab(s) orally once a day  levETIRAcetam 500 mg oral tablet: 1 tab(s) orally every 12 hours  levothyroxine 50 mcg (0.05 mg) oral tablet: 1 tab(s) orally once a day  Melatonin 5 mg oral tablet: 1 tab(s) orally once a day (at bedtime)  metoprolol succinate 25 mg oral tablet, extended release: 1 tab(s) orally once a day  Senna 8.6 mg oral tablet: 1 tab(s) orally once a day (at bedtime)   aspirin 81 mg oral tablet: 1 tab(s) orally 2 times a day Post-operative DVT prophylaxis for 6 weeks  atorvastatin 20 mg oral tablet: 1 orally once a day (at bedtime)  escitalopram 20 mg oral tablet: 1 tab(s) orally once a day  furosemide 20 mg oral tablet: 1 tab(s) orally every other day  levETIRAcetam 500 mg oral tablet: 1 tab(s) orally every 12 hours  levothyroxine 50 mcg (0.05 mg) oral tablet: 1 tab(s) orally once a day  Melatonin 5 mg oral tablet: 1 tab(s) orally once a day (at bedtime)  metoprolol succinate 25 mg oral tablet, extended release: 1 tab(s) orally once a day  Senna 8.6 mg oral tablet: 1 tab(s) orally once a day (at bedtime)  triamcinolone 0.1% topical ointment: 1 Apply topically to affected area every 12 hours   acetaminophen 325 mg oral tablet: 2 tab(s) orally every 6 hours As needed Temp greater or equal to 38C (100.4F), Moderate Pain (4 - 6)  aspirin 81 mg oral tablet: 1 tab(s) orally 2 times a day Post-operative DVT prophylaxis for 6 weeks  atorvastatin 20 mg oral tablet: 1 orally once a day (at bedtime)  escitalopram 20 mg oral tablet: 1 tab(s) orally once a day  Lasix 20 mg oral tablet: 1 tab(s) orally once a day as needed for edema  levETIRAcetam 500 mg oral tablet: 1 tab(s) orally every 12 hours  levothyroxine 50 mcg (0.05 mg) oral tablet: 1 tab(s) orally once a day  Melatonin 5 mg oral tablet: 1 tab(s) orally once a day (at bedtime)  metoprolol succinate 25 mg oral tablet, extended release: 1 tab(s) orally once a day  predniSONE: 1 tab(s) orally once a day Patient on prednisone taper:  30mg daily, last dose 2/19  20mg daily, 2/20, 2/21, 2/22, 2/23, 2/24  10mg daily, 2/25, 2/26, 2/27, 2/28, 3/1  Senna 8.6 mg oral tablet: 1 tab(s) orally once a day (at bedtime)  triamcinolone 0.1% topical ointment: 1 Apply topically to affected area every 12 hours

## 2025-02-14 NOTE — DISCHARGE NOTE PROVIDER - NSDCCPCAREPLAN_GEN_ALL_CORE_FT
PRINCIPAL DISCHARGE DIAGNOSIS  Diagnosis: Skin rash  Assessment and Plan of Treatment: You came to the hospital for a skin rash. The dermatology service evaluated you and performed a skin biopsy as well as blood work to help diagnose the rash. The most likely diagnosis is called bullous pemphigoid. This is a blistering autoimmune dermatosis that is more common in people with age. You were prescribed oral prednisone to take for 15 days, as well as triamcinolone ointment to apply to all affected areas twice a day. Please follow up with the [facility] Dermatology department.     PRINCIPAL DISCHARGE DIAGNOSIS  Diagnosis: Skin rash  Assessment and Plan of Treatment: You came to the hospital for a skin rash. The dermatology service evaluated you and performed a skin biopsy as well as blood work, which diagnosed the rash as bullous pemphigoid. This is a blistering autoimmune dermatosis that is more common in people with age. You were prescribed oral prednisone to take for 15 days, as well as triamcinolone ointment to apply to all affected areas twice a day. Please follow up with the [facility] Dermatology department.     PRINCIPAL DISCHARGE DIAGNOSIS  Diagnosis: Skin rash  Assessment and Plan of Treatment: You came to the hospital for a skin rash. The dermatology service evaluated you and performed a skin biopsy as well as blood work, which diagnosed the rash as bullous pemphigoid. This is a blistering autoimmune dermatosis that is more common in people with age. You were prescribed oral prednisone to take for 15 days, as well as triamcinolone ointment to apply to all affected areas twice a day. Wound care instructions are provided in "additional instructions". Please follow up with the [facility] Dermatology department.

## 2025-02-14 NOTE — PROGRESS NOTE ADULT - SUBJECTIVE AND OBJECTIVE BOX
Patient is a 74y old  Female who presents with a chief complaint of derm evaluation for potential bullous pemphigoid (13 Feb 2025 01:08)      INTERVAL HPI/OVERNIGHT EVENTS: Passed SLP eval yesterday, diet placed. Dermatology biopsy performed - prednisone PO and triamcinolone ointment ordered per derm recs. Tylenol prn for pain/grimacing ordered.      Patient examined at bedside this morning. Patient is nonverbal, smiles and tracks with eyes. Grimaces to pain.         Vital Signs Last 24 Hrs  T(C): 36.9 (14 Feb 2025 05:08), Max: 37.1 (13 Feb 2025 21:16)  T(F): 98.5 (14 Feb 2025 05:08), Max: 98.8 (13 Feb 2025 21:16)  HR: 63 (14 Feb 2025 05:08) (63 - 76)  BP: 140/42 (14 Feb 2025 05:08) (125/50 - 140/55)  BP(mean): --  RR: 17 (14 Feb 2025 05:08) (17 - 17)  SpO2: 98% (14 Feb 2025 05:08) (96% - 98%)    Parameters below as of 14 Feb 2025 05:08  Patient On (Oxygen Delivery Method): room air      CAPILLARY BLOOD GLUCOSE      I&O's Detail      No Known Allergies      PHYSICAL EXAM:  GENERAL: NAD, smiling, but grimaces when lesions are touched  HEAD:  Atraumatic, Normocephalic  EYES: PERRLA, conjunctiva and sclera clear  ENMT: Moist mucous membranes  NECK: Supple, No JVD, Normal thyroid  CHEST/LUNG: Clear to percussion bilaterally; No rales, rhonchi, wheezing, or rubs  HEART: Regular rate and rhythm; No murmurs, rubs, or gallops  ABDOMEN: Soft, Nontender, Nondistended; Bowel sounds present  EXTREMITIES:  2+ Peripheral Pulses, No clubbing, cyanosis, or edema  NERVOUS SYSTEM:  Alert & Oriented X0, nonverbal, does not follow commands, grimaces to touch and tracks with eyes  SKIN: Diffuse fluid filled soft bullae and ruptured flacid bullae over the trunk and extremities, few small ulcers in hard/soft palate, negative Nikolsky        LABS:                                     9.5    7.04  )-----------( 189      ( 13 Feb 2025 07:39 )             30.1   02-13    143  |  110[H]  |  14  ----------------------------<  153[H]  3.6   |  21[L]  |  0.52    Ca    7.8[L]      13 Feb 2025 07:39  Phos  3.2     02-13  Mg     2.00     02-13    TPro  5.4[L]  /  Alb  2.7[L]  /  TBili  0.3  /  DBili  x   /  AST  12  /  ALT  11  /  AlkPhos  57  02-13          RADIOLOGY & ADDITIONAL TESTS:       Patient is a 74y old  Female who presents with a chief complaint of derm evaluation for potential bullous pemphigoid (13 Feb 2025 01:08)      INTERVAL HPI/OVERNIGHT EVENTS: Passed SLP eval yesterday, diet placed. Dermatology biopsy performed - prednisone PO and triamcinolone ointment ordered per derm recs. Tylenol prn for pain/grimacing ordered.      Patient examined at bedside this morning. Patient is nonverbal, smiles and tracks with eyes. Grimaces to pain.         Vital Signs Last 24 Hrs  T(C): 36.9 (14 Feb 2025 05:08), Max: 37.1 (13 Feb 2025 21:16)  T(F): 98.5 (14 Feb 2025 05:08), Max: 98.8 (13 Feb 2025 21:16)  HR: 63 (14 Feb 2025 05:08) (63 - 76)  BP: 140/42 (14 Feb 2025 05:08) (125/50 - 140/55)  BP(mean): --  RR: 17 (14 Feb 2025 05:08) (17 - 17)  SpO2: 98% (14 Feb 2025 05:08) (96% - 98%)    Parameters below as of 14 Feb 2025 05:08  Patient On (Oxygen Delivery Method): room air      CAPILLARY BLOOD GLUCOSE      I&O's Detail      No Known Allergies      PHYSICAL EXAM:  GENERAL: NAD, smiling, but grimaces when lesions are touched  HEAD:  Atraumatic, Normocephalic  EYES: PERRLA, conjunctiva and sclera clear  ENMT: Moist mucous membranes  NECK: Supple, No JVD, Normal thyroid  CHEST/LUNG: Clear to percussion bilaterally; No rales, rhonchi, wheezing, or rubs  HEART: Regular rate and rhythm; No murmurs, rubs, or gallops  ABDOMEN: Soft, Nontender, Nondistended; Bowel sounds present  EXTREMITIES:  2+ Peripheral Pulses, No clubbing, cyanosis, or edema  NERVOUS SYSTEM:  Alert & Oriented X0, nonverbal, does not follow commands, grimaces to touch and tracks with eyes  SKIN: Diffuse fluid filled soft bullae and ruptured flacid bullae over the trunk and extremities        LABS:                                     9.5    7.04  )-----------( 189      ( 13 Feb 2025 07:39 )             30.1   02-14    143  |  113[H]  |  10  ----------------------------<  65[L]  3.8   |  17[L]  |  0.49[L]    Ca    8.0[L]      14 Feb 2025 05:56  Phos  2.1     02-14  Mg     2.00     02-14    TPro  5.4[L]  /  Alb  2.7[L]  /  TBili  0.3  /  DBili  x   /  AST  12  /  ALT  11  /  AlkPhos  57  02-13          RADIOLOGY & ADDITIONAL TESTS:

## 2025-02-14 NOTE — ED POST DISCHARGE NOTE - RESULT SUMMARY
gram + cocci in clusters in aerobic bottle. [facility] informed to call [facility] however they wanted to relay the results to [facility]. Info taken and message left to primary team Dr Helena Quiñones and Dr. Margret Mccracken.

## 2025-02-14 NOTE — DISCHARGE NOTE PROVIDER - ATTENDING DISCHARGE PHYSICAL EXAMINATION:
Patient seen and examined at bedside on day of discharge (2/18/25) Patient resting comfortably, eyes tracking and following commands. Seen being fed lunch. Denies any pain currently. VSS, afebrile. Dressings noted over all extremities. Patient initially admitted with concern for worsening blisters/rash. Evaluated by dermatology and now s/p skin biopsy confirmed bullous pemphigoid. Started on topical medications as well as systemic steroids with improvement in symptoms. Patient evaluated by wound care, recs appreciated. Now medically optimized for transfer to [facility]. Patient will require close outpatient PCP and dermatology follow up.

## 2025-02-15 LAB
ANION GAP SERPL CALC-SCNC: 13 MMOL/L — SIGNIFICANT CHANGE UP (ref 7–14)
BUN SERPL-MCNC: 9 MG/DL — SIGNIFICANT CHANGE UP (ref 7–23)
CALCIUM SERPL-MCNC: 8.2 MG/DL — LOW (ref 8.4–10.5)
CHLORIDE SERPL-SCNC: 110 MMOL/L — HIGH (ref 98–107)
CO2 SERPL-SCNC: 22 MMOL/L — SIGNIFICANT CHANGE UP (ref 22–31)
CREAT SERPL-MCNC: 0.58 MG/DL — SIGNIFICANT CHANGE UP (ref 0.5–1.3)
CULTURE RESULTS: ABNORMAL
EGFR: 94 ML/MIN/1.73M2 — SIGNIFICANT CHANGE UP
GAS PNL BLDV: SIGNIFICANT CHANGE UP
GLUCOSE SERPL-MCNC: 92 MG/DL — SIGNIFICANT CHANGE UP (ref 70–99)
HCT VFR BLD CALC: 31.6 % — LOW (ref 34.5–45)
HGB BLD-MCNC: 9.5 G/DL — LOW (ref 11.5–15.5)
LACTATE SERPL-SCNC: 2.4 MMOL/L — HIGH (ref 0.5–2)
MAGNESIUM SERPL-MCNC: 2 MG/DL — SIGNIFICANT CHANGE UP (ref 1.6–2.6)
MCHC RBC-ENTMCNC: 28.6 PG — SIGNIFICANT CHANGE UP (ref 27–34)
MCHC RBC-ENTMCNC: 30.1 G/DL — LOW (ref 32–36)
MCV RBC AUTO: 95.2 FL — SIGNIFICANT CHANGE UP (ref 80–100)
NRBC # BLD AUTO: 0 K/UL — SIGNIFICANT CHANGE UP (ref 0–0)
NRBC # FLD: 0 K/UL — SIGNIFICANT CHANGE UP (ref 0–0)
NRBC BLD AUTO-RTO: 0 /100 WBCS — SIGNIFICANT CHANGE UP (ref 0–0)
ORGANISM # SPEC MICROSCOPIC CNT: ABNORMAL
ORGANISM # SPEC MICROSCOPIC CNT: SIGNIFICANT CHANGE UP
PHOSPHATE SERPL-MCNC: 2.5 MG/DL — SIGNIFICANT CHANGE UP (ref 2.5–4.5)
PLATELET # BLD AUTO: 234 K/UL — SIGNIFICANT CHANGE UP (ref 150–400)
POTASSIUM SERPL-MCNC: 3.1 MMOL/L — LOW (ref 3.5–5.3)
POTASSIUM SERPL-SCNC: 3.1 MMOL/L — LOW (ref 3.5–5.3)
RBC # BLD: 3.32 M/UL — LOW (ref 3.8–5.2)
RBC # FLD: 13.7 % — SIGNIFICANT CHANGE UP (ref 10.3–14.5)
SODIUM SERPL-SCNC: 145 MMOL/L — SIGNIFICANT CHANGE UP (ref 135–145)
SPECIMEN SOURCE: SIGNIFICANT CHANGE UP
WBC # BLD: 8.84 K/UL — SIGNIFICANT CHANGE UP (ref 3.8–10.5)
WBC # FLD AUTO: 8.84 K/UL — SIGNIFICANT CHANGE UP (ref 3.8–10.5)

## 2025-02-15 PROCEDURE — 99232 SBSQ HOSP IP/OBS MODERATE 35: CPT

## 2025-02-15 RX ORDER — SODIUM CHLORIDE 9 G/1000ML
500 INJECTION, SOLUTION INTRAVENOUS
Refills: 0 | Status: DISCONTINUED | OUTPATIENT
Start: 2025-02-15 | End: 2025-02-16

## 2025-02-15 RX ADMIN — SODIUM CHLORIDE 75 MILLILITER(S): 9 INJECTION, SOLUTION INTRAVENOUS at 12:24

## 2025-02-15 RX ADMIN — TRIAMCINOLONE ACETONIDE 1 APPLICATION(S): 1 CREAM TOPICAL at 17:51

## 2025-02-15 RX ADMIN — Medication 81 MILLIGRAM(S): at 12:22

## 2025-02-15 RX ADMIN — Medication 1 APPLICATION(S): at 12:23

## 2025-02-15 RX ADMIN — Medication 6 MILLIGRAM(S): at 22:50

## 2025-02-15 RX ADMIN — Medication 650 MILLIGRAM(S): at 23:07

## 2025-02-15 RX ADMIN — LEVETIRACETAM 500 MILLIGRAM(S): 10 INJECTION, SOLUTION INTRAVENOUS at 17:50

## 2025-02-15 RX ADMIN — LEVETIRACETAM 500 MILLIGRAM(S): 10 INJECTION, SOLUTION INTRAVENOUS at 05:03

## 2025-02-15 RX ADMIN — TRIAMCINOLONE ACETONIDE 1 APPLICATION(S): 1 CREAM TOPICAL at 05:03

## 2025-02-15 RX ADMIN — Medication 1 TABLET(S): at 12:22

## 2025-02-15 RX ADMIN — Medication 50 MICROGRAM(S): at 05:03

## 2025-02-15 RX ADMIN — ESCITALOPRAM OXALATE 20 MILLIGRAM(S): 20 TABLET ORAL at 12:22

## 2025-02-15 RX ADMIN — MUPIROCIN CALCIUM 1 APPLICATION(S): 20 CREAM TOPICAL at 05:03

## 2025-02-15 RX ADMIN — PREDNISONE 30 MILLIGRAM(S): 20 TABLET ORAL at 05:03

## 2025-02-15 RX ADMIN — MUPIROCIN CALCIUM 1 APPLICATION(S): 20 CREAM TOPICAL at 17:51

## 2025-02-15 RX ADMIN — ATORVASTATIN CALCIUM 20 MILLIGRAM(S): 80 TABLET, FILM COATED ORAL at 22:49

## 2025-02-15 RX ADMIN — Medication 40 MILLIEQUIVALENT(S): at 12:22

## 2025-02-15 RX ADMIN — ENOXAPARIN SODIUM 40 MILLIGRAM(S): 100 INJECTION SUBCUTANEOUS at 12:21

## 2025-02-15 NOTE — DIETITIAN INITIAL EVALUATION ADULT - PERTINENT MEDS FT
MEDICATIONS  (STANDING):  aspirin enteric coated 81 milliGRAM(s) Oral daily  atorvastatin 20 milliGRAM(s) Oral at bedtime  chlorhexidine 2% Cloths 1 Application(s) Topical daily  enoxaparin Injectable 40 milliGRAM(s) SubCutaneous every 24 hours  escitalopram 20 milliGRAM(s) Oral daily  lactated ringers. 500 milliLiter(s) (75 mL/Hr) IV Continuous <Continuous>  levETIRAcetam 500 milliGRAM(s) Oral two times a day  levothyroxine 50 MICROGram(s) Oral daily  melatonin 6 milliGRAM(s) Oral at bedtime  mupirocin 2% Ointment 1 Application(s) Both Nostrils every 12 hours  potassium chloride    Tablet ER 20 milliEquivalent(s) Oral every 2 hours  predniSONE   Tablet 30 milliGRAM(s) Oral daily  predniSONE   Tablet   Oral   senna 1 Tablet(s) Oral daily  triamcinolone 0.1% Ointment 1 Application(s) Topical every 12 hours    MEDICATIONS  (PRN):  acetaminophen     Tablet .. 650 milliGRAM(s) Oral every 6 hours PRN Temp greater or equal to 38C (100.4F), Moderate Pain (4 - 6)

## 2025-02-15 NOTE — PROGRESS NOTE ADULT - PROBLEM SELECTOR PLAN 4
According to son, patient was previously on IVIG a few years ago but has not received treatment recently. none

## 2025-02-15 NOTE — PROGRESS NOTE ADULT - SUBJECTIVE AND OBJECTIVE BOX
PROGRESS NOTE:     Patient is a 74y old  Female who presents with a chief complaint of derm evaluation for potential bullous pemphigoid (14 Feb 2025 14:58)      SUBJECTIVE / OVERNIGHT EVENTS:    No acute events overnight. Patient examined at bedside with no acute complaints.     Pain:  Bowel Movements:  Urination:  OOB:  PT:    REVIEW OF SYSTEMS:    CONSTITUTIONAL: No weakness, fevers or chills  EYES/ENT: No visual changes;  No vertigo or throat pain   NECK: No pain or stiffness  RESPIRATORY: No cough, wheezing, hemoptysis; No shortness of breath  CARDIOVASCULAR: No chest pain or palpitations  GASTROINTESTINAL: No abdominal or epigastric pain. No nausea, vomiting, or hematemesis; No diarrhea or constipation. No melena or hematochezia.  GENITOURINARY: No dysuria, frequency or hematuria  NEUROLOGICAL: No numbness or weakness  SKIN: No itching, rashes      MEDICATIONS  (STANDING):  aspirin enteric coated 81 milliGRAM(s) Oral daily  atorvastatin 20 milliGRAM(s) Oral at bedtime  chlorhexidine 2% Cloths 1 Application(s) Topical daily  enoxaparin Injectable 40 milliGRAM(s) SubCutaneous every 24 hours  escitalopram 20 milliGRAM(s) Oral daily  levETIRAcetam 500 milliGRAM(s) Oral two times a day  levothyroxine 50 MICROGram(s) Oral daily  melatonin 6 milliGRAM(s) Oral at bedtime  mupirocin 2% Ointment 1 Application(s) Both Nostrils every 12 hours  predniSONE   Tablet 30 milliGRAM(s) Oral daily  predniSONE   Tablet   Oral   senna 1 Tablet(s) Oral daily  triamcinolone 0.1% Ointment 1 Application(s) Topical every 12 hours    MEDICATIONS  (PRN):  acetaminophen     Tablet .. 650 milliGRAM(s) Oral every 6 hours PRN Temp greater or equal to 38C (100.4F), Moderate Pain (4 - 6)      CAPILLARY BLOOD GLUCOSE      POCT Blood Glucose.: 108 mg/dL (14 Feb 2025 08:59)    I&O's Summary      VITALS:   T(C): 36.4 (02-15-25 @ 05:14), Max: 37 (02-14-25 @ 21:57)  HR: 94 (02-15-25 @ 05:14) (69 - 94)  BP: 130/62 (02-15-25 @ 05:14) (124/46 - 146/85)  RR: 18 (02-15-25 @ 05:14) (18 - 18)  SpO2: 98% (02-15-25 @ 05:14) (97% - 100%)    GENERAL: NAD, lying in bed comfortably  HEAD:  Atraumatic, normocephalic  EYES: EOMI, PERRLA, conjunctiva and sclera clear  ENT: Moist mucous membranes  NECK: Supple, no JVD  HEART: Regular rate and rhythm, no murmurs, rubs, or gallops  LUNGS: Unlabored respirations.  Clear to auscultation bilaterally, no crackles, wheezing, or rhonchi  ABDOMEN: Soft, nontender, nondistended, +BS  EXTREMITIES: 2+ peripheral pulses bilaterally. No clubbing, cyanosis, or edema  NERVOUS SYSTEM:  A&Ox3, no focal deficits   SKIN: No rashes or lesions    LABS:                        9.5    7.04  )-----------( 189      ( 13 Feb 2025 07:39 )             30.1     02-14    143  |  113[H]  |  10  ----------------------------<  65[L]  3.8   |  17[L]  |  0.49[L]    Ca    8.0[L]      14 Feb 2025 05:56  Phos  2.1     02-14  Mg     2.00     02-14    TPro  5.4[L]  /  Alb  2.7[L]  /  TBili  0.3  /  DBili  x   /  AST  12  /  ALT  11  /  AlkPhos  57  02-13          Urinalysis Basic - ( 14 Feb 2025 05:56 )    Color: x / Appearance: x / SG: x / pH: x  Gluc: 65 mg/dL / Ketone: x  / Bili: x / Urobili: x   Blood: x / Protein: x / Nitrite: x   Leuk Esterase: x / RBC: x / WBC x   Sq Epi: x / Non Sq Epi: x / Bacteria: x        Culture - Blood (collected 12 Feb 2025 16:00)  Source: .Blood BLOOD  Gram Stain (14 Feb 2025 13:28):    Growth in aerobic bottle: Gram Positive Cocci in Clusters  Preliminary Report (14 Feb 2025 19:12):    Growth in aerobic bottle: Aerococcus urinae    Direct identification is available within approximately 3-5    hours either by Blood Panel Multiplexed PCR or Direct    MALDI-TOF. Details: [email or web address]  Organism: Blood Culture PCR (14 Feb 2025 17:16)  Organism: Blood Culture PCR (14 Feb 2025 17:16)    Culture - Blood (collected 12 Feb 2025 16:00)  Source: .Blood BLOOD  Preliminary Report (15 Feb 2025 02:01):    No growth at 48 Hours        RADIOLOGY & ADDITIONAL TESTS:  Results Reviewed:   Imaging Personally Reviewed:  Electrocardiogram Personally Reviewed:    COORDINATION OF CARE:  Care Discussed with Consultants/Other Providers [Y/N]:  Prior or Outpatient Records Reviewed [Y/N]:   PROGRESS NOTE:     Patient is a 74y old  Female who presents with a chief complaint of derm evaluation for potential bullous pemphigoid (14 Feb 2025 14:58)      SUBJECTIVE / OVERNIGHT EVENTS:    No acute events overnight. Patient examined at bedside with no acute complaints.     REVIEW OF SYSTEMS:    Unable to assess ROS due to baseline mental status       MEDICATIONS  (STANDING):  aspirin enteric coated 81 milliGRAM(s) Oral daily  atorvastatin 20 milliGRAM(s) Oral at bedtime  chlorhexidine 2% Cloths 1 Application(s) Topical daily  enoxaparin Injectable 40 milliGRAM(s) SubCutaneous every 24 hours  escitalopram 20 milliGRAM(s) Oral daily  levETIRAcetam 500 milliGRAM(s) Oral two times a day  levothyroxine 50 MICROGram(s) Oral daily  melatonin 6 milliGRAM(s) Oral at bedtime  mupirocin 2% Ointment 1 Application(s) Both Nostrils every 12 hours  predniSONE   Tablet 30 milliGRAM(s) Oral daily  predniSONE   Tablet   Oral   senna 1 Tablet(s) Oral daily  triamcinolone 0.1% Ointment 1 Application(s) Topical every 12 hours    MEDICATIONS  (PRN):  acetaminophen     Tablet .. 650 milliGRAM(s) Oral every 6 hours PRN Temp greater or equal to 38C (100.4F), Moderate Pain (4 - 6)      CAPILLARY BLOOD GLUCOSE      POCT Blood Glucose.: 108 mg/dL (14 Feb 2025 08:59)    I&O's Summary      VITALS:   T(C): 36.4 (02-15-25 @ 05:14), Max: 37 (02-14-25 @ 21:57)  HR: 94 (02-15-25 @ 05:14) (69 - 94)  BP: 130/62 (02-15-25 @ 05:14) (124/46 - 146/85)  RR: 18 (02-15-25 @ 05:14) (18 - 18)  SpO2: 98% (02-15-25 @ 05:14) (97% - 100%)    GENERAL: NAD, lying in bed comfortably  HEAD:  Atraumatic, normocephalic  EYES: EOMI, PERRLA, conjunctiva and sclera clear  ENT: Moist mucous membranes  NECK: Supple, no JVD  HEART: Regular rate and rhythm, no murmurs, rubs, or gallops  LUNGS: Unlabored respirations.  Clear to auscultation bilaterally, no crackles, wheezing, or rhonchi  ABDOMEN: Soft, nontender, nondistended, +BS  EXTREMITIES: 2+ peripheral pulses bilaterally. LLE edema   NERVOUS SYSTEM:  A&Ox0, no focal deficits   SKIN: No rashes or lesions    LABS:                        9.5    7.04  )-----------( 189      ( 13 Feb 2025 07:39 )             30.1     02-14    143  |  113[H]  |  10  ----------------------------<  65[L]  3.8   |  17[L]  |  0.49[L]    Ca    8.0[L]      14 Feb 2025 05:56  Phos  2.1     02-14  Mg     2.00     02-14    TPro  5.4[L]  /  Alb  2.7[L]  /  TBili  0.3  /  DBili  x   /  AST  12  /  ALT  11  /  AlkPhos  57  02-13          Urinalysis Basic - ( 14 Feb 2025 05:56 )    Color: x / Appearance: x / SG: x / pH: x  Gluc: 65 mg/dL / Ketone: x  / Bili: x / Urobili: x   Blood: x / Protein: x / Nitrite: x   Leuk Esterase: x / RBC: x / WBC x   Sq Epi: x / Non Sq Epi: x / Bacteria: x        Culture - Blood (collected 12 Feb 2025 16:00)  Source: .Blood BLOOD  Gram Stain (14 Feb 2025 13:28):    Growth in aerobic bottle: Gram Positive Cocci in Clusters  Preliminary Report (14 Feb 2025 19:12):    Growth in aerobic bottle: Aerococcus urinae    Direct identification is available within approximately 3-5    hours either by Blood Panel Multiplexed PCR or Direct    MALDI-TOF. Details: [email or web address]  Organism: Blood Culture PCR (14 Feb 2025 17:16)  Organism: Blood Culture PCR (14 Feb 2025 17:16)    Culture - Blood (collected 12 Feb 2025 16:00)  Source: .Blood BLOOD  Preliminary Report (15 Feb 2025 02:01):    No growth at 48 Hours        RADIOLOGY & ADDITIONAL TESTS:  Results Reviewed:   Imaging Personally Reviewed:  Electrocardiogram Personally Reviewed:    COORDINATION OF CARE:  Care Discussed with Consultants/Other Providers [Y/N]:  Prior or Outpatient Records Reviewed [Y/N]:

## 2025-02-15 NOTE — DIETITIAN INITIAL EVALUATION ADULT - PERTINENT LABORATORY DATA
02-15    145  |  110[H]  |  9   ----------------------------<  92  3.1[L]   |  22  |  0.58    Ca    8.2[L]      15 Feb 2025 05:45  Phos  2.5     02-15  Mg     2.00     02-15

## 2025-02-15 NOTE — PROGRESS NOTE ADULT - PROBLEM SELECTOR PLAN 2
Labs showing non-anion gap acidosis with bicarb 17 on 2/14.    Plan:  - start dextrose 5% for 10 hours  - f/u BMP and VBG  - f/u cystatin-C for kidney function  - replete lytes

## 2025-02-15 NOTE — PROGRESS NOTE ADULT - PROBLEM SELECTOR PLAN 9
Diet: Soft and Bite Size Solids with Thin Liquids (per SLP eval)  DVT ppx: lovenox SQ   Dispo: pending clinical course; resident of [facility]  Code status: Full    Son, Josh, is HCP  Niece, cecile kent, is POA ([phone])

## 2025-02-15 NOTE — DIETITIAN INITIAL EVALUATION ADULT - OTHER INFO
74 year old female with a PMH of vascular dementia (AAOx0 at baseline), dermatomyositis with ILD, seizure disorder, HTN, HLD, hypothyroidism initially sent [facility] for diffuse blisters concerning for bullous pemphigoid per chart.    Patient seen for swallow evaluation (2/13) w/ rec for soft and bite sized/thin liquids per chart. Patient w/ fair PO intake 51-75% per RN flow sheet and tray observation. No GI distress reported. Has no food allergies. Per HIE, noted w/ weights 59 kg (12/29/24), 63.5 kg (10/3/24), 59 kg (9/2/24) and 67 kg (8/19/24). ABW is 47.1 kg (2/13) per chart, ?accuracy as patient appears heavier per observation. Suggest re-weight for verification. Noted w/ +1 R ankle, leg and +2 L ankle, leg edema w/ no pressure injuries per RN flow sheet.

## 2025-02-15 NOTE — DIETITIAN INITIAL EVALUATION ADULT - PROBLEM SELECTOR PLAN 4
Diet: pending dysphagia screen  DVT ppx: lovenox SQ   Dispo: pending clinical course; resident of [facility]

## 2025-02-15 NOTE — PROGRESS NOTE ADULT - PROBLEM SELECTOR PLAN 1
Patient presenting with diffuse tense bullae over trunk and extremities, mild and intermittent x1-2 years (originally assumed to be dermatomyositis flares), now worsened and more extensive x 1 month, with oral ulcers, negative Nikolsky, concerning for bullous pemphigoid.    Plan:  - s/p prednisone 60mg PO x1 at OSH  - s/p empiric zosyn and vancomycin at OSH as patient was febrile to 100.2, now afebrile. Wounds do not appear infected, not septic, will hold off on antibiotics at this time  - Derm recs appreciated - f/u skin biopsy results  - Start prednisone 30-20-10 taper (5 days each dose)  - Start triamcinolone 0.1% ointment BID to affected areas  - Outpatient derm follow up  - F/u /230 serology  - Wound care consult  - Tylenol for pain prn Patient presenting with diffuse tense bullae over trunk and extremities, mild and intermittent x1-2 years (originally assumed to be dermatomyositis flares), now worsened and more extensive x 1 month, with oral ulcers, negative Nikolsky, concerning for bullous pemphigoid.    Plan:  - s/p prednisone 60mg PO x1 at OSH  - s/p empiric zosyn and vancomycin at OSH as patient was febrile to 100.2, now afebrile. Wounds do not appear infected, not septic, will hold off on antibiotics at this time  - Derm recs appreciated - f/u skin biopsy results  - C/w prednisone 30-20-10 taper (5 days each dose)  - C/w  triamcinolone 0.1% ointment BID to affected areas  - Outpatient derm follow up  - F/u /230 serology  - Wound care consult  - Tylenol for pain prn

## 2025-02-15 NOTE — PROGRESS NOTE ADULT - ASSESSMENT
74F with history of vascular dementia (AAOx0 at baseline), dermatomyositis with ILD, seizure disorder on keppra, HTN, HLD, hypothyroidism, recurrent falls, [facility] for diffuse blisters concerning for bullous pemphigoid, transferred to [facility] for dermatology evaluation, pending skin biopsy results. Started prednisone taper and triamcinolone ointment. Patient found to have metabolic acidosis.

## 2025-02-16 LAB
ANION GAP SERPL CALC-SCNC: 12 MMOL/L — SIGNIFICANT CHANGE UP (ref 7–14)
BUN SERPL-MCNC: 14 MG/DL — SIGNIFICANT CHANGE UP (ref 7–23)
CALCIUM SERPL-MCNC: 8.1 MG/DL — LOW (ref 8.4–10.5)
CHLORIDE SERPL-SCNC: 110 MMOL/L — HIGH (ref 98–107)
CO2 SERPL-SCNC: 19 MMOL/L — LOW (ref 22–31)
CREAT SERPL-MCNC: 0.55 MG/DL — SIGNIFICANT CHANGE UP (ref 0.5–1.3)
EGFR: 96 ML/MIN/1.73M2 — SIGNIFICANT CHANGE UP
FLUAV AG NPH QL: SIGNIFICANT CHANGE UP
FLUBV AG NPH QL: SIGNIFICANT CHANGE UP
GAS PNL BLDV: SIGNIFICANT CHANGE UP
GLUCOSE SERPL-MCNC: 77 MG/DL — SIGNIFICANT CHANGE UP (ref 70–99)
POTASSIUM SERPL-MCNC: 4.1 MMOL/L — SIGNIFICANT CHANGE UP (ref 3.5–5.3)
POTASSIUM SERPL-SCNC: 4.1 MMOL/L — SIGNIFICANT CHANGE UP (ref 3.5–5.3)
RSV RNA NPH QL NAA+NON-PROBE: SIGNIFICANT CHANGE UP
SARS-COV-2 RNA SPEC QL NAA+PROBE: SIGNIFICANT CHANGE UP
SODIUM SERPL-SCNC: 141 MMOL/L — SIGNIFICANT CHANGE UP (ref 135–145)

## 2025-02-16 PROCEDURE — 99232 SBSQ HOSP IP/OBS MODERATE 35: CPT

## 2025-02-16 RX ORDER — SODIUM CHLORIDE 9 G/1000ML
1000 INJECTION, SOLUTION INTRAVENOUS
Refills: 0 | Status: DISCONTINUED | OUTPATIENT
Start: 2025-02-16 | End: 2025-02-18

## 2025-02-16 RX ADMIN — LEVETIRACETAM 500 MILLIGRAM(S): 10 INJECTION, SOLUTION INTRAVENOUS at 17:23

## 2025-02-16 RX ADMIN — ENOXAPARIN SODIUM 40 MILLIGRAM(S): 100 INJECTION SUBCUTANEOUS at 12:39

## 2025-02-16 RX ADMIN — Medication 650 MILLIGRAM(S): at 00:10

## 2025-02-16 RX ADMIN — MUPIROCIN CALCIUM 1 APPLICATION(S): 20 CREAM TOPICAL at 17:22

## 2025-02-16 RX ADMIN — Medication 650 MILLIGRAM(S): at 14:11

## 2025-02-16 RX ADMIN — SODIUM CHLORIDE 100 MILLILITER(S): 9 INJECTION, SOLUTION INTRAVENOUS at 12:38

## 2025-02-16 RX ADMIN — ATORVASTATIN CALCIUM 20 MILLIGRAM(S): 80 TABLET, FILM COATED ORAL at 21:57

## 2025-02-16 RX ADMIN — PREDNISONE 30 MILLIGRAM(S): 20 TABLET ORAL at 06:45

## 2025-02-16 RX ADMIN — LEVETIRACETAM 500 MILLIGRAM(S): 10 INJECTION, SOLUTION INTRAVENOUS at 06:44

## 2025-02-16 RX ADMIN — MUPIROCIN CALCIUM 1 APPLICATION(S): 20 CREAM TOPICAL at 06:45

## 2025-02-16 RX ADMIN — TRIAMCINOLONE ACETONIDE 1 APPLICATION(S): 1 CREAM TOPICAL at 17:22

## 2025-02-16 RX ADMIN — TRIAMCINOLONE ACETONIDE 1 APPLICATION(S): 1 CREAM TOPICAL at 06:46

## 2025-02-16 RX ADMIN — Medication 1 APPLICATION(S): at 13:23

## 2025-02-16 RX ADMIN — Medication 1 TABLET(S): at 12:41

## 2025-02-16 RX ADMIN — Medication 650 MILLIGRAM(S): at 13:11

## 2025-02-16 RX ADMIN — Medication 50 MICROGRAM(S): at 04:11

## 2025-02-16 RX ADMIN — ESCITALOPRAM OXALATE 20 MILLIGRAM(S): 20 TABLET ORAL at 12:41

## 2025-02-16 RX ADMIN — Medication 81 MILLIGRAM(S): at 12:41

## 2025-02-16 RX ADMIN — Medication 6 MILLIGRAM(S): at 21:57

## 2025-02-16 NOTE — PROGRESS NOTE ADULT - ASSESSMENT
74F with history of vascular dementia (AAOx0 at baseline), dermatomyositis with ILD, seizure disorder on keppra, HTN, HLD, hypothyroidism, recurrent falls, initially sent [facility] for diffuse blisters concerning for bullous pemphigoid, transferred to [facility] for dermatology evaluation, pending skin biopsy results. Started prednisone taper and triamcinolone ointment. Patient found to have metabolic acidosis.

## 2025-02-16 NOTE — PROGRESS NOTE ADULT - PROBLEM SELECTOR PLAN 1
Patient presenting with diffuse tense bullae over trunk and extremities, mild and intermittent x1-2 years (originally assumed to be dermatomyositis flares), now worsened and more extensive x 1 month, with oral ulcers, negative Nikolsky, concerning for bullous pemphigoid.  -Derm biopsy results confirm BP.    Plan:  - s/p prednisone 60mg PO x1 at OSH  - s/p empiric zosyn and vancomycin at OSH as patient was febrile to 100.2, now afebrile. Wounds do not appear infected, not septic, will hold off on antibiotics at this time  - Derm recs appreciated  - C/w prednisone 30-20-10 taper (5 days each dose)  - C/w  triamcinolone 0.1% ointment BID to affected areas  - Outpatient derm follow up  - F/u /230 serology  - Wound care consult  - Tylenol for pain prn Patient presenting with diffuse tense bullae over trunk and extremities, mild and intermittent x1-2 years (originally assumed to be dermatomyositis flares), now worsened and more extensive x 1 month, with oral ulcers, negative Nikolsky, concerning for bullous pemphigoid.  -Derm biopsy results confirm BP    Plan:  - s/p prednisone 60mg PO x1 at OSH  - s/p empiric zosyn and vancomycin at OSH as patient was febrile to 100.2, now afebrile. Wounds do not appear infected, not septic, will hold off on antibiotics at this time  - Derm recs appreciated  - C/w prednisone 30-20-10 taper (5 days each dose)  - C/w  triamcinolone 0.1% ointment BID to affected areas  - Outpatient derm follow up  - F/u /230 serology  - Wound care consult  - Tylenol for pain prn

## 2025-02-16 NOTE — PROGRESS NOTE ADULT - PROBLEM SELECTOR PLAN 2
Labs showing non-anion gap acidosis with bicarb 17 on 2/14.  Lactate 2.4.  Cystatin-c wnl    Plan:  - s/p dextrose 5% for 10 hours and LR with improvement in bicarb to 22 - now VBG with respiratory alkalosis (pH 7.46)  - f/u labs  - replete lytes Labs showing non-anion gap acidosis with bicarb 17 on 2/14. Now 19 on 2/16.  Lactate 2.4.  Cystatin-c wnl    Plan:  - s/p dextrose 5% for 10 hours and LR with improvement in bicarb to 22 - now VBG with respiratory alkalosis (pH 7.46)  - f/u labs  - replete lytes

## 2025-02-16 NOTE — PROGRESS NOTE ADULT - PROBLEM SELECTOR PLAN 9
Diet: Soft and Bite Size Solids with Thin Liquids (per SLP eval)  DVT ppx: lovenox SQ   Dispo: pending clinical course; resident of [facility]  Code status: Full    Son, Josh, is HCP  Niece, cecile kent, is POA ([phone])

## 2025-02-16 NOTE — PROGRESS NOTE ADULT - SUBJECTIVE AND OBJECTIVE BOX
PROGRESS NOTE:     Patient is a 74y old  Female who presents with a chief complaint of derm evaluation for potential bullous pemphigoid (14 Feb 2025 14:58)      SUBJECTIVE / OVERNIGHT EVENTS:    No acute events overnight. Patient examined at bedside with no acute complaints.     REVIEW OF SYSTEMS:    Unable to assess ROS due to baseline mental status       MEDICATIONS  (STANDING):  aspirin enteric coated 81 milliGRAM(s) Oral daily  atorvastatin 20 milliGRAM(s) Oral at bedtime  chlorhexidine 2% Cloths 1 Application(s) Topical daily  enoxaparin Injectable 40 milliGRAM(s) SubCutaneous every 24 hours  escitalopram 20 milliGRAM(s) Oral daily  levETIRAcetam 500 milliGRAM(s) Oral two times a day  levothyroxine 50 MICROGram(s) Oral daily  melatonin 6 milliGRAM(s) Oral at bedtime  mupirocin 2% Ointment 1 Application(s) Both Nostrils every 12 hours  predniSONE   Tablet 30 milliGRAM(s) Oral daily  predniSONE   Tablet   Oral   senna 1 Tablet(s) Oral daily  triamcinolone 0.1% Ointment 1 Application(s) Topical every 12 hours    MEDICATIONS  (PRN):  acetaminophen     Tablet .. 650 milliGRAM(s) Oral every 6 hours PRN Temp greater or equal to 38C (100.4F), Moderate Pain (4 - 6)        CAPILLARY BLOOD GLUCOSE      I&O's Detail    15 Feb 2025 07:01  -  16 Feb 2025 07:00  --------------------------------------------------------  IN:    Lactated Ringers: 500 mL  Total IN: 500 mL    OUT:  Total OUT: 0 mL    Total NET: 500 mL        VITALS:   Vital Signs Last 24 Hrs  T(C): 36.4 (16 Feb 2025 06:42), Max: 36.7 (15 Feb 2025 20:54)  T(F): 97.5 (16 Feb 2025 06:42), Max: 98.1 (15 Feb 2025 20:54)  HR: 72 (16 Feb 2025 06:42) (69 - 72)  BP: 133/61 (16 Feb 2025 06:42) (118/55 - 133/61)  BP(mean): --  RR: 18 (16 Feb 2025 06:42) (17 - 18)  SpO2: 96% (16 Feb 2025 06:42) (95% - 98%)    Parameters below as of 16 Feb 2025 06:42  Patient On (Oxygen Delivery Method): room air      GENERAL: NAD, lying in bed comfortably  GENERAL: NAD, smiling, but grimaces when lesions are touched  HEAD:  Atraumatic, Normocephalic  EYES: PERRLA, conjunctiva and sclera clear  ENMT: Moist mucous membranes  NECK: Supple, No JVD, Normal thyroid  CHEST/LUNG: Clear to percussion bilaterally; No rales, rhonchi, wheezing, or rubs  HEART: Regular rate and rhythm; No murmurs, rubs, or gallops  ABDOMEN: Soft, Nontender, Nondistended; Bowel sounds present  EXTREMITIES:  2+ Peripheral Pulses, No clubbing, cyanosis, or edema  NERVOUS SYSTEM:  Alert & Oriented X0, nonverbal, does not follow commands, grimaces to touch and tracks with eyes  SKIN: Diffuse fluid filled soft bullae and ruptured flacid bullae over the trunk and extremities    LABS:                                   9.5    8.84  )-----------( 234      ( 15 Feb 2025 05:45 )             31.6   02-15    145  |  110[H]  |  9   ----------------------------<  92  3.1[L]   |  22  |  0.58    Ca    8.2[L]      15 Feb 2025 05:45  Phos  2.5     02-15  Mg     2.00     02-15    Blood Gas Profile - Venous (02.16.25 @ 05:45)    pH, Venous: 7.46: Due to specimen delivery delays, please interpret with caution   pCO2, Venous: 36 mmHg   pO2, Venous: 191 mmHg   HCO3, Venous: 26 mmol/L   Base Excess, Venous: 1.8 mmol/L   Oxygen Saturation, Venous: 98.8 %   Total CO2, Venous: 27 mmol/L      Urinalysis Basic - ( 14 Feb 2025 05:56 )    Color: x / Appearance: x / SG: x / pH: x  Gluc: 65 mg/dL / Ketone: x  / Bili: x / Urobili: x   Blood: x / Protein: x / Nitrite: x   Leuk Esterase: x / RBC: x / WBC x   Sq Epi: x / Non Sq Epi: x / Bacteria: x        Culture - Blood (collected 12 Feb 2025 16:00)  Source: .Blood BLOOD  Gram Stain (14 Feb 2025 13:28):    Growth in aerobic bottle: Gram Positive Cocci in Clusters  Preliminary Report (14 Feb 2025 19:12):    Growth in aerobic bottle: Aerococcus urinae    Direct identification is available within approximately 3-5    hours either by Blood Panel Multiplexed PCR or Direct    MALDI-TOF. Details: [email or web address]  Organism: Blood Culture PCR (14 Feb 2025 17:16)  Organism: Blood Culture PCR (14 Feb 2025 17:16)    Culture - Blood (collected 12 Feb 2025 16:00)  Source: .Blood BLOOD  Preliminary Report (15 Feb 2025 02:01):    No growth at 48 Hours        RADIOLOGY & ADDITIONAL TESTS:     PROGRESS NOTE:     Patient is a 74y old  Female who presents with a chief complaint of derm evaluation for potential bullous pemphigoid (14 Feb 2025 14:58)      SUBJECTIVE / OVERNIGHT EVENTS:    No acute events overnight. Patient examined at bedside with no acute complaints, sleeping comfortably.     REVIEW OF SYSTEMS:    Unable to assess ROS due to baseline mental status       MEDICATIONS  (STANDING):  aspirin enteric coated 81 milliGRAM(s) Oral daily  atorvastatin 20 milliGRAM(s) Oral at bedtime  chlorhexidine 2% Cloths 1 Application(s) Topical daily  enoxaparin Injectable 40 milliGRAM(s) SubCutaneous every 24 hours  escitalopram 20 milliGRAM(s) Oral daily  levETIRAcetam 500 milliGRAM(s) Oral two times a day  levothyroxine 50 MICROGram(s) Oral daily  melatonin 6 milliGRAM(s) Oral at bedtime  mupirocin 2% Ointment 1 Application(s) Both Nostrils every 12 hours  predniSONE   Tablet 30 milliGRAM(s) Oral daily  predniSONE   Tablet   Oral   senna 1 Tablet(s) Oral daily  triamcinolone 0.1% Ointment 1 Application(s) Topical every 12 hours    MEDICATIONS  (PRN):  acetaminophen     Tablet .. 650 milliGRAM(s) Oral every 6 hours PRN Temp greater or equal to 38C (100.4F), Moderate Pain (4 - 6)        CAPILLARY BLOOD GLUCOSE      I&O's Detail    15 Feb 2025 07:01  -  16 Feb 2025 07:00  --------------------------------------------------------  IN:    Lactated Ringers: 500 mL  Total IN: 500 mL    OUT:  Total OUT: 0 mL    Total NET: 500 mL        VITALS:   Vital Signs Last 24 Hrs  T(C): 36.4 (16 Feb 2025 06:42), Max: 36.7 (15 Feb 2025 20:54)  T(F): 97.5 (16 Feb 2025 06:42), Max: 98.1 (15 Feb 2025 20:54)  HR: 72 (16 Feb 2025 06:42) (69 - 72)  BP: 133/61 (16 Feb 2025 06:42) (118/55 - 133/61)  BP(mean): --  RR: 18 (16 Feb 2025 06:42) (17 - 18)  SpO2: 96% (16 Feb 2025 06:42) (95% - 98%)    Parameters below as of 16 Feb 2025 06:42  Patient On (Oxygen Delivery Method): room air      GENERAL: NAD, lying in bed comfortably  GENERAL: NAD, smiling, but grimaces when lesions are touched  HEAD:  Atraumatic, Normocephalic  EYES: PERRLA, conjunctiva and sclera clear  ENMT: Moist mucous membranes  NECK: Supple, No JVD, Normal thyroid  CHEST/LUNG: Clear to percussion bilaterally; No rales, rhonchi, wheezing, or rubs  HEART: Regular rate and rhythm; No murmurs, rubs, or gallops  ABDOMEN: Soft, Nontender, Nondistended; Bowel sounds present  EXTREMITIES:  2+ Peripheral Pulses, No clubbing, cyanosis, mild bilateral LE edema to knee, wearing boots  NERVOUS SYSTEM:  Alert & Oriented X0, nonverbal, does not follow commands, grimaces to touch and tracks with eyes  SKIN: Diffuse fluid filled soft bullae and ruptured flacid bullae over the trunk and extremities    LABS:                                   9.5    8.84  )-----------( 234      ( 15 Feb 2025 05:45 )             31.6   02-16    141  |  110[H]  |  14  ----------------------------<  77  4.1   |  19[L]  |  0.55    Ca    8.1[L]      16 Feb 2025 05:45  Phos  2.5     02-15  Mg     2.00     02-15        Blood Gas Profile - Venous (02.16.25 @ 05:45)    pH, Venous: 7.46: Due to specimen delivery delays, please interpret with caution   pCO2, Venous: 36 mmHg   pO2, Venous: 191 mmHg   HCO3, Venous: 26 mmol/L   Base Excess, Venous: 1.8 mmol/L   Oxygen Saturation, Venous: 98.8 %   Total CO2, Venous: 27 mmol/L      Urinalysis Basic - ( 14 Feb 2025 05:56 )    Color: x / Appearance: x / SG: x / pH: x  Gluc: 65 mg/dL / Ketone: x  / Bili: x / Urobili: x   Blood: x / Protein: x / Nitrite: x   Leuk Esterase: x / RBC: x / WBC x   Sq Epi: x / Non Sq Epi: x / Bacteria: x        Culture - Blood (collected 12 Feb 2025 16:00)  Source: .Blood BLOOD  Gram Stain (14 Feb 2025 13:28):    Growth in aerobic bottle: Gram Positive Cocci in Clusters  Preliminary Report (14 Feb 2025 19:12):    Growth in aerobic bottle: Aerococcus urinae    Direct identification is available within approximately 3-5    hours either by Blood Panel Multiplexed PCR or Direct    MALDI-TOF. Details: [email or web address]  Organism: Blood Culture PCR (14 Feb 2025 17:16)  Organism: Blood Culture PCR (14 Feb 2025 17:16)    Culture - Blood (collected 12 Feb 2025 16:00)  Source: .Blood BLOOD  Preliminary Report (15 Feb 2025 02:01):    No growth at 48 Hours        RADIOLOGY & ADDITIONAL TESTS:

## 2025-02-17 LAB
ANION GAP SERPL CALC-SCNC: 11 MMOL/L — SIGNIFICANT CHANGE UP (ref 7–14)
BUN SERPL-MCNC: 13 MG/DL — SIGNIFICANT CHANGE UP (ref 7–23)
CALCIUM SERPL-MCNC: 8.3 MG/DL — LOW (ref 8.4–10.5)
CHLORIDE SERPL-SCNC: 112 MMOL/L — HIGH (ref 98–107)
CO2 SERPL-SCNC: 23 MMOL/L — SIGNIFICANT CHANGE UP (ref 22–31)
CREAT SERPL-MCNC: 0.57 MG/DL — SIGNIFICANT CHANGE UP (ref 0.5–1.3)
EGFR: 95 ML/MIN/1.73M2 — SIGNIFICANT CHANGE UP
GLUCOSE SERPL-MCNC: 91 MG/DL — SIGNIFICANT CHANGE UP (ref 70–99)
POTASSIUM SERPL-MCNC: 3.7 MMOL/L — SIGNIFICANT CHANGE UP (ref 3.5–5.3)
POTASSIUM SERPL-SCNC: 3.7 MMOL/L — SIGNIFICANT CHANGE UP (ref 3.5–5.3)
SODIUM SERPL-SCNC: 146 MMOL/L — HIGH (ref 135–145)

## 2025-02-17 PROCEDURE — 99232 SBSQ HOSP IP/OBS MODERATE 35: CPT

## 2025-02-17 RX ADMIN — TRIAMCINOLONE ACETONIDE 1 APPLICATION(S): 1 CREAM TOPICAL at 17:09

## 2025-02-17 RX ADMIN — Medication 1 APPLICATION(S): at 12:29

## 2025-02-17 RX ADMIN — TRIAMCINOLONE ACETONIDE 1 APPLICATION(S): 1 CREAM TOPICAL at 05:30

## 2025-02-17 RX ADMIN — MUPIROCIN CALCIUM 1 APPLICATION(S): 20 CREAM TOPICAL at 17:08

## 2025-02-17 RX ADMIN — MUPIROCIN CALCIUM 1 APPLICATION(S): 20 CREAM TOPICAL at 05:30

## 2025-02-17 RX ADMIN — ATORVASTATIN CALCIUM 20 MILLIGRAM(S): 80 TABLET, FILM COATED ORAL at 22:38

## 2025-02-17 RX ADMIN — LEVETIRACETAM 500 MILLIGRAM(S): 10 INJECTION, SOLUTION INTRAVENOUS at 17:08

## 2025-02-17 RX ADMIN — PREDNISONE 30 MILLIGRAM(S): 20 TABLET ORAL at 05:30

## 2025-02-17 RX ADMIN — LEVETIRACETAM 500 MILLIGRAM(S): 10 INJECTION, SOLUTION INTRAVENOUS at 05:30

## 2025-02-17 RX ADMIN — Medication 1 TABLET(S): at 12:28

## 2025-02-17 RX ADMIN — Medication 50 MICROGRAM(S): at 04:25

## 2025-02-17 RX ADMIN — Medication 81 MILLIGRAM(S): at 12:28

## 2025-02-17 RX ADMIN — Medication 6 MILLIGRAM(S): at 22:37

## 2025-02-17 RX ADMIN — ENOXAPARIN SODIUM 40 MILLIGRAM(S): 100 INJECTION SUBCUTANEOUS at 12:28

## 2025-02-17 RX ADMIN — ESCITALOPRAM OXALATE 20 MILLIGRAM(S): 20 TABLET ORAL at 12:28

## 2025-02-17 NOTE — PROGRESS NOTE ADULT - PROBLEM SELECTOR PLAN 9
Diet: Soft and Bite Size Solids with Thin Liquids (per SLP eval)  DVT ppx: lovenox SQ   Dispo: pending clinical course; resident of [facility]  Code status: Full    Son, Josh, is HCP  Niece, cecile kent, is POA ([phone]) Diet: Soft and Bite Size Solids with Thin Liquids (per SLP eval)  DVT ppx: lovenox SQ   Dispo: pending clinical course; resident of [facility] but patient will need higher-level of care for wound care  Code status: Full    Son, Josh, is HCP  Niece, cecile kent, is POA ([phone])

## 2025-02-17 NOTE — PROGRESS NOTE ADULT - SUBJECTIVE AND OBJECTIVE BOX
PROGRESS NOTE:     Patient is a 74y old  Female who presents with a chief complaint of derm evaluation for potential bullous pemphigoid (14 Feb 2025 14:58)      SUBJECTIVE / OVERNIGHT EVENTS:    No acute events overnight. Patient examined at bedside with no acute complaints, sleeping comfortably.     REVIEW OF SYSTEMS:    Unable to assess ROS due to baseline mental status       MEDICATIONS  (STANDING):  aspirin enteric coated 81 milliGRAM(s) Oral daily  atorvastatin 20 milliGRAM(s) Oral at bedtime  chlorhexidine 2% Cloths 1 Application(s) Topical daily  enoxaparin Injectable 40 milliGRAM(s) SubCutaneous every 24 hours  escitalopram 20 milliGRAM(s) Oral daily  levETIRAcetam 500 milliGRAM(s) Oral two times a day  levothyroxine 50 MICROGram(s) Oral daily  melatonin 6 milliGRAM(s) Oral at bedtime  mupirocin 2% Ointment 1 Application(s) Both Nostrils every 12 hours  predniSONE   Tablet 30 milliGRAM(s) Oral daily  predniSONE   Tablet   Oral   senna 1 Tablet(s) Oral daily  triamcinolone 0.1% Ointment 1 Application(s) Topical every 12 hours    MEDICATIONS  (PRN):  acetaminophen     Tablet .. 650 milliGRAM(s) Oral every 6 hours PRN Temp greater or equal to 38C (100.4F), Moderate Pain (4 - 6)        CAPILLARY BLOOD GLUCOSE      I&O's Detail          VITALS:   Vital Signs Last 24 Hrs  T(C): 36.5 (17 Feb 2025 05:15), Max: 37.1 (16 Feb 2025 13:02)  T(F): 97.7 (17 Feb 2025 05:15), Max: 98.8 (16 Feb 2025 13:02)  HR: 66 (17 Feb 2025 05:15) (66 - 76)  BP: 138/54 (17 Feb 2025 05:15) (119/59 - 138/54)  BP(mean): --  RR: 17 (17 Feb 2025 05:15) (17 - 18)  SpO2: 98% (17 Feb 2025 05:15) (97% - 98%)    Parameters below as of 17 Feb 2025 05:15  Patient On (Oxygen Delivery Method): room air        GENERAL: NAD, lying in bed comfortably  HEAD:  Atraumatic, Normocephalic  EYES: PERRLA, conjunctiva and sclera clear  ENMT: Moist mucous membranes  NECK: Supple, No JVD, Normal thyroid  CHEST/LUNG: Clear to percussion bilaterally; No rales, rhonchi, wheezing, or rubs  HEART: Regular rate and rhythm; No murmurs, rubs, or gallops  ABDOMEN: Soft, Nontender, Nondistended; Bowel sounds present  EXTREMITIES:  2+ Peripheral Pulses, No clubbing, cyanosis, mild bilateral LE edema to knee, wearing boots  NERVOUS SYSTEM:  Alert & Oriented X0, nonverbal, does not follow commands, grimaces to touch and tracks with eyes  SKIN: Diffuse fluid filled soft bullae and ruptured flacid bullae over the trunk and extremities    LABS:                                   9.5    8.84  )-----------( 234      ( 15 Feb 2025 05:45 )             31.6   02-16    141  |  110[H]  |  14  ----------------------------<  77  4.1   |  19[L]  |  0.55    Ca    8.1[L]      16 Feb 2025 05:45  Phos  2.5     02-15  Mg     2.00     02-15        Blood Gas Profile - Venous (02.16.25 @ 05:45)    pH, Venous: 7.46: Due to specimen delivery delays, please interpret with caution   pCO2, Venous: 36 mmHg   pO2, Venous: 191 mmHg   HCO3, Venous: 26 mmol/L   Base Excess, Venous: 1.8 mmol/L   Oxygen Saturation, Venous: 98.8 %   Total CO2, Venous: 27 mmol/L      Urinalysis Basic - ( 14 Feb 2025 05:56 )    Color: x / Appearance: x / SG: x / pH: x  Gluc: 65 mg/dL / Ketone: x  / Bili: x / Urobili: x   Blood: x / Protein: x / Nitrite: x   Leuk Esterase: x / RBC: x / WBC x   Sq Epi: x / Non Sq Epi: x / Bacteria: x        Culture - Blood (collected 12 Feb 2025 16:00)  Source: .Blood BLOOD  Gram Stain (14 Feb 2025 13:28):    Growth in aerobic bottle: Gram Positive Cocci in Clusters  Preliminary Report (14 Feb 2025 19:12):    Growth in aerobic bottle: Aerococcus urinae    Direct identification is available within approximately 3-5    hours either by Blood Panel Multiplexed PCR or Direct    MALDI-TOF. Details: [email or web address]  Organism: Blood Culture PCR (14 Feb 2025 17:16)  Organism: Blood Culture PCR (14 Feb 2025 17:16)    Culture - Blood (collected 12 Feb 2025 16:00)  Source: .Blood BLOOD  Preliminary Report (15 Feb 2025 02:01):    No growth at 48 Hours        RADIOLOGY & ADDITIONAL TESTS:     PROGRESS NOTE:     Patient is a 74y old  Female who presents with a chief complaint of derm evaluation for potential bullous pemphigoid (14 Feb 2025 14:58)      SUBJECTIVE / OVERNIGHT EVENTS:    No acute events overnight. Patient examined at bedside with no acute complaints, sleeping comfortably. Per family, patient needs to go to other facility on dc as her current assisted living facility is unable to care for wounds.    REVIEW OF SYSTEMS:    Unable to assess ROS due to baseline mental status       MEDICATIONS  (STANDING):  aspirin enteric coated 81 milliGRAM(s) Oral daily  atorvastatin 20 milliGRAM(s) Oral at bedtime  chlorhexidine 2% Cloths 1 Application(s) Topical daily  enoxaparin Injectable 40 milliGRAM(s) SubCutaneous every 24 hours  escitalopram 20 milliGRAM(s) Oral daily  levETIRAcetam 500 milliGRAM(s) Oral two times a day  levothyroxine 50 MICROGram(s) Oral daily  melatonin 6 milliGRAM(s) Oral at bedtime  mupirocin 2% Ointment 1 Application(s) Both Nostrils every 12 hours  predniSONE   Tablet 30 milliGRAM(s) Oral daily  predniSONE   Tablet   Oral   senna 1 Tablet(s) Oral daily  triamcinolone 0.1% Ointment 1 Application(s) Topical every 12 hours    MEDICATIONS  (PRN):  acetaminophen     Tablet .. 650 milliGRAM(s) Oral every 6 hours PRN Temp greater or equal to 38C (100.4F), Moderate Pain (4 - 6)        CAPILLARY BLOOD GLUCOSE      I&O's Detail        VITALS:   Vital Signs Last 24 Hrs  T(C): 36.5 (17 Feb 2025 05:15), Max: 37.1 (16 Feb 2025 13:02)  T(F): 97.7 (17 Feb 2025 05:15), Max: 98.8 (16 Feb 2025 13:02)  HR: 66 (17 Feb 2025 05:15) (66 - 76)  BP: 138/54 (17 Feb 2025 05:15) (119/59 - 138/54)  BP(mean): --  RR: 17 (17 Feb 2025 05:15) (17 - 18)  SpO2: 98% (17 Feb 2025 05:15) (97% - 98%)    Parameters below as of 17 Feb 2025 05:15  Patient On (Oxygen Delivery Method): room air        GENERAL: NAD, lying in bed comfortably  HEAD:  Atraumatic, Normocephalic  EYES: PERRLA, conjunctiva and sclera clear  ENMT: Moist mucous membranes  NECK: Supple, No JVD, Normal thyroid  CHEST/LUNG: Clear to percussion bilaterally; No rales, rhonchi, wheezing, or rubs  HEART: Regular rate and rhythm; No murmurs, rubs, or gallops  ABDOMEN: Soft, Nontender, Nondistended; Bowel sounds present. Urine is concentrated  EXTREMITIES:  2+ Peripheral Pulses, No clubbing, cyanosis, mild bilateral LE edema to knee, wearing boots  NERVOUS SYSTEM:  Alert & Oriented X0, nonverbal, does not follow commands, grimaces to touch and tracks with eyes  SKIN: Diffuse fluid filled soft bullae and ruptured flacid bullae over the trunk and extremities    LABS:                                   9.5    8.84  )-----------( 234      ( 15 Feb 2025 05:45 )             31.6   02-16    141  |  110[H]  |  14  ----------------------------<  77  4.1   |  19[L]  |  0.55    Ca    8.1[L]      16 Feb 2025 05:45  Phos  2.5     02-15  Mg     2.00     02-15        Blood Gas Profile - Venous (02.16.25 @ 05:45)    pH, Venous: 7.46: Due to specimen delivery delays, please interpret with caution   pCO2, Venous: 36 mmHg   pO2, Venous: 191 mmHg   HCO3, Venous: 26 mmol/L   Base Excess, Venous: 1.8 mmol/L   Oxygen Saturation, Venous: 98.8 %   Total CO2, Venous: 27 mmol/L      Urinalysis Basic - ( 14 Feb 2025 05:56 )    Color: x / Appearance: x / SG: x / pH: x  Gluc: 65 mg/dL / Ketone: x  / Bili: x / Urobili: x   Blood: x / Protein: x / Nitrite: x   Leuk Esterase: x / RBC: x / WBC x   Sq Epi: x / Non Sq Epi: x / Bacteria: x        Culture - Blood (collected 12 Feb 2025 16:00)  Source: .Blood BLOOD  Gram Stain (14 Feb 2025 13:28):    Growth in aerobic bottle: Gram Positive Cocci in Clusters  Preliminary Report (14 Feb 2025 19:12):    Growth in aerobic bottle: Aerococcus urinae    Direct identification is available within approximately 3-5    hours either by Blood Panel Multiplexed PCR or Direct    MALDI-TOF. Details: [email or web address]  Organism: Blood Culture PCR (14 Feb 2025 17:16)  Organism: Blood Culture PCR (14 Feb 2025 17:16)    Culture - Blood (collected 12 Feb 2025 16:00)  Source: .Blood BLOOD  Preliminary Report (15 Feb 2025 02:01):    No growth at 48 Hours        RADIOLOGY & ADDITIONAL TESTS:

## 2025-02-17 NOTE — ADVANCED PRACTICE NURSE CONSULT - REASON FOR CONSULT
Patient seen on skin care rounds after wound care referral received for assessment of skin impairment and recommendations of topical management of de-roofed bullae 2/2 bullous pemphigoid. As per H&P, patient is a 74F with history of dementia (AAOx0 at baseline), dermatomyositis, seizure disorder, HTN, and HLD initially sent to [place] ED from [facility] for diffuse blisters, dermatology availability limited at ?, transferred to [facility] ED for dermatology evaluation of potential bullous pemphigoid. Labs at  largely unremarkable, WBC wnl, mildly hypernatremic to 148. Was given zosyn, vancomycin, and prednisone 60mg PO x1.   Patient unable to provide any history, no paperwork from facility, additional collateral obtained from son and NOK Josh Irais over the phone. Josh travels frequently for work and is currently in [place] so is not fully up to date on details of her current condition. Unable to reach facility overnight. Appears that patient has been having intermittent blistering wounds over the past year, never to this extent, was just being managed at the facility, have not sought medical attention for it. Worsened over the past month. No fevers as far as he is aware. He was inquiring whether it's related to her dermatomyositis, states that she has been somewhat lost to follow up, have not seen her rheumatologist or pulmonologist in at least a year or two, last received IVIG therapy a few years ago. Of note, pt did come with a partially filled out MOLST form stating full code, that was filled out with cousin, Josh's cousin Rosi Mitchell. Josh confirmed that he is NOK (pt's  , no other children, no other designated HCP). For now, he agrees with full code status, offered to fill out MOLST, states he will do so when he returns from [facility].     Consulted by dermatology for bullous pemphigoid.

## 2025-02-17 NOTE — ADVANCED PRACTICE NURSE CONSULT - ASSESSMENT
General: A&Ox0, bedbound, incontinent of urine and stool. Skin warm, dry with increased moisture in intertriginous folds, adequate skin turgor, scattered areas of hyperpigmentation and hypopigmentation, scattered areas of ecchymosis on bilateral upper extremities with no hematomas. Blanchable erythema on bilateral heels.     Generalized Bullous Pemphigoid: Mixture of intact and de-roofed blisters  General: A&Ox0, bedbound, incontinent of urine and stool. Skin warm, dry with increased moisture in intertriginous folds, adequate skin turgor, scattered areas of hyperpigmentation and hypopigmentation, scattered areas of ecchymosis on bilateral upper extremities with no hematomas. Blanchable erythema on bilateral heels.     Generalized Bullous Pemphigoid: Mixture of intact, de-roofed blisters and de-roofed blisters now crusted over with stable serosanguinous scabbing; de-roofed blisters appearing mostly dry, minimal drainage noted. Majority of bullae noted to bilateral knees, R forearm, R hand, L elbow, anterior and posterior trunk. Largest intact bullae noted to R hand, blood filled, measuring [id number]. No induration, no erythema, no crepitus, no fluctuance, no edema, no temperature changes, no overt signs of infection noted.     Abdominal pannus, bilateral under breasts: MAD as evidenced by blanchable erythema and increased moisture. No induration, no crepitus, no fluctuance, no edema, no temperature changes, no overt signs of infection noted.

## 2025-02-17 NOTE — PROGRESS NOTE ADULT - PROBLEM SELECTOR PLAN 1
Patient presenting with diffuse tense bullae over trunk and extremities, mild and intermittent x1-2 years (originally assumed to be dermatomyositis flares), now worsened and more extensive x 1 month, with oral ulcers, negative Nikolsky, concerning for bullous pemphigoid.  -Derm biopsy results confirm BP    Plan:  - s/p prednisone 60mg PO x1 at OSH  - s/p empiric zosyn and vancomycin at OSH as patient was febrile to 100.2, now afebrile. Wounds do not appear infected, not septic, will hold off on antibiotics at this time  - Derm recs appreciated  - C/w prednisone 30-20-10 taper (5 days each dose)  - C/w  triamcinolone 0.1% ointment BID to affected areas  - Outpatient derm follow up  - F/u /230 serology  - Wound care consult  - Tylenol for pain prn Patient presenting with diffuse tense bullae over trunk and extremities, mild and intermittent x1-2 years (originally assumed to be dermatomyositis flares), now worsened and more extensive x 1 month, with oral ulcers, negative Nikolsky, concerning for bullous pemphigoid.  -Derm biopsy results confirm BP    Plan:  - s/p prednisone 60mg PO x1 at OSH  - s/p empiric zosyn and vancomycin at OSH as patient was febrile to 100.2, now afebrile. Wounds do not appear infected, not septic, will hold off on antibiotics at this time  - Derm recs appreciated  - C/w prednisone 30-20-10 taper (5 days each dose)  - C/w  triamcinolone 0.1% ointment BID to affected areas  - Outpatient derm follow up  - F/u /230 serology  - Wound care consult   - Tylenol for pain prn

## 2025-02-17 NOTE — PROGRESS NOTE ADULT - ASSESSMENT
74F with history of vascular dementia (AAOx0 at baseline), dermatomyositis with ILD, seizure disorder on keppra, HTN, HLD, hypothyroidism, recurrent falls, initially sent [facility] for diffuse blisters concerning for bullous pemphigoid, transferred to [facility] for dermatology evaluation, pending skin biopsy results. Started prednisone taper and triamcinolone ointment. Patient found to have metabolic acidosis. 74F with history of vascular dementia (AAOx0 at baseline), dermatomyositis with ILD, seizure disorder on keppra, HTN, HLD, hypothyroidism, recurrent falls, initially sent to [facility] [place] [facility] from [facility] for diffuse blisters concerning for bullous pemphigoid, transferred to [facility] for dermatology evaluation, skin biopsy results confirm BP. Started prednisone taper and triamcinolone ointment. Patient found to have metabolic acidosis.

## 2025-02-17 NOTE — PROGRESS NOTE ADULT - PROBLEM SELECTOR PLAN 2
Labs showing non-anion gap acidosis with bicarb 17 on 2/14. Now 19 on 2/16.  Lactate 2.4.  Cystatin-c wnl    Plan:  - s/p dextrose 5% for 10 hours and LR with improvement in bicarb to 22 - now VBG with respiratory alkalosis (pH 7.46)  - f/u labs  - replete lytes

## 2025-02-18 ENCOUNTER — TRANSCRIPTION ENCOUNTER (OUTPATIENT)
Age: 75
End: 2025-02-18

## 2025-02-18 VITALS
SYSTOLIC BLOOD PRESSURE: 144 MMHG | OXYGEN SATURATION: 96 % | TEMPERATURE: 98 F | RESPIRATION RATE: 17 BRPM | HEART RATE: 61 BPM | DIASTOLIC BLOOD PRESSURE: 69 MMHG

## 2025-02-18 LAB
ANION GAP SERPL CALC-SCNC: 9 MMOL/L — SIGNIFICANT CHANGE UP (ref 7–14)
BP 180, S: >200 RU/ML — HIGH
BP 230, S: <2 RU/ML — SIGNIFICANT CHANGE UP
BUN SERPL-MCNC: 11 MG/DL — SIGNIFICANT CHANGE UP (ref 7–23)
CALCIUM SERPL-MCNC: 8.4 MG/DL — SIGNIFICANT CHANGE UP (ref 8.4–10.5)
CHLORIDE SERPL-SCNC: 108 MMOL/L — HIGH (ref 98–107)
CO2 SERPL-SCNC: 25 MMOL/L — SIGNIFICANT CHANGE UP (ref 22–31)
CREAT SERPL-MCNC: 0.58 MG/DL — SIGNIFICANT CHANGE UP (ref 0.5–1.3)
CULTURE RESULTS: SIGNIFICANT CHANGE UP
EGFR: 94 ML/MIN/1.73M2 — SIGNIFICANT CHANGE UP
GLUCOSE SERPL-MCNC: 95 MG/DL — SIGNIFICANT CHANGE UP (ref 70–99)
MAGNESIUM SERPL-MCNC: 2 MG/DL — SIGNIFICANT CHANGE UP (ref 1.6–2.6)
PHOSPHATE SERPL-MCNC: 2.7 MG/DL — SIGNIFICANT CHANGE UP (ref 2.5–4.5)
POTASSIUM SERPL-MCNC: 3.8 MMOL/L — SIGNIFICANT CHANGE UP (ref 3.5–5.3)
POTASSIUM SERPL-SCNC: 3.8 MMOL/L — SIGNIFICANT CHANGE UP (ref 3.5–5.3)
SODIUM SERPL-SCNC: 142 MMOL/L — SIGNIFICANT CHANGE UP (ref 135–145)
SPECIMEN SOURCE: SIGNIFICANT CHANGE UP

## 2025-02-18 PROCEDURE — 99239 HOSP IP/OBS DSCHRG MGMT >30: CPT

## 2025-02-18 RX ORDER — ACETAMINOPHEN 500 MG/5ML
2 LIQUID (ML) ORAL
Qty: 0 | Refills: 0 | DISCHARGE
Start: 2025-02-18

## 2025-02-18 RX ORDER — TRIAMCINOLONE ACETONIDE 1 MG/G
1 CREAM TOPICAL
Qty: 0 | Refills: 0 | DISCHARGE
Start: 2025-02-18

## 2025-02-18 RX ORDER — PREDNISONE 20 MG/1
1 TABLET ORAL
Qty: 0 | Refills: 0 | DISCHARGE
Start: 2025-02-18

## 2025-02-18 RX ORDER — SODIUM CHLORIDE 9 G/1000ML
500 INJECTION, SOLUTION INTRAVENOUS ONCE
Refills: 0 | Status: COMPLETED | OUTPATIENT
Start: 2025-02-18 | End: 2025-02-18

## 2025-02-18 RX ORDER — FUROSEMIDE 10 MG/ML
1 INJECTION INTRAMUSCULAR; INTRAVENOUS
Qty: 0 | Refills: 0 | DISCHARGE
Start: 2025-02-18

## 2025-02-18 RX ORDER — FUROSEMIDE 10 MG/ML
1 INJECTION INTRAMUSCULAR; INTRAVENOUS
Qty: 30 | Refills: 0
Start: 2025-02-18

## 2025-02-18 RX ADMIN — MUPIROCIN CALCIUM 1 APPLICATION(S): 20 CREAM TOPICAL at 05:37

## 2025-02-18 RX ADMIN — SODIUM CHLORIDE 500 MILLILITER(S): 9 INJECTION, SOLUTION INTRAVENOUS at 11:36

## 2025-02-18 RX ADMIN — TRIAMCINOLONE ACETONIDE 1 APPLICATION(S): 1 CREAM TOPICAL at 05:37

## 2025-02-18 RX ADMIN — Medication 50 MICROGRAM(S): at 04:21

## 2025-02-18 RX ADMIN — Medication 1 APPLICATION(S): at 11:38

## 2025-02-18 RX ADMIN — ESCITALOPRAM OXALATE 20 MILLIGRAM(S): 20 TABLET ORAL at 11:38

## 2025-02-18 RX ADMIN — Medication 1 TABLET(S): at 11:37

## 2025-02-18 RX ADMIN — ENOXAPARIN SODIUM 40 MILLIGRAM(S): 100 INJECTION SUBCUTANEOUS at 11:36

## 2025-02-18 RX ADMIN — Medication 81 MILLIGRAM(S): at 11:37

## 2025-02-18 RX ADMIN — Medication 650 MILLIGRAM(S): at 12:41

## 2025-02-18 RX ADMIN — Medication 650 MILLIGRAM(S): at 11:41

## 2025-02-18 RX ADMIN — PREDNISONE 30 MILLIGRAM(S): 20 TABLET ORAL at 05:36

## 2025-02-18 RX ADMIN — LEVETIRACETAM 500 MILLIGRAM(S): 10 INJECTION, SOLUTION INTRAVENOUS at 05:37

## 2025-02-18 NOTE — PROGRESS NOTE ADULT - ASSESSMENT
74F with history of vascular dementia (AAOx0 at baseline), dermatomyositis with ILD, seizure disorder on keppra, HTN, HLD, hypothyroidism, recurrent falls, initially sent to [facility] from [facility] for diffuse blisters concerning for bullous pemphigoid, transferred to [facility] for dermatology evaluation, skin biopsy results confirm BP. Started prednisone taper and triamcinolone ointment. Patient found to have metabolic acidosis.

## 2025-02-18 NOTE — DISCHARGE NOTE NURSING/CASE MANAGEMENT/SOCIAL WORK - FINANCIAL ASSISTANCE
[facility] provides services at a reduced cost to those who are determined to be eligible through [facility]’s financial assistance program. Information regarding [facility]’s financial assistance program can be found by going to [email or web address] or by calling [phone].

## 2025-02-18 NOTE — PROGRESS NOTE ADULT - PROBLEM SELECTOR PLAN 2
Labs showing non-anion gap acidosis with bicarb 17 on 2/14. Now 19 on 2/16.  Lactate 2.4.  Cystatin-c wnl    Plan:  - s/p dextrose 5% for 10 hours and LR with improvement in bicarb to 22 - now VBG with respiratory alkalosis (pH 7.46)  - f/u labs - improved  - replete lytes as needed

## 2025-02-18 NOTE — PROGRESS NOTE ADULT - SUBJECTIVE AND OBJECTIVE BOX
PROGRESS NOTE:     Patient is a 74y old  Female who presents with a chief complaint of derm evaluation for potential bullous pemphigoid (14 Feb 2025 14:58)      SUBJECTIVE / OVERNIGHT EVENTS:    No acute events overnight. Patient examined at bedside with no acute complaints, sleeping comfortably. Per family, patient needs to go to other facility on dc as her current assisted living facility is unable to care for wounds.    REVIEW OF SYSTEMS:    Unable to assess ROS due to baseline mental status       MEDICATIONS  (STANDING):  aspirin enteric coated 81 milliGRAM(s) Oral daily  atorvastatin 20 milliGRAM(s) Oral at bedtime  chlorhexidine 2% Cloths 1 Application(s) Topical daily  enoxaparin Injectable 40 milliGRAM(s) SubCutaneous every 24 hours  escitalopram 20 milliGRAM(s) Oral daily  levETIRAcetam 500 milliGRAM(s) Oral two times a day  levothyroxine 50 MICROGram(s) Oral daily  melatonin 6 milliGRAM(s) Oral at bedtime  mupirocin 2% Ointment 1 Application(s) Both Nostrils every 12 hours  predniSONE   Tablet 30 milliGRAM(s) Oral daily  predniSONE   Tablet   Oral   senna 1 Tablet(s) Oral daily  triamcinolone 0.1% Ointment 1 Application(s) Topical every 12 hours    MEDICATIONS  (PRN):  acetaminophen     Tablet .. 650 milliGRAM(s) Oral every 6 hours PRN Temp greater or equal to 38C (100.4F), Moderate Pain (4 - 6)        CAPILLARY BLOOD GLUCOSE      I&O's Detail      VITALS:   Vital Signs Last 24 Hrs  T(C): 36.9 (18 Feb 2025 05:30), Max: 36.9 (18 Feb 2025 05:30)  T(F): 98.4 (18 Feb 2025 05:30), Max: 98.4 (18 Feb 2025 05:30)  HR: 62 (18 Feb 2025 05:30) (62 - 74)  BP: 138/68 (18 Feb 2025 05:30) (128/58 - 141/61)  BP(mean): --  RR: 16 (18 Feb 2025 05:30) (16 - 18)  SpO2: 98% (18 Feb 2025 05:30) (98% - 98%)    Parameters below as of 18 Feb 2025 05:30  Patient On (Oxygen Delivery Method): room air          GENERAL: NAD, lying in bed comfortably  HEAD:  Atraumatic, Normocephalic  EYES: PERRLA, conjunctiva and sclera clear  ENMT: Moist mucous membranes  NECK: Supple, No JVD, Normal thyroid  CHEST/LUNG: Clear to percussion bilaterally; No rales, rhonchi, wheezing, or rubs  HEART: Regular rate and rhythm; No murmurs, rubs, or gallops  ABDOMEN: Soft, Nontender, Nondistended; Bowel sounds present. Urine is concentrated  EXTREMITIES:  2+ Peripheral Pulses, No clubbing, cyanosis, mild bilateral LE edema to knee, wearing boots  NERVOUS SYSTEM:  Alert & Oriented X0, nonverbal, does not follow commands, grimaces to touch and tracks with eyes  SKIN: Diffuse fluid filled soft bullae and ruptured flacid bullae over the trunk and extremities    LABS:                                   9.5    8.84  )-----------( 234      ( 15 Feb 2025 05:45 )             31.6       02-18    142  |  108[H]  |  11  ----------------------------<  95  3.8   |  25  |  0.58    Ca    8.4      18 Feb 2025 06:50  Phos  2.7     02-18  Mg     2.00     02-18            Blood Gas Profile - Venous (02.16.25 @ 05:45)    pH, Venous: 7.46: Due to specimen delivery delays, please interpret with caution   pCO2, Venous: 36 mmHg   pO2, Venous: 191 mmHg   HCO3, Venous: 26 mmol/L   Base Excess, Venous: 1.8 mmol/L   Oxygen Saturation, Venous: 98.8 %   Total CO2, Venous: 27 mmol/L      Urinalysis Basic - ( 14 Feb 2025 05:56 )    Color: x / Appearance: x / SG: x / pH: x  Gluc: 65 mg/dL / Ketone: x  / Bili: x / Urobili: x   Blood: x / Protein: x / Nitrite: x   Leuk Esterase: x / RBC: x / WBC x   Sq Epi: x / Non Sq Epi: x / Bacteria: x        Culture - Blood (collected 12 Feb 2025 16:00)  Source: .Blood BLOOD  Gram Stain (14 Feb 2025 13:28):    Growth in aerobic bottle: Gram Positive Cocci in Clusters  Preliminary Report (14 Feb 2025 19:12):    Growth in aerobic bottle: Aerococcus urinae    Direct identification is available within approximately 3-5    hours either by Blood Panel Multiplexed PCR or Direct    MALDI-TOF. Details: [email or web address]  Organism: Blood Culture PCR (14 Feb 2025 17:16)  Organism: Blood Culture PCR (14 Feb 2025 17:16)    Culture - Blood (collected 12 Feb 2025 16:00)  Source: .Blood BLOOD  Preliminary Report (15 Feb 2025 02:01):    No growth at 48 Hours        RADIOLOGY & ADDITIONAL TESTS:     PROGRESS NOTE:     Patient is a 74y old  Female who presents with a chief complaint of derm evaluation for potential bullous pemphigoid (14 Feb 2025 14:58)      SUBJECTIVE / OVERNIGHT EVENTS:    No acute events overnight. Patient examined at bedside with no acute complaints, sleeping comfortably. DC to [facility] today.  REVIEW OF SYSTEMS:    Unable to assess ROS due to baseline mental status       MEDICATIONS  (STANDING):  aspirin enteric coated 81 milliGRAM(s) Oral daily  atorvastatin 20 milliGRAM(s) Oral at bedtime  chlorhexidine 2% Cloths 1 Application(s) Topical daily  enoxaparin Injectable 40 milliGRAM(s) SubCutaneous every 24 hours  escitalopram 20 milliGRAM(s) Oral daily  levETIRAcetam 500 milliGRAM(s) Oral two times a day  levothyroxine 50 MICROGram(s) Oral daily  melatonin 6 milliGRAM(s) Oral at bedtime  mupirocin 2% Ointment 1 Application(s) Both Nostrils every 12 hours  predniSONE   Tablet 30 milliGRAM(s) Oral daily  predniSONE   Tablet   Oral   senna 1 Tablet(s) Oral daily  triamcinolone 0.1% Ointment 1 Application(s) Topical every 12 hours    MEDICATIONS  (PRN):  acetaminophen     Tablet .. 650 milliGRAM(s) Oral every 6 hours PRN Temp greater or equal to 38C (100.4F), Moderate Pain (4 - 6)        CAPILLARY BLOOD GLUCOSE      I&O's Detail      VITALS:   Vital Signs Last 24 Hrs  T(C): 36.9 (18 Feb 2025 05:30), Max: 36.9 (18 Feb 2025 05:30)  T(F): 98.4 (18 Feb 2025 05:30), Max: 98.4 (18 Feb 2025 05:30)  HR: 62 (18 Feb 2025 05:30) (62 - 74)  BP: 138/68 (18 Feb 2025 05:30) (128/58 - 141/61)  BP(mean): --  RR: 16 (18 Feb 2025 05:30) (16 - 18)  SpO2: 98% (18 Feb 2025 05:30) (98% - 98%)    Parameters below as of 18 Feb 2025 05:30  Patient On (Oxygen Delivery Method): room air          GENERAL: NAD, lying in bed comfortably  HEAD:  Atraumatic, Normocephalic  EYES: PERRLA, conjunctiva and sclera clear  ENMT: Moist mucous membranes  NECK: Supple, No JVD, Normal thyroid  CHEST/LUNG: Clear to percussion bilaterally; No rales, rhonchi, wheezing, or rubs  HEART: Regular rate and rhythm; No murmurs, rubs, or gallops  ABDOMEN: Soft, Nontender, Nondistended; Bowel sounds present. Urine is concentrated  EXTREMITIES:  2+ Peripheral Pulses, No clubbing, cyanosis, mild bilateral LE edema to knee, wearing boots  NERVOUS SYSTEM:  Alert & Oriented X0, nonverbal, does not follow commands, grimaces to touch, not opening eyes  SKIN: Diffuse fluid filled soft bullae and ruptured flacid bullae over the trunk and extremities    LABS:                                   9.5    8.84  )-----------( 234      ( 15 Feb 2025 05:45 )             31.6       02-18    142  |  108[H]  |  11  ----------------------------<  95  3.8   |  25  |  0.58    Ca    8.4      18 Feb 2025 06:50  Phos  2.7     02-18  Mg     2.00     02-18            Blood Gas Profile - Venous (02.16.25 @ 05:45)    pH, Venous: 7.46: Due to specimen delivery delays, please interpret with caution   pCO2, Venous: 36 mmHg   pO2, Venous: 191 mmHg   HCO3, Venous: 26 mmol/L   Base Excess, Venous: 1.8 mmol/L   Oxygen Saturation, Venous: 98.8 %   Total CO2, Venous: 27 mmol/L      Urinalysis Basic - ( 14 Feb 2025 05:56 )    Color: x / Appearance: x / SG: x / pH: x  Gluc: 65 mg/dL / Ketone: x  / Bili: x / Urobili: x   Blood: x / Protein: x / Nitrite: x   Leuk Esterase: x / RBC: x / WBC x   Sq Epi: x / Non Sq Epi: x / Bacteria: x        Culture - Blood (collected 12 Feb 2025 16:00)  Source: .Blood BLOOD  Gram Stain (14 Feb 2025 13:28):    Growth in aerobic bottle: Gram Positive Cocci in Clusters  Preliminary Report (14 Feb 2025 19:12):    Growth in aerobic bottle: Aerococcus urinae    Direct identification is available within approximately 3-5    hours either by Blood Panel Multiplexed PCR or Direct    MALDI-TOF. Details: [email or web address]  Organism: Blood Culture PCR (14 Feb 2025 17:16)  Organism: Blood Culture PCR (14 Feb 2025 17:16)    Culture - Blood (collected 12 Feb 2025 16:00)  Source: .Blood BLOOD  Preliminary Report (15 Feb 2025 02:01):    No growth at 48 Hours        RADIOLOGY & ADDITIONAL TESTS:

## 2025-02-18 NOTE — PROGRESS NOTE ADULT - REASON FOR ADMISSION
Derm evaluation for potential bullous pemphigoid

## 2025-02-18 NOTE — DISCHARGE NOTE NURSING/CASE MANAGEMENT/SOCIAL WORK - PATIENT PORTAL LINK FT
You can access the FollowMyHealth Patient Portal offered by [facility] by registering at the following website: [email or web address]. By joining [facility]’s FollowMyHealth portal, you will also be able to view your health information using other applications (apps) compatible with our system.

## 2025-02-18 NOTE — DISCHARGE NOTE NURSING/CASE MANAGEMENT/SOCIAL WORK - NSDCFUADDAPPT_GEN_ALL_CORE_FT
APPTS ARE READY TO BE MADE: [ ] YES    Best Family or Patient Contact (if needed):    Additional Information about above appointments (if needed):    1: Dermatology - [phone]  2:   3:     Other comments or requests:

## 2025-02-18 NOTE — PROGRESS NOTE ADULT - ATTENDING COMMENTS
Initial attending contact date 02/15/25. See resident note written above for details. I reviewed the resident documentation. I have personally seen and examined this patient. I reviewed vitals, labs, medications, and additional imaging. I agree with the above resident's findings and plans as written above with the following additions/statements.    73 yo F admitted with biopsy confirmed bullous pemphigoid   - c/w prednisone taper  - c/w triamcinolone ointment  - dispo to Nursing home
Briefly, this is a 73 y/o F with history of vascular dementia (AAOx0 at baseline), dermatomyositis with ILD, seizure disorder on keppra, HTN, HLD, hypothyroidism, recurrent falls, initially sent to [facility] from [facility] for diffuse blisters concerning for bullous pemphigoid, transferred to [facility] for dermatology evaluation. Currently admitted to medicine for further workup and management.    Patient seen and examined at bedside. Patient resting comfortably, eyes tracking and following commands. Seen being fed lunch. Denies any pain currently. VSS, afebrile. Dressings noted over all extremities.    #c/f Bullous Pemphigoid  #Seizure d/o  #Acidosis  - patient presenting to the hospital due to worsening of rash with blisters  - transferred to [facility] for dermatology evaluation  - currently HD stable, afebrile  - dermatology eval appreciated, now s/p skin biopsy showing bullous pemphigoid  - c/w triamcinolone ointment and prednisone taper  - no concern for infection currently, c/w local wound care  - cleared for soft diet per speech/swallow  - bicarb now improved with IVF  - Plan for transfer to [facility] today    Remainder of plan as outlined above.  Case and plan of care discussed with medicine team 4 .
Briefly, this is a 73 y/o F with history of vascular dementia (AAOx0 at baseline), dermatomyositis with ILD, seizure disorder on keppra, HTN, HLD, hypothyroidism, recurrent falls, initially sent to [facility] from [facility] for diffuse blisters concerning for bullous pemphigoid, transferred to [facility] for dermatology evaluation. Currently admitted to medicine for further workup and management.    Patient seen and examined at bedside. Patient resting comfortably, eyes tracking and following commands. VSS, afebrile. Exam notable for numerous fluid filled blisters on extremities, several ruptured blisters noted, dressings present throughout.    #c/f Bullous Pemphigoid  #Seizure d/o  #Acidosis  - patient presenting to the hospital due to worsening of rash with blisters  - transferred to [facility] for dermatology evaluation  - currently HD stable, afebrile  - dermatology eval appreciated, now s/p skin biopsy showing bullous pemphigoid  - c/w triamcinolone ointment and prednisone taper  - no concern for infection currently, c/w local wound care  - cleared for soft diet per speech/swallow  - noted with slightly decreased bicarbonate, VBG with normal pH  - suspect patient with insensible volume losses 2/2 numerous ruptured bullae/blisters  - c/w gentle IV hydration with LR    Remainder of plan as outlined above.  Case and plan of care discussed with medicine team 4 .
Briefly, this is a 73 y/o F with history of vascular dementia (AAOx0 at baseline), dermatomyositis with ILD, seizure disorder on keppra, HTN, HLD, hypothyroidism, recurrent falls, initially sent to [facility] from [facility] for diffuse blisters concerning for bullous pemphigoid, transferred to [facility] for dermatology evaluation. Currently admitted to medicine for further workup and management.    Patient seen and examined at bedside. Patient resting comfortably, eyes tracking and following commands. VSS, afebrile. Exam notable for numerous fluid filled blisters on extremities. No purulent drainage noted.    #c/f Bullous Pemphigoid  #Seizure d/o  - patient presenting to the hospital due to worsening of rash with blisters  - transferred to [facility] for dermatology evaluation  - currently HD stable, afebrile  - dermatology eval appreciated, now s/p skin biopsy, will f/u results  - no concern for infection currently, c/w local wound care  - cleared for soft diet per speech/swallow    Remainder of plan as outlined above.  Case and plan of care discussed with medicine team 4
Briefly, this is a 73 y/o F with history of vascular dementia (AAOx0 at baseline), dermatomyositis with ILD, seizure disorder on keppra, HTN, HLD, hypothyroidism, recurrent falls, initially sent to [facility] from [facility] for diffuse blisters concerning for bullous pemphigoid, transferred to [facility] for dermatology evaluation. Currently admitted to medicine for further workup and management.    Patient seen and examined at bedside. Patient resting comfortably, eyes tracking and following commands. VSS, afebrile. Dressings noted over all extremities.    #c/f Bullous Pemphigoid  #Seizure d/o  #Acidosis  - patient presenting to the hospital due to worsening of rash with blisters  - transferred to [facility] for dermatology evaluation  - currently HD stable, afebrile  - dermatology eval appreciated, now s/p skin biopsy showing bullous pemphigoid  - c/w triamcinolone ointment and prednisone taper  - no concern for infection currently, c/w local wound care  - cleared for soft diet per speech/swallow  - bicarb now improved with IVF  - patient now planned for [facility] placement    Remainder of plan as outlined above.  Case and plan of care discussed with medicine team 4 .
Briefly, this is a 75 y/o F with history of vascular dementia (AAOx0 at baseline), dermatomyositis with ILD, seizure disorder on keppra, HTN, HLD, hypothyroidism, recurrent falls, initially sent to [facility] from [facility] for diffuse blisters concerning for bullous pemphigoid, transferred to [facility] for dermatology evaluation. Currently admitted to medicine for further workup and management.    Patient seen and examined at bedside. Patient resting comfortably, eyes tracking and following commands. VSS, afebrile. Exam notable for numerous fluid filled blisters on extremities, several ruptured blisters noted.    #c/f Bullous Pemphigoid  #Seizure d/o  #Acidosis  - patient presenting to the hospital due to worsening of rash with blisters  - transferred to [facility] for dermatology evaluation  - currently HD stable, afebrile  - dermatology eval appreciated, now s/p skin biopsy, will f/u results  - c/w triamcinolone ointment and prednisone taper  - no concern for infection currently, c/w local wound care  - cleared for soft diet per speech/swallow  - noted with slightly decreased bicarbonate  - begin gentle IV hydration, check VBG in AM    Remainder of plan as outlined above.  Case and plan of care discussed with medicine team 4 .

## 2025-02-18 NOTE — PROGRESS NOTE ADULT - PROBLEM SELECTOR PLAN 9
Diet: Soft and Bite Size Solids with Thin Liquids (per SLP eval)  DVT ppx: lovenox SQ   Dispo: pending clinical course; resident of [facility] but patient will need higher-level of care for wound care  Code status: Full    Son, Josh, is HCP  Niece, cecile kent, is POA ([phone]) Diet: Soft and Bite Size Solids with Thin Liquids (per SLP eval)  DVT ppx: lovenox SQ   Dispo: [facility]  Code status: Full    Son, Josh, is HCP  Niece, cecile kent, is POA ([phone])

## 2025-02-18 NOTE — CHART NOTE - NSCHARTNOTEFT_GEN_A_CORE
Dermatology Chart Note  Patient biopsy result and bullous pemphigoid antibodies consistent with bullous pemphigoid. Patient discharged today on prednisone taper and topical triamcinolone ointment. She should follow-up with dermatology within 2 weeks.   Attempt made to call son to notify of the above, but no answer.    Discussed with attending, Dr. Desire Darden MD   Resident Physician, PGY-3  [facility] Dermatology  Office: [phone]  Pager: [phone]   **Please page with 10-DIGIT callback # for further related questions.**

## 2025-02-18 NOTE — PROGRESS NOTE ADULT - PROBLEM SELECTOR PLAN 1
Patient presenting with diffuse tense bullae over trunk and extremities, mild and intermittent x1-2 years (originally assumed to be dermatomyositis flares), now worsened and more extensive x 1 month, with oral ulcers, negative Nikolsky, concerning for bullous pemphigoid.  -Derm biopsy results confirm BP    Plan:  - s/p prednisone 60mg PO x1 at OSH  - s/p empiric zosyn and vancomycin at OSH as patient was febrile to 100.2, now afebrile. Wounds do not appear infected, not septic, will hold off on antibiotics at this time  - Derm recs appreciated  - C/w prednisone 30-20-10 taper (5 days each dose)  - C/w  triamcinolone 0.1% ointment BID to affected areas  - Outpatient derm follow up  - F/u /230 serology  - Wound care recs appreciated  - Tylenol for pain prn

## 2025-02-20 LAB
CULTURE RESULTS: SIGNIFICANT CHANGE UP
CULTURE RESULTS: SIGNIFICANT CHANGE UP
SPECIMEN SOURCE: SIGNIFICANT CHANGE UP
SPECIMEN SOURCE: SIGNIFICANT CHANGE UP

## 2025-02-21 PROCEDURE — 84145 PROCALCITONIN (PCT): CPT

## 2025-02-21 PROCEDURE — 83605 ASSAY OF LACTIC ACID: CPT

## 2025-02-21 PROCEDURE — 87077 CULTURE AEROBIC IDENTIFY: CPT

## 2025-02-21 PROCEDURE — 85652 RBC SED RATE AUTOMATED: CPT

## 2025-02-21 PROCEDURE — 80053 COMPREHEN METABOLIC PANEL: CPT

## 2025-02-21 PROCEDURE — 85730 THROMBOPLASTIN TIME PARTIAL: CPT

## 2025-02-21 PROCEDURE — 86140 C-REACTIVE PROTEIN: CPT

## 2025-02-21 PROCEDURE — 87040 BLOOD CULTURE FOR BACTERIA: CPT

## 2025-02-21 PROCEDURE — 96375 TX/PRO/DX INJ NEW DRUG ADDON: CPT

## 2025-02-21 PROCEDURE — 87637 SARSCOV2&INF A&B&RSV AMP PRB: CPT

## 2025-02-21 PROCEDURE — 96374 THER/PROPH/DIAG INJ IV PUSH: CPT

## 2025-02-21 PROCEDURE — 93005 ELECTROCARDIOGRAM TRACING: CPT

## 2025-02-21 PROCEDURE — 36415 COLL VENOUS BLD VENIPUNCTURE: CPT

## 2025-02-21 PROCEDURE — 87150 DNA/RNA AMPLIFIED PROBE: CPT

## 2025-02-21 PROCEDURE — 99285 EMERGENCY DEPT VISIT HI MDM: CPT | Mod: 25

## 2025-02-21 PROCEDURE — 85025 COMPLETE CBC W/AUTO DIFF WBC: CPT

## 2025-02-21 PROCEDURE — 85610 PROTHROMBIN TIME: CPT

## 2025-02-21 PROCEDURE — 71045 X-RAY EXAM CHEST 1 VIEW: CPT

## 2025-07-11 NOTE — PATIENT PROFILE ADULT - DO YOU FEEL THREATENED BY OTHERS?
I have referred you to the heart valve clinic for follow-up.  They should be calling you in the next few days and if they do not please call them to schedule an appointment.    If you have any concern or worsening condition please return to emergency department.  
no

## 2025-07-21 NOTE — ED PROVIDER NOTE - UNABLE TO OBTAIN
OCCUPATIONAL THERAPY TREATMENT  Patient: Shlomo Santiago (81 y.o. male)  Date: 7/21/2025  Primary Diagnosis: CHF (congestive heart failure), NYHA class II, acute, systolic (HCC) [I50.21]  Acute on chronic congestive heart failure, unspecified heart failure type (HCC) [I50.9]  Precautions: Fall Risk, General Precautions  Recommendations for nursing mobility: Frequent repositioning to prevent skin breakdown, Use of bed/chair alarm for safety, LE elevation for management of edema, and Assist x2    In place during session: Nasal Cannula 1L and External Catheter  Chart, occupational therapy assessment, plan of care, and goals were reviewed.  ASSESSMENT  Patient continues with skilled OT services and is slowly progressing towards goals. PT/OT sessions occurred together for increased safety of Pt and clinicians. Pt presented semi supine upon CAOT[facility] arrival, awake and agreeable to session w/ min encouragement. Pt A&O x 2.     Pt completed facial hygiene while semi supine in bed w/ setup and voice cues for initiation/sequencing. Pt given skilled guidance through completion of HEP consisting of BLE TherEx targeting improved ROM and reduced pain in prep for completion of functional mobility/ADLs/IADLs w/ consistent multimodal cues throughout for initiation and sequencing (see chart below for TherEx details.) Pt required Total A to complete posterior/anterior pericare post incontinence of bowels x2 w/ Pt completing logrolls 3x w/ Max A for linen management and toileting ADLs. Pt noted to have some redness and irritation in posterior/anterior pillo area and barrier cream applied at this time. Pt required Max A to doff soiled gown while semi-supine in bed and don clean gown w/ voice cues for initiation/sequencing.     Pt completed bed mobility, coming to sit EOB w/ overall Max A x2 and presenting w/ poor static sitting balance, requiring Max A and Ron UE support to maintain initially but progressing to Fair/Poor dynamic sitting  Dementia

## (undated) DEVICE — GLV 6.5 PROTEXIS (WHITE)

## (undated) DEVICE — POSITIONER POSITIONING ROLL  5X17"

## (undated) DEVICE — SOL IRR POUR H2O 1500ML

## (undated) DEVICE — SUT POLYSORB 2-0 30" GS-11 UNDYED

## (undated) DEVICE — DRAPE LIGHT HANDLE COVER (GREEN)

## (undated) DEVICE — BAG SPONGE COUNTER EZ

## (undated) DEVICE — NDL HYPO REGULAR BEVEL 18GA X 1.5" (PINK)

## (undated) DEVICE — SAW BLADE STRYKER SAGITTAL AGGRESSIVE 25X86.5X1.32MM

## (undated) DEVICE — POSITIONER FOAM HEAD CRADLE (PINK)

## (undated) DEVICE — WOUND IRR IRRISEPT W 0.5 CHG

## (undated) DEVICE — SUT TEVDEK 1 30" KC-6

## (undated) DEVICE — Device

## (undated) DEVICE — POSITIONER STRAP ARMBOARD VELCRO TS-30

## (undated) DEVICE — SUT MONOSOF 3-0 30" C-16

## (undated) DEVICE — POSITIONER STIRRUP STRAP W SLIP RING 19X3.5"

## (undated) DEVICE — VENODYNE/SCD SLEEVE CALF MEDIUM

## (undated) DEVICE — ZIMMER PULSAVAC PLUS FAN KIT

## (undated) DEVICE — SYR LUER LOK 5CC

## (undated) DEVICE — WARMING BLANKET UPPER ADULT

## (undated) DEVICE — GLV 7.5 PROTEXIS (WHITE)

## (undated) DEVICE — PACK TOTAL HIP

## (undated) DEVICE — SPECIMEN CONTAINER PET

## (undated) DEVICE — KIT OPTIVAC CEMENT MIXER 40GM

## (undated) DEVICE — LAP PAD 18 X 18"

## (undated) DEVICE — PREP CHLORAPREP HI-LITE ORANGE 26ML

## (undated) DEVICE — SOL IRR POUR NS 0.9% 500ML

## (undated) DEVICE — GLV 8 PROTEXIS (WHITE)

## (undated) DEVICE — TIP FEMORAL CANAL

## (undated) DEVICE — SUT POLYSORB 1 27" GS-12 UNDYED